# Patient Record
Sex: MALE | Race: WHITE | NOT HISPANIC OR LATINO | Employment: OTHER | URBAN - METROPOLITAN AREA
[De-identification: names, ages, dates, MRNs, and addresses within clinical notes are randomized per-mention and may not be internally consistent; named-entity substitution may affect disease eponyms.]

---

## 2017-01-13 ENCOUNTER — HOSPITAL ENCOUNTER (EMERGENCY)
Facility: HOSPITAL | Age: 77
Discharge: HOME | End: 2017-01-13
Attending: EMERGENCY MEDICINE
Payer: MEDICARE

## 2017-01-13 VITALS
DIASTOLIC BLOOD PRESSURE: 72 MMHG | TEMPERATURE: 98 F | RESPIRATION RATE: 16 BRPM | BODY MASS INDEX: 29.25 KG/M2 | OXYGEN SATURATION: 99 % | HEART RATE: 76 BPM | WEIGHT: 182 LBS | HEIGHT: 66 IN | SYSTOLIC BLOOD PRESSURE: 130 MMHG

## 2017-01-13 DIAGNOSIS — S49.92XA SHOULDER INJURY, LEFT, INITIAL ENCOUNTER: Primary | ICD-10-CM

## 2017-01-13 PROCEDURE — 63600175 PHARM REV CODE 636 W HCPCS: Performed by: EMERGENCY MEDICINE

## 2017-01-13 PROCEDURE — 99283 EMERGENCY DEPT VISIT LOW MDM: CPT | Mod: 25

## 2017-01-13 PROCEDURE — 96372 THER/PROPH/DIAG INJ SC/IM: CPT

## 2017-01-13 RX ORDER — MORPHINE SULFATE 10 MG/ML
5 INJECTION INTRAMUSCULAR; INTRAVENOUS; SUBCUTANEOUS
Status: COMPLETED | OUTPATIENT
Start: 2017-01-13 | End: 2017-01-13

## 2017-01-13 RX ORDER — OXYCODONE AND ACETAMINOPHEN 5; 325 MG/1; MG/1
1 TABLET ORAL EVERY 8 HOURS PRN
Qty: 15 TABLET | Refills: 0 | Status: SHIPPED | OUTPATIENT
Start: 2017-01-13 | End: 2018-12-07

## 2017-01-13 RX ADMIN — MORPHINE SULFATE 5 MG: 10 INJECTION INTRAVENOUS at 04:01

## 2017-01-13 NOTE — ED TRIAGE NOTES
Threw a plastic empty plastic bottle, heard something snap in left shoulder, now cant move shoulder.  complaints of pain in the left shoulder joint. Denies numbness or tingling distally.

## 2017-01-13 NOTE — DISCHARGE INSTRUCTIONS
As discussed it is very important that you take the sling off several times a day and do the exercises that were demonstrated.  You may apply ice to your shoulder a few times a day for the next few days.

## 2017-01-13 NOTE — ED PROVIDER NOTES
"Encounter Date: 1/13/2017    SCRIBE #1 NOTE: I, Fabricio Lieberman, am scribing for, and in the presence of, Gary Pearson III, MD. Other sections scribed: HPI, ROS.       History     Chief Complaint   Patient presents with    Shoulder Pain     "I think he dislocated his shoulder," per family member, pt was throwing something 30 mins pta, reports unable to move L upper arm, has pacemaker on L side of chest, denies hx of shoulder injuries, Hx CVA 14yrs ago, takes coumadin, CAD, DM     Review of patient's allergies indicates:   Allergen Reactions    Penicillins Swelling     HPI Comments: CC: Shoulder Pain  HPI: This 76 y.o. male with HTN, DM type II, hypothyroidism, CAD s/p stents x2, anticoagulation on Plavix, cardiac pacemaker in place and Hx of tobacco use presents to the ED c/o L shoulder pain that began this afternoon. His pain is severe, constant, acute, atraumatic and worse with ROM of shoulder. He denies Hx of previous shoulder injuries. He denies relief after taking a Percocet 5-325 mg PTA. He denies elbow pain, wrist pain, numbness to arm, neck pain, chest pain, and fever.      The history is provided by the patient.     Past Medical History   Diagnosis Date    Anticoagulant long-term use      Plavix (on hold for angiogram)    Arthritis     Coronary artery disease      pacemaker, stentsx2    Diabetes mellitus     Hypertension     Sleep apnea     Stroke      Rt side affected / mild    Thyroid disease      No past medical history pertinent negatives.  Past Surgical History   Procedure Laterality Date    Coronary angioplasty with stent placement  6/06     stents x 2 placed     Cholecystectomy      Appendectomy      Hernia repair       ventral hernia repair     No family history on file.  Social History   Substance Use Topics    Smoking status: Former Smoker     Quit date: 6/13/1998    Smokeless tobacco: Never Used    Alcohol use No     Review of Systems   Constitutional: Negative for chills and fever. "   HENT: Negative for congestion.    Eyes: Negative for pain and visual disturbance.   Respiratory: Negative for cough and shortness of breath.    Cardiovascular: Negative for chest pain.   Gastrointestinal: Negative for abdominal pain, diarrhea, nausea and vomiting.   Genitourinary: Negative for dysuria.   Musculoskeletal: Positive for arthralgias (L shoulder). Negative for joint swelling and neck pain.   Skin: Negative for rash and wound.   Neurological: Negative for numbness and headaches.       Physical Exam   Initial Vitals   BP Pulse Resp Temp SpO2   01/13/17 1612 01/13/17 1612 01/13/17 1612 01/13/17 1612 01/13/17 1612   134/70 72 18 98.6 °F (37 °C) 99 %     Physical Exam    Constitutional: He appears well-developed and well-nourished. He is not diaphoretic. No distress.   HENT:   Head: Normocephalic and atraumatic.   Right Ear: External ear normal.   Left Ear: External ear normal.   Eyes: Conjunctivae and EOM are normal. Pupils are equal, round, and reactive to light.   Neck: Normal range of motion.   Cardiovascular: Normal rate and regular rhythm.   Pulmonary/Chest: No respiratory distress.   Abdominal: He exhibits no distension.   Musculoskeletal: He exhibits no edema.        Arms:  Neurological: He is alert. GCS eye subscore is 4. GCS verbal subscore is 5. GCS motor subscore is 6.   Skin: Skin is warm and dry.   Psychiatric: He has a normal mood and affect. Thought content normal.         ED Course   Procedures  Labs Reviewed - No data to display          Medical Decision Making:   Initial Assessment:   76-year-old male presents complaining of severe, acute onset left shoulder pain that began while throwing something immediately prior to arrival.  He does have limited abduction of the left shoulder, but there is no obvious deformity on exam.  Areas of tenderness documented above.  No neurovascular compromise.  No associated signs or symptoms to suggest referred pain.  Differential Diagnosis:   Likely  represents muscular or tendinous strain.  Acromioclavicular joint separation also a possibility.  Much lower likelihood of fracture or dislocation, but will obtain an x-ray to screen for these.  Independently Interpreted Test(s):   I have ordered and independently interpreted X-rays - see summary below.       <> Summary of X-Ray Reading(s): Left shoulder x-ray: No acute bony abnormality, high riding humeral head  ED Management:  X-ray does not reveal acute bony abnormality.  Suspect soft tissue injury.  Will place the patient in a sling and swath, prescribed pain medication, and recommend range of motion exercises and follow-up with orthopedics.  Will hold NSAIDs this patient is taking warfarin. Patient counseled regarding test results, recommendations for supportive care, and need for follow-up.  Return precautions given.              Scribe Attestation:   Scribe #1: I performed the above scribed service and the documentation accurately describes the services I performed. I attest to the accuracy of the note.    Attending Attestation:           Physician Attestation for Scribe:  Physician Attestation Statement for Scribe #1: I, Gary Pearson III, MD, reviewed documentation, as scribed by Fabricio Lieberman in my presence, and it is both accurate and complete.                 ED Course     Clinical Impression:   The encounter diagnosis was Shoulder injury, left, initial encounter.          Gary Pearson III, MD  01/13/17 1942

## 2017-01-13 NOTE — ED AVS SNAPSHOT
[facility]  [address] [place] [address]               August Sukumarclair   2017  4:14 PM   ED    Description:  Male : 1940   Department:  [facility]           Your Care was Coordinated By:     Provider Role From To    Gary Pearson III, MD Attending Provider 17 [id number] --      Reason for Visit     Shoulder Pain           Diagnoses this Visit        Comments    Shoulder injury, left, initial encounter    -  Primary       ED Disposition     ED Disposition Condition Comment    Discharge             To Do List           Follow-up Information     Follow up with Vaughn Minor III, MD. Call in 3 days.    Specialty:  Orthopedic Surgery    Contact information:    [address]  [address]  [place] [address]  [phone]         These Medications        Disp Refills Start End    oxycodone-acetaminophen (PERCOCET) 5-325 mg per tablet 15 tablet 0 2017     Take 1 tablet by mouth every 8 (eight) hours as needed for Pain. - Oral    Pharmacy: [facility]  [place]  [address] [facility] Ph #: [phone]         [facility]     [facility] -  Assistance  Registered nurses in the [facility] provide clinical advisement, health education, appointment booking, and other advisory services.  Call for this free service at [phone].             Medications           These medications were administered today        Dose Freq    morphine injection 5 mg 5 mg ED 1 Time    Sig: Inject 0.5 mLs (5 mg total) into the muscle ED 1 Time.    Class: Normal    Route: Intramuscular      CHANGE how you are taking these medications     Start Taking Instead of    oxycodone-acetaminophen (PERCOCET) 5-325 mg per tablet oxycodone-acetaminophen 5-325 mg (PERCOCET) 5-325 mg per tablet    Dosage:  Take 1 tablet by mouth every 8 (eight) hours as needed for Pain. Dosage:  Take 1 tablet by mouth  "every 6 (six) hours as needed for Pain (breakthrough pain).           Verify that the below list of medications is an accurate representation of the medications you are currently taking.  If none reported, the list may be blank. If incorrect, please contact your healthcare provider. Carry this list with you in case of emergency.           Current Medications     amiodarone (PACERONE) 200 MG Tab Take 200 mg by mouth once daily.     digoxin (LANOXIN) 250 mcg tablet Take 250 mcg by mouth once daily.    glimepiride (AMARYL) 4 MG tablet Take 4 mg by mouth before breakfast.    levothyroxine (SYNTHROID) 200 MCG tablet Take 200 mcg by mouth once daily.    metformin (GLUCOPHAGE) 500 MG tablet Take 500 mg by mouth 2 (two) times daily with meals.    ranitidine (ZANTAC) 300 MG tablet Take 300 mg by mouth every evening.    sertraline (ZOLOFT) 50 MG tablet Take 50 mg by mouth once daily.    tamsulosin (FLOMAX) 0.4 mg Cp24 TAKE 1 CAPSULE AT BEDTIME.    vitamin D 185 MG Tab Take 185 mg by mouth once daily.    warfarin (COUMADIN) 5 MG tablet Take 7.5 mg by mouth once daily.    clopidogrel (PLAVIX) 75 mg tablet Take 75 mg by mouth once daily.    oxycodone-acetaminophen (PERCOCET) 5-325 mg per tablet Take 1 tablet by mouth every 8 (eight) hours as needed for Pain.    sildenafil (VIAGRA) 100 MG tablet Take 1 tablet (100 mg total) by mouth daily as needed for Erectile Dysfunction.           Clinical Reference Information           Your Vitals Were     BP Pulse Temp Resp Height Weight    130/72 (BP Location: Right arm, Patient Position: Sitting, BP Method: Automatic) 76 98.2 °F (36.8 °C) (Oral) 16 5' 6" (1.676 m) 82.6 kg (182 lb)    SpO2 BMI             99% 29.38 kg/m2         Allergies as of 1/13/2017        Reactions    Penicillins Swelling      Immunizations Administered on Date of Encounter - 1/13/2017     None      ED Micro, Lab, POCT     None      ED Imaging Orders     Start Ordered       Status Ordering Provider    01/13/17 8807 " 01/13/17 1627  X-Ray Shoulder Trauma 3 view Left  1 time imaging      Final result         Discharge Instructions       As discussed it is very important that you take the sling off several times a day and do the exercises that were demonstrated.  You may apply ice to your shoulder a few times a day for the next few days.     [facility] Sign-Up     Activating your [facility] account is as easy as 1-2-3!     1) Visit [email or web address], select Sign Up Now, enter this activation code and your date of birth, then select Next.  [email or web address]  Expires: 2/27/2017  5:57 PM      2) Create a username and password to use when you visit [facility] in the future and select a security question in case you lose your password and select Next.    3) Enter your e-mail address and click Sign Up!    Additional Information  If you have questions, please e-mail [email or web address] or call [phone] to talk to our [facility] staff. Remember, [facility] is NOT to be used for urgent needs. For medical emergencies, dial 911.         Smoking Cessation     If you would like to quit smoking:   You may be eligible for free services if you are a [place] resident and started smoking cigarettes before September 1, 1988.  Call the [employer] ([employer]) toll free at (933) 304-3788 or (316) 711-2749.   Call 1-800-QUIT-NOW if you do not meet the above criteria.             [facility] complies with applicable Federal civil rights laws and does not discriminate on the basis of race, color, national origin, age, disability, or sex.        Language Assistance Services     ATTENTION: Language assistance services are available, free of charge. Please call [phone].      ATENCIÓN: Si habla gracie, tiene a jurado disposición servicios gratuitos de asistencia lingüística. Llame al [phone].     CHÚ Ý: N?u b?n nói Ti?ng Vi?t, có các d?ch v? h? tr? ngôn ng? mi?n phí dành cho b?n. G?i s? [phone].

## 2017-01-20 ENCOUNTER — HOSPITAL ENCOUNTER (OUTPATIENT)
Dept: RADIOLOGY | Facility: HOSPITAL | Age: 77
Discharge: HOME | End: 2017-01-20
Attending: ORTHOPAEDIC SURGERY
Payer: MEDICARE

## 2017-01-20 ENCOUNTER — HOSPITAL ENCOUNTER (OUTPATIENT)
Dept: INTERVENTIONAL RADIOLOGY/VASCULAR | Facility: HOSPITAL | Age: 77
Discharge: HOME | End: 2017-01-20
Attending: ORTHOPAEDIC SURGERY
Payer: MEDICARE

## 2017-01-20 DIAGNOSIS — M75.112 NONTRAUMATIC INCOMPLETE TEAR OF LEFT ROTATOR CUFF: ICD-10-CM

## 2017-01-20 DIAGNOSIS — M25.512 SHOULDER PAIN, LEFT: ICD-10-CM

## 2017-01-20 PROCEDURE — 25500020 PHARM REV CODE 255: Performed by: ORTHOPAEDIC SURGERY

## 2017-01-20 PROCEDURE — 20610 DRAIN/INJ JOINT/BURSA W/O US: CPT | Mod: ,,, | Performed by: RADIOLOGY

## 2017-01-20 PROCEDURE — 73201 CT UPPER EXTREMITY W/DYE: CPT | Mod: TC,LT

## 2017-01-20 PROCEDURE — 73201 CT UPPER EXTREMITY W/DYE: CPT | Mod: 26,LT,, | Performed by: RADIOLOGY

## 2017-01-20 PROCEDURE — 77002 NEEDLE LOCALIZATION BY XRAY: CPT | Mod: TC

## 2017-01-20 PROCEDURE — 77002 NEEDLE LOCALIZATION BY XRAY: CPT | Mod: 26,,, | Performed by: RADIOLOGY

## 2017-01-20 RX ADMIN — IOHEXOL 10 ML: 300 INJECTION, SOLUTION INTRAVENOUS at 04:01

## 2017-02-16 ENCOUNTER — HOSPITAL ENCOUNTER (EMERGENCY)
Facility: HOSPITAL | Age: 77
Discharge: HOME | End: 2017-02-16
Payer: MEDICARE

## 2017-02-16 VITALS
RESPIRATION RATE: 18 BRPM | TEMPERATURE: 99 F | DIASTOLIC BLOOD PRESSURE: 77 MMHG | OXYGEN SATURATION: 98 % | HEART RATE: 90 BPM | SYSTOLIC BLOOD PRESSURE: 139 MMHG

## 2017-02-16 DIAGNOSIS — J10.1 INFLUENZA A: Primary | ICD-10-CM

## 2017-02-16 LAB
ALBUMIN SERPL BCP-MCNC: 3.7 G/DL
ALP SERPL-CCNC: 89 U/L
ALT SERPL W/O P-5'-P-CCNC: 21 U/L
ANION GAP SERPL CALC-SCNC: 11 MMOL/L
AST SERPL-CCNC: 22 U/L
BACTERIA #/AREA URNS HPF: ABNORMAL /HPF
BASOPHILS # BLD AUTO: 0.02 K/UL
BASOPHILS NFR BLD: 0.4 %
BILIRUB SERPL-MCNC: 0.9 MG/DL
BILIRUB UR QL STRIP: NEGATIVE
BNP SERPL-MCNC: 81 PG/ML
BUN SERPL-MCNC: 15 MG/DL
CALCIUM SERPL-MCNC: 9.2 MG/DL
CHLORIDE SERPL-SCNC: 105 MMOL/L
CLARITY UR: CLEAR
CO2 SERPL-SCNC: 22 MMOL/L
COLOR UR: YELLOW
CREAT SERPL-MCNC: 0.9 MG/DL
DIFFERENTIAL METHOD: ABNORMAL
DIGOXIN SERPL-MCNC: <0.1 NG/ML
EOSINOPHIL # BLD AUTO: 0 K/UL
EOSINOPHIL NFR BLD: 0.4 %
ERYTHROCYTE [DISTWIDTH] IN BLOOD BY AUTOMATED COUNT: 17.1 %
EST. GFR  (AFRICAN AMERICAN): >60 ML/MIN/1.73 M^2
EST. GFR  (NON AFRICAN AMERICAN): >60 ML/MIN/1.73 M^2
FLUAV AG SPEC QL IA: POSITIVE
FLUBV AG SPEC QL IA: NEGATIVE
GLUCOSE SERPL-MCNC: 130 MG/DL
GLUCOSE UR QL STRIP: ABNORMAL
HCT VFR BLD AUTO: 40.6 %
HGB BLD-MCNC: 13.5 G/DL
HGB UR QL STRIP: NEGATIVE
HYALINE CASTS #/AREA URNS LPF: 0 /LPF
INR PPP: 2.2
KETONES UR QL STRIP: NEGATIVE
LEUKOCYTE ESTERASE UR QL STRIP: NEGATIVE
LYMPHOCYTES # BLD AUTO: 0.3 K/UL
LYMPHOCYTES NFR BLD: 4.7 %
MCH RBC QN AUTO: 31.8 PG
MCHC RBC AUTO-ENTMCNC: 33.3 %
MCV RBC AUTO: 96 FL
MICROSCOPIC COMMENT: ABNORMAL
MONOCYTES # BLD AUTO: 0.5 K/UL
MONOCYTES NFR BLD: 9.8 %
NEUTROPHILS # BLD AUTO: 4.5 K/UL
NEUTROPHILS NFR BLD: 84.5 %
NITRITE UR QL STRIP: NEGATIVE
PH UR STRIP: 5 [PH] (ref 5–8)
PLATELET # BLD AUTO: 130 K/UL
PMV BLD AUTO: 10.3 FL
POTASSIUM SERPL-SCNC: 3.7 MMOL/L
PROT SERPL-MCNC: 7.2 G/DL
PROT UR QL STRIP: ABNORMAL
PROTHROMBIN TIME: 22.1 SEC
RBC # BLD AUTO: 4.24 M/UL
RBC #/AREA URNS HPF: 2 /HPF (ref 0–4)
SODIUM SERPL-SCNC: 138 MMOL/L
SP GR UR STRIP: 1.03 (ref 1–1.03)
SPECIMEN SOURCE: ABNORMAL
SQUAMOUS #/AREA URNS HPF: 2 /HPF
TROPONIN I SERPL DL<=0.01 NG/ML-MCNC: 0.02 NG/ML
URN SPEC COLLECT METH UR: ABNORMAL
UROBILINOGEN UR STRIP-ACNC: NEGATIVE EU/DL
WBC # BLD AUTO: 5.32 K/UL
WBC #/AREA URNS HPF: 1 /HPF (ref 0–5)
WBC CASTS #/AREA URNS LPF: 1 /LPF

## 2017-02-16 PROCEDURE — 80053 COMPREHEN METABOLIC PANEL: CPT

## 2017-02-16 PROCEDURE — 94761 N-INVAS EAR/PLS OXIMETRY MLT: CPT

## 2017-02-16 PROCEDURE — 94640 AIRWAY INHALATION TREATMENT: CPT

## 2017-02-16 PROCEDURE — 84484 ASSAY OF TROPONIN QUANT: CPT

## 2017-02-16 PROCEDURE — 81000 URINALYSIS NONAUTO W/SCOPE: CPT

## 2017-02-16 PROCEDURE — 83880 ASSAY OF NATRIURETIC PEPTIDE: CPT

## 2017-02-16 PROCEDURE — 27000221 HC OXYGEN, UP TO 24 HOURS

## 2017-02-16 PROCEDURE — 87400 INFLUENZA A/B EACH AG IA: CPT | Mod: 59

## 2017-02-16 PROCEDURE — 25000242 PHARM REV CODE 250 ALT 637 W/ HCPCS

## 2017-02-16 PROCEDURE — 93005 ELECTROCARDIOGRAM TRACING: CPT

## 2017-02-16 PROCEDURE — 85610 PROTHROMBIN TIME: CPT

## 2017-02-16 PROCEDURE — 99284 EMERGENCY DEPT VISIT MOD MDM: CPT

## 2017-02-16 PROCEDURE — 80162 ASSAY OF DIGOXIN TOTAL: CPT

## 2017-02-16 PROCEDURE — 85025 COMPLETE CBC W/AUTO DIFF WBC: CPT

## 2017-02-16 PROCEDURE — 25000003 PHARM REV CODE 250

## 2017-02-16 RX ORDER — OSELTAMIVIR PHOSPHATE 75 MG/1
75 CAPSULE ORAL 2 TIMES DAILY
Qty: 10 CAPSULE | Refills: 0 | Status: SHIPPED | OUTPATIENT
Start: 2017-02-16 | End: 2017-02-21

## 2017-02-16 RX ORDER — IPRATROPIUM BROMIDE AND ALBUTEROL SULFATE 2.5; .5 MG/3ML; MG/3ML
3 SOLUTION RESPIRATORY (INHALATION)
Status: COMPLETED | OUTPATIENT
Start: 2017-02-16 | End: 2017-02-16

## 2017-02-16 RX ORDER — OSELTAMIVIR PHOSPHATE 75 MG/1
75 CAPSULE ORAL
Status: COMPLETED | OUTPATIENT
Start: 2017-02-16 | End: 2017-02-16

## 2017-02-16 RX ADMIN — IPRATROPIUM BROMIDE AND ALBUTEROL SULFATE 3 ML: .5; 3 SOLUTION RESPIRATORY (INHALATION) at 01:02

## 2017-02-16 RX ADMIN — OSELTAMIVIR PHOSPHATE 75 MG: 75 CAPSULE ORAL at 02:02

## 2017-02-16 NOTE — ED AVS SNAPSHOT
[facility]  [address] [place] [address]               August Ewelina   2017 12:49 PM   ED    Description:  Male : 1940   Department:  [facility]           Your Care was Coordinated By:     Provider Role From To    Gary Valdez MD Attending Provider 17 1302 --      Reason for Visit     Fatigue           Diagnoses this Visit        Comments    Influenza A    -  Primary       ED Disposition     None           To Do List           Follow-up Information     Follow up with Nic Mauro MD. Schedule an appointment as soon as possible for a visit in 1 day.    Specialty:  Family Medicine    Why:  Consider again the flu vaccine every year, please discuss your absence of digoxin in her blood.  Return for worsening fevers symptoms cough weakness any concern    Contact information:    [address] [place] [address]  PRIMARY CARE [facility] [address] [phone]         These Medications        Disp Refills Start End    oseltamivir (TAMIFLU) 75 MG capsule 10 capsule 0 2017    Take 1 capsule (75 mg total) by mouth 2 (two) times daily. - Oral    Pharmacy: [facility]  [place] - [address] [facility] Ph #: [phone]         [facility]     [facility] -  Assistance  Registered nurses in the [facility] provide clinical advisement, health education, appointment booking, and other advisory services.  Call for this free service at [phone].             Medications           START taking these NEW medications        Refills    oseltamivir (TAMIFLU) 75 MG capsule 0    Sig: Take 1 capsule (75 mg total) by mouth 2 (two) times daily.    Class: Print    Route: Oral      These medications were administered today        Dose Freq    albuterol-ipratropium 2.5mg-0.5mg/3mL nebulizer solution 3 mL 3 mL ED 1 Time    Sig: Take 3 mLs by nebulization ED 1 Time.     Class: Normal    Route: Nebulization    oseltamivir capsule 75 mg 75 mg ED 1 Time    Sig: Take 1 capsule (75 mg total) by mouth ED 1 Time.    Class: Normal    Route: Oral           Verify that the below list of medications is an accurate representation of the medications you are currently taking.  If none reported, the list may be blank. If incorrect, please contact your healthcare provider. Carry this list with you in case of emergency.           Current Medications     amiodarone (PACERONE) 200 MG Tab Take 200 mg by mouth once daily.     clopidogrel (PLAVIX) 75 mg tablet Take 75 mg by mouth once daily.    digoxin (LANOXIN) 250 mcg tablet Take 250 mcg by mouth once daily.    glimepiride (AMARYL) 4 MG tablet Take 4 mg by mouth before breakfast.    levothyroxine (SYNTHROID) 200 MCG tablet Take 200 mcg by mouth once daily.    metformin (GLUCOPHAGE) 500 MG tablet Take 500 mg by mouth 2 (two) times daily with meals.    ranitidine (ZANTAC) 300 MG tablet Take 300 mg by mouth every evening.    sertraline (ZOLOFT) 50 MG tablet Take 50 mg by mouth once daily.    tamsulosin (FLOMAX) 0.4 mg Cp24 TAKE 1 CAPSULE AT BEDTIME.    vitamin D 185 MG Tab Take 185 mg by mouth once daily.    warfarin (COUMADIN) 5 MG tablet Take 7.5 mg by mouth once daily.    oseltamivir (TAMIFLU) 75 MG capsule Take 1 capsule (75 mg total) by mouth 2 (two) times daily.    oseltamivir capsule 75 mg Take 1 capsule (75 mg total) by mouth ED 1 Time.    oxycodone-acetaminophen (PERCOCET) 5-325 mg per tablet Take 1 tablet by mouth every 8 (eight) hours as needed for Pain.    sildenafil (VIAGRA) 100 MG tablet Take 1 tablet (100 mg total) by mouth daily as needed for Erectile Dysfunction.           Clinical Reference Information           Your Vitals Were     BP Pulse Temp Resp SpO2       139/77 90 99.2 °F (37.3 °C) (Oral) 18 98%       Allergies as of 2/16/2017        Reactions    Penicillins Swelling      Immunizations Administered on Date of Encounter -  2/16/2017     None      ED Micro, Lab, POCT     Start Ordered       Status Ordering Provider    02/16/17 1308 02/16/17 1307  Protime-INR  STAT      Final result     02/16/17 1308 02/16/17 1307  Troponin I  STAT      Final result     02/16/17 1308 02/16/17 1307  B-Type natriuretic peptide (BNP)  STAT      Final result     02/16/17 1308 02/16/17 1307  Digoxin level  Once      Final result     02/16/17 1307 02/16/17 1307  CBC auto differential  STAT      Final result     02/16/17 1307 02/16/17 1307  Comprehensive metabolic panel  STAT      Final result     02/16/17 1307 02/16/17 1307  Urinalysis - Clean Catch  STAT      Final result     02/16/17 1307 02/16/17 1307  Influenza antigen Nasopharyngeal Wash  STAT      Final result     02/16/17 1307 02/16/17 1307  Urinalysis Microscopic  Once      Final result       ED Imaging Orders     Start Ordered       Status Ordering Provider    02/16/17 1307 02/16/17 1307  X-Ray Chest AP Portable  1 time imaging      Final result         Discharge Instructions         Flu Vaccines for Adults   The flu (influenza) is caused by a virus that is easily spread. A flu vaccine protects you and others from the flu. Its best to get a flu shot each fall, as soon as the vaccine is available in your area. You can get it at your healthcare providers office or a health clinic. Drugstores, senior centers, and workplaces often offer flu shots, too. If you want to know if your provider has the flu vaccine available, or if you have other questions, ask your healthcare provider.    Flu facts  · The flu shot will not give you the flu.  · The flu can be dangerous--even life-threatening. Every year, about 36,000 people die from complications from the flu.  · The flu is caused by a virus. It cant be treated with antibiotics.  · Influenza is not the same as stomach flu, the 24-hour bug that causes vomiting and diarrhea. This is most likely because of a GI (gastrointestinal) infection--not the  "flu.  · You need to get a flu shot each year.   Flu symptoms  Flu symptoms tend to come on quickly. Fever, headache, fatigue, cough, sore throat, runny nose, and muscle aches are symptoms of the flu. Upset stomach and vomiting are not common for adults. Some symptoms such as fatigue and cough may last a few weeks.  How a flu shot protects you  There are many types (strains) of the flu virus. Medical experts predict which strains are most likely to make people sick each year. Flu shots are made from these strains. When you get a flu vaccine, "killed" (inactivated) or very mild flu viruses are injected into your body or sprayed into your nose. These cannot give you the flu. But they do cause your body to make antibodies to fight these flu strains. If you are exposed to the same strains later in the flu season, the antibodies will fight off the germs.  Recommendations for the flu vaccine  The CDC recommends that infants over the age of 6 months and all children and adults should get a flu shot every year.  Some people are at an increased risk of developing serious complications from the flu. It is extremely important that these people get the vaccine. They include those with:  · Long-term heart and lung conditions  · Other serious health conditions  ¨ Endocrine disorders such as diabetes  ¨ Kidney or liver disorders  ¨ Weakened immune system from disease or medical treatment. For example, people with HIV or AIDS or those taking long-term steroids or medicines to treat cancer.  ¨ Blood disorders such as sickle cell disease  It is also very important that others who have an increased risk of being exposed to the flu or are around people with increased risk for complications get the vaccine. They are:  · Healthcare providers and other staff who provide care in hospitals, nursing homes, home health, and other facilities  · Household members, including children of people in high-risk groups  Types of flu vaccines  The flu " vaccine is available as a shot and as a nasal spray. Your healthcare provider will determine which vaccine is right for you.  Flu shot  The shot is available in a few different forms. There is a high-dose vaccine for those over 65 and a vaccine for those with egg allergies. It is safe for most people. Talk with your provider if you have had:  · A severe allergic reaction to a previous flu vaccine  · Guillain-Barré syndrome. This is a severe paralyzing condition.  Nasal spray  The nasal spray is not recommended for the 4946-0474 flu season. The CDC says this is because the nasal spray did not seem to protect against the flu over the last several flu seasons. In the past, it was meant for people ages 2 to 49.  Date Last Reviewed: 8/27/2014 © 2000-2016 [employer]. [address] [place] [address]. All rights reserved. This information is not intended as a substitute for professional medical care. Always follow your healthcare professional's instructions.          Influenza     Viruses that cause influenza spread through the air in droplets when someone who has the flu coughs, sneezes, laughs, or talks.   Influenza (the flu) is an infection that affects your respiratory tract. This tract is made up of your mouth, nose, and lungs, and the passages between them. Unlike a cold, the flu can make you very ill. And it can lead to pneumonia, a serious lung infection. The flu can have serious complications and even be fatal for some people. These include older adults, young children, and people with certain chronic conditions.  Who is at risk for the flu?  Anyone can get the flu. But you are more likely to become infected if you:  · Have a weakened immune system  · Work in a healthcare setting where you may be exposed to flu germs  · Live or work with someone who has the flu  · Havent had an annual flu shot  How does the flu spread?  The flu is caused by viruses. The viruses spread through the air in  droplets when someone who has the flu coughs, sneezes, laughs, or talks. You can become infected when you inhale these viruses directly. You can also become infected when you touch a surface on which the droplets have landed and then transfer the germs to your eyes, nose, or mouth. Touching used tissues, or sharing utensils, drinking glasses, or a toothbrush with an infected person can expose you to flu viruses, too.  What are the symptoms of the flu?  Flu symptoms tend to come on quickly and may last a few days to a few weeks. They include:  · Fever usually higher than 100.4°F  (38°C) and chills  · Sore throat and headache  · Dry cough  · Runny nose  · Tiredness and weakness  · Muscle aches  Things that make the flu worse  For some people, the flu can be very serious. The risk for complications is greater for:  · Children younger than age 5  · Adults ages 65 and older  · People with a chronic illness such as diabetes or heart, kidney, or lung disease  · People who live in a nursing home or long-term care facility   How is the flu treated?  The flu usually gets better after 7 days or so. In some cases, your healthcare provider may prescribe an antiviral medicine. This may help you get well sooner. For the medicine to help, you need to take it as soon as possible (ideally within 48 hours) after your symptoms start. If you develop pneumonia or other serious illness, you may need to stay in the hospital.  Easing flu symptoms  · Drink lots of fluids such as water, juice, and warm soup. A good rule is to drink enough so that you urinate your normal amount.  · Get plenty of rest.  · Ask your healthcare provider what to take for fever and pain.  · Call your provider if your fever is 100.4°F (38°C) or higher, or you become dizzy, lightheaded, or short of breath.  Taking steps to protect others  · Wash your hands often, especially after coughing or sneezing. Or clean your hands with an alcohol-based hand  containing  at least 60% alcohol.  · Cough or sneeze into a tissue. Then throw the tissue away and wash your hands. If you dont have a tissue, cough and sneeze into the crook of your elbow.  · Stay home until at least 24 hours after you no longer have a fever or chills. Be sure the fever isnt being hidden by fever-reducing medicine.  · Dont share food, utensils, drinking glasses, or a toothbrush with others.  · Ask your healthcare provider if others in your household should get antiviral medicine to help them avoid infection.  How can the flu be prevented?  · One of the best ways to avoid the flu is to get a flu vaccine each year. Viruses that cause the flu change from year to year. For that reason, doctors recommend getting the flu vaccine each year, as soon as it's available in your area. The vaccine may be given as a shot or as a nasal spray. Your healthcare provider can tell you which vaccine is right for you. The nasal spray is not recommended for the 2099-0563 flu season. The CDC says this is because the nasal spray did not seem to protect against the flu over the last several flu seasons. In the past, it was meant for people ages 2 to 49.  · Wash your hands often. Frequent handwashing is a proven way to help prevent infection.  · Carry an alcohol-based hand gel containing at least 60% alcohol. Use it when you can't use soap and water. Then wash your hands as soon as you can.  · Avoid touching your eyes, nose, and mouth.  · At home and work, clean phones, computer keyboards, and toys often with disinfectant wipes.  · If possible, avoid close contact with others who have the flu or symptoms of the flu.  Handwashing tips  Handwashing is one of the best ways to prevent many common infections. If you are caring for or visiting someone with the flu, wash your hands each time you enter and leave the room. Follow these steps:  · Use warm water and plenty of soap. Rub your hands together well.  · Clean the whole hand, under  your nails, between your fingers, and up the wrists.  · Wash for at least 15 seconds.  · Rinse, letting the water run down your fingers, not up your wrists.  · Dry your hands well. Use a paper towel to turn off the faucet and open the door.  Using alcohol-based hand   Alcohol-based hand  are also a good choice. Use them when you can't use soap and water. Follow these steps:  · Squeeze about a tablespoon of gel into the palm of one hand.  · Rub your hands together briskly, cleaning the backs of your hands, the palms, between your fingers, and up the wrists.  · Rub until the gel is gone and your hands are completely dry.  Preventing influenza in healthcare settings  The flu is a special concern for people in hospitals and long-term care facilities. To help prevent the spread of flu, many hospitals and nursing homes take these steps:  · Healthcare providers wash their hands or use an alcohol-based hand  before and after treating each patient.  · People with the flu have private rooms and bathrooms or share a room with someone with the same infection.  · People at high-risk for the flu but don't have it are encouraged to get the flu and pneumonia vaccines.  · All healthcare workers are encouraged or required to get flu shots.   Date Last Reviewed: 8/27/2014 © 2000-2016 [employer]. [address] [place] [address]. All rights reserved. This information is not intended as a substitute for professional medical care. Always follow your healthcare professional's instructions.          Influenza (Adult)    Influenza is also called the flu. It is a viral illness that affects the air passages of your lungs. It is different from the common cold. The flu can easily be passed from one to person to another. It may be spread through the air by coughing and sneezing. Or it can be spread by touching the sick person and then touching your own eyes, nose, or mouth.  The flu starts 1 to 3  days after you are exposed to the flu virus. It may last for 1 to 2 weeks. You usually dont need to take antibiotics unless you have a complication. This might be an ear or sinus infection or pneumonia.  Symptoms of the flu may be mild or severe. They can include extreme tiredness (wanting to stay in bed all day), chills, fevers, muscle aches, soreness with eye movement, headache, and a dry, hacking cough.  Home care  Follow these guidelines when caring for yourself at home:  · Avoid being around cigarette smoke, whether yours or other peoples.  · Acetaminophen or ibuprofen will help ease your fever, muscle aches, and headache. Dont give aspirin to anyone younger than 18 who has the flu. Aspirin can harm the liver.  · Nausea and loss of appetite are common with the flu. Eat light meals. Drink 6 to 8 glasses of liquids every day. Good choices are water, sport drinks, soft drinks without caffeine, juices, tea, and soup. Extra fluids will also help loosen secretions in your nose and lungs.  · Over-the-counter cold medicines will not make the flu go away faster. But the medicines may help with coughing, sore throat, and congestion in your nose and sinuses. Dont use a decongestant if you have high blood pressure.  · Stay home until your fever has been gone for at least 24 hours without using medicine to reduce fever.  Follow-up care  Follow up with your healthcare provider, or as advised, if you are not getting better over the next week.  If you are 65 or older, talk with your provider about getting a pneumococcal vaccine every 5 years. You should also get this vaccine if you have chronic asthma or COPD. All adults should get a flu vaccine every fall. Ask your provider about this.  When to seek medical advice  Call your healthcare provider right away if any of these occur:  · Cough with lots of colored sputum (mucus) or blood in your sputum  · Chest pain, shortness of breath, wheezing, or difficulty  breathing  · Severe headache, or face, neck, or ear pain  · New rash with fever  · Fever of 100.4°F (38°C) or higher, or as directed by your healthcare provider  · Confusion, behavior change, or seizure  · Severe weakness or dizziness  · You get a fever or cough after getting better for a few days  Date Last Reviewed: 12/23/2014  © 3105-9410 [employer]. [address] [place] [address], [place] [address]. All rights reserved. This information is not intended as a substitute for professional medical care. Always follow your healthcare professional's instructions.          Smoking Cessation     If you would like to quit smoking:   You may be eligible for free services if you are a [place] resident and started smoking cigarettes before September 1, 1988.  Call the [employer]oking [employer]essation [employer] ([employer]) toll free at (834) 018-8584 or (400) 123-2977.   Call 8-800-QUIT-NOW if you do not meet the above criteria.             [facility] complies with applicable Federal civil rights laws and does not discriminate on the basis of race, color, national origin, age, disability, or sex.        Language Assistance Services     ATTENTION: Language assistance services are available, free of charge. Please call [phone].      ATENCIÓN: Si habla español, tiene a jurado disposición servicios gratuitos de asistencia lingüística. Llame al [phone].     CHÚ Ý: N?u b?n nói Ti?ng Vi?t, có các d?ch v? h? tr? ngôn ng? mi?n phí dành cho b?n. G?i s? [phone].

## 2017-02-16 NOTE — PROVIDER PROGRESS NOTES - EMERGENCY DEPT.
Encounter Date: 2/16/2017    ED Physician Progress Notes         lungs clear all fields patient is satting 98% on room air after DuoNeb

## 2017-02-16 NOTE — DISCHARGE INSTRUCTIONS
"  Flu Vaccines for Adults   The flu (influenza) is caused by a virus that is easily spread. A flu vaccine protects you and others from the flu. Its best to get a flu shot each fall, as soon as the vaccine is available in your area. You can get it at your healthcare providers office or a health clinic. Drugstores, senior centers, and workplaces often offer flu shots, too. If you want to know if your provider has the flu vaccine available, or if you have other questions, ask your healthcare provider.    Flu facts  · The flu shot will not give you the flu.  · The flu can be dangerous--even life-threatening. Every year, about 36,000 people die from complications from the flu.  · The flu is caused by a virus. It cant be treated with antibiotics.  · Influenza is not the same as stomach flu, the 24-hour bug that causes vomiting and diarrhea. This is most likely because of a GI (gastrointestinal) infection--not the flu.  · You need to get a flu shot each year.   Flu symptoms  Flu symptoms tend to come on quickly. Fever, headache, fatigue, cough, sore throat, runny nose, and muscle aches are symptoms of the flu. Upset stomach and vomiting are not common for adults. Some symptoms such as fatigue and cough may last a few weeks.  How a flu shot protects you  There are many types (strains) of the flu virus. Medical experts predict which strains are most likely to make people sick each year. Flu shots are made from these strains. When you get a flu vaccine, "killed" (inactivated) or very mild flu viruses are injected into your body or sprayed into your nose. These cannot give you the flu. But they do cause your body to make antibodies to fight these flu strains. If you are exposed to the same strains later in the flu season, the antibodies will fight off the germs.  Recommendations for the flu vaccine  The CDC recommends that infants over the age of 6 months and all children and adults should get a flu shot every year.  Some " people are at an increased risk of developing serious complications from the flu. It is extremely important that these people get the vaccine. They include those with:  · Long-term heart and lung conditions  · Other serious health conditions  ¨ Endocrine disorders such as diabetes  ¨ Kidney or liver disorders  ¨ Weakened immune system from disease or medical treatment. For example, people with HIV or AIDS or those taking long-term steroids or medicines to treat cancer.  ¨ Blood disorders such as sickle cell disease  It is also very important that others who have an increased risk of being exposed to the flu or are around people with increased risk for complications get the vaccine. They are:  · Healthcare providers and other staff who provide care in hospitals, nursing homes, home health, and other facilities  · Household members, including children of people in high-risk groups  Types of flu vaccines  The flu vaccine is available as a shot and as a nasal spray. Your healthcare provider will determine which vaccine is right for you.  Flu shot  The shot is available in a few different forms. There is a high-dose vaccine for those over 65 and a vaccine for those with egg allergies. It is safe for most people. Talk with your provider if you have had:  · A severe allergic reaction to a previous flu vaccine  · Guillain-Barré syndrome. This is a severe paralyzing condition.  Nasal spray  The nasal spray is not recommended for the 8354-3044 flu season. The CDC says this is because the nasal spray did not seem to protect against the flu over the last several flu seasons. In the past, it was meant for people ages 2 to 49.  Date Last Reviewed: 8/27/2014  © 4744-4529 [employer]. [address] [place] [address], [place] [address]. All rights reserved. This information is not intended as a substitute for professional medical care. Always follow your healthcare professional's instructions.          Influenza     Viruses  that cause influenza spread through the air in droplets when someone who has the flu coughs, sneezes, laughs, or talks.   Influenza (the flu) is an infection that affects your respiratory tract. This tract is made up of your mouth, nose, and lungs, and the passages between them. Unlike a cold, the flu can make you very ill. And it can lead to pneumonia, a serious lung infection. The flu can have serious complications and even be fatal for some people. These include older adults, young children, and people with certain chronic conditions.  Who is at risk for the flu?  Anyone can get the flu. But you are more likely to become infected if you:  · Have a weakened immune system  · Work in a healthcare setting where you may be exposed to flu germs  · Live or work with someone who has the flu  · Havent had an annual flu shot  How does the flu spread?  The flu is caused by viruses. The viruses spread through the air in droplets when someone who has the flu coughs, sneezes, laughs, or talks. You can become infected when you inhale these viruses directly. You can also become infected when you touch a surface on which the droplets have landed and then transfer the germs to your eyes, nose, or mouth. Touching used tissues, or sharing utensils, drinking glasses, or a toothbrush with an infected person can expose you to flu viruses, too.  What are the symptoms of the flu?  Flu symptoms tend to come on quickly and may last a few days to a few weeks. They include:  · Fever usually higher than 100.4°F  (38°C) and chills  · Sore throat and headache  · Dry cough  · Runny nose  · Tiredness and weakness  · Muscle aches  Things that make the flu worse  For some people, the flu can be very serious. The risk for complications is greater for:  · Children younger than age 5  · Adults ages 65 and older  · People with a chronic illness such as diabetes or heart, kidney, or lung disease  · People who live in a nursing home or long-term care  facility   How is the flu treated?  The flu usually gets better after 7 days or so. In some cases, your healthcare provider may prescribe an antiviral medicine. This may help you get well sooner. For the medicine to help, you need to take it as soon as possible (ideally within 48 hours) after your symptoms start. If you develop pneumonia or other serious illness, you may need to stay in the hospital.  Easing flu symptoms  · Drink lots of fluids such as water, juice, and warm soup. A good rule is to drink enough so that you urinate your normal amount.  · Get plenty of rest.  · Ask your healthcare provider what to take for fever and pain.  · Call your provider if your fever is 100.4°F (38°C) or higher, or you become dizzy, lightheaded, or short of breath.  Taking steps to protect others  · Wash your hands often, especially after coughing or sneezing. Or clean your hands with an alcohol-based hand  containing at least 60% alcohol.  · Cough or sneeze into a tissue. Then throw the tissue away and wash your hands. If you dont have a tissue, cough and sneeze into the crook of your elbow.  · Stay home until at least 24 hours after you no longer have a fever or chills. Be sure the fever isnt being hidden by fever-reducing medicine.  · Dont share food, utensils, drinking glasses, or a toothbrush with others.  · Ask your healthcare provider if others in your household should get antiviral medicine to help them avoid infection.  How can the flu be prevented?  · One of the best ways to avoid the flu is to get a flu vaccine each year. Viruses that cause the flu change from year to year. For that reason, doctors recommend getting the flu vaccine each year, as soon as it's available in your area. The vaccine may be given as a shot or as a nasal spray. Your healthcare provider can tell you which vaccine is right for you. The nasal spray is not recommended for the 4863-9953 flu season. The CDC says this is because the nasal  spray did not seem to protect against the flu over the last several flu seasons. In the past, it was meant for people ages 2 to 49.  · Wash your hands often. Frequent handwashing is a proven way to help prevent infection.  · Carry an alcohol-based hand gel containing at least 60% alcohol. Use it when you can't use soap and water. Then wash your hands as soon as you can.  · Avoid touching your eyes, nose, and mouth.  · At home and work, clean phones, computer keyboards, and toys often with disinfectant wipes.  · If possible, avoid close contact with others who have the flu or symptoms of the flu.  Handwashing tips  Handwashing is one of the best ways to prevent many common infections. If you are caring for or visiting someone with the flu, wash your hands each time you enter and leave the room. Follow these steps:  · Use warm water and plenty of soap. Rub your hands together well.  · Clean the whole hand, under your nails, between your fingers, and up the wrists.  · Wash for at least 15 seconds.  · Rinse, letting the water run down your fingers, not up your wrists.  · Dry your hands well. Use a paper towel to turn off the faucet and open the door.  Using alcohol-based hand   Alcohol-based hand  are also a good choice. Use them when you can't use soap and water. Follow these steps:  · Squeeze about a tablespoon of gel into the palm of one hand.  · Rub your hands together briskly, cleaning the backs of your hands, the palms, between your fingers, and up the wrists.  · Rub until the gel is gone and your hands are completely dry.  Preventing influenza in healthcare settings  The flu is a special concern for people in hospitals and long-term care facilities. To help prevent the spread of flu, many hospitals and nursing homes take these steps:  · Healthcare providers wash their hands or use an alcohol-based hand  before and after treating each patient.  · People with the flu have private rooms and  bathrooms or share a room with someone with the same infection.  · People at high-risk for the flu but don't have it are encouraged to get the flu and pneumonia vaccines.  · All healthcare workers are encouraged or required to get flu shots.   Date Last Reviewed: 8/27/2014  © 0707-4441 [employer]. [address] [place] [address], [place] [address]. All rights reserved. This information is not intended as a substitute for professional medical care. Always follow your healthcare professional's instructions.          Influenza (Adult)    Influenza is also called the flu. It is a viral illness that affects the air passages of your lungs. It is different from the common cold. The flu can easily be passed from one to person to another. It may be spread through the air by coughing and sneezing. Or it can be spread by touching the sick person and then touching your own eyes, nose, or mouth.  The flu starts 1 to 3 days after you are exposed to the flu virus. It may last for 1 to 2 weeks. You usually dont need to take antibiotics unless you have a complication. This might be an ear or sinus infection or pneumonia.  Symptoms of the flu may be mild or severe. They can include extreme tiredness (wanting to stay in bed all day), chills, fevers, muscle aches, soreness with eye movement, headache, and a dry, hacking cough.  Home care  Follow these guidelines when caring for yourself at home:  · Avoid being around cigarette smoke, whether yours or other peoples.  · Acetaminophen or ibuprofen will help ease your fever, muscle aches, and headache. Dont give aspirin to anyone younger than 18 who has the flu. Aspirin can harm the liver.  · Nausea and loss of appetite are common with the flu. Eat light meals. Drink 6 to 8 glasses of liquids every day. Good choices are water, sport drinks, soft drinks without caffeine, juices, tea, and soup. Extra fluids will also help loosen secretions in your nose and lungs.  · Over-the-counter  cold medicines will not make the flu go away faster. But the medicines may help with coughing, sore throat, and congestion in your nose and sinuses. Dont use a decongestant if you have high blood pressure.  · Stay home until your fever has been gone for at least 24 hours without using medicine to reduce fever.  Follow-up care  Follow up with your healthcare provider, or as advised, if you are not getting better over the next week.  If you are 65 or older, talk with your provider about getting a pneumococcal vaccine every 5 years. You should also get this vaccine if you have chronic asthma or COPD. All adults should get a flu vaccine every fall. Ask your provider about this.  When to seek medical advice  Call your healthcare provider right away if any of these occur:  · Cough with lots of colored sputum (mucus) or blood in your sputum  · Chest pain, shortness of breath, wheezing, or difficulty breathing  · Severe headache, or face, neck, or ear pain  · New rash with fever  · Fever of 100.4°F (38°C) or higher, or as directed by your healthcare provider  · Confusion, behavior change, or seizure  · Severe weakness or dizziness  · You get a fever or cough after getting better for a few days  Date Last Reviewed: 12/23/2014  © 0757-8376 [employer]. [address] [place] [address], [place] [address]. All rights reserved. This information is not intended as a substitute for professional medical care. Always follow your healthcare professional's instructions.

## 2017-02-16 NOTE — ED PROVIDER NOTES
"Encounter Date: 2/16/2017    SCRIBE #1 NOTE: I, Rubi Washburn, am scribing for, and in the presence of,  Gary Valdez MD. I have scribed the following portions of the note - Other sections scribed: HPI and ROS.       History     Chief Complaint   Patient presents with    Fatigue     Pt. with PMH of CVA presents with inability to walk or "put on pants this morning" accompanied by cough and nasal congestion. Pt. reportedly was also weak last night.      Review of patient's allergies indicates:   Allergen Reactions    Penicillins Swelling     HPI Comments: CC: Fatigue    HPI: This 76 y.o. Male who has DM, HTN, CVA, Arthritis, Anticoagulant long term use, Thyroid disease, CAD, Sleep Apnea, and Pacemaker placement presents to the ED c/o acute onset, constant fatigue with associated generalized weakness and dizziness that began this morning.  Pt reports of a 2 day h/o nonproductive cough, decreased appetite, and rhinorrhea.  Pt reports having a fever last night (temp max 102).  Pt reports he has been attempting treatment with Nyquil without improvement.  Pt reports his wife was diagnosed with Influenza A 2 days ago.  Pt denies abdominal pain, nausea, emesis, diarrhea, chest pain, back pain, rash, or any other associated symptoms.  Pt denies the use of home oxygen.  Pt reports his blood sugar as 90.  No alleviating factors.    The history is provided by the patient. No  was used.     Past Medical History   Diagnosis Date    Anticoagulant long-term use      Plavix (on hold for angiogram)    Arthritis     Coronary artery disease      pacemaker, stentsx2    Diabetes mellitus     Hypertension     Sleep apnea     Stroke      Rt side affected / mild    Thyroid disease      No past medical history pertinent negatives.  Past Surgical History   Procedure Laterality Date    Coronary angioplasty with stent placement  6/06     stents x 2 placed     Cholecystectomy      Appendectomy      Hernia repair "       ventral hernia repair     No family history on file.  Social History   Substance Use Topics    Smoking status: Former Smoker     Quit date: 6/13/1998    Smokeless tobacco: Never Used    Alcohol use No     Review of Systems   Constitutional: Positive for appetite change and fatigue. Negative for chills and fever.   HENT: Positive for rhinorrhea and sore throat. Negative for ear pain.    Eyes: Negative for pain.   Respiratory: Positive for cough. Negative for shortness of breath.    Cardiovascular: Negative for chest pain.   Gastrointestinal: Negative for abdominal pain, diarrhea, nausea and vomiting.   Genitourinary: Negative for dysuria.   Musculoskeletal: Negative for back pain and neck pain.   Skin: Negative for rash.   Neurological: Positive for dizziness and weakness. Negative for light-headedness, numbness and headaches.       Physical Exam   Initial Vitals   BP Pulse Resp Temp SpO2   02/16/17 1221 02/16/17 1221 02/16/17 1221 02/16/17 1221 02/16/17 1221   136/78 94 17 99.7 °F (37.6 °C) 91 %     Physical Exam well-developed well-nourished white male alert oriented ×3  HEENT exam pupils reactive injected sclera nonicteric nares benign injected posterior pharynx no exudate  Neck no meningismus shotty anterior cervical nodes  Lungs end expiratory wheezing right upper lobe and occasionally bibasal or accessory musculature and use no rhonchi  Cardiovascular S1-S2 regular no murmur rub or gallop  Abdomen very protuberant bowel sounds positive soft unable to appreciate masses or hepatosplenomegaly  Skin without rash  Muscular skeletal without deformity    ED Course   Procedures  Labs Reviewed   CBC W/ AUTO DIFFERENTIAL   COMPREHENSIVE METABOLIC PANEL   URINALYSIS   INFLUENZA A AND B ANTIGEN   PROTIME-INR   TROPONIN I   B-TYPE NATRIURETIC PEPTIDE   DIGOXIN LEVEL     EKG Readings: (Independently Interpreted)   Sinus rhythm occasional PAC and complete right bundle-branch block early criteria for LVH prolonged QT  nonspecific ST-T changes... No demand pacer spikes as noted on January 23 EKG 2014       X-Rays:   Independently Interpreted Readings:   Other Readings:  Chest x-ray without acute infiltrate   fever generalized weakness decreased appetite wife recently diagnosed with influenza A patient did not get immunized this year clinical evidence of influenza but concerning with the wheezes patient ran underlying secondary pneumonia or bronchitis             Scribe Attestation:   Scribe #1: I performed the above scribed service and the documentation accurately describes the services I performed. I attest to the accuracy of the note.    Attending Attestation:           Physician Attestation for Scribe:  Physician Attestation Statement for Scribe #1: I, Gary Valdez MD, reviewed documentation, as scribed by Rubi Washburn in my presence, and it is both accurate and complete.                 ED Course     Clinical Impression:   There were no encounter diagnoses.          Gary Valdez MD  02/16/17 [id number]

## 2017-02-16 NOTE — ED TRIAGE NOTES
"Pt states last night he started with a nonproductive cough. Today he feels "off balance", runny nose, cough not improving. He ran fever 102 but did not take any tylenol or ibuprofen. He took nyquil last night. His wife tested positive for flu A a few days ago. He does not wear home O2, sats low 90%s.  "

## 2017-02-16 NOTE — ED NOTES
SR up x2. Bed locked and low. Call bell in reach. Updated on plan of care. Will continue to monitor.

## 2017-06-03 ENCOUNTER — HOSPITAL ENCOUNTER (INPATIENT)
Facility: HOSPITAL | Age: 77
LOS: 4 days | Discharge: HOME | DRG: 151 | End: 2017-06-08
Attending: EMERGENCY MEDICINE | Admitting: HOSPITALIST
Payer: MEDICARE

## 2017-06-03 DIAGNOSIS — S02.2XXA CLOSED FRACTURE OF NASAL BONE, INITIAL ENCOUNTER: ICD-10-CM

## 2017-06-03 DIAGNOSIS — Z95.0 PRESENCE OF CARDIAC PACEMAKER: ICD-10-CM

## 2017-06-03 DIAGNOSIS — I50.9 CHRONIC CHF: ICD-10-CM

## 2017-06-03 DIAGNOSIS — R04.0 ACUTE POSTERIOR EPISTAXIS: Primary | ICD-10-CM

## 2017-06-03 DIAGNOSIS — I48.91 ATRIAL FIBRILLATION: ICD-10-CM

## 2017-06-04 PROBLEM — R04.0 ACUTE POSTERIOR EPISTAXIS: Status: ACTIVE | Noted: 2017-06-04

## 2017-06-04 PROBLEM — I25.10 CAD (CORONARY ARTERY DISEASE): Status: ACTIVE | Noted: 2017-06-04

## 2017-06-04 PROBLEM — I48.19 PERSISTENT ATRIAL FIBRILLATION: Status: ACTIVE | Noted: 2017-06-04

## 2017-06-04 LAB
ALBUMIN SERPL BCP-MCNC: 3.7 G/DL
ALP SERPL-CCNC: 73 U/L
ALT SERPL W/O P-5'-P-CCNC: 68 U/L
ANION GAP SERPL CALC-SCNC: 13 MMOL/L
APTT BLDCRRT: 29.4 SEC
AST SERPL-CCNC: 46 U/L
BASOPHILS # BLD AUTO: 0.01 K/UL
BASOPHILS NFR BLD: 0.2 %
BILIRUB SERPL-MCNC: 0.8 MG/DL
BUN SERPL-MCNC: 25 MG/DL
CALCIUM SERPL-MCNC: 9.5 MG/DL
CHLORIDE SERPL-SCNC: 105 MMOL/L
CO2 SERPL-SCNC: 21 MMOL/L
CREAT SERPL-MCNC: 1.3 MG/DL
DIFFERENTIAL METHOD: ABNORMAL
EOSINOPHIL # BLD AUTO: 0.1 K/UL
EOSINOPHIL NFR BLD: 1.7 %
ERYTHROCYTE [DISTWIDTH] IN BLOOD BY AUTOMATED COUNT: 15.3 %
EST. GFR  (AFRICAN AMERICAN): >60 ML/MIN/1.73 M^2
EST. GFR  (NON AFRICAN AMERICAN): 53 ML/MIN/1.73 M^2
GLUCOSE SERPL-MCNC: 149 MG/DL
HCT VFR BLD AUTO: 36.2 %
HGB BLD-MCNC: 12.2 G/DL
INR PPP: 1.6
LYMPHOCYTES # BLD AUTO: 0.7 K/UL
LYMPHOCYTES NFR BLD: 12.3 %
MCH RBC QN AUTO: 32.6 PG
MCHC RBC AUTO-ENTMCNC: 33.7 %
MCV RBC AUTO: 97 FL
MONOCYTES # BLD AUTO: 0.6 K/UL
MONOCYTES NFR BLD: 9.9 %
NEUTROPHILS # BLD AUTO: 4.5 K/UL
NEUTROPHILS NFR BLD: 75.9 %
PLATELET # BLD AUTO: 143 K/UL
PMV BLD AUTO: 10.4 FL
POTASSIUM SERPL-SCNC: 4 MMOL/L
PROT SERPL-MCNC: 7.1 G/DL
PROTHROMBIN TIME: 16.7 SEC
RBC # BLD AUTO: 3.74 M/UL
SODIUM SERPL-SCNC: 139 MMOL/L
WBC # BLD AUTO: 5.94 K/UL

## 2017-06-04 PROCEDURE — 93010 ELECTROCARDIOGRAM REPORT: CPT | Mod: ,,, | Performed by: INTERNAL MEDICINE

## 2017-06-04 PROCEDURE — 25000003 PHARM REV CODE 250: Performed by: EMERGENCY MEDICINE

## 2017-06-04 PROCEDURE — 85730 THROMBOPLASTIN TIME PARTIAL: CPT

## 2017-06-04 PROCEDURE — 96361 HYDRATE IV INFUSION ADD-ON: CPT

## 2017-06-04 PROCEDURE — 96375 TX/PRO/DX INJ NEW DRUG ADDON: CPT

## 2017-06-04 PROCEDURE — 96365 THER/PROPH/DIAG IV INF INIT: CPT

## 2017-06-04 PROCEDURE — 27000190 HC CPAP FULL FACE MASK W/VALVE

## 2017-06-04 PROCEDURE — 99285 EMERGENCY DEPT VISIT HI MDM: CPT | Mod: ,,, | Performed by: EMERGENCY MEDICINE

## 2017-06-04 PROCEDURE — 80053 COMPREHEN METABOLIC PANEL: CPT

## 2017-06-04 PROCEDURE — 85025 COMPLETE CBC W/AUTO DIFF WBC: CPT

## 2017-06-04 PROCEDURE — 25000003 PHARM REV CODE 250: Performed by: HOSPITALIST

## 2017-06-04 PROCEDURE — 99900035 HC TECH TIME PER 15 MIN (STAT)

## 2017-06-04 PROCEDURE — 2Y41X5Z PACKING OF NASAL REGION USING PACKING MATERIAL: ICD-10-PCS | Performed by: EMERGENCY MEDICINE

## 2017-06-04 PROCEDURE — 63600175 PHARM REV CODE 636 W HCPCS: Performed by: EMERGENCY MEDICINE

## 2017-06-04 PROCEDURE — 12000002 HC ACUTE/MED SURGE SEMI-PRIVATE ROOM

## 2017-06-04 PROCEDURE — 99222 1ST HOSP IP/OBS MODERATE 55: CPT | Mod: ,,, | Performed by: OTOLARYNGOLOGY

## 2017-06-04 PROCEDURE — 94660 CPAP INITIATION&MGMT: CPT

## 2017-06-04 PROCEDURE — 99285 EMERGENCY DEPT VISIT HI MDM: CPT | Mod: 25

## 2017-06-04 PROCEDURE — 99223 1ST HOSP IP/OBS HIGH 75: CPT | Mod: ,,, | Performed by: HOSPITALIST

## 2017-06-04 PROCEDURE — 85610 PROTHROMBIN TIME: CPT

## 2017-06-04 PROCEDURE — 96376 TX/PRO/DX INJ SAME DRUG ADON: CPT

## 2017-06-04 RX ORDER — ONDANSETRON 2 MG/ML
4 INJECTION INTRAMUSCULAR; INTRAVENOUS
Status: COMPLETED | OUTPATIENT
Start: 2017-06-04 | End: 2017-06-04

## 2017-06-04 RX ORDER — OXYCODONE AND ACETAMINOPHEN 5; 325 MG/1; MG/1
1 TABLET ORAL EVERY 8 HOURS PRN
Status: DISCONTINUED | OUTPATIENT
Start: 2017-06-04 | End: 2017-06-08 | Stop reason: HOSPADM

## 2017-06-04 RX ORDER — INSULIN ASPART 100 [IU]/ML
1-10 INJECTION, SOLUTION INTRAVENOUS; SUBCUTANEOUS
Status: DISCONTINUED | OUTPATIENT
Start: 2017-06-04 | End: 2017-06-08 | Stop reason: HOSPADM

## 2017-06-04 RX ORDER — NITROGLYCERIN 0.4 MG/1
0.4 TABLET SUBLINGUAL EVERY 5 MIN PRN
Status: DISCONTINUED | OUTPATIENT
Start: 2017-06-04 | End: 2017-06-08 | Stop reason: HOSPADM

## 2017-06-04 RX ORDER — OXYMETAZOLINE HCL 0.05 %
2 SPRAY, NON-AEROSOL (ML) NASAL 2 TIMES DAILY
Status: COMPLETED | OUTPATIENT
Start: 2017-06-04 | End: 2017-06-07

## 2017-06-04 RX ORDER — ACETAMINOPHEN 160 MG/5ML
650 SOLUTION ORAL
Status: COMPLETED | OUTPATIENT
Start: 2017-06-04 | End: 2017-06-04

## 2017-06-04 RX ORDER — ATORVASTATIN CALCIUM 80 MG/1
80 TABLET, FILM COATED ORAL NIGHTLY
COMMUNITY
End: 2018-12-07

## 2017-06-04 RX ORDER — FAMOTIDINE 20 MG/1
20 TABLET, FILM COATED ORAL 2 TIMES DAILY
Status: DISCONTINUED | OUTPATIENT
Start: 2017-06-04 | End: 2017-06-08 | Stop reason: HOSPADM

## 2017-06-04 RX ORDER — ISOSORBIDE MONONITRATE 20 MG/1
20 TABLET ORAL DAILY
Status: DISCONTINUED | OUTPATIENT
Start: 2017-06-05 | End: 2017-06-07

## 2017-06-04 RX ORDER — RANOLAZINE 500 MG/1
500 TABLET, EXTENDED RELEASE ORAL 2 TIMES DAILY
COMMUNITY
End: 2019-04-22

## 2017-06-04 RX ORDER — LIDOCAINE HYDROCHLORIDE 20 MG/ML
5 SOLUTION OROPHARYNGEAL
Status: COMPLETED | OUTPATIENT
Start: 2017-06-04 | End: 2017-06-04

## 2017-06-04 RX ORDER — NITROGLYCERIN 0.4 MG/1
0.4 TABLET SUBLINGUAL EVERY 5 MIN PRN
COMMUNITY

## 2017-06-04 RX ORDER — LANOLIN ALCOHOL/MO/W.PET/CERES
1000 CREAM (GRAM) TOPICAL DAILY
Status: DISCONTINUED | OUTPATIENT
Start: 2017-06-04 | End: 2017-06-08 | Stop reason: HOSPADM

## 2017-06-04 RX ORDER — DIGOXIN 125 MCG
250 TABLET ORAL DAILY
Status: DISCONTINUED | OUTPATIENT
Start: 2017-06-04 | End: 2017-06-08 | Stop reason: HOSPADM

## 2017-06-04 RX ORDER — AMIODARONE HYDROCHLORIDE 200 MG/1
200 TABLET ORAL DAILY
Status: DISCONTINUED | OUTPATIENT
Start: 2017-06-04 | End: 2017-06-08 | Stop reason: HOSPADM

## 2017-06-04 RX ORDER — CLOPIDOGREL BISULFATE 75 MG/1
75 TABLET ORAL DAILY
Status: DISCONTINUED | OUTPATIENT
Start: 2017-06-04 | End: 2017-06-08 | Stop reason: HOSPADM

## 2017-06-04 RX ORDER — RANOLAZINE 500 MG/1
500 TABLET, EXTENDED RELEASE ORAL 2 TIMES DAILY
Status: DISCONTINUED | OUTPATIENT
Start: 2017-06-04 | End: 2017-06-08 | Stop reason: HOSPADM

## 2017-06-04 RX ORDER — WARFARIN 7.5 MG/1
7.5 TABLET ORAL DAILY
Status: DISCONTINUED | OUTPATIENT
Start: 2017-06-04 | End: 2017-06-08 | Stop reason: HOSPADM

## 2017-06-04 RX ORDER — FLUTICASONE PROPIONATE 50 MCG
1 SPRAY, SUSPENSION (ML) NASAL DAILY
Status: DISCONTINUED | OUTPATIENT
Start: 2017-06-04 | End: 2017-06-08 | Stop reason: HOSPADM

## 2017-06-04 RX ORDER — ACETAMINOPHEN 650 MG/20.3ML
650 LIQUID ORAL ONCE
Status: COMPLETED | OUTPATIENT
Start: 2017-06-04 | End: 2017-06-04

## 2017-06-04 RX ORDER — TAMSULOSIN HYDROCHLORIDE 0.4 MG/1
1 CAPSULE ORAL NIGHTLY
Status: DISCONTINUED | OUTPATIENT
Start: 2017-06-04 | End: 2017-06-08 | Stop reason: HOSPADM

## 2017-06-04 RX ORDER — ISOSORBIDE MONONITRATE 20 MG/1
60 TABLET ORAL DAILY
Status: ON HOLD | COMMUNITY
End: 2017-06-08 | Stop reason: HOSPADM

## 2017-06-04 RX ORDER — IBUPROFEN 200 MG
16 TABLET ORAL
Status: DISCONTINUED | OUTPATIENT
Start: 2017-06-04 | End: 2017-06-08 | Stop reason: HOSPADM

## 2017-06-04 RX ORDER — LEVOTHYROXINE SODIUM 100 UG/1
200 TABLET ORAL DAILY
Status: DISCONTINUED | OUTPATIENT
Start: 2017-06-05 | End: 2017-06-08 | Stop reason: HOSPADM

## 2017-06-04 RX ORDER — GLUCAGON 1 MG
1 KIT INJECTION
Status: DISCONTINUED | OUTPATIENT
Start: 2017-06-04 | End: 2017-06-08 | Stop reason: HOSPADM

## 2017-06-04 RX ORDER — ATORVASTATIN CALCIUM 20 MG/1
80 TABLET, FILM COATED ORAL DAILY
Status: DISCONTINUED | OUTPATIENT
Start: 2017-06-04 | End: 2017-06-08 | Stop reason: HOSPADM

## 2017-06-04 RX ORDER — CEFPODOXIME PROXETIL 200 MG/1
200 TABLET, FILM COATED ORAL EVERY 12 HOURS
Status: DISCONTINUED | OUTPATIENT
Start: 2017-06-05 | End: 2017-06-06

## 2017-06-04 RX ORDER — ACETAMINOPHEN 500 MG
1000 TABLET ORAL
Status: DISCONTINUED | OUTPATIENT
Start: 2017-06-04 | End: 2017-06-04

## 2017-06-04 RX ORDER — FLUTICASONE PROPIONATE 50 MCG
1 SPRAY, SUSPENSION (ML) NASAL NIGHTLY PRN
COMMUNITY

## 2017-06-04 RX ORDER — IBUPROFEN 200 MG
24 TABLET ORAL
Status: DISCONTINUED | OUTPATIENT
Start: 2017-06-04 | End: 2017-06-08 | Stop reason: HOSPADM

## 2017-06-04 RX ORDER — WITCH HAZEL 50 %
2500 PADS, MEDICATED (EA) TOPICAL DAILY
COMMUNITY

## 2017-06-04 RX ORDER — LEVOCETIRIZINE DIHYDROCHLORIDE 5 MG/1
5 TABLET, FILM COATED ORAL NIGHTLY
COMMUNITY
End: 2018-12-07

## 2017-06-04 RX ADMIN — AMIODARONE HYDROCHLORIDE 200 MG: 200 TABLET ORAL at 02:06

## 2017-06-04 RX ADMIN — SODIUM CHLORIDE 500 ML: 0.9 INJECTION, SOLUTION INTRAVENOUS at 01:06

## 2017-06-04 RX ADMIN — OXYMETAZOLINE HYDROCHLORIDE 2 SPRAY: 5 SPRAY NASAL at 08:06

## 2017-06-04 RX ADMIN — CEFTRIAXONE 1 G: 1 INJECTION, SOLUTION INTRAVENOUS at 02:06

## 2017-06-04 RX ADMIN — ACETAMINOPHEN 649.6 MG: 160 SOLUTION ORAL at 02:06

## 2017-06-04 RX ADMIN — RANOLAZINE 500 MG: 500 TABLET, FILM COATED, EXTENDED RELEASE ORAL at 08:06

## 2017-06-04 RX ADMIN — ONDANSETRON 4 MG: 2 INJECTION INTRAMUSCULAR; INTRAVENOUS at 01:06

## 2017-06-04 RX ADMIN — OXYCODONE HYDROCHLORIDE AND ACETAMINOPHEN 1 TABLET: 5; 325 TABLET ORAL at 03:06

## 2017-06-04 RX ADMIN — CLOPIDOGREL 75 MG: 75 TABLET, FILM COATED ORAL at 02:06

## 2017-06-04 RX ADMIN — FLUTICASONE PROPIONATE 1 SPRAY: 50 SPRAY, METERED NASAL at 03:06

## 2017-06-04 RX ADMIN — ATORVASTATIN CALCIUM 80 MG: 20 TABLET, FILM COATED ORAL at 02:06

## 2017-06-04 RX ADMIN — TAMSULOSIN HYDROCHLORIDE 0.4 MG: 0.4 CAPSULE ORAL at 08:06

## 2017-06-04 RX ADMIN — FAMOTIDINE 20 MG: 20 TABLET, FILM COATED ORAL at 08:06

## 2017-06-04 RX ADMIN — ONDANSETRON 4 MG: 2 INJECTION INTRAMUSCULAR; INTRAVENOUS at 04:06

## 2017-06-04 RX ADMIN — WARFARIN SODIUM 7.5 MG: 2.5 TABLET ORAL at 06:06

## 2017-06-04 RX ADMIN — DIGOXIN 250 MCG: 125 TABLET ORAL at 03:06

## 2017-06-04 RX ADMIN — ACETAMINOPHEN 650 MG: 160 SOLUTION ORAL at 08:06

## 2017-06-04 RX ADMIN — CYANOCOBALAMIN TAB 1000 MCG 1000 MCG: 1000 TAB at 02:06

## 2017-06-04 RX ADMIN — LIDOCAINE HYDROCHLORIDE 5 ML: 20 SOLUTION ORAL; TOPICAL at 04:06

## 2017-06-04 NOTE — ED NOTES
Nose bleed since 1800, actively bleeding. Pt takes Coumadin and Plavix. Transfer from [facility]. Pt has rhino rocket in left nare.

## 2017-06-04 NOTE — NURSING TRANSFER
Nursing Transfer Note      6/4/2017     Transfer From: ED    Transfer via wheelchair    Transfer with     Transported by escort    Medicines sent:     Chart send with patient: Yes    Notified: family at bedside    Patient reassessed at: 1400 (date, time)    Upon arrival to floor: patient oriented to room, call bell in reach and bed in lowest position

## 2017-06-04 NOTE — ED PROVIDER NOTES
Encounter Date: 6/3/2017    SCRIBE #1 NOTE: I, Yumiko Perkins, am scribing for, and in the presence of,  Jennifer Christian MD. I have scribed the following portions of the note - Other sections scribed: HPI, ROS, PE.       History     Chief Complaint   Patient presents with    Epistaxis     Nose bleed since 1800, actively bleeding. Pt takes Coumadin and Plavix.      Review of patient's allergies indicates:   Allergen Reactions    Penicillins Swelling     CC: Epistaxis    HPI: 76 year old male on Coumadin and Plavix presents to the ED with daughter for an evaluation of a nosebleed since yesterday, Friday, 6/2/17. Symptoms are acute onset. Daughter-in-law states the bleeding began yesterday, subsided, and then began to bleed again at 1500 today, Saturday, 6/3/17. The bleeding has not stopped since. Daughter-in-law reports pt saturated a large towel with blood and filled a couple of emesis bags since arrival to the ED. Pt currently states the blood is draining down his throat.    Daughter-in-law states pt tripped and fell face forward 1 week ago. Pt did have a nosebleed from the fall. He refused to go to the ED at that time. Daughter-in-law stated he had two black eyes from that fall. He did not pass out.    Pt denies fever, chills, chest pain, SOB, and any other associated symptoms. No hx of nosebleeds in the past.    PCP: Dr. Rojas    Pt has PMHx of DM, HTN, CVA, arthritis, thyroid disease, CAD, sleep apnea, pacemaker placement, and anemia with 2 iron infusions.  Pt has PSHx of coronary angioplasty with stent, cholecystectomy, appendectomy, and hernia repair.      The history is provided by the patient and a relative. No  was used.     Past Medical History:   Diagnosis Date    Anticoagulant long-term use     Plavix (on hold for angiogram)    Arthritis     Coronary artery disease     pacemaker, stentsx2    Diabetes mellitus     Hypertension     Pacemaker     Sleep apnea     Stroke     Rt  side affected / mild    Thyroid disease      Past Surgical History:   Procedure Laterality Date    ANKLE SURGERY      APPENDECTOMY      CARDIAC PACEMAKER PLACEMENT      CHOLECYSTECTOMY      CORONARY ANGIOPLASTY WITH STENT PLACEMENT  6/06    stents x 2 placed     EYE SURGERY      HERNIA REPAIR      ventral hernia repair    sciatica      sciatica procedure     Family History   Problem Relation Age of Onset    Cancer Mother     Heart disease Father     Cancer Sister      Social History   Substance Use Topics    Smoking status: Former Smoker     Quit date: 6/13/1998    Smokeless tobacco: Never Used    Alcohol use No     Review of Systems   Constitutional: Negative for chills, diaphoresis and fever.   HENT: Positive for nosebleeds. Negative for ear pain and sore throat.    Eyes: Negative for photophobia and visual disturbance.   Respiratory: Negative for cough and shortness of breath.    Cardiovascular: Negative for chest pain.   Gastrointestinal: Negative for abdominal pain, diarrhea, nausea and vomiting.   Genitourinary: Negative for dysuria.   Musculoskeletal: Negative for back pain, neck pain and neck stiffness.   Skin: Positive for pallor. Negative for rash.   Neurological: Negative for headaches.       Physical Exam     Initial Vitals [06/03/17 2335]   BP Pulse Resp Temp SpO2   123/68 77 18 -- 97 %     Physical Exam    Nursing note and vitals reviewed.  Constitutional: Vital signs are normal. He appears well-developed and well-nourished. He is active.  Non-toxic appearance. No distress.   Uncomfortable, awake and alert, holding Yankauer suction in oropharynx, suctioning bright red blood.   HENT:   Head: Normocephalic and atraumatic.   Nose: Epistaxis is observed.   Bright red blood to bilateral nares, L>>R.  Bright red blood to posterior oropharynx.   Eyes: Conjunctivae and EOM are normal. Pupils are equal, round, and reactive to light.   Neck: Trachea normal and normal range of motion. Neck supple.    Cardiovascular: Normal rate and regular rhythm.   Pulmonary/Chest: Breath sounds normal. No respiratory distress.   Abdominal: Soft. Normal appearance and bowel sounds are normal. He exhibits no distension. There is no tenderness.   Musculoskeletal: Normal range of motion. He exhibits no edema.   Neurological: He is alert and oriented to person, place, and time. He has normal strength.   Skin: Skin is warm, dry and intact. There is pallor.   Psychiatric: He has a normal mood and affect.         ED Course   Procedures  Labs Reviewed   CBC W/ AUTO DIFFERENTIAL - Abnormal; Notable for the following:        Result Value    RBC 3.74 (*)     Hemoglobin 12.2 (*)     Hematocrit 36.2 (*)     MCH 32.6 (*)     RDW 15.3 (*)     Platelets 143 (*)     Lymph # 0.7 (*)     Gran% 75.9 (*)     Lymph% 12.3 (*)     All other components within normal limits   COMPREHENSIVE METABOLIC PANEL - Abnormal; Notable for the following:     CO2 21 (*)     Glucose 149 (*)     BUN, Bld 25 (*)     AST 46 (*)     ALT 68 (*)     eGFR if non  53 (*)     All other components within normal limits   PROTIME-INR - Abnormal; Notable for the following:     Prothrombin Time 16.7 (*)     INR 1.6 (*)     All other components within normal limits   APTT             Medical Decision Making:   History:   Old Medical Records: I decided to obtain old medical records.  Old Records Summarized: records from previous admission(s).  Initial Assessment:   This is an emergent evaluation of a 76-year-old male who presents with acute severe epistaxis.  Differential Diagnosis:   Differential includes supratherapeutic INR, thrombocytopenia, anemia, trauma secondary to fall 1 week ago, other.  Clinical Tests:   Lab Tests: Ordered and Reviewed  Radiological Study: Ordered and Reviewed  ED Management:  The patient was bolused normal saline 500 mL.  He received Zofran IV for his reported nausea.  I placed a Rapid Rhino 9cm nasal catheter to his left nare.  This  caused significant improvement and resolution of the patient's epistaxis.    Because of the presence of posterior packing, I treated the patient with rocephin IV to prevent TSS.    The patient had APAP PO (liquid as he stated the packing made it difficult to swallow pills) for headache.    Due to the history of fall one week ago with subsequent epistaxis, I obtained a CT head and maxillofacial.  This revealed fractures of the left and right nasal bones.  CT head read is pending.    On labwork, INR is 1.6.  Platelet count is 143.  Hemoglobin is 12.2.    Of note, the patient's suction canister at bedside has over 300mL of blood in it from his epistaxis tonight.    The patient requires monitoring due to the presence of posterior packing.  He should be evaluated by ENT.  Unfortunately we did not have ENT on the weekends at this institution.  Therefore he requires transfer to [facility].  I discussed this with Will at the transfer center.  Will put me in contact with Dr. KWESI Bnods of ENT, who recommended medicine admission and ENT consult.  Will then discussed the case with Dr. Sonal Mariano, hospitalist, who recommended that the patient be in ED to ED transfer for ENT evaluation, as he is concerned that with the posterior packing, the patient may become unstable.    At present, the patient's blood pressure, respiratory rate, and heart rate remain stable.  He is sat'ing around 94% on room air.  I have recently re-treated his throat pain and nausea with viscous lidocaine and zofran.    Plan is for ED to ED transfer to [facility] for ENT eval and further dispo per ENT/medicine/EM.  Other:   I have discussed this case with another health care provider.            Scribe Attestation:   Scribe #1: I performed the above scribed service and the documentation accurately describes the services I performed. I attest to the accuracy of the note.    Attending Attestation:           Physician Attestation for Scribe:  Physician  Attestation Statement for Scribe #1: I, Jennifer Christian MD, reviewed documentation, as scribed by Yumiko Perkins in my presence, and it is both accurate and complete.                 ED Course     Clinical Impression:   The primary encounter diagnosis was Acute posterior epistaxis. A diagnosis of Closed fracture of nasal bone, initial encounter was also pertinent to this visit.          Jennifer Christian MD  06/04/17 0447

## 2017-06-04 NOTE — ED TRIAGE NOTES
"Pt presents to ED from home with c/o bilateral nare nosebleed since yesterday. Pt is currently taking coumadin and plavix. Pt admits to having a fall 1 week ago with nosebleed and bilateral eye bruising. Daughter in law at bedside, states that pt had "sciata nerve burned on both sides" x 2 weeks apart, 2 weeks ago. Pt denies chest pain or SOB.  "

## 2017-06-04 NOTE — CONSULTS
Otolaryngology - Head and Neck Surgery  Consultation Report    Consultation From: ED    Chief Complaint: epistaxis    History of Present Illness: 76 y.o. year old male presents as transfer from [facility] for epistaxis.  He reports a fall onto his face one week ago with profuse bleeding bilaterally that stopped on its own, he did not seek medical attention at that time.  Last night he experienced bleeding from the left side of the nose that did not stop with pressure and he presented to the outside ED where an anterior-posterior pack was placed with cessation of bleeding, and he was transferred for further management.  Patient is on Coumadin and Plavix.    Review of Systems - Negative except as per HPI.    Past Medical History: Patient has a past medical history of Anticoagulant long-term use; Arthritis; Coronary artery disease; Diabetes mellitus; Hypertension; Pacemaker; Sleep apnea; Stroke; and Thyroid disease.    Past Surgical History: Patient has a past surgical history that includes Coronary angioplasty with stent (6/06); Cholecystectomy; Appendectomy; Hernia repair; Eye surgery; Cardiac pacemaker placement; sciatica; and Ankle surgery.    Social History: Patient reports that he quit smoking about 18 years ago. He has never used smokeless tobacco. He reports that he does not drink alcohol or use drugs.    Family History: family history includes Cancer in his mother and sister; Heart disease in his father.    Medications: No current facility-administered medications for this encounter.     Current Outpatient Prescriptions:     amiodarone (PACERONE) 200 MG Tab, Take 200 mg by mouth once daily. , Disp: , Rfl:     atorvastatin (LIPITOR) 80 MG tablet, Take 80 mg by mouth once daily., Disp: , Rfl:     clopidogrel (PLAVIX) 75 mg tablet, Take 75 mg by mouth once daily., Disp: , Rfl:     cyanocobalamin 2000 MCG tablet, Take 2,500 mcg by mouth once daily., Disp: , Rfl:     digoxin (LANOXIN) 250 mcg tablet,  Take 250 mcg by mouth once daily., Disp: , Rfl:     fluticasone (FLONASE) 50 mcg/actuation nasal spray, 1 spray by Each Nare route once daily., Disp: , Rfl:     glimepiride (AMARYL) 4 MG tablet, Take 4 mg by mouth before breakfast., Disp: , Rfl:     isosorbide mononitrate (ISMO,MONOKET) 20 MG Tab, Take 60 mg by mouth once daily at 6am., Disp: , Rfl:     levocetirizine (XYZAL) 5 MG tablet, Take 5 mg by mouth every evening., Disp: , Rfl:     levothyroxine (SYNTHROID) 200 MCG tablet, Take 200 mcg by mouth once daily., Disp: , Rfl:     metformin (GLUCOPHAGE) 500 MG tablet, Take 500 mg by mouth 2 (two) times daily with meals., Disp: , Rfl:     ranitidine (ZANTAC) 300 MG tablet, Take 300 mg by mouth every evening., Disp: , Rfl:     ranolazine (RANEXA) 500 MG Tb12, Take 500 mg by mouth 2 (two) times daily., Disp: , Rfl:     sertraline (ZOLOFT) 50 MG tablet, Take 100 mg by mouth 2 (two) times daily. , Disp: , Rfl:     tamsulosin (FLOMAX) 0.4 mg Cp24, TAKE 1 CAPSULE AT BEDTIME., Disp: 90 capsule, Rfl: 3    vitamin D 185 MG Tab, Take 2,000 mg by mouth once daily. , Disp: , Rfl:     warfarin (COUMADIN) 5 MG tablet, Take 7.5 mg by mouth once daily., Disp: , Rfl:     nitroGLYCERIN (NITROSTAT) 0.4 MG SL tablet, Place 0.4 mg under the tongue every 5 (five) minutes as needed for Chest pain., Disp: , Rfl:     oxycodone-acetaminophen (PERCOCET) 5-325 mg per tablet, Take 1 tablet by mouth every 8 (eight) hours as needed for Pain., Disp: 15 tablet, Rfl: 0    sildenafil (VIAGRA) 100 MG tablet, Take 1 tablet (100 mg total) by mouth daily as needed for Erectile Dysfunction., Disp: 10 tablet, Rfl: 11      Allergies: Patient is allergic to penicillins.    Physical Exam:  Temp:  [98.4 °F (36.9 °C)] 98.4 °F (36.9 °C)  Pulse:  [72-85] 76  Resp:  [14-32] 18  SpO2:  [93 %-97 %] 97 %  BP: (108-136)/(56-80) 134/66        Constitutional: Well appearing / communicating.  NAD.  Eyes: EOM I Bilaterally  Head/Face: Anterior nasal bridge  with swelling and mild erythema.  Negative paranasal sinus pressure/tenderness.  Salivary glands WNL.  House Brackmann I Bilaterally.  Nose: Left nare with 7.5 A/P pack in place and both balloons inflated.  No active bleeding from right or left nare.  Oral Cavity: Gingiva/lips WNL.  FOM Soft, no masses palpated. Oral Tongue mobile. Hard Palate WNL.   Oropharynx: BOT WNL. No masses/lesions noted. Tonsillar fossa/pharyngeal wall without lesions. Posterior oropharynx WNL.  Soft palate without masses. Midline uvula. No blood in posterior pharynx.  Neck/Lymphatic: No LAD I-VI bilaterally.  No thyromegaly.  No masses noted on exam.  Neuro/Psychiatric: AOx3.  Normal mood and affect.   Respiratory: Normal respiratory effort, no stridor, no retractions noted.    CBC    Recent Labs  Lab 06/04/17  0014   WBC 5.94   HGB 12.2*   HCT 36.2*   MCV 97   *     BMP    Recent Labs  Lab 06/04/17  0014   *      K 4.0      CO2 21*   BUN 25*   CREATININE 1.3   CALCIUM 9.5       Imaging Results          CT Maxillofacial Without Contrast (Final result)  Result time 06/04/17 01:58:41    Final result by Dari Mcnamara MD (06/04/17 01:58:41)                 Impression:        1. Left greater than right nasal fractures, as above.    2. Left-sided paranasal sinus disease.    3. Left frontotemporal lobe encephalomalacia suggesting sequela of remote infarct.    4. Additional findings as above.      Electronically signed by: DARI MCNAMARA MD, MD  Date:     06/04/17  Time:    01:58              Narrative:    HISTORY: Trauma    COMPARISON: None    FINDINGS: There is slightly comminuted and depressed fracture involving the posterior left nasal bone. Probable nondisplaced fracture involving the anterior right nasal bone as well.  A cannula identified within the left nasal passage extending to the nasopharynx. Air-fluid levels noted throughout the left nasal passage. There is leftward deviation of the bony nasal septum, which  otherwise appears intact. Mild prominence of the nasal soft tissues suggesting swelling/contusion. No subcutaneous emphysema or radiodense retained foreign body.    Patchy mucosal thickening of the left frontal ethmoidal recess, left ethmoidal air cells, left maxillary sinus and to a lesser extent left sphenoid sinus. Right-sided paranasal sinuses and mastoid air cells are clear.    Probable prior surgical change the bilateral ocular lenses. Otherwise, the orbits and orbital contents appear intact.  The globes are symmetric and the lenses are anteriorly located.    Bilateral orbital rims, zygomatic arches, pterygoid plates mandibular condyles and TMJs appear relatively symmetric and intact. Include airway is midline and patent.  Bilateral parotid and submandibular glands are within normal limits. Moderate to advanced degenerative change of the included mid to upper cervical spine. Patient is edentulous.     Included intracranial contents are significant for left frontotemporal lobe mild encephalomalacia suggesting sequela of remote infarct, generalized cerebral atrophy, and mild periventricular nonspecific low attenuation changes.                             CT Head Without Contrast (In process)                  Assessment: 76 year old male with nasal trauma 1 week ago presents with epistaxis.  Anticoagulated with Coumadin, also on Plavix. Anterior-posterior rhino rocket pack placed at outside hospital with cessation of bleeding. Ct reviewed, bilateral nasal bone fractures.      Plan:   - No acute surgical intervention, no active bleeding  - Okay for diet from ENT standpoint  - Recommend admission given posterior pack and anticoagulation, heart history  - Nasal saline Q2h spray to bilateral nostrils  - Afrin PRN nasal bleeding  - If O2 is required, no nasal cannula, please use humidified face tent  - No bending, lifting, or straining. Rec stool softener  - Will follow, if no further bleeding plan on pack removal in  72 hours.  - D/W staff Dr. Bonds

## 2017-06-04 NOTE — ED PROVIDER NOTES
Encounter Date: 6/3/2017    SCRIBE #1 NOTE: I, Ana Laura Purcell, am scribing for, and in the presence of,  Dr. Elias. I have scribed the following portions of the note - the Resident attestation.       History     Chief Complaint   Patient presents with    Epistaxis     Nose bleed since 1800, actively bleeding. Pt takes Coumadin and Plavix. Transfer from [facility]     Review of patient's allergies indicates:   Allergen Reactions    Penicillins Swelling     76-year-old male transferred from outside facility for evaluation of continued epistaxis. Patient is on both Coumadin and Plavix. At the outside facility patient was noted to have consistent posterior bleeding and a rhino rocket was placed. Patient was transferred here as he would require ENT evaluation and possibly medicine admission      The history is provided by the patient.     Past Medical History:   Diagnosis Date    Anticoagulant long-term use     Plavix (on hold for angiogram)    Arthritis     Coronary artery disease     pacemaker, stentsx2    Diabetes mellitus     Hypertension     Pacemaker     Sleep apnea     Stroke     Rt side affected / mild    Thyroid disease      Past Surgical History:   Procedure Laterality Date    ANKLE SURGERY      APPENDECTOMY      CARDIAC PACEMAKER PLACEMENT      CHOLECYSTECTOMY      CORONARY ANGIOPLASTY WITH STENT PLACEMENT  6/06    stents x 2 placed     EYE SURGERY      HERNIA REPAIR      ventral hernia repair    sciatica      sciatica procedure     Family History   Problem Relation Age of Onset    Cancer Mother     Heart disease Father     Cancer Sister      Social History   Substance Use Topics    Smoking status: Former Smoker     Quit date: 6/13/1998    Smokeless tobacco: Never Used    Alcohol use No     Review of Systems   Constitutional: Negative for chills and fever.   HENT: Positive for nosebleeds and sore throat. Negative for voice change.    Eyes: Negative for pain and visual disturbance.    Respiratory: Negative for cough and shortness of breath.    Cardiovascular: Negative for chest pain and leg swelling.   Gastrointestinal: Negative for abdominal pain, nausea and vomiting.   Genitourinary: Negative for dysuria and flank pain.   Musculoskeletal: Negative for back pain and neck pain.   Skin: Negative for rash.   Neurological: Negative for weakness and headaches.       Physical Exam     Initial Vitals   BP Pulse Resp Temp SpO2   06/03/17 2335 06/03/17 2335 06/03/17 2335 06/04/17 0603 06/03/17 2335   123/68 77 18 98.4 °F (36.9 °C) 97 %     Physical Exam    Nursing note and vitals reviewed.  Constitutional: He appears well-developed and well-nourished. He is not diaphoretic. No distress.   HENT:   Head: Normocephalic and atraumatic.   Mouth/Throat: Oropharynx is clear and moist.   Rhino rocket in place in the left nare with no active bleeding.     Eyes: Conjunctivae are normal. Pupils are equal, round, and reactive to light.   Neck: Normal range of motion. Neck supple.   Cardiovascular: Normal rate, regular rhythm and intact distal pulses.   Pulmonary/Chest: Breath sounds normal. No respiratory distress.   Abdominal: Soft. Bowel sounds are normal. There is no tenderness.   Musculoskeletal: He exhibits no edema or tenderness.   Neurological: He is alert and oriented to person, place, and time.   Skin: Skin is warm and dry.         ED Course   Procedures  Labs Reviewed   CBC W/ AUTO DIFFERENTIAL - Abnormal; Notable for the following:        Result Value    RBC 3.74 (*)     Hemoglobin 12.2 (*)     Hematocrit 36.2 (*)     MCH 32.6 (*)     RDW 15.3 (*)     Platelets 143 (*)     Lymph # 0.7 (*)     Gran% 75.9 (*)     Lymph% 12.3 (*)     All other components within normal limits   COMPREHENSIVE METABOLIC PANEL - Abnormal; Notable for the following:     CO2 21 (*)     Glucose 149 (*)     BUN, Bld 25 (*)     AST 46 (*)     ALT 68 (*)     eGFR if non  53 (*)     All other components within normal  limits   PROTIME-INR - Abnormal; Notable for the following:     Prothrombin Time 16.7 (*)     INR 1.6 (*)     All other components within normal limits   APTT             Medical Decision Making:   History:   Old Medical Records: I decided to obtain old medical records.  Clinical Tests:   Lab Tests: Ordered and Reviewed  Radiological Study: Ordered and Reviewed  Other:   I have discussed this case with another health care provider.       APC / Resident Notes:   VINCENTII MDM    A/P:  Pt is a 76-year-old male presenting for evaluation of continued epistaxis. There is a significant amount of blood loss at the outside hospital. As well as patient reports significant blood loss at home. He is denying any current symptoms for anemia but given the fact that he is on to blood thinners and has posterior packing placed with minimal time for Hemostasis he will require admission to medicine for observation and H&H monitoring. ENT has been consulted for evaluation recommendations as well.  Case Discussed with Dr. TONY MCKAY  06/04/2017 11:53 AM             Scribe Attestation:   Scribe #1: I performed the above scribed service and the documentation accurately describes the services I performed. I attest to the accuracy of the note.    Attending Attestation:   Physician Attestation Statement for Resident:  As the supervising MD   Physician Attestation Statement: I have personally seen and examined this patient.   I agree with the above history. -: 76 y.o. male with hx of CAD, DM, pacemaker, and previous CVA was seen at the [facility] and noted to have an epistaxis. This has been off and on, however now with L sided nosebleed. Pt was seen at the other ER and a rhino rocket was placed. Pt was referred here for ENT evaluation. Pt takes coumadin and plavix.   As the supervising MD I agree with the above PE.    As the supervising MD I agree with the above treatment, course, plan, and disposition.   -: His INR  is 1.6. The plan is to monitor the pt and await ENT evaluation.          Physician Attestation for Scribe:  Physician Attestation Statement for Scribe #1: I, Dr. Elias, reviewed documentation, as scribed by Ana Laura Purcell in my presence, and it is both accurate and complete.         Attending ED Notes:   Patient will be evaluated because of continued bleeding from his nose despite a rhino rocket. Patient is on blood thinners  Patient was evaluated by ENT and the patient will be admitted to the IM service to follow his HCT  And his persistant bleeding          ED Course     Clinical Impression:   The primary encounter diagnosis was Acute posterior epistaxis. A diagnosis of Closed fracture of nasal bone, initial encounter was also pertinent to this visit.    Disposition:   Disposition: Admitted  Condition: Serious       Elke Kuo MD  Resident  06/04/17 1159       Myke Huerta MD  06/20/17 4191

## 2017-06-04 NOTE — ED NOTES
[facility] ED-ED  [phone]  Accepting MD:Dr. Mariano.   [facility] called by transfer center

## 2017-06-04 NOTE — ED NOTES
The patient is awake, alert and cooperative with a calm affect, patient is aware of environment. Airway is open and patent, respirations are spontaneous, normal respiratory effort and rate noted, skin warm and dry, moves all extremities well, appearance: no apparent distress noted.  Aware that they are waiting to see ED doctor and ENT at this time

## 2017-06-04 NOTE — ED NOTES
The patient is resting quietly, eyes closed, arouses easily to stimuli. Airway is open and patent, respirations are spontaneous, normal respiratory effort and rate noted, skin warm and dry, appearance: in no acute distress and resting comfortably.  Patient/family member aware that they are being admitted to hospital. Awaiting bed assignment at this time. Informed patient/family that nurse will keep updating patient status. Will continue to monitor at this time

## 2017-06-04 NOTE — ED NOTES
LOC: The patient is awake, alert and aware of environment with an appropriate affect, the patient is oriented x 3 and speaking appropriately.  APPEARANCE: Patient resting comfortably and in no acute distress, patient is clean and well groomed, patient's clothing is properly fastened.  SKIN: The skin is warm and dry, patient has normal skin turgor and moist mucus membranes, skin intact, no breakdown or brusing noted.  MUSKULOSKELETAL: Patient moving all extremities well, no obvious swelling or deformities noted.  RESPIRATORY: Airway is open and patent, respirations are spontaneous, patient has a normal effort and rate.   NEURO: No neuro deficits, hand grasp equal, no drift noted, no facial droop noted. Speech is clear.  PAIN: Patient complains of nose pain, rhino rocket in place

## 2017-06-05 LAB
ALBUMIN SERPL BCP-MCNC: 3.4 G/DL
ALP SERPL-CCNC: 70 U/L
ALT SERPL W/O P-5'-P-CCNC: 65 U/L
ANION GAP SERPL CALC-SCNC: 10 MMOL/L
AST SERPL-CCNC: 40 U/L
BASOPHILS # BLD AUTO: 0.01 K/UL
BASOPHILS NFR BLD: 0.1 %
BILIRUB SERPL-MCNC: 0.9 MG/DL
BUN SERPL-MCNC: 17 MG/DL
CALCIUM SERPL-MCNC: 9.1 MG/DL
CHLORIDE SERPL-SCNC: 105 MMOL/L
CO2 SERPL-SCNC: 23 MMOL/L
CREAT SERPL-MCNC: 0.8 MG/DL
DIFFERENTIAL METHOD: ABNORMAL
DIGOXIN SERPL-MCNC: 0.3 NG/ML
EOSINOPHIL # BLD AUTO: 0 K/UL
EOSINOPHIL NFR BLD: 0.1 %
ERYTHROCYTE [DISTWIDTH] IN BLOOD BY AUTOMATED COUNT: 15.2 %
EST. GFR  (AFRICAN AMERICAN): >60 ML/MIN/1.73 M^2
EST. GFR  (NON AFRICAN AMERICAN): >60 ML/MIN/1.73 M^2
ESTIMATED PA SYSTOLIC PRESSURE: 21.34
GLUCOSE SERPL-MCNC: 166 MG/DL
HCT VFR BLD AUTO: 31.6 %
HGB BLD-MCNC: 10.5 G/DL
LYMPHOCYTES # BLD AUTO: 0.6 K/UL
LYMPHOCYTES NFR BLD: 7.8 %
MAGNESIUM SERPL-MCNC: 1.4 MG/DL
MCH RBC QN AUTO: 31.8 PG
MCHC RBC AUTO-ENTMCNC: 33.2 %
MCV RBC AUTO: 96 FL
MITRAL VALVE REGURGITATION: NORMAL
MONOCYTES # BLD AUTO: 0.7 K/UL
MONOCYTES NFR BLD: 8.2 %
NEUTROPHILS # BLD AUTO: 6.7 K/UL
NEUTROPHILS NFR BLD: 83.4 %
PHOSPHATE SERPL-MCNC: 1.9 MG/DL
PLATELET # BLD AUTO: 139 K/UL
PMV BLD AUTO: 10.3 FL
POTASSIUM SERPL-SCNC: 3.4 MMOL/L
PROT SERPL-MCNC: 6.7 G/DL
RBC # BLD AUTO: 3.3 M/UL
RETIRED EF AND QEF - SEE NOTES: 60 (ref 55–65)
SODIUM SERPL-SCNC: 138 MMOL/L
T4 FREE SERPL-MCNC: 1.15 NG/DL
TRICUSPID VALVE REGURGITATION: NORMAL
TSH SERPL DL<=0.005 MIU/L-ACNC: 5.06 UIU/ML
WBC # BLD AUTO: 8.05 K/UL

## 2017-06-05 PROCEDURE — 25000003 PHARM REV CODE 250: Performed by: HOSPITALIST

## 2017-06-05 PROCEDURE — 84100 ASSAY OF PHOSPHORUS: CPT

## 2017-06-05 PROCEDURE — 99233 SBSQ HOSP IP/OBS HIGH 50: CPT | Mod: ,,, | Performed by: HOSPITALIST

## 2017-06-05 PROCEDURE — 93306 TTE W/DOPPLER COMPLETE: CPT

## 2017-06-05 PROCEDURE — 83735 ASSAY OF MAGNESIUM: CPT

## 2017-06-05 PROCEDURE — 84439 ASSAY OF FREE THYROXINE: CPT

## 2017-06-05 PROCEDURE — 12000002 HC ACUTE/MED SURGE SEMI-PRIVATE ROOM

## 2017-06-05 PROCEDURE — 36415 COLL VENOUS BLD VENIPUNCTURE: CPT

## 2017-06-05 PROCEDURE — 93306 TTE W/DOPPLER COMPLETE: CPT | Mod: 26,,, | Performed by: INTERNAL MEDICINE

## 2017-06-05 PROCEDURE — 80162 ASSAY OF DIGOXIN TOTAL: CPT

## 2017-06-05 PROCEDURE — 84443 ASSAY THYROID STIM HORMONE: CPT

## 2017-06-05 PROCEDURE — 85025 COMPLETE CBC W/AUTO DIFF WBC: CPT

## 2017-06-05 PROCEDURE — 80053 COMPREHEN METABOLIC PANEL: CPT

## 2017-06-05 PROCEDURE — 99900035 HC TECH TIME PER 15 MIN (STAT)

## 2017-06-05 RX ADMIN — RANOLAZINE 500 MG: 500 TABLET, FILM COATED, EXTENDED RELEASE ORAL at 09:06

## 2017-06-05 RX ADMIN — ATORVASTATIN CALCIUM 80 MG: 20 TABLET, FILM COATED ORAL at 08:06

## 2017-06-05 RX ADMIN — LEVOTHYROXINE SODIUM 200 MCG: 100 TABLET ORAL at 06:06

## 2017-06-05 RX ADMIN — CYANOCOBALAMIN TAB 1000 MCG 1000 MCG: 1000 TAB at 08:06

## 2017-06-05 RX ADMIN — WARFARIN SODIUM 7.5 MG: 2.5 TABLET ORAL at 04:06

## 2017-06-05 RX ADMIN — TAMSULOSIN HYDROCHLORIDE 0.4 MG: 0.4 CAPSULE ORAL at 09:06

## 2017-06-05 RX ADMIN — FAMOTIDINE 20 MG: 20 TABLET, FILM COATED ORAL at 09:06

## 2017-06-05 RX ADMIN — OXYCODONE HYDROCHLORIDE AND ACETAMINOPHEN 1 TABLET: 5; 325 TABLET ORAL at 03:06

## 2017-06-05 RX ADMIN — OXYCODONE HYDROCHLORIDE AND ACETAMINOPHEN 1 TABLET: 5; 325 TABLET ORAL at 09:06

## 2017-06-05 RX ADMIN — CLOPIDOGREL 75 MG: 75 TABLET, FILM COATED ORAL at 08:06

## 2017-06-05 RX ADMIN — ISOSORBIDE MONONITRATE 20 MG: 20 TABLET ORAL at 06:06

## 2017-06-05 RX ADMIN — DIGOXIN 250 MCG: 125 TABLET ORAL at 08:06

## 2017-06-05 RX ADMIN — OXYMETAZOLINE HYDROCHLORIDE 2 SPRAY: 5 SPRAY NASAL at 09:06

## 2017-06-05 RX ADMIN — OXYMETAZOLINE HYDROCHLORIDE 2 SPRAY: 5 SPRAY NASAL at 08:06

## 2017-06-05 RX ADMIN — CEFPODOXIME PROXETIL 200 MG: 200 TABLET, FILM COATED ORAL at 08:06

## 2017-06-05 RX ADMIN — FAMOTIDINE 20 MG: 20 TABLET, FILM COATED ORAL at 08:06

## 2017-06-05 RX ADMIN — RANOLAZINE 500 MG: 500 TABLET, FILM COATED, EXTENDED RELEASE ORAL at 08:06

## 2017-06-05 RX ADMIN — CEFPODOXIME PROXETIL 200 MG: 200 TABLET, FILM COATED ORAL at 09:06

## 2017-06-05 RX ADMIN — FLUTICASONE PROPIONATE 1 SPRAY: 50 SPRAY, METERED NASAL at 08:06

## 2017-06-05 NOTE — HOSPITAL COURSE
Admitted as a transfer due to Epistaxis.     Acute posterior epistaxis  - [place] [facility] [place]  - No acute surgical intervention, no active bleeding  - Recommend admission given posterior pack and anticoagulation, heart history  - Nasal saline Q2h spray to bilateral nostrils  - Afrin PRN nasal bleeding  - If O2 is required, no nasal cannula, please use humidified face tent  - given one time dose of ceftriaxone IVPB  - cont Cefpodoxime PO BID x 5 days   - No bending, lifting, or straining. Rec stool softener  - - packing was removed; afrin and abx treatment completed   - start CPAP today     # Syncope and collapse .  # Orthostatic Hypotension  # Autonomic dysfunction due to DM2  - will get cardiology evaluation, PPM interrogation, negative for arrhythmia    - CT head- no acute process  - 2d echo with no significant valvular defects   - orthostatic pressures were positive; given fluids; all BP meds have been stopped; patient told and sent home on janel hoes to wear all the time; also sent home on low dose midodrine and to slowly get up out of bed;         Atrial fibrillation s/p PPM  - CHADS score 3  - EKG ordered  - cont amiodarone  - INR: 1.6  - restarting coumadin tonight; patient and family aware of risks and would like to continue for now     Hypothyroidism  - TSH  - cont synthroid     Diabetes type 2  - hold po metformin and po amaryl  - POCT q 4hrs  - Insulin SS      Coronary artery disease  - cont nitrates PRN for chest pain  - holding imdur due to low BPs  - cont ranolazine 12hr tablets BID  - cont Plavix 75 mg daily     AAA repair   - cont statin        Discharge Disposition - d/c today

## 2017-06-05 NOTE — NURSING
RN assisted pt to bathroom.  As pt stood up from toilet he became weak and almost fell.  RN caught him and lifted back to toilet.  Assisted to bed with second RN.  Pt has not c/o any pain or discomfort.

## 2017-06-05 NOTE — HPI
76 y.o. year old male presents as transfer from [facility] for epistaxis.  He reports a fall onto his face one week ago with profuse bleeding bilaterally that stopped on its own, he did not seek medical attention at that time.  Last night he experienced bleeding from the left side of the nose that did not stop with pressure and he presented to the outside ED where an anterior-posterior pack was placed with cessation of bleeding, and he was transferred for further management.  Patient is on Coumadin and Plavix.

## 2017-06-05 NOTE — NURSING
RN noted when she removed bipap to give meds that pt's nose had started to bleed a small amount again.  RN turned off bipap and notified Ara Segura.  Per her do not use as pt is obviously not tolerating.

## 2017-06-05 NOTE — PROGRESS NOTES
BiPAP was removed bc Pt started bleeding through his nose. RN Cherie was aware. Will continue to monitor.

## 2017-06-05 NOTE — H&P
[facility] Hospital Medicine  History & Physical    Patient Name: Doni Theodore  MRN: [id number]  Admission Date: 6/3/2017  Attending Physician: Filiberto Maldonado MD   Primary Care Provider: Nic Mauro MD    [facility] Medicine Team: [facility] MED A Filiberto Maldonado MD     Patient information was obtained from patient, spouse/SO and ER records.     Subjective:     Principal Problem:Acute posterior epistaxis    Chief Complaint:   Chief Complaint   Patient presents with    Epistaxis     Nose bleed since 1800, actively bleeding. Pt takes Coumadin and Plavix. [place] [facility] [place]        HPI: 76 y.o. year old male presents as transfer from [facility] for epistaxis.  He reports a fall onto his face one week ago with profuse bleeding bilaterally that stopped on its own, he did not seek medical attention at that time.  Last night he experienced bleeding from the left side of the nose that did not stop with pressure and he presented to the outside ED where an anterior-posterior pack was placed with cessation of bleeding, and he was transferred for further management.  Patient is on Coumadin and Plavix.    Past Medical History:   Diagnosis Date    Anticoagulant long-term use     Plavix (on hold for angiogram)    Arthritis     Coronary artery disease     pacemaker, stentsx2    Diabetes mellitus     Hypertension     Pacemaker     Sleep apnea     Stroke     Rt side affected / mild    Thyroid disease        Past Surgical History:   Procedure Laterality Date    ANKLE SURGERY      APPENDECTOMY      CARDIAC PACEMAKER PLACEMENT      CHOLECYSTECTOMY      CORONARY ANGIOPLASTY WITH STENT PLACEMENT  6/06    stents x 2 placed     EYE SURGERY      HERNIA REPAIR      ventral hernia repair    sciatica      sciatica procedure       Review of patient's allergies indicates:   Allergen Reactions    Penicillins Swelling       No current facility-administered medications on file prior to  encounter.      Current Outpatient Prescriptions on File Prior to Encounter   Medication Sig    amiodarone (PACERONE) 200 MG Tab Take 200 mg by mouth once daily.     clopidogrel (PLAVIX) 75 mg tablet Take 75 mg by mouth once daily.    digoxin (LANOXIN) 250 mcg tablet Take 250 mcg by mouth once daily.    glimepiride (AMARYL) 4 MG tablet Take 4 mg by mouth before breakfast.    levothyroxine (SYNTHROID) 200 MCG tablet Take 200 mcg by mouth once daily.    metformin (GLUCOPHAGE) 500 MG tablet Take 500 mg by mouth 2 (two) times daily with meals.    ranitidine (ZANTAC) 300 MG tablet Take 300 mg by mouth every evening.    sertraline (ZOLOFT) 50 MG tablet Take 100 mg by mouth 2 (two) times daily.     tamsulosin (FLOMAX) 0.4 mg Cp24 TAKE 1 CAPSULE AT BEDTIME.    vitamin D 185 MG Tab Take 2,000 mg by mouth once daily.     warfarin (COUMADIN) 5 MG tablet Take 7.5 mg by mouth once daily.    oxycodone-acetaminophen (PERCOCET) 5-325 mg per tablet Take 1 tablet by mouth every 8 (eight) hours as needed for Pain.    sildenafil (VIAGRA) 100 MG tablet Take 1 tablet (100 mg total) by mouth daily as needed for Erectile Dysfunction.     Family History     Problem Relation (Age of Onset)    Cancer Mother, Sister    Heart disease Father        Social History Main Topics    Smoking status: Former Smoker     Quit date: 6/13/1998    Smokeless tobacco: Never Used    Alcohol use No    Drug use: No    Sexual activity: No     Review of Systems   Constitutional: Negative.    HENT: Positive for nosebleeds. Negative for ear discharge and ear pain.    Eyes: Negative.    Respiratory: Negative.    Cardiovascular: Negative.    Gastrointestinal: Negative.      Objective:     Vital Signs (Most Recent):  Temp: 98 °F (36.7 °C) (06/04/17 1600)  Pulse: 74 (06/04/17 1600)  Resp: 18 (06/04/17 1600)  BP: (!) 153/74 (06/04/17 1600)  SpO2: 95 % (06/04/17 1600) Vital Signs (24h Range):  Temp:  [98 °F (36.7 °C)-98.4 °F (36.9 °C)] 98 °F (36.7  °C)  Pulse:  [68-85] 74  Resp:  [14-32] 18  SpO2:  [93 %-97 %] 95 %  BP: (108-153)/(56-80) 153/74     Weight: 104.3 kg (230 lb)  Body mass index is 33 kg/m².    Physical Exam   Constitutional: He appears well-developed and well-nourished.   HENT:   Head: Normocephalic and atraumatic.   Eyes: Conjunctivae are normal.   Neck: Normal range of motion. Neck supple.   Cardiovascular: Normal rate and regular rhythm.    Pulmonary/Chest: Effort normal and breath sounds normal.   Abdominal: Soft. Bowel sounds are normal.   Musculoskeletal: Normal range of motion.   Neurological: He is alert. He has normal reflexes.   Psychiatric: He has a normal mood and affect.        Significant Labs:   CBC:   Recent Labs  Lab 06/04/17  0014   WBC 5.94   HGB 12.2*   HCT 36.2*   *     CMP:   Recent Labs  Lab 06/04/17  0014      K 4.0      CO2 21*   *   BUN 25*   CREATININE 1.3   CALCIUM 9.5   PROT 7.1   ALBUMIN 3.7   BILITOT 0.8   ALKPHOS 73   AST 46*   ALT 68*   ANIONGAP 13   EGFRNONAA 53*       Significant Imaging: I have reviewed all pertinent imaging results/findings within the past 24 hours.    Assessment/Plan:   Acute posterior epistaxis  - transferred from [facility]  - No acute surgical intervention, no active bleeding  - Recommend admission given posterior pack and anticoagulation, heart history  - Nasal saline Q2h spray to bilateral nostrils  - Afrin PRN nasal bleeding  - If O2 is required, no nasal cannula, please use humidified face tent  - given one time dose of ceftriaxone IVPB  - cont Cefpodoxime PO BID x 5 days   - No bending, lifting, or straining. Rec stool softener  - Will follow, if no further bleeding plan on pack removal in 72 hours.    Atrial fibrillation s/p PPM  - CHADS score 3  - EKG ordered  - cont amiodarone  - cont Coumadin   - INR: 1.6    Hypothyroidism  - TSH  - cont synthroid    Diabetes type 2  - hold po metformin and po amaryl  - POCT q 4hrs  - Insulin SS     Coronary artery  disease  - cont nitrates PRN for chest pain  - cont isosorbide mononitrates  - cont ranolazine 12hr tablets BID  - cont Plavix 75 mg daily    AAA repair   - cont statin      VTE Risk Mitigation         Ordered     warfarin tablet 7.5 mg  Daily     Route:  Oral        06/04/17 1251        Filiberto Maldonado MD  Department of Hospital Medicine   [facility]

## 2017-06-05 NOTE — PROGRESS NOTES
Otolaryngology - Head and Neck Surgery  Progress Note    Subjective: No acute surgical issues overnight. No respiratory distress. BiPAP stopped overnight because of slight nasal bleeding.  Hemostatic this AM.     Objective:  Temp:  [98 °F (36.7 °C)-98.5 °F (36.9 °C)]   Pulse:  [68-85]   Resp:  [18-20]   BP: (131-156)/(66-77)   SpO2:  [94 %-97 %]      ]    Physical Exam   VITAL SIGNS:   Vitals:    06/05/17 0331 06/05/17 0333 06/05/17 0439 06/05/17 0700   BP:   131/69    BP Location:       Patient Position:       BP Method:       Pulse: 82 73 76 85   Resp: 20  20    Temp:   98 °F (36.7 °C)    TempSrc:   Oral    SpO2: (!) 94%  95%    Weight:       Height:            Constitutional: Well appearing / communicating.  NAD.  Eyes: EOM I Bilaterally  Head/Face: Anterior nasal bridge with swelling and mild erythema.  Negative paranasal sinus pressure/tenderness.  Salivary glands WNL.  House Brackmann I Bilaterally.  Nose: Left nare with 7.5 A/P pack in place and both balloons inflated.  No active bleeding from right or left nare.  Oral Cavity: Gingiva/lips WNL.  FOM Soft, no masses palpated. Oral Tongue mobile. Hard Palate WNL.   Oropharynx: BOT WNL. No masses/lesions noted. Tonsillar fossa/pharyngeal wall without lesions. Posterior oropharynx WNL.  Soft palate without masses. Midline uvula. No blood in posterior pharynx.  Neck/Lymphatic: No LAD I-VI bilaterally.  No thyromegaly.  No masses noted on exam.  Neuro/Psychiatric: AOx3.  Normal mood and affect.   Respiratory: Normal respiratory effort, no stridor, no retractions noted.      CBC    Recent Labs  Lab 06/04/17  0014 06/05/17  0619   WBC 5.94 8.05   HGB 12.2* 10.5*   HCT 36.2* 31.6*   MCV 97 96   * 139*       BMP    Recent Labs  Lab 06/04/17  0014 06/05/17 0619   * 166*    138   K 4.0 3.4*    105   CO2 21* 23   BUN 25* 17   CREATININE 1.3 0.8   CALCIUM 9.5 9.1   PHOS  --  1.9*   MG  --  1.4*       LISSET    Recent Labs  Lab 06/04/17  0014   INR  1.6*         Assessment: 76 year old male with nasal trauma 1 week ago presents with epistaxis.  Anticoagulated with Coumadin, also on Plavix. Anterior-posterior rhino rocket pack placed at outside hospital with cessation of bleeding. Ct reviewed, bilateral nasal bone fractures.      Plan:  Acute epistaxis    Nasal saline q4hrs to bilateral nostril   Afrin PRN nasal bleeding   Anti-staph abx while pack in place   Stool softener   Optimize coagulation profile/minimize HTN   Will keep balloon inflated for total of 72 hours   Humidified face tent if O2 required   Will follow    Wilton Anderson MD  Resident Physician  Otolaryngology-[facility]  Pager: [phone]

## 2017-06-05 NOTE — PLAN OF CARE
Nic Maruo MD  [address] [facility] [address]      [facility] [place] [facility] [address] [facility]  [address] [place] [address]  Phone: [phone] Fax: [phone]      Payor: [employer] MEDICARE / Plan: [employer] / Product Type: Medicare Advantage /        06/05/17 1609   Discharge Assessment   Assessment Type Discharge Planning Assessment   Confirmed/corrected address and phone number on facesheet? Yes   Assessment information obtained from? Patient;Caregiver   Expected Length of Stay (days) 4   Communicated expected length of stay with patient/caregiver yes   Prior to hospitilization cognitive status: Alert/Oriented   Prior to hospitalization functional status: Independent   Current cognitive status: Alert/Oriented   Current Functional Status: Independent   Arrived From home or self-care   Lives With spouse   Able to Return to Prior Arrangements yes   Is patient able to care for self after discharge? Yes   Who are your caregiver(s) and their phone number(s)? Maile Theodore  spouse [phone]    Patient's perception of discharge disposition home or selfcare   Patient currently receives home health services? No   Patient currently receives any other outside agency services? No   Equipment Currently Used at Home none   Does the patient have transportation to healthcare appointments? Yes   Transportation Available family or friend will provide   On Dialysis? No   Discharge Plan A Home with family   Patient/Family In Agreement With Plan yes

## 2017-06-05 NOTE — PHYSICIAN QUERY
"PT Name: Doni Theodore  MR #: [id number]    Physician Query Form - Heart  Condition Clarification     CDS/: Brandy E Capley               Contact information: Ext.  [phone]  This form is a permanent document in the medical record.     Query Date: June 5, 2017    By submitting this query, we are merely seeking further clarification of documentation. Please utilize your independent clinical judgment when addressing the question(s) below.    The medical record contains the following   Indicators     Supporting Clinical Findings Location in Medical Record    BNP      EF      Radiology findings     X Echo Results CONCLUSIONS     1 - Normal left ventricular systolic function (EF 60-65%).     2 - Indeterminate LV diastolic function.     3 - Normal right ventricular systolic function .     4 - The estimated PA systolic pressure is greater than 21 mmHg.     5 - Trivial mitral regurgitation.     6 - Trivial tricuspid regurgitation.     7 - Calicification of the aorta and ST junction..     8 - Mild left atrial enlargement. Echo 6/5    "Ascites" documented      "SOB" or "SLADE" documented      "Hypoxia" documented      Heart Failure documented      "Edema" documented     X Diuretics/Meds amiodarone tablet 200 mg  :  Dose 200 mg  :  Oral  :  Daily  atorvastatin tablet 80 mg  :  Dose 80 mg  :  Oral  :  Daily  clopidogrel tablet 75 mg  :  Dose 75 mg  :  Oral  :  Daily  digoxin tablet 250 mcg  :  Dose 250 mcg  :  Oral  :  Daily  isosorbide mononitrate tablet 20 mg  :  Dose 20 mg  :  Oral  :  Daily  ranolazine 12 hr tablet 500 mg  :  Dose 500 mg  :  Oral  :  2 times daily   warfarin tablet 7.5 mg  :  Dose 7.5 mg  :  Oral  :  Daily  :   MAR 6/4  MAR 6/4  MAR 6/4  MAR 6/4  MAR 6/5  MAR 6/4  MAR 6/4    Treatment:     X Other:  Past medical history:   Coronary artery disease  Pacemaker  Stents X 2  hypertension H&P 6/4       Provider, please specify diagnosis or diagnoses associated with above clinical findings.                          "      [ X ] Chronic Diastolic Heart Failure (EF > 40)*  [  ] Other (please specify): ___________________________________  [  ] Clinically Undetermined            *American Heart Association                                                                                                          Please document in your progress notes daily for the duration of treatment until resolved and include in your discharge summary.

## 2017-06-05 NOTE — SUBJECTIVE & OBJECTIVE
Past Medical History:   Diagnosis Date    Anticoagulant long-term use     Plavix (on hold for angiogram)    Arthritis     Coronary artery disease     pacemaker, stentsx2    Diabetes mellitus     Hypertension     Pacemaker     Sleep apnea     Stroke     Rt side affected / mild    Thyroid disease        Past Surgical History:   Procedure Laterality Date    ANKLE SURGERY      APPENDECTOMY      CARDIAC PACEMAKER PLACEMENT      CHOLECYSTECTOMY      CORONARY ANGIOPLASTY WITH STENT PLACEMENT  6/06    stents x 2 placed     EYE SURGERY      HERNIA REPAIR      ventral hernia repair    sciatica      sciatica procedure       Review of patient's allergies indicates:   Allergen Reactions    Penicillins Swelling       No current facility-administered medications on file prior to encounter.      Current Outpatient Prescriptions on File Prior to Encounter   Medication Sig    amiodarone (PACERONE) 200 MG Tab Take 200 mg by mouth once daily.     clopidogrel (PLAVIX) 75 mg tablet Take 75 mg by mouth once daily.    digoxin (LANOXIN) 250 mcg tablet Take 250 mcg by mouth once daily.    glimepiride (AMARYL) 4 MG tablet Take 4 mg by mouth before breakfast.    levothyroxine (SYNTHROID) 200 MCG tablet Take 200 mcg by mouth once daily.    metformin (GLUCOPHAGE) 500 MG tablet Take 500 mg by mouth 2 (two) times daily with meals.    ranitidine (ZANTAC) 300 MG tablet Take 300 mg by mouth every evening.    sertraline (ZOLOFT) 50 MG tablet Take 100 mg by mouth 2 (two) times daily.     tamsulosin (FLOMAX) 0.4 mg Cp24 TAKE 1 CAPSULE AT BEDTIME.    vitamin D 185 MG Tab Take 2,000 mg by mouth once daily.     warfarin (COUMADIN) 5 MG tablet Take 7.5 mg by mouth once daily.    oxycodone-acetaminophen (PERCOCET) 5-325 mg per tablet Take 1 tablet by mouth every 8 (eight) hours as needed for Pain.    sildenafil (VIAGRA) 100 MG tablet Take 1 tablet (100 mg total) by mouth daily as needed for Erectile Dysfunction.     Family  History     Problem Relation (Age of Onset)    Cancer Mother, Sister    Heart disease Father        Social History Main Topics    Smoking status: Former Smoker     Quit date: 6/13/1998    Smokeless tobacco: Never Used    Alcohol use No    Drug use: No    Sexual activity: No     Review of Systems   Constitutional: Negative.    HENT: Positive for nosebleeds. Negative for ear discharge and ear pain.    Eyes: Negative.    Respiratory: Negative.    Cardiovascular: Negative.    Gastrointestinal: Negative.      Objective:     Vital Signs (Most Recent):  Temp: 98 °F (36.7 °C) (06/04/17 1600)  Pulse: 74 (06/04/17 1600)  Resp: 18 (06/04/17 1600)  BP: (!) 153/74 (06/04/17 1600)  SpO2: 95 % (06/04/17 1600) Vital Signs (24h Range):  Temp:  [98 °F (36.7 °C)-98.4 °F (36.9 °C)] 98 °F (36.7 °C)  Pulse:  [68-85] 74  Resp:  [14-32] 18  SpO2:  [93 %-97 %] 95 %  BP: (108-153)/(56-80) 153/74     Weight: 104.3 kg (230 lb)  Body mass index is 33 kg/m².    Physical Exam   Constitutional: He appears well-developed and well-nourished.   HENT:   Head: Normocephalic and atraumatic.   Eyes: Conjunctivae are normal.   Neck: Normal range of motion. Neck supple.   Cardiovascular: Normal rate and regular rhythm.    Pulmonary/Chest: Effort normal and breath sounds normal.   Abdominal: Soft. Bowel sounds are normal.   Musculoskeletal: Normal range of motion.   Neurological: He is alert. He has normal reflexes.   Psychiatric: He has a normal mood and affect.        Significant Labs:   CBC:   Recent Labs  Lab 06/04/17  0014   WBC 5.94   HGB 12.2*   HCT 36.2*   *     CMP:   Recent Labs  Lab 06/04/17  0014      K 4.0      CO2 21*   *   BUN 25*   CREATININE 1.3   CALCIUM 9.5   PROT 7.1   ALBUMIN 3.7   BILITOT 0.8   ALKPHOS 73   AST 46*   ALT 68*   ANIONGAP 13   EGFRNONAA 53*       Significant Imaging: I have reviewed all pertinent imaging results/findings within the past 24 hours.

## 2017-06-06 PROBLEM — R55 SYNCOPE AND COLLAPSE: Status: ACTIVE | Noted: 2017-06-06

## 2017-06-06 LAB
ALBUMIN SERPL BCP-MCNC: 3.3 G/DL
ALP SERPL-CCNC: 73 U/L
ALT SERPL W/O P-5'-P-CCNC: 75 U/L
ANION GAP SERPL CALC-SCNC: 9 MMOL/L
AST SERPL-CCNC: 51 U/L
BASOPHILS # BLD AUTO: 0.01 K/UL
BASOPHILS NFR BLD: 0.2 %
BILIRUB SERPL-MCNC: 0.8 MG/DL
BUN SERPL-MCNC: 15 MG/DL
CALCIUM SERPL-MCNC: 9.5 MG/DL
CHLORIDE SERPL-SCNC: 101 MMOL/L
CO2 SERPL-SCNC: 28 MMOL/L
CREAT SERPL-MCNC: 1 MG/DL
DIFFERENTIAL METHOD: ABNORMAL
EOSINOPHIL # BLD AUTO: 0.1 K/UL
EOSINOPHIL NFR BLD: 1.3 %
ERYTHROCYTE [DISTWIDTH] IN BLOOD BY AUTOMATED COUNT: 15.2 %
EST. GFR  (AFRICAN AMERICAN): >60 ML/MIN/1.73 M^2
EST. GFR  (NON AFRICAN AMERICAN): >60 ML/MIN/1.73 M^2
GLUCOSE SERPL-MCNC: 170 MG/DL
HCT VFR BLD AUTO: 31 %
HGB BLD-MCNC: 10.2 G/DL
INR PPP: 1.6
LYMPHOCYTES # BLD AUTO: 0.7 K/UL
LYMPHOCYTES NFR BLD: 11.7 %
MAGNESIUM SERPL-MCNC: 1.5 MG/DL
MCH RBC QN AUTO: 31.7 PG
MCHC RBC AUTO-ENTMCNC: 32.9 %
MCV RBC AUTO: 96 FL
MONOCYTES # BLD AUTO: 0.6 K/UL
MONOCYTES NFR BLD: 10.1 %
NEUTROPHILS # BLD AUTO: 4.8 K/UL
NEUTROPHILS NFR BLD: 76.5 %
PHOSPHATE SERPL-MCNC: 2.4 MG/DL
PLATELET # BLD AUTO: 141 K/UL
PMV BLD AUTO: 10.3 FL
POCT GLUCOSE: 159 MG/DL (ref 70–110)
POCT GLUCOSE: 219 MG/DL (ref 70–110)
POTASSIUM SERPL-SCNC: 4.2 MMOL/L
PROT SERPL-MCNC: 6.8 G/DL
PROTHROMBIN TIME: 16.3 SEC
RBC # BLD AUTO: 3.22 M/UL
SODIUM SERPL-SCNC: 138 MMOL/L
WBC # BLD AUTO: 6.22 K/UL

## 2017-06-06 PROCEDURE — 12000002 HC ACUTE/MED SURGE SEMI-PRIVATE ROOM

## 2017-06-06 PROCEDURE — 99900035 HC TECH TIME PER 15 MIN (STAT)

## 2017-06-06 PROCEDURE — 63600175 PHARM REV CODE 636 W HCPCS: Performed by: HOSPITALIST

## 2017-06-06 PROCEDURE — 80053 COMPREHEN METABOLIC PANEL: CPT

## 2017-06-06 PROCEDURE — 84100 ASSAY OF PHOSPHORUS: CPT

## 2017-06-06 PROCEDURE — 85610 PROTHROMBIN TIME: CPT

## 2017-06-06 PROCEDURE — 36415 COLL VENOUS BLD VENIPUNCTURE: CPT

## 2017-06-06 PROCEDURE — 85025 COMPLETE CBC W/AUTO DIFF WBC: CPT

## 2017-06-06 PROCEDURE — 83735 ASSAY OF MAGNESIUM: CPT

## 2017-06-06 PROCEDURE — 25000003 PHARM REV CODE 250: Performed by: HOSPITALIST

## 2017-06-06 PROCEDURE — 63700000 PHARM REV CODE 250 ALT 637 W/O HCPCS: Performed by: HOSPITALIST

## 2017-06-06 PROCEDURE — 99233 SBSQ HOSP IP/OBS HIGH 50: CPT | Mod: ,,, | Performed by: HOSPITALIST

## 2017-06-06 RX ORDER — CEFPODOXIME PROXETIL 100 MG/1
200 TABLET, FILM COATED ORAL EVERY 12 HOURS
Status: DISCONTINUED | OUTPATIENT
Start: 2017-06-06 | End: 2017-06-07

## 2017-06-06 RX ADMIN — LEVOTHYROXINE SODIUM 200 MCG: 100 TABLET ORAL at 06:06

## 2017-06-06 RX ADMIN — AMIODARONE HYDROCHLORIDE 200 MG: 200 TABLET ORAL at 08:06

## 2017-06-06 RX ADMIN — CLOPIDOGREL 75 MG: 75 TABLET, FILM COATED ORAL at 08:06

## 2017-06-06 RX ADMIN — CYANOCOBALAMIN TAB 1000 MCG 1000 MCG: 1000 TAB at 08:06

## 2017-06-06 RX ADMIN — RANOLAZINE 500 MG: 500 TABLET, FILM COATED, EXTENDED RELEASE ORAL at 08:06

## 2017-06-06 RX ADMIN — OXYMETAZOLINE HYDROCHLORIDE 2 SPRAY: 5 SPRAY NASAL at 09:06

## 2017-06-06 RX ADMIN — ATORVASTATIN CALCIUM 80 MG: 20 TABLET, FILM COATED ORAL at 08:06

## 2017-06-06 RX ADMIN — FAMOTIDINE 20 MG: 20 TABLET, FILM COATED ORAL at 08:06

## 2017-06-06 RX ADMIN — INSULIN ASPART 2 UNITS: 100 INJECTION, SOLUTION INTRAVENOUS; SUBCUTANEOUS at 08:06

## 2017-06-06 RX ADMIN — CEFPODOXIME PROXETIL 200 MG: 200 TABLET, FILM COATED ORAL at 08:06

## 2017-06-06 RX ADMIN — INSULIN ASPART 2 UNITS: 100 INJECTION, SOLUTION INTRAVENOUS; SUBCUTANEOUS at 09:06

## 2017-06-06 RX ADMIN — TAMSULOSIN HYDROCHLORIDE 0.4 MG: 0.4 CAPSULE ORAL at 08:06

## 2017-06-06 RX ADMIN — WARFARIN SODIUM 7.5 MG: 2.5 TABLET ORAL at 05:06

## 2017-06-06 RX ADMIN — OXYCODONE HYDROCHLORIDE AND ACETAMINOPHEN 1 TABLET: 5; 325 TABLET ORAL at 06:06

## 2017-06-06 RX ADMIN — OXYCODONE HYDROCHLORIDE AND ACETAMINOPHEN 1 TABLET: 5; 325 TABLET ORAL at 02:06

## 2017-06-06 RX ADMIN — OXYMETAZOLINE HYDROCHLORIDE 2 SPRAY: 5 SPRAY NASAL at 08:06

## 2017-06-06 RX ADMIN — DIGOXIN 250 MCG: 125 TABLET ORAL at 08:06

## 2017-06-06 RX ADMIN — ISOSORBIDE MONONITRATE 20 MG: 20 TABLET ORAL at 06:06

## 2017-06-06 RX ADMIN — FLUTICASONE PROPIONATE 1 SPRAY: 50 SPRAY, METERED NASAL at 08:06

## 2017-06-06 NOTE — SUBJECTIVE & OBJECTIVE
Interval History: Patient seen and examined at bedside. No further epistaxis. Most likely discharge tomorrow.     Review of Systems   Constitutional: Negative.    HENT: Positive for nosebleeds. Negative for ear discharge and ear pain.    Eyes: Negative.    Respiratory: Negative.    Cardiovascular: Negative.    Gastrointestinal: Negative.      Objective:     Vital Signs (Most Recent):  Temp: 98.6 °F (37 °C) (06/05/17 1700)  Pulse: 76 (06/05/17 1900)  Resp: 12 (06/05/17 1700)  BP: 134/78 (06/05/17 1700)  SpO2: 97 % (06/05/17 1700) Vital Signs (24h Range):  Temp:  [97.5 °F (36.4 °C)-98.6 °F (37 °C)] 98.6 °F (37 °C)  Pulse:  [] 76  Resp:  [12-20] 12  SpO2:  [94 %-97 %] 97 %  BP: (106-141)/(59-78) 134/78     Weight: 104.3 kg (230 lb)  Body mass index is 33 kg/m².  No intake or output data in the 24 hours ending 06/05/17 2132   Physical Exam   Constitutional: He appears well-developed and well-nourished.   HENT:   Head: Normocephalic and atraumatic.   Eyes: Conjunctivae are normal.   Neck: Normal range of motion. Neck supple.   Cardiovascular: Normal rate and regular rhythm.    Pulmonary/Chest: Effort normal and breath sounds normal.   Abdominal: Soft. Bowel sounds are normal.   Musculoskeletal: Normal range of motion.   Neurological: He is alert. He has normal reflexes.   Psychiatric: He has a normal mood and affect.       Significant Labs:   CBC:   Recent Labs  Lab 06/04/17 0014 06/05/17 0619   WBC 5.94 8.05   HGB 12.2* 10.5*   HCT 36.2* 31.6*   * 139*     CMP:   Recent Labs  Lab 06/04/17 0014 06/05/17 0619    138   K 4.0 3.4*    105   CO2 21* 23   * 166*   BUN 25* 17   CREATININE 1.3 0.8   CALCIUM 9.5 9.1   PROT 7.1 6.7   ALBUMIN 3.7 3.4*   BILITOT 0.8 0.9   ALKPHOS 73 70   AST 46* 40   ALT 68* 65*   ANIONGAP 13 10   EGFRNONAA 53* >60.0       Significant Imaging: I have reviewed all pertinent imaging results/findings within the past 24 hours.

## 2017-06-06 NOTE — PROGRESS NOTES
[facility] Hospital Medicine  Progress Note    Patient Name: Doni Theodore  MRN: [id number]  Patient Class: IP- Inpatient   Admission Date: 6/3/2017  Length of Stay: 1 days  Attending Physician: Filiberto Maldonado MD  Primary Care Provider: Nic Mauro MD    [facility] Medicine Team: [facility]OSP MED A Filiberto Maldonado MD    Subjective:     Principal Problem:Acute posterior epistaxis    HPI:  76 y.o. year old male presents as transfer from [facility] for epistaxis.  He reports a fall onto his face one week ago with profuse bleeding bilaterally that stopped on its own, he did not seek medical attention at that time.  Last night he experienced bleeding from the left side of the nose that did not stop with pressure and he presented to the outside ED where an anterior-posterior pack was placed with cessation of bleeding, and he was transferred for further management.  Patient is on Coumadin and Plavix.    Hospital Course:  Admitted as a transfer due to Epistaxis.     Interval History: Patient seen and examined at bedside. No further epistaxis. Most likely discharge tomorrow.     Review of Systems   Constitutional: Negative.    HENT: Positive for nosebleeds. Negative for ear discharge and ear pain.    Eyes: Negative.    Respiratory: Negative.    Cardiovascular: Negative.    Gastrointestinal: Negative.      Objective:     Vital Signs (Most Recent):  Temp: 98.6 °F (37 °C) (06/05/17 1700)  Pulse: 76 (06/05/17 1900)  Resp: 12 (06/05/17 1700)  BP: 134/78 (06/05/17 1700)  SpO2: 97 % (06/05/17 1700) Vital Signs (24h Range):  Temp:  [97.5 °F (36.4 °C)-98.6 °F (37 °C)] 98.6 °F (37 °C)  Pulse:  [] 76  Resp:  [12-20] 12  SpO2:  [94 %-97 %] 97 %  BP: (106-141)/(59-78) 134/78     Weight: 104.3 kg (230 lb)  Body mass index is 33 kg/m².  No intake or output data in the 24 hours ending 06/05/17 2132   Physical Exam   Constitutional: He appears well-developed and well-nourished.   HENT:   Head:  Normocephalic and atraumatic.   Eyes: Conjunctivae are normal.   Neck: Normal range of motion. Neck supple.   Cardiovascular: Normal rate and regular rhythm.    Pulmonary/Chest: Effort normal and breath sounds normal.   Abdominal: Soft. Bowel sounds are normal.   Musculoskeletal: Normal range of motion.   Neurological: He is alert. He has normal reflexes.   Psychiatric: He has a normal mood and affect.       Significant Labs:   CBC:   Recent Labs  Lab 06/04/17  0014 06/05/17  0619   WBC 5.94 8.05   HGB 12.2* 10.5*   HCT 36.2* 31.6*   * 139*     CMP:   Recent Labs  Lab 06/04/17  0014 06/05/17  0619    138   K 4.0 3.4*    105   CO2 21* 23   * 166*   BUN 25* 17   CREATININE 1.3 0.8   CALCIUM 9.5 9.1   PROT 7.1 6.7   ALBUMIN 3.7 3.4*   BILITOT 0.8 0.9   ALKPHOS 73 70   AST 46* 40   ALT 68* 65*   ANIONGAP 13 10   EGFRNONAA 53* >60.0       Significant Imaging: I have reviewed all pertinent imaging results/findings within the past 24 hours.    Assessment/Plan:   Acute posterior epistaxis  - transferred from [facility]  - No acute surgical intervention, no active bleeding  - Recommend admission given posterior pack and anticoagulation, heart history  - Nasal saline Q2h spray to bilateral nostrils  - Afrin PRN nasal bleeding  - If O2 is required, no nasal cannula, please use humidified face tent  - given one time dose of ceftriaxone IVPB  - cont Cefpodoxime PO BID x 5 days   - No bending, lifting, or straining. Rec stool softener  - Will follow, if no further bleeding plan on pack removal in 72 hours.     Atrial fibrillation s/p PPM  - CHADS score 3  - EKG ordered  - cont amiodarone  - cont Coumadin   - INR: 1.6     Hypothyroidism  - TSH  - cont synthroid     Diabetes type 2  - hold po metformin and po amaryl  - POCT q 4hrs  - Insulin SS      Coronary artery disease  - cont nitrates PRN for chest pain  - cont isosorbide mononitrates  - cont ranolazine 12hr tablets BID  - cont Plavix 75 mg  daily     AAA repair   - cont statin     VTE Risk Mitigation         Ordered     warfarin tablet 7.5 mg  Daily     Route:  Oral        06/04/17 4527          Filiberto Maldonado MD  Department of Hospital Medicine   [facility]

## 2017-06-06 NOTE — PROGRESS NOTES
Pt's family refused transfer to the 11th Floor. Pt to stay in Rm# [id number]. Brianda on IMTA notified. Will continue to monitor.

## 2017-06-06 NOTE — PROGRESS NOTES
[facility]  Otorhinolaryngology-Head & Neck Surgery  Progress Note    Subjective:     Post-Op Info:  * No surgery found *      Hospital Day: 4     Interval History: NAEON. Pack deflated. No respiratory issues. Hemostatic    Medications:  Continuous Infusions:   Scheduled Meds:   amiodarone  200 mg Oral Daily    atorvastatin  80 mg Oral Daily    cefpodoxime  200 mg Oral Q12H    clopidogrel  75 mg Oral Daily    cyanocobalamin  1,000 mcg Oral Daily    digoxin  250 mcg Oral Daily    famotidine  20 mg Oral BID    fluticasone  1 spray Each Nare Daily    isosorbide mononitrate  20 mg Oral Daily    levothyroxine  200 mcg Oral Daily    oxymetazoline  2 spray Each Nare BID    ranolazine  500 mg Oral BID    tamsulosin  1 capsule Oral Nightly    warfarin  7.5 mg Oral Daily     PRN Meds:dextrose 50%, dextrose 50%, glucagon (human recombinant), glucose, glucose, insulin aspart, nitroGLYCERIN, oxycodone-acetaminophen     Review of patient's allergies indicates:   Allergen Reactions    Penicillins Swelling     Objective:     Vital Signs (24h Range):  Temp:  [97.5 °F (36.4 °C)-98.6 °F (37 °C)] 98.4 °F (36.9 °C)  Pulse:  [] 62  Resp:  [12-18] 16  SpO2:  [93 %-97 %] 96 %  BP: (106-179)/(59-85) 147/69        Lines/Drains/Airways     Peripheral Intravenous Line                 Peripheral IV - Single Lumen 06/04/17 0014 Left Antecubital 2 days                Physical Exam   Constitutional: He is oriented to person, place, and time. He appears well-developed and well-nourished. He is cooperative.  Non-toxic appearance. He does not have a sickly appearance. No distress.   HENT:   Head: Normocephalic. Not macrocephalic and not microcephalic. Head is without raccoon's eyes.   Right Ear: Hearing and external ear normal. No lacerations. No mastoid tenderness.   Left Ear: Hearing and external ear normal. No lacerations. No mastoid tenderness.   Nose: No rhinorrhea. No epistaxis. Foreign body (nasal pack)  is present.       Mouth/Throat: Oropharynx is clear and moist. No oral lesions. No trismus in the jaw. No uvula swelling. No oropharyngeal exudate or posterior oropharyngeal edema.       Eyes: Conjunctivae and EOM are normal. Pupils are equal, round, and reactive to light.   Neck: Normal range of motion. Neck supple. Carotid bruit is not present. No tracheal deviation present. No thyroid mass and no thyromegaly present.   Pulmonary/Chest: No stridor. No apnea and no tachypnea. No respiratory distress.   Abdominal: Normal appearance. There is no tenderness.   Neurological: He is alert and oriented to person, place, and time.   Skin: He is not diaphoretic.       Significant Labs:  ABGs: No results for input(s): PH, PCO2, HCO3, POCSATURATED, BE in the last 168 hours.  CBC:   Recent Labs  Lab 06/06/17  0349   WBC 6.22   RBC 3.22*   HGB 10.2*   HCT 31.0*   *   MCV 96   MCH 31.7*   MCHC 32.9     Coagulation:   Recent Labs  Lab 06/04/17  0014 06/06/17  0349   INR 1.6* 1.6*   APTT 29.4  --      LFTs:   Recent Labs  Lab 06/06/17  0349   ALT 75*   AST 51*   ALKPHOS 73   BILITOT 0.8   PROT 6.8   ALBUMIN 3.3*       Significant Diagnostics:  None    Assessment/Plan:     * Acute posterior epistaxis    Pack balloon deflated   Hemostatic   If noted to bleed, page resident, will inflate cuff  If hemostatic for 24 hours, will harvest pack  Nasal saline q4hrs to bilateral nostril  Afrin PRN nasal bleeding  Anti-staph abx while pack in place   Stool softener  Optimize coagulation profile/minimize HTN  Humidified face tent if O2 required  If repeat bleed noted, consider IR embolization              Wilton Anderson MD  Otorhinolaryngology-Head & Neck Surgery  [facility]

## 2017-06-06 NOTE — HPI
76 year old male anticoagulated with coumadin and plavix. Presenting with epistaxis 1 week after a nasal trauma. Anterior-posterior rhino rocket pack placed at outside hospital with cessation of bleeding. Ct reviewed, bilateral nasal bone fractures.

## 2017-06-06 NOTE — ASSESSMENT & PLAN NOTE
Pack balloon deflated   Hemostatic   If noted to bleed, page resident, will inflate cuff  If hemostatic for 24 hours, will harvest pack  Nasal saline q4hrs to bilateral nostril  Afrin PRN nasal bleeding  Anti-staph abx while pack in place   Stool softener  Optimize coagulation profile/minimize HTN  Humidified face tent if O2 required  If repeat bleed noted, consider IR embolization

## 2017-06-06 NOTE — SUBJECTIVE & OBJECTIVE
Interval History: NAEON. Pack deflated. No respiratory issues. Hemostatic    Medications:  Continuous Infusions:   Scheduled Meds:   amiodarone  200 mg Oral Daily    atorvastatin  80 mg Oral Daily    cefpodoxime  200 mg Oral Q12H    clopidogrel  75 mg Oral Daily    cyanocobalamin  1,000 mcg Oral Daily    digoxin  250 mcg Oral Daily    famotidine  20 mg Oral BID    fluticasone  1 spray Each Nare Daily    isosorbide mononitrate  20 mg Oral Daily    levothyroxine  200 mcg Oral Daily    oxymetazoline  2 spray Each Nare BID    ranolazine  500 mg Oral BID    tamsulosin  1 capsule Oral Nightly    warfarin  7.5 mg Oral Daily     PRN Meds:dextrose 50%, dextrose 50%, glucagon (human recombinant), glucose, glucose, insulin aspart, nitroGLYCERIN, oxycodone-acetaminophen     Review of patient's allergies indicates:   Allergen Reactions    Penicillins Swelling     Objective:     Vital Signs (24h Range):  Temp:  [97.5 °F (36.4 °C)-98.6 °F (37 °C)] 98.4 °F (36.9 °C)  Pulse:  [] 62  Resp:  [12-18] 16  SpO2:  [93 %-97 %] 96 %  BP: (106-179)/(59-85) 147/69        Lines/Drains/Airways     Peripheral Intravenous Line                 Peripheral IV - Single Lumen 06/04/17 0014 Left Antecubital 2 days                Physical Exam   Constitutional: He is oriented to person, place, and time. He appears well-developed and well-nourished. He is cooperative.  Non-toxic appearance. He does not have a sickly appearance. No distress.   HENT:   Head: Normocephalic. Not macrocephalic and not microcephalic. Head is without raccoon's eyes.   Right Ear: Hearing and external ear normal. No lacerations. No mastoid tenderness.   Left Ear: Hearing and external ear normal. No lacerations. No mastoid tenderness.   Nose: No rhinorrhea. No epistaxis. Foreign body (nasal pack) is present.       Mouth/Throat: Oropharynx is clear and moist. No oral lesions. No trismus in the jaw. No uvula swelling. No oropharyngeal exudate or posterior  oropharyngeal edema.       Eyes: Conjunctivae and EOM are normal. Pupils are equal, round, and reactive to light.   Neck: Normal range of motion. Neck supple. Carotid bruit is not present. No tracheal deviation present. No thyroid mass and no thyromegaly present.   Pulmonary/Chest: No stridor. No apnea and no tachypnea. No respiratory distress.   Abdominal: Normal appearance. There is no tenderness.   Neurological: He is alert and oriented to person, place, and time.   Skin: He is not diaphoretic.       Significant Labs:  ABGs: No results for input(s): PH, PCO2, HCO3, POCSATURATED, BE in the last 168 hours.  CBC:   Recent Labs  Lab 06/06/17  0349   WBC 6.22   RBC 3.22*   HGB 10.2*   HCT 31.0*   *   MCV 96   MCH 31.7*   MCHC 32.9     Coagulation:   Recent Labs  Lab 06/04/17  0014 06/06/17  0349   INR 1.6* 1.6*   APTT 29.4  --      LFTs:   Recent Labs  Lab 06/06/17  0349   ALT 75*   AST 51*   ALKPHOS 73   BILITOT 0.8   PROT 6.8   ALBUMIN 3.3*       Significant Diagnostics:  None

## 2017-06-07 LAB
ALBUMIN SERPL BCP-MCNC: 3.2 G/DL
ALP SERPL-CCNC: 72 U/L
ALT SERPL W/O P-5'-P-CCNC: 101 U/L
ANION GAP SERPL CALC-SCNC: 10 MMOL/L
AST SERPL-CCNC: 77 U/L
BASOPHILS # BLD AUTO: 0.01 K/UL
BASOPHILS NFR BLD: 0.2 %
BILIRUB SERPL-MCNC: 0.6 MG/DL
BUN SERPL-MCNC: 16 MG/DL
CALCIUM SERPL-MCNC: 9.4 MG/DL
CHLORIDE SERPL-SCNC: 103 MMOL/L
CO2 SERPL-SCNC: 27 MMOL/L
CREAT SERPL-MCNC: 0.9 MG/DL
DIFFERENTIAL METHOD: ABNORMAL
EOSINOPHIL # BLD AUTO: 0.1 K/UL
EOSINOPHIL NFR BLD: 1.7 %
ERYTHROCYTE [DISTWIDTH] IN BLOOD BY AUTOMATED COUNT: 15.3 %
EST. GFR  (AFRICAN AMERICAN): >60 ML/MIN/1.73 M^2
EST. GFR  (NON AFRICAN AMERICAN): >60 ML/MIN/1.73 M^2
GLUCOSE SERPL-MCNC: 155 MG/DL
HCT VFR BLD AUTO: 29.5 %
HGB BLD-MCNC: 9.9 G/DL
INR PPP: 1.8
LYMPHOCYTES # BLD AUTO: 0.6 K/UL
LYMPHOCYTES NFR BLD: 9.1 %
MAGNESIUM SERPL-MCNC: 1.6 MG/DL
MCH RBC QN AUTO: 32.1 PG
MCHC RBC AUTO-ENTMCNC: 33.6 %
MCV RBC AUTO: 96 FL
MONOCYTES # BLD AUTO: 0.5 K/UL
MONOCYTES NFR BLD: 8.7 %
NEUTROPHILS # BLD AUTO: 4.9 K/UL
NEUTROPHILS NFR BLD: 80.1 %
PHOSPHATE SERPL-MCNC: 2.4 MG/DL
PLATELET # BLD AUTO: 139 K/UL
PMV BLD AUTO: 9.9 FL
POCT GLUCOSE: 170 MG/DL (ref 70–110)
POCT GLUCOSE: 208 MG/DL (ref 70–110)
POCT GLUCOSE: 250 MG/DL (ref 70–110)
POCT GLUCOSE: 270 MG/DL (ref 70–110)
POTASSIUM SERPL-SCNC: 4.1 MMOL/L
PROT SERPL-MCNC: 6.6 G/DL
PROTHROMBIN TIME: 17.9 SEC
RBC # BLD AUTO: 3.08 M/UL
SODIUM SERPL-SCNC: 140 MMOL/L
WBC # BLD AUTO: 6.06 K/UL

## 2017-06-07 PROCEDURE — 25000003 PHARM REV CODE 250: Performed by: HOSPITALIST

## 2017-06-07 PROCEDURE — 85610 PROTHROMBIN TIME: CPT

## 2017-06-07 PROCEDURE — 93279 PRGRMG DEV EVAL PM/LDLS PM: CPT

## 2017-06-07 PROCEDURE — 80053 COMPREHEN METABOLIC PANEL: CPT

## 2017-06-07 PROCEDURE — 99222 1ST HOSP IP/OBS MODERATE 55: CPT | Mod: GC,,, | Performed by: INTERNAL MEDICINE

## 2017-06-07 PROCEDURE — 84100 ASSAY OF PHOSPHORUS: CPT

## 2017-06-07 PROCEDURE — 20600001 HC STEP DOWN PRIVATE ROOM

## 2017-06-07 PROCEDURE — 99233 SBSQ HOSP IP/OBS HIGH 50: CPT | Mod: ,,, | Performed by: HOSPITALIST

## 2017-06-07 PROCEDURE — 93279 PRGRMG DEV EVAL PM/LDLS PM: CPT | Mod: 26,,, | Performed by: INTERNAL MEDICINE

## 2017-06-07 PROCEDURE — 85025 COMPLETE CBC W/AUTO DIFF WBC: CPT

## 2017-06-07 PROCEDURE — 36415 COLL VENOUS BLD VENIPUNCTURE: CPT

## 2017-06-07 PROCEDURE — 83735 ASSAY OF MAGNESIUM: CPT

## 2017-06-07 RX ORDER — RAMELTEON 8 MG/1
8 TABLET ORAL NIGHTLY
Status: DISCONTINUED | OUTPATIENT
Start: 2017-06-07 | End: 2017-06-08 | Stop reason: HOSPADM

## 2017-06-07 RX ADMIN — RANOLAZINE 500 MG: 500 TABLET, FILM COATED, EXTENDED RELEASE ORAL at 08:06

## 2017-06-07 RX ADMIN — OXYCODONE HYDROCHLORIDE AND ACETAMINOPHEN 1 TABLET: 5; 325 TABLET ORAL at 08:06

## 2017-06-07 RX ADMIN — OXYMETAZOLINE HYDROCHLORIDE 2 SPRAY: 5 SPRAY NASAL at 08:06

## 2017-06-07 RX ADMIN — DIGOXIN 250 MCG: 125 TABLET ORAL at 08:06

## 2017-06-07 RX ADMIN — INSULIN ASPART 6 UNITS: 100 INJECTION, SOLUTION INTRAVENOUS; SUBCUTANEOUS at 12:06

## 2017-06-07 RX ADMIN — AMIODARONE HYDROCHLORIDE 200 MG: 200 TABLET ORAL at 08:06

## 2017-06-07 RX ADMIN — ATORVASTATIN CALCIUM 80 MG: 20 TABLET, FILM COATED ORAL at 08:06

## 2017-06-07 RX ADMIN — TAMSULOSIN HYDROCHLORIDE 0.4 MG: 0.4 CAPSULE ORAL at 09:06

## 2017-06-07 RX ADMIN — CEFPODOXIME PROXETIL 200 MG: 200 TABLET, FILM COATED ORAL at 08:06

## 2017-06-07 RX ADMIN — CLOPIDOGREL 75 MG: 75 TABLET, FILM COATED ORAL at 08:06

## 2017-06-07 RX ADMIN — INSULIN ASPART 2 UNITS: 100 INJECTION, SOLUTION INTRAVENOUS; SUBCUTANEOUS at 06:06

## 2017-06-07 RX ADMIN — INSULIN ASPART 4 UNITS: 100 INJECTION, SOLUTION INTRAVENOUS; SUBCUTANEOUS at 08:06

## 2017-06-07 RX ADMIN — ISOSORBIDE MONONITRATE 20 MG: 20 TABLET ORAL at 05:06

## 2017-06-07 RX ADMIN — FAMOTIDINE 20 MG: 20 TABLET, FILM COATED ORAL at 09:06

## 2017-06-07 RX ADMIN — RANOLAZINE 500 MG: 500 TABLET, FILM COATED, EXTENDED RELEASE ORAL at 09:06

## 2017-06-07 RX ADMIN — FAMOTIDINE 20 MG: 20 TABLET, FILM COATED ORAL at 08:06

## 2017-06-07 RX ADMIN — CYANOCOBALAMIN TAB 1000 MCG 1000 MCG: 1000 TAB at 08:06

## 2017-06-07 RX ADMIN — LEVOTHYROXINE SODIUM 200 MCG: 100 TABLET ORAL at 05:06

## 2017-06-07 RX ADMIN — WARFARIN SODIUM 7.5 MG: 2.5 TABLET ORAL at 05:06

## 2017-06-07 RX ADMIN — FLUTICASONE PROPIONATE 1 SPRAY: 50 SPRAY, METERED NASAL at 08:06

## 2017-06-07 RX ADMIN — INSULIN ASPART 2 UNITS: 100 INJECTION, SOLUTION INTRAVENOUS; SUBCUTANEOUS at 09:06

## 2017-06-07 NOTE — NURSING
Patient arrived to floor via wheelchair. VSS and in NAD. Patient is AAOx4, calm, cooperative, and independent. Patient oriented to room, instructed to use call bell for assistance. No signs of skin breakdown noted.

## 2017-06-07 NOTE — PLAN OF CARE
Problem: Patient Care Overview  Goal: Plan of Care Review  Outcome: Ongoing (interventions implemented as appropriate)  POC reviewed with patient who verbalized understanding. Pt tolerating diet well, no nausea/vomiting. Left nare drain remaining intact with no drainage during the night shift. Urine output adequate Pain being controlled with PRN pain medication. Pt remained free from falls throughout the shift VSS. No distress noted, will continue to monitor.

## 2017-06-07 NOTE — PROGRESS NOTES
[facility] Hospital Medicine  Progress Note    Patient Name: Doni Theodore  MRN: [id number]  Patient Class: IP- Inpatient   Admission Date: 6/3/2017  Length of Stay: 2 days  Attending Physician: Bryon Urias MD  Primary Care Provider: Nic Mauro MD    [facility] Medicine Team: [facility] MED A Bryon Urias MD    Subjective:     Principal Problem:Acute posterior epistaxis    HPI:  76 y.o. year old male presents as transfer from [facility] for epistaxis.  He reports a fall onto his face one week ago with profuse bleeding bilaterally that stopped on its own, he did not seek medical attention at that time.  Last night he experienced bleeding from the left side of the nose that did not stop with pressure and he presented to the outside ED where an anterior-posterior pack was placed with cessation of bleeding, and he was transferred for further management.  Patient is on Coumadin and Plavix.    Hospital Course:  Admitted as a transfer due to Epistaxis.     Interval History: patient is doing well today; patient's packing has been deflated today; no signs of epistaxis; will monitor over night;   - patient states that he had a syncopal episode and states when he woke up, he had a major nose bleed and came to the ED; will need further work up with MRI brain and cardiology consult    Review of Systems   Constitutional: Negative for activity change and appetite change.   HENT: Negative for drooling and facial swelling.    Eyes: Negative for discharge and itching.   Respiratory: Negative for cough, shortness of breath and wheezing.    Cardiovascular: Negative for chest pain and palpitations.   Gastrointestinal: Negative for abdominal pain and nausea.   Genitourinary: Negative for difficulty urinating and dysuria.   Skin: Negative for color change and pallor.   Neurological: Negative for dizziness and numbness.   Psychiatric/Behavioral: Negative for behavioral problems and confusion.      Objective:     Vital Signs (Most Recent):  Temp: 98.7 °F (37.1 °C) (06/06/17 1935)  Pulse: 67 (06/06/17 1935)  Resp: 18 (06/06/17 1935)  BP: 126/77 (06/06/17 1935)  SpO2: 97 % (06/06/17 1935) Vital Signs (24h Range):  Temp:  [97.4 °F (36.3 °C)-98.7 °F (37.1 °C)] 98.7 °F (37.1 °C)  Pulse:  [62-91] 67  Resp:  [16-18] 18  SpO2:  [94 %-97 %] 97 %  BP: (107-170)/(61-77) 126/77     Weight: 104.3 kg (230 lb)  Body mass index is 33 kg/m².    Intake/Output Summary (Last 24 hours) at 06/06/17 2301  Last data filed at 06/06/17 1733   Gross per 24 hour   Intake              840 ml   Output                0 ml   Net              840 ml      Physical Exam   Constitutional: He is oriented to person, place, and time. He appears well-developed and well-nourished.   HENT:   Head: Normocephalic and atraumatic.   Eyes: Conjunctivae are normal. Pupils are equal, round, and reactive to light.   Neck: Normal range of motion. No thyromegaly present.   Cardiovascular: Normal rate, regular rhythm and normal heart sounds.    Pulmonary/Chest: Effort normal and breath sounds normal.   Abdominal: Soft. He exhibits no distension. There is no tenderness.   Neurological: He is alert and oriented to person, place, and time. Coordination normal.   Skin: No rash noted. No erythema.       Significant Labs:   BMP:   Recent Labs  Lab 06/06/17  0349   *      K 4.2      CO2 28   BUN 15   CREATININE 1.0   CALCIUM 9.5   MG 1.5*     CBC:   Recent Labs  Lab 06/05/17  0619 06/06/17  0349   WBC 8.05 6.22   HGB 10.5* 10.2*   HCT 31.6* 31.0*   * 141*       Significant Imaging: I have reviewed all pertinent imaging results/findings within the past 24 hours.    Assessment/Plan:      Acute posterior epistaxis  - transferred from [facility]  - No acute surgical intervention, no active bleeding  - Recommend admission given posterior pack and anticoagulation, heart history  - Nasal saline Q2h spray to bilateral nostrils  - Afrin PRN nasal  bleeding  - If O2 is required, no nasal cannula, please use humidified face tent  - given one time dose of ceftriaxone IVPB  - cont Cefpodoxime PO BID x 5 days   - No bending, lifting, or straining. Rec stool softener  - - packing was deflated today by ENT, will monitor over night, might be removed in the AM    # Syncope and collapse .  -   Unclear etiology, concern for arrhythmia cause  - will get cardiology evaluation, may need PPM interrogation    - CT head- no acute process  - 2d echo with no significant valvular defects        Atrial fibrillation s/p PPM  - CHADS score 3  - EKG ordered  - cont amiodarone  - INR: 1.6  - coumadin on hold due to recurrent epistaxis      Hypothyroidism  - TSH  - cont synthroid     Diabetes type 2  - hold po metformin and po amaryl  - POCT q 4hrs  - Insulin SS      Coronary artery disease  - cont nitrates PRN for chest pain  - cont isosorbide mononitrates  - cont ranolazine 12hr tablets BID  - cont Plavix 75 mg daily     AAA repair   - cont statin      Discharge Disposition - likely tomorrow     VTE Risk Mitigation         Ordered     warfarin tablet 7.5 mg  Daily     Route:  Oral        06/04/17 1251          Bryon Urias MD  Department of Hospital Medicine   [facility]

## 2017-06-07 NOTE — SUBJECTIVE & OBJECTIVE
Interval History: patient is doing well today; patient's packing has been deflated today; no signs of epistaxis; will monitor over night;   - patient states that he had a syncopal episode and states when he woke up, he had a major nose bleed and came to the ED; will need further work up with MRI brain and cardiology consult    Review of Systems   Constitutional: Negative for activity change and appetite change.   HENT: Negative for drooling and facial swelling.    Eyes: Negative for discharge and itching.   Respiratory: Negative for cough, shortness of breath and wheezing.    Cardiovascular: Negative for chest pain and palpitations.   Gastrointestinal: Negative for abdominal pain and nausea.   Genitourinary: Negative for difficulty urinating and dysuria.   Skin: Negative for color change and pallor.   Neurological: Negative for dizziness and numbness.   Psychiatric/Behavioral: Negative for behavioral problems and confusion.     Objective:     Vital Signs (Most Recent):  Temp: 98.7 °F (37.1 °C) (06/06/17 1935)  Pulse: 67 (06/06/17 1935)  Resp: 18 (06/06/17 1935)  BP: 126/77 (06/06/17 1935)  SpO2: 97 % (06/06/17 1935) Vital Signs (24h Range):  Temp:  [97.4 °F (36.3 °C)-98.7 °F (37.1 °C)] 98.7 °F (37.1 °C)  Pulse:  [62-91] 67  Resp:  [16-18] 18  SpO2:  [94 %-97 %] 97 %  BP: (107-170)/(61-77) 126/77     Weight: 104.3 kg (230 lb)  Body mass index is 33 kg/m².    Intake/Output Summary (Last 24 hours) at 06/06/17 2301  Last data filed at 06/06/17 1733   Gross per 24 hour   Intake              840 ml   Output                0 ml   Net              840 ml      Physical Exam   Constitutional: He is oriented to person, place, and time. He appears well-developed and well-nourished.   HENT:   Head: Normocephalic and atraumatic.   Eyes: Conjunctivae are normal. Pupils are equal, round, and reactive to light.   Neck: Normal range of motion. No thyromegaly present.   Cardiovascular: Normal rate, regular rhythm and normal heart  sounds.    Pulmonary/Chest: Effort normal and breath sounds normal.   Abdominal: Soft. He exhibits no distension. There is no tenderness.   Neurological: He is alert and oriented to person, place, and time. Coordination normal.   Skin: No rash noted. No erythema.       Significant Labs:   BMP:   Recent Labs  Lab 06/06/17  0349   *      K 4.2      CO2 28   BUN 15   CREATININE 1.0   CALCIUM 9.5   MG 1.5*     CBC:   Recent Labs  Lab 06/05/17  0619 06/06/17  0349   WBC 8.05 6.22   HGB 10.5* 10.2*   HCT 31.6* 31.0*   * 141*       Significant Imaging: I have reviewed all pertinent imaging results/findings within the past 24 hours.

## 2017-06-07 NOTE — CONSULTS
Cardiology Consult Note  Attending Physician: Bryon Urias MD  Reason for Consult: syncope, pacemaker interrogation     HPI:   76 y.o. gentleman with history of CAD s/p multiple stents, sick sinus syndrome s/p PPM, A-Fib (on anti-coagulation), HTN, HLD, DM2, hypothyroidism, and FEMI who presented to [facility] on 6/3 for recurrent epistaxis after falling on his face. Patient reports that he was walking in his kitchen about 2 weeks ago and then remembers waking up off the floor with a bleeding from both nares. He does not remember passing out and denies prodromal symptoms prior to syncope. The bleeding stopped on its own and he did not seek medical attention but reoccurred from the left nares the day prior to admission.  Cardiology was consulted for pacemaker interrogation. Patient denies previous episodes of syncope. Of note, he mentions that he has been getting dizzy over the past several months when standing up quickly.    ROS:    Constitution: negative for - fever, chills, weight loss or weight gain  HENT: negative for - sore throat, rhinorrhea, or headache  Eyes: negative for - blurred or double vision  Cardiovascular: no chest pain or dyspnea on exertion  Pulmonary: no cough, shortness of breath, or wheezing  Gastrointestinal: negative for - abdominal pain, nausea, vomiting, or diarrhea  : negative for - dysuria  Neurological: negative for - focal weakness or sensory changes  PMH:     Past Medical History:   Diagnosis Date    Anticoagulant long-term use     Plavix (on hold for angiogram)    Arthritis     Coronary artery disease     pacemaker, stentsx2    Diabetes mellitus     Hypertension     Pacemaker     Sleep apnea     Stroke     Rt side affected / mild    Thyroid disease      Past Surgical History:   Procedure Laterality Date    ANKLE SURGERY      APPENDECTOMY      CARDIAC PACEMAKER PLACEMENT      CHOLECYSTECTOMY      CORONARY ANGIOPLASTY WITH STENT PLACEMENT  6/06    stents x 2 placed      EYE SURGERY      HERNIA REPAIR      ventral hernia repair    sciatica      sciatica procedure     Allergies:     Review of patient's allergies indicates:   Allergen Reactions    Penicillins Swelling     Medications:     No current facility-administered medications on file prior to encounter.      Current Outpatient Prescriptions on File Prior to Encounter   Medication Sig Dispense Refill    amiodarone (PACERONE) 200 MG Tab Take 200 mg by mouth once daily.       clopidogrel (PLAVIX) 75 mg tablet Take 75 mg by mouth once daily.      digoxin (LANOXIN) 250 mcg tablet Take 250 mcg by mouth once daily.      glimepiride (AMARYL) 4 MG tablet Take 4 mg by mouth before breakfast.      levothyroxine (SYNTHROID) 200 MCG tablet Take 200 mcg by mouth once daily.      metformin (GLUCOPHAGE) 500 MG tablet Take 500 mg by mouth 2 (two) times daily with meals.      ranitidine (ZANTAC) 300 MG tablet Take 300 mg by mouth every evening.      sertraline (ZOLOFT) 50 MG tablet Take 100 mg by mouth 2 (two) times daily.       tamsulosin (FLOMAX) 0.4 mg Cp24 TAKE 1 CAPSULE AT BEDTIME. 90 capsule 3    vitamin D 185 MG Tab Take 2,000 mg by mouth once daily.       warfarin (COUMADIN) 5 MG tablet Take 7.5 mg by mouth once daily.      oxycodone-acetaminophen (PERCOCET) 5-325 mg per tablet Take 1 tablet by mouth every 8 (eight) hours as needed for Pain. 15 tablet 0    sildenafil (VIAGRA) 100 MG tablet Take 1 tablet (100 mg total) by mouth daily as needed for Erectile Dysfunction. 10 tablet 11       Inpatient Medications   Continuous Infusions:   Scheduled Meds:   amiodarone  200 mg Oral Daily    atorvastatin  80 mg Oral Daily    cefpodoxime  200 mg Oral Q12H    clopidogrel  75 mg Oral Daily    cyanocobalamin  1,000 mcg Oral Daily    digoxin  250 mcg Oral Daily    famotidine  20 mg Oral BID    fluticasone  1 spray Each Nare Daily    isosorbide mononitrate  20 mg Oral Daily    levothyroxine  200 mcg Oral Daily    ranolazine   500 mg Oral BID    tamsulosin  1 capsule Oral Nightly    warfarin  7.5 mg Oral Daily     PRN Meds:dextrose 50%, dextrose 50%, glucagon (human recombinant), glucose, glucose, insulin aspart, nitroGLYCERIN, oxycodone-acetaminophen     Social History:     Social History   Substance Use Topics    Smoking status: Former Smoker     Quit date: 6/13/1998    Smokeless tobacco: Never Used    Alcohol use No     Family History:     Family History   Problem Relation Age of Onset    Cancer Mother     Heart disease Father     Cancer Sister      Physical Exam:     Vitals:  Temp:  [97.4 °F (36.3 °C)-98.9 °F (37.2 °C)]   Pulse:  [59-84]   Resp:  [17-18]   BP: (110-139)/(65-77)   SpO2:  [94 %-97 %]  on room air I/O's:    Intake/Output Summary (Last 24 hours) at 06/07/17 0929  Last data filed at 06/07/17 0443   Gross per 24 hour   Intake             1220 ml   Output                0 ml   Net             1220 ml        Constitutional: NAD, conversant  HEENT: Sclera anicteric, PERRLA, EOMI  Neck: No JVD, no carotid bruits  CV: RRR, no murmur, normal S1/S2  Pulm: CTAB, no wheezes, rales, or ronchi  GI: Abdomen soft, NTND, +BS  Extremities: No LE edema, warm and well perfused  Skin: No ecchymosis, erythema, or ulcers  Psych: AOx3, appropriate affect  Neuro: CNII-XII intact, no focal deficits    Labs:       Recent Labs  Lab 06/05/17 0619 06/06/17 0349 06/07/17  0341    138 140   K 3.4* 4.2 4.1    101 103   CO2 23 28 27   BUN 17 15 16   CREATININE 0.8 1.0 0.9   * 170* 155*   ANIONGAP 10 9 10     No results for input(s): TROPONINI, BNP in the last 168 hours.   Recent Labs  Lab 06/05/17 0619 06/06/17 0349 06/07/17  0341   WBC 8.05 6.22 6.06   HGB 10.5* 10.2* 9.9*   HCT 31.6* 31.0* 29.5*   * 141* 139*       Recent Labs  Lab 06/05/17 0619 06/06/17  0349 06/07/17  0341   AST 40 51* 77*   ALT 65* 75* 101*   ALKPHOS 70 73 72   BILITOT 0.9 0.8 0.6   ALBUMIN 3.4* 3.3* 3.2*        Imaging:     EF   Date Value Ref  Range Status   06/05/2017 60 55 - 65      2D Echo 6/5/17   1 - Normal left ventricular systolic function (EF 60-65%).     2 - Indeterminate LV diastolic function.     3 - Normal right ventricular systolic function .     4 - The estimated PA systolic pressure is greater than 21 mmHg.     5 - Trivial mitral regurgitation.     6 - Trivial tricuspid regurgitation.     7 - Calicification of the aorta and ST junction..     8 - Mild left atrial enlargement.     EKG: NSR, incomplete RBBB    Telemetry: NSR    Assessment:   77 yo male with pmh of  history of CAD s/p multiple stents, sick sinus syndrome s/p PPM, A-Fib (on anti-coagulation), HTN, HLD, DM2, hypothyroidism, and FEMI who presented with epistaxis after falling on his face 2 weeks ago. It is unclear whether patient had synopsized prior to fall but it likely as patient does not remember how he fell.     Plan:     Syncope  - syncopal episode appears to have been unprovoked, so arrhythmia is a possibility  - CT head negative for intracranial hemorrhage or masses  - will order pacemaker interrogation to check for recent episodes of arrhythmia  - recommend checking orthostatic vital signs as patient reports lightheadedness/dizziness when standing up    Atrial Fibrillation   - continue amiodarone and warfarin as bleed appears to have subsided  - consider stopping digoxin but will leave that decision to patient's primary cardiologist    CAD  - continue asa, plavix, statin    Plan discussed with Dr. Gutierrez. Staff attestation to follow    Finesse Srinivasan DO  PGY1 Internal Medicine  Pager: [phone]      I have personally taken the history and examined this patient. Pertinent labs/studies have been reviewed, and I agree with the resident's assessment and plan as above.    Traumatic syncope without prodrome. Agree with pacemaker interrogation to rule out arrhythmogenic etiology.

## 2017-06-08 VITALS
SYSTOLIC BLOOD PRESSURE: 110 MMHG | DIASTOLIC BLOOD PRESSURE: 59 MMHG | HEART RATE: 64 BPM | WEIGHT: 230 LBS | BODY MASS INDEX: 32.93 KG/M2 | RESPIRATION RATE: 16 BRPM | OXYGEN SATURATION: 93 % | TEMPERATURE: 98 F | HEIGHT: 70 IN

## 2017-06-08 PROBLEM — I95.1 ORTHOSTATIC HYPOTENSION: Status: ACTIVE | Noted: 2017-06-08

## 2017-06-08 LAB
ALBUMIN SERPL BCP-MCNC: 3.2 G/DL
ALP SERPL-CCNC: 75 U/L
ALT SERPL W/O P-5'-P-CCNC: 104 U/L
ANION GAP SERPL CALC-SCNC: 11 MMOL/L
AST SERPL-CCNC: 65 U/L
BASOPHILS # BLD AUTO: 0.01 K/UL
BASOPHILS NFR BLD: 0.2 %
BILIRUB SERPL-MCNC: 0.7 MG/DL
BUN SERPL-MCNC: 16 MG/DL
CALCIUM SERPL-MCNC: 9.6 MG/DL
CHLORIDE SERPL-SCNC: 101 MMOL/L
CO2 SERPL-SCNC: 26 MMOL/L
CREAT SERPL-MCNC: 1 MG/DL
DIFFERENTIAL METHOD: ABNORMAL
EOSINOPHIL # BLD AUTO: 0.1 K/UL
EOSINOPHIL NFR BLD: 1.1 %
ERYTHROCYTE [DISTWIDTH] IN BLOOD BY AUTOMATED COUNT: 15.3 %
EST. GFR  (AFRICAN AMERICAN): >60 ML/MIN/1.73 M^2
EST. GFR  (NON AFRICAN AMERICAN): >60 ML/MIN/1.73 M^2
GLUCOSE SERPL-MCNC: 186 MG/DL
HCT VFR BLD AUTO: 29.3 %
HGB BLD-MCNC: 9.8 G/DL
INR PPP: 1.9
LYMPHOCYTES # BLD AUTO: 0.5 K/UL
LYMPHOCYTES NFR BLD: 9.9 %
MAGNESIUM SERPL-MCNC: 1.6 MG/DL
MCH RBC QN AUTO: 31.9 PG
MCHC RBC AUTO-ENTMCNC: 33.4 %
MCV RBC AUTO: 95 FL
MONOCYTES # BLD AUTO: 0.6 K/UL
MONOCYTES NFR BLD: 10.3 %
NEUTROPHILS # BLD AUTO: 4.2 K/UL
NEUTROPHILS NFR BLD: 78.3 %
PHOSPHATE SERPL-MCNC: 2.9 MG/DL
PLATELET # BLD AUTO: 164 K/UL
PMV BLD AUTO: 10 FL
POCT GLUCOSE: 177 MG/DL (ref 70–110)
POCT GLUCOSE: 204 MG/DL (ref 70–110)
POTASSIUM SERPL-SCNC: 3.8 MMOL/L
PROT SERPL-MCNC: 6.8 G/DL
PROTHROMBIN TIME: 19.3 SEC
RBC # BLD AUTO: 3.07 M/UL
SODIUM SERPL-SCNC: 138 MMOL/L
WBC # BLD AUTO: 5.35 K/UL

## 2017-06-08 PROCEDURE — 25000003 PHARM REV CODE 250: Performed by: HOSPITALIST

## 2017-06-08 PROCEDURE — 99233 SBSQ HOSP IP/OBS HIGH 50: CPT | Mod: ,,, | Performed by: HOSPITALIST

## 2017-06-08 PROCEDURE — 85025 COMPLETE CBC W/AUTO DIFF WBC: CPT

## 2017-06-08 PROCEDURE — 84100 ASSAY OF PHOSPHORUS: CPT

## 2017-06-08 PROCEDURE — 83735 ASSAY OF MAGNESIUM: CPT

## 2017-06-08 PROCEDURE — 36415 COLL VENOUS BLD VENIPUNCTURE: CPT

## 2017-06-08 PROCEDURE — 80053 COMPREHEN METABOLIC PANEL: CPT

## 2017-06-08 PROCEDURE — 85610 PROTHROMBIN TIME: CPT

## 2017-06-08 RX ORDER — MIDODRINE HYDROCHLORIDE 2.5 MG/1
2.5 TABLET ORAL 3 TIMES DAILY
Status: DISCONTINUED | OUTPATIENT
Start: 2017-06-08 | End: 2017-06-08 | Stop reason: HOSPADM

## 2017-06-08 RX ORDER — MIDODRINE HYDROCHLORIDE 2.5 MG/1
2.5 TABLET ORAL 3 TIMES DAILY
Qty: 90 TABLET | Refills: 3 | Status: SHIPPED | OUTPATIENT
Start: 2017-06-08 | End: 2018-12-07

## 2017-06-08 RX ADMIN — ATORVASTATIN CALCIUM 80 MG: 20 TABLET, FILM COATED ORAL at 08:06

## 2017-06-08 RX ADMIN — INSULIN ASPART 2 UNITS: 100 INJECTION, SOLUTION INTRAVENOUS; SUBCUTANEOUS at 08:06

## 2017-06-08 RX ADMIN — FAMOTIDINE 20 MG: 20 TABLET, FILM COATED ORAL at 08:06

## 2017-06-08 RX ADMIN — MIDODRINE HYDROCHLORIDE 2.5 MG: 2.5 TABLET ORAL at 10:06

## 2017-06-08 RX ADMIN — SODIUM CHLORIDE 500 ML: 0.9 INJECTION, SOLUTION INTRAVENOUS at 10:06

## 2017-06-08 RX ADMIN — DIGOXIN 250 MCG: 125 TABLET ORAL at 08:06

## 2017-06-08 RX ADMIN — MIDODRINE HYDROCHLORIDE 2.5 MG: 2.5 TABLET ORAL at 02:06

## 2017-06-08 RX ADMIN — LEVOTHYROXINE SODIUM 200 MCG: 100 TABLET ORAL at 06:06

## 2017-06-08 RX ADMIN — RAMELTEON 8 MG: 8 TABLET, FILM COATED ORAL at 01:06

## 2017-06-08 RX ADMIN — FLUTICASONE PROPIONATE 1 SPRAY: 50 SPRAY, METERED NASAL at 08:06

## 2017-06-08 RX ADMIN — AMIODARONE HYDROCHLORIDE 200 MG: 200 TABLET ORAL at 08:06

## 2017-06-08 RX ADMIN — RANOLAZINE 500 MG: 500 TABLET, FILM COATED, EXTENDED RELEASE ORAL at 08:06

## 2017-06-08 RX ADMIN — INSULIN ASPART 4 UNITS: 100 INJECTION, SOLUTION INTRAVENOUS; SUBCUTANEOUS at 01:06

## 2017-06-08 RX ADMIN — CYANOCOBALAMIN TAB 1000 MCG 1000 MCG: 1000 TAB at 08:06

## 2017-06-08 RX ADMIN — CLOPIDOGREL 75 MG: 75 TABLET, FILM COATED ORAL at 08:06

## 2017-06-08 NOTE — PROGRESS NOTES
Pacemaker Clinic-  Inpatient device evaluation performed 2ndary to syncope.  SC PPM programmed VVI 50 bpm, routinely followed elsewhere.  Device fxn WNL, RV pacing 1.7%, no ventricular high rates noted (set at 150 bpm x 5 beats).  Approx 7.5 years to [facility]VALDO.  No changes.

## 2017-06-08 NOTE — SUBJECTIVE & OBJECTIVE
Interval History: patient is doing well today; awaiting pacemaker interrogation; needs orthostatic pressures; packing has been removed; will re-start coumadin; spoke with family about high risks, they would like to continue for now    Review of Systems   Constitutional: Negative for activity change and appetite change.   HENT: Negative for drooling and facial swelling.    Eyes: Negative for discharge and itching.   Respiratory: Negative for cough, shortness of breath and wheezing.    Cardiovascular: Negative for chest pain and palpitations.   Gastrointestinal: Negative for abdominal pain and nausea.   Genitourinary: Negative for difficulty urinating and dysuria.   Skin: Negative for color change and pallor.   Neurological: Negative for dizziness and numbness.   Psychiatric/Behavioral: Negative for behavioral problems and confusion.     Objective:     Vital Signs (Most Recent):  Temp: 98.7 °F (37.1 °C) (06/07/17 1943)  Pulse: 68 (06/07/17 1943)  Resp: 15 (06/07/17 1943)  BP: (!) 142/70 (06/07/17 1943)  SpO2: (!) 94 % (06/07/17 1943) Vital Signs (24h Range):  Temp:  [96.6 °F (35.9 °C)-98.9 °F (37.2 °C)] 98.7 °F (37.1 °C)  Pulse:  [59-84] 68  Resp:  [15-18] 15  SpO2:  [92 %-96 %] 94 %  BP: (102-142)/(61-70) 142/70     Weight: 104.3 kg (230 lb)  Body mass index is 33 kg/m².    Intake/Output Summary (Last 24 hours) at 06/07/17 2110  Last data filed at 06/07/17 0443   Gross per 24 hour   Intake              500 ml   Output                0 ml   Net              500 ml      Physical Exam   Constitutional: He is oriented to person, place, and time. He appears well-developed and well-nourished.   HENT:   Head: Normocephalic and atraumatic.   Eyes: Conjunctivae are normal. Pupils are equal, round, and reactive to light.   Neck: Normal range of motion. No thyromegaly present.   Cardiovascular: Normal rate, regular rhythm and normal heart sounds.    Pulmonary/Chest: Effort normal and breath sounds normal.   Abdominal: Soft. He  exhibits no distension. There is no tenderness.   Neurological: He is alert and oriented to person, place, and time. Coordination normal.   Skin: No rash noted. No erythema.       Significant Labs:   BMP:     Recent Labs  Lab 06/07/17  0341   *      K 4.1      CO2 27   BUN 16   CREATININE 0.9   CALCIUM 9.4   MG 1.6     CBC:     Recent Labs  Lab 06/06/17  0349 06/07/17  0341   WBC 6.22 6.06   HGB 10.2* 9.9*   HCT 31.0* 29.5*   * 139*       Significant Imaging: I have reviewed all pertinent imaging results/findings within the past 24 hours.

## 2017-06-08 NOTE — NURSING
D/C instructions provided to pt, along with medications and times for next administration. IV removed, education provided on orthostatics and home use of janel alicia.

## 2017-06-08 NOTE — NURSING
Patient being discharged home. Patient given AVS. IV and telemetry removed. Patient is waiting on ride to pick him up.

## 2017-06-08 NOTE — SUBJECTIVE & OBJECTIVE
Interval History: patient had his interogation which was negative for arrhythmia; plan to go home    Review of Systems   Constitutional: Negative for activity change and appetite change.   HENT: Negative for drooling and facial swelling.    Eyes: Negative for discharge and itching.   Respiratory: Negative for cough, shortness of breath and wheezing.    Cardiovascular: Negative for chest pain and palpitations.   Gastrointestinal: Negative for abdominal pain and nausea.   Genitourinary: Negative for difficulty urinating and dysuria.   Skin: Negative for color change and pallor.   Neurological: Negative for dizziness and numbness.   Psychiatric/Behavioral: Negative for behavioral problems and confusion.     Objective:     Vital Signs (Most Recent):  Temp: 98.2 °F (36.8 °C) (06/08/17 1100)  Pulse: 64 (06/08/17 1151)  Resp: 16 (06/08/17 0725)  BP: (!) 110/59 (06/08/17 1151)  SpO2: (!) 93 % (06/08/17 1100) Vital Signs (24h Range):  Temp:  [97.5 °F (36.4 °C)-98.7 °F (37.1 °C)] 98.2 °F (36.8 °C)  Pulse:  [59-82] 64  Resp:  [15-16] 16  SpO2:  [92 %-96 %] 93 %  BP: ()/(52-75) 110/59     Weight: 104.3 kg (230 lb)  Body mass index is 33 kg/m².  No intake or output data in the 24 hours ending 06/08/17 1632   Physical Exam   Constitutional: He is oriented to person, place, and time. He appears well-developed and well-nourished.   HENT:   Head: Normocephalic and atraumatic.   Eyes: Conjunctivae are normal. Pupils are equal, round, and reactive to light.   Neck: Normal range of motion. No thyromegaly present.   Cardiovascular: Normal rate, regular rhythm and normal heart sounds.    Pulmonary/Chest: Effort normal and breath sounds normal.   Abdominal: Soft. He exhibits no distension. There is no tenderness.   Neurological: He is alert and oriented to person, place, and time. Coordination normal.   Skin: No rash noted. No erythema.       Significant Labs:   BMP:     Recent Labs  Lab 06/08/17  0427   *      K 3.8       CO2 26   BUN 16   CREATININE 1.0   CALCIUM 9.6   MG 1.6     CBC:     Recent Labs  Lab 06/07/17  0341 06/08/17  0427   WBC 6.06 5.35   HGB 9.9* 9.8*   HCT 29.5* 29.3*   * 164       Significant Imaging: I have reviewed all pertinent imaging results/findings within the past 24 hours.

## 2017-06-08 NOTE — PROGRESS NOTES
[facility] Hospital Medicine  Progress Note    Patient Name: Doni Theodore  MRN: [id number]  Patient Class: IP- Inpatient   Admission Date: 6/3/2017  Length of Stay: 3 days  Attending Physician: Bryon Urias MD  Primary Care Provider: Nic Mauro MD    [facility] Medicine Team: [facility] HOSP MED A Bryon Urias MD    Subjective:     Principal Problem:Acute posterior epistaxis    HPI:  76 y.o. year old male presents as transfer from [facility] for epistaxis.  He reports a fall onto his face one week ago with profuse bleeding bilaterally that stopped on its own, he did not seek medical attention at that time.  Last night he experienced bleeding from the left side of the nose that did not stop with pressure and he presented to the outside ED where an anterior-posterior pack was placed with cessation of bleeding, and he was transferred for further management.  Patient is on Coumadin and Plavix.    Hospital Course:  Admitted as a transfer due to Epistaxis.     Interval History: patient is doing well today; awaiting pacemaker interrogation; needs orthostatic pressures; packing has been removed; will re-start coumadin; spoke with family about high risks, they would like to continue for now    Review of Systems   Constitutional: Negative for activity change and appetite change.   HENT: Negative for drooling and facial swelling.    Eyes: Negative for discharge and itching.   Respiratory: Negative for cough, shortness of breath and wheezing.    Cardiovascular: Negative for chest pain and palpitations.   Gastrointestinal: Negative for abdominal pain and nausea.   Genitourinary: Negative for difficulty urinating and dysuria.   Skin: Negative for color change and pallor.   Neurological: Negative for dizziness and numbness.   Psychiatric/Behavioral: Negative for behavioral problems and confusion.     Objective:     Vital Signs (Most Recent):  Temp: 98.7 °F (37.1 °C) (06/07/17 1943)  Pulse: 68  (06/07/17 1943)  Resp: 15 (06/07/17 1943)  BP: (!) 142/70 (06/07/17 1943)  SpO2: (!) 94 % (06/07/17 1943) Vital Signs (24h Range):  Temp:  [96.6 °F (35.9 °C)-98.9 °F (37.2 °C)] 98.7 °F (37.1 °C)  Pulse:  [59-84] 68  Resp:  [15-18] 15  SpO2:  [92 %-96 %] 94 %  BP: (102-142)/(61-70) 142/70     Weight: 104.3 kg (230 lb)  Body mass index is 33 kg/m².    Intake/Output Summary (Last 24 hours) at 06/07/17 2119  Last data filed at 06/07/17 0443   Gross per 24 hour   Intake              500 ml   Output                0 ml   Net              500 ml      Physical Exam   Constitutional: He is oriented to person, place, and time. He appears well-developed and well-nourished.   HENT:   Head: Normocephalic and atraumatic.   Eyes: Conjunctivae are normal. Pupils are equal, round, and reactive to light.   Neck: Normal range of motion. No thyromegaly present.   Cardiovascular: Normal rate, regular rhythm and normal heart sounds.    Pulmonary/Chest: Effort normal and breath sounds normal.   Abdominal: Soft. He exhibits no distension. There is no tenderness.   Neurological: He is alert and oriented to person, place, and time. Coordination normal.   Skin: No rash noted. No erythema.       Significant Labs:   BMP:     Recent Labs  Lab 06/07/17  0341   *      K 4.1      CO2 27   BUN 16   CREATININE 0.9   CALCIUM 9.4   MG 1.6     CBC:     Recent Labs  Lab 06/06/17  0349 06/07/17  0341   WBC 6.22 6.06   HGB 10.2* 9.9*   HCT 31.0* 29.5*   * 139*       Significant Imaging: I have reviewed all pertinent imaging results/findings within the past 24 hours.    Assessment/Plan:      Acute posterior epistaxis  - transferred from [facility]  - No acute surgical intervention, no active bleeding  - Recommend admission given posterior pack and anticoagulation, heart history  - Nasal saline Q2h spray to bilateral nostrils  - Afrin PRN nasal bleeding  - If O2 is required, no nasal cannula, please use humidified face tent  - given  one time dose of ceftriaxone IVPB  - cont Cefpodoxime PO BID x 5 days   - No bending, lifting, or straining. Rec stool softener  - - packing was removed today; afrin for 3 more day  - start CPAP tomorrow      # Syncope and collapse .  -   Unclear etiology, concern for arrhythmia cause  - will get cardiology evaluation, awaiting PPM interrogation    - CT head- no acute process  - 2d echo with no significant valvular defects   - check orthostatic pressures in AM        Atrial fibrillation s/p PPM  - CHADS score 3  - EKG ordered  - cont amiodarone  - INR: 1.6  - restarting coumadin tonight      Hypothyroidism  - TSH  - cont synthroid     Diabetes type 2  - hold po metformin and po amaryl  - POCT q 4hrs  - Insulin SS      Coronary artery disease  - cont nitrates PRN for chest pain  - holding imdur due to low BPs  - cont ranolazine 12hr tablets BID  - cont Plavix 75 mg daily     AAA repair   - cont statin        Discharge Disposition - likely tomorrow            VTE Risk Mitigation         Ordered     warfarin tablet 7.5 mg  Daily     Route:  Oral        06/04/17 0611          Bryon Urias MD  Department of Hospital Medicine   [facility]

## 2017-06-08 NOTE — PLAN OF CARE
Problem: Patient Care Overview  Goal: Plan of Care Review  Outcome: Ongoing (interventions implemented as appropriate)  Plan of care reviewed with pt.  Understanding verbalized.  Pt alert and oriented x 4.  Afebrile.  VSS.  No complaints of pain throughout shift.  Teley monitor in place.  Corrective insulin given.  Up with stand by assist.   Instructed pt not to use CPAP tonight per MD order.  Understanding verbalized.  Fall precautions in place.  Bed in lowest position.  Call light and bedside table within reach.  Safety precautions maintained throughout shift.

## 2017-06-08 NOTE — PROGRESS NOTES
[facility] Hospital Medicine  Progress Note    Patient Name: Doni Theodore  MRN: [id number]  Patient Class: IP- Inpatient   Admission Date: 6/3/2017  Length of Stay: 4 days  Attending Physician: No att. providers found  Primary Care Provider: Nic Mauro MD    [facility] Medicine Team: [facility] HOSP MED A Bryon Urias MD    Subjective:     Principal Problem:Acute posterior epistaxis    HPI:  76 y.o. year old male presents as transfer from [facility] [place] for epistaxis.  He reports a fall onto his face one week ago with profuse bleeding bilaterally that stopped on its own, he did not seek medical attention at that time.  Last night he experienced bleeding from the left side of the nose that did not stop with pressure and he presented to the outside ED where an anterior-posterior pack was placed with cessation of bleeding, and he was transferred for further management.  Patient is on Coumadin and Plavix.    Hospital Course:  Admitted as a transfer due to Epistaxis.     Interval History: patient had his interogation which was negative for arrhythmia; plan to go home    Review of Systems   Constitutional: Negative for activity change and appetite change.   HENT: Negative for drooling and facial swelling.    Eyes: Negative for discharge and itching.   Respiratory: Negative for cough, shortness of breath and wheezing.    Cardiovascular: Negative for chest pain and palpitations.   Gastrointestinal: Negative for abdominal pain and nausea.   Genitourinary: Negative for difficulty urinating and dysuria.   Skin: Negative for color change and pallor.   Neurological: Negative for dizziness and numbness.   Psychiatric/Behavioral: Negative for behavioral problems and confusion.     Objective:     Vital Signs (Most Recent):  Temp: 98.2 °F (36.8 °C) (06/08/17 1100)  Pulse: 64 (06/08/17 1151)  Resp: 16 (06/08/17 0725)  BP: (!) 110/59 (06/08/17 1151)  SpO2: (!) 93 % (06/08/17 1100) Vital Signs (24h  Range):  Temp:  [97.5 °F (36.4 °C)-98.7 °F (37.1 °C)] 98.2 °F (36.8 °C)  Pulse:  [59-82] 64  Resp:  [15-16] 16  SpO2:  [92 %-96 %] 93 %  BP: ()/(52-75) 110/59     Weight: 104.3 kg (230 lb)  Body mass index is 33 kg/m².  No intake or output data in the 24 hours ending 06/08/17 1632   Physical Exam   Constitutional: He is oriented to person, place, and time. He appears well-developed and well-nourished.   HENT:   Head: Normocephalic and atraumatic.   Eyes: Conjunctivae are normal. Pupils are equal, round, and reactive to light.   Neck: Normal range of motion. No thyromegaly present.   Cardiovascular: Normal rate, regular rhythm and normal heart sounds.    Pulmonary/Chest: Effort normal and breath sounds normal.   Abdominal: Soft. He exhibits no distension. There is no tenderness.   Neurological: He is alert and oriented to person, place, and time. Coordination normal.   Skin: No rash noted. No erythema.       Significant Labs:   BMP:     Recent Labs  Lab 06/08/17  0427   *      K 3.8      CO2 26   BUN 16   CREATININE 1.0   CALCIUM 9.6   MG 1.6     CBC:     Recent Labs  Lab 06/07/17  0341 06/08/17  0427   WBC 6.06 5.35   HGB 9.9* 9.8*   HCT 29.5* 29.3*   * 164       Significant Imaging: I have reviewed all pertinent imaging results/findings within the past 24 hours.    Assessment/Plan:      Acute posterior epistaxis  - transferred from [facility]  - No acute surgical intervention, no active bleeding  - Recommend admission given posterior pack and anticoagulation, heart history  - Nasal saline Q2h spray to bilateral nostrils  - Afrin PRN nasal bleeding  - If O2 is required, no nasal cannula, please use humidified face tent  - given one time dose of ceftriaxone IVPB  - cont Cefpodoxime PO BID x 5 days   - No bending, lifting, or straining. Rec stool softener  - - packing was removed; afrin and abx treatment completed   - start CPAP today     # Syncope and collapse .  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  - will get cardiology evaluation, PPM interrogation, negative for arrhythmia    - CT head- no acute process  - 2d echo with no significant valvular defects   - orthostatic pressures were positive; given fluids; all BP meds have been stopped; patient told and sent home on janel hoes to wear all the time; also sent home on low dose midodrine and to slowly get up out of bed;         Atrial fibrillation s/p PPM  - CHADS score 3  - EKG ordered  - cont amiodarone  - INR: 1.6  - restarting coumadin tonight; patient and family aware of risks and would like to continue for now     Hypothyroidism  - TSH  - cont synthroid     Diabetes type 2  - hold po metformin and po amaryl  - POCT q 4hrs  - Insulin SS      Coronary artery disease  - cont nitrates PRN for chest pain  - holding imdur due to low BPs  - cont ranolazine 12hr tablets BID  - cont Plavix 75 mg daily     AAA repair   - cont statin        Discharge Disposition - d/c today       VTE Risk Mitigation     None          Bryon Urias MD  Department of Hospital Medicine   [facility]

## 2017-06-08 NOTE — DISCHARGE SUMMARY
[facility] Hospital Medicine  Discharge Summary      Patient Name: Doni Theodore  MRN: [id number]  Admission Date: 6/3/2017  Hospital Length of Stay: 4 days  Discharge Date and Time: 6/8/17  Attending Physician: No att. providers found   Discharging Provider: Bryon Urias MD  Primary Care Provider: Nic Mauro MD  [facility] Medicine Team: [facility] HOSP MED A Bryon Urias MD    HPI:   76 y.o. year old male presents as transfer from [facility] for epistaxis.  He reports a fall onto his face one week ago with profuse bleeding bilaterally that stopped on its own, he did not seek medical attention at that time.  Last night he experienced bleeding from the left side of the nose that did not stop with pressure and he presented to the outside ED where an anterior-posterior pack was placed with cessation of bleeding, and he was transferred for further management.  Patient is on Coumadin and Plavix.    * No surgery found *      Indwelling Lines/Drains at time of discharge:   Lines/Drains/Airways          No matching active lines, drains, or airways        Hospital Course:   Admitted as a transfer due to Epistaxis.     Acute posterior epistaxis  - transferred from [facility]  - No acute surgical intervention, no active bleeding  - Recommend admission given posterior pack and anticoagulation, heart history  - Nasal saline Q2h spray to bilateral nostrils  - Afrin PRN nasal bleeding  - If O2 is required, no nasal cannula, please use humidified face tent  - given one time dose of ceftriaxone IVPB  - cont Cefpodoxime PO BID x 5 days   - No bending, lifting, or straining. Rec stool softener  - - packing was removed; afrin and abx treatment completed   - start CPAP today     # Syncope and collapse .  # Orthostatic Hypotension  # Autonomic dysfunction due to DM2  - will get cardiology evaluation, PPM interrogation, negative for arrhythmia    - CT head- no acute process  - 2d echo with no significant  valvular defects   - orthostatic pressures were positive; given fluids; all BP meds have been stopped; patient told and sent home on janel hoes to wear all the time; also sent home on low dose midodrine and to slowly get up out of bed;         Atrial fibrillation s/p PPM  - CHADS score 3  - EKG ordered  - cont amiodarone  - INR: 1.6  - restarting coumadin tonight; patient and family aware of risks and would like to continue for now     Hypothyroidism  - TSH  - cont synthroid     Diabetes type 2  - hold po metformin and po amaryl  - POCT q 4hrs  - Insulin SS      Coronary artery disease  - cont nitrates PRN for chest pain  - holding imdur due to low BPs  - cont ranolazine 12hr tablets BID  - cont Plavix 75 mg daily     AAA repair   - cont statin        Discharge Disposition - d/c today      Consults:   Consults         Status Ordering Provider     Inpatient consult to Cardiology  Once     Provider:  (Not yet assigned)    Completed ANTOINETTE PUGH     Inpatient consult to ENT  Once     Provider:  (Not yet assigned)    Completed AARON SILVA              Pending Diagnostic Studies:     Procedure Component Value Units Date/Time    CT Head Without Contrast [[id number]] Resulted:  06/04/17 0142    Order Status:  Sent Lab Status:  In process Updated:  06/04/17 0142        Final Active Diagnoses:    Diagnosis Date Noted POA    PRINCIPAL PROBLEM:  Acute posterior epistaxis [R04.0] 06/04/2017 Yes    Orthostatic hypotension [I95.1] 06/08/2017 Yes    Syncope and collapse [R55] 06/06/2017 Yes    Persistent atrial fibrillation [I48.1] 06/04/2017 Yes    CAD (coronary artery disease) [I25.10] 06/04/2017 Yes    AAA (abdominal aortic aneurysm) [I71.4] 06/18/2013 Yes      Problems Resolved During this Admission:    Diagnosis Date Noted Date Resolved POA      No new Assessment & Plan notes have been filed under this hospital service since the last note was generated.  Service: Hospital Medicine      Discharged Condition:  good    Disposition: Home or Self Care    Follow Up: 2 weeks PCP    Patient Instructions:     Diet general       Medications:  Reconciled Home Medications:   Discharge Medication List as of 6/8/2017  1:46 PM      START taking these medications    Details   midodrine (PROAMATINE) 2.5 MG Tab Take 1 tablet (2.5 mg total) by mouth 3 (three) times daily., Starting Thu 6/8/2017, Until Fri 6/8/2018, Normal         CONTINUE these medications which have NOT CHANGED    Details   amiodarone (PACERONE) 200 MG Tab Take 200 mg by mouth once daily. , Until Discontinued, Historical Med      atorvastatin (LIPITOR) 80 MG tablet Take 80 mg by mouth once daily., Historical Med      clopidogrel (PLAVIX) 75 mg tablet Take 75 mg by mouth once daily., Until Discontinued, Historical Med      cyanocobalamin 2000 MCG tablet Take 2,500 mcg by mouth once daily., Historical Med      digoxin (LANOXIN) 250 mcg tablet Take 250 mcg by mouth once daily., Until Discontinued, Historical Med      fluticasone (FLONASE) 50 mcg/actuation nasal spray 1 spray by Each Nare route once daily., Historical Med      glimepiride (AMARYL) 4 MG tablet Take 4 mg by mouth before breakfast., Until Discontinued, Historical Med      levocetirizine (XYZAL) 5 MG tablet Take 5 mg by mouth every evening., Historical Med      levothyroxine (SYNTHROID) 200 MCG tablet Take 200 mcg by mouth once daily., Until Discontinued, Historical Med      metformin (GLUCOPHAGE) 500 MG tablet Take 500 mg by mouth 2 (two) times daily with meals., Until Discontinued, Historical Med      ranitidine (ZANTAC) 300 MG tablet Take 300 mg by mouth every evening., Until Discontinued, Historical Med      ranolazine (RANEXA) 500 MG Tb12 Take 500 mg by mouth 2 (two) times daily., Historical Med      sertraline (ZOLOFT) 50 MG tablet Take 100 mg by mouth 2 (two) times daily. , Historical Med      tamsulosin (FLOMAX) 0.4 mg Cp24 TAKE 1 CAPSULE AT BEDTIME., Normal      vitamin D 185 MG Tab Take 2,000 mg by  mouth once daily. , Historical Med      warfarin (COUMADIN) 5 MG tablet Take 7.5 mg by mouth once daily., Until Discontinued, Historical Med      nitroGLYCERIN (NITROSTAT) 0.4 MG SL tablet Place 0.4 mg under the tongue every 5 (five) minutes as needed for Chest pain., Historical Med      oxycodone-acetaminophen (PERCOCET) 5-325 mg per tablet Take 1 tablet by mouth every 8 (eight) hours as needed for Pain., Starting 1/13/2017, Until Discontinued, Print      sildenafil (VIAGRA) 100 MG tablet Take 1 tablet (100 mg total) by mouth daily as needed for Erectile Dysfunction., Starting 6/30/2015, Until Wed 6/29/16, Normal         STOP taking these medications       isosorbide mononitrate (ISMO,MONOKET) 20 MG Tab Comments:   Reason for Stopping:             Time spent on the discharge of patient: 33 minutes    Bryon Urias MD  Department of Hospital Medicine  [facility]

## 2017-06-09 ENCOUNTER — PATIENT OUTREACH (OUTPATIENT)
Dept: ADMINISTRATIVE | Facility: CLINIC | Age: 77
End: 2017-06-09
Payer: MEDICARE

## 2017-06-09 NOTE — PLAN OF CARE
06/09/17 1051   Final Note   Assessment Type Final Discharge Note   Discharge Disposition Home   Discharge planning education complete? Yes   What phone number can be called within the next 1-3 days to see how you are doing after discharge? [phone]   Hospital Follow Up  Appt(s) scheduled? Yes   Discharge plans and expectations educations in teach back method with documentation complete? Yes   Discharge/Hospital Encounter Summary to (non-[facility]) PCP Yes  (faxed to [phone])   Referral to Outpatient Case Management complete? No   Referral to / orders for Home Health Complete? No   Any social issues identified prior to discharge? No   Did you assess the readiness or willingness of the family or caregiver to support self management of care? Yes   Right Care Referral Info   Post Acute Recommendation No Care     Nic Mauro MD  [address] [facility] CARE [facility] / JEAN BOB*    Follow up scheduled for 06/12/17 at 3:00pm and discharge summary faxed

## 2017-06-10 NOTE — PATIENT INSTRUCTIONS
Fall Prevention   Falls often occur due to slipping, tripping or losing your balance. Here are ways to reduce your risk of falling again.   Was there anything that caused your fall that can be fixed, removed or replaced?   Make your home safe by keeping walkways clear of objects you may trip over.   Use non-slip pads under rugs.   Do not walk in poorly lit areas.   Do not stand on chairs or wobbly ladders.   Use caution when reaching overhead or looking upward. This position can cause a loss of balance.   Be sure your shoes fit properly, have non-slip bottoms and are in good condition.   Be cautious when going up and down stairs, curbs, and when walking on uneven sidewalks.   If your balance is poor, consider using a cane or walker.   If your fall was related to alcohol use, stop or limit alcohol intake.   If your fall was related to use of sleeping medicines, talk to your doctor about this. You may need to reduce your dosage at bedtime if you awaken during the night to go to the bathroom.   To reduce the need for nighttime bathroom trips:   Avoid drinking fluids for several hours before going to bed   Empty your bladder before going to bed   Men can keep a urinal at the bedside  Stay as active as you can. Balance, flexibility, strength, and endurance all come from exercise. They all play a role in preventing falls. Ask your heathcare provider which types of activity are right for you.  © 8006-2930 [employer]. [address] [place] [address], [place] [address]. All rights reserved. This information is not intended as a substitute for professional medical care. Always follow your healthcare professional's instructions.

## 2017-09-19 DIAGNOSIS — I25.10 CORONARY ARTERY DISEASE, ANGINA PRESENCE UNSPECIFIED, UNSPECIFIED VESSEL OR LESION TYPE, UNSPECIFIED WHETHER NATIVE OR TRANSPLANTED HEART: Primary | ICD-10-CM

## 2017-09-21 ENCOUNTER — INITIAL CONSULT (OUTPATIENT)
Dept: CARDIOLOGY | Facility: CLINIC | Age: 77
End: 2017-09-21
Payer: MEDICARE

## 2017-09-21 ENCOUNTER — DOCUMENTATION ONLY (OUTPATIENT)
Dept: CARDIOLOGY | Facility: CLINIC | Age: 77
End: 2017-09-21

## 2017-09-21 VITALS
HEIGHT: 68 IN | BODY MASS INDEX: 27.3 KG/M2 | OXYGEN SATURATION: 94 % | DIASTOLIC BLOOD PRESSURE: 58 MMHG | SYSTOLIC BLOOD PRESSURE: 92 MMHG | WEIGHT: 180.13 LBS | HEART RATE: 74 BPM

## 2017-09-21 DIAGNOSIS — I49.5 SICK SINUS SYNDROME: ICD-10-CM

## 2017-09-21 DIAGNOSIS — I71.40 ABDOMINAL AORTIC ANEURYSM (AAA) WITHOUT RUPTURE: ICD-10-CM

## 2017-09-21 DIAGNOSIS — I25.10 CORONARY ARTERY DISEASE, ANGINA PRESENCE UNSPECIFIED, UNSPECIFIED VESSEL OR LESION TYPE, UNSPECIFIED WHETHER NATIVE OR TRANSPLANTED HEART: Primary | ICD-10-CM

## 2017-09-21 DIAGNOSIS — R55 SYNCOPE AND COLLAPSE: ICD-10-CM

## 2017-09-21 DIAGNOSIS — I95.1 ORTHOSTATIC HYPOTENSION: ICD-10-CM

## 2017-09-21 DIAGNOSIS — D64.9 ANEMIA, UNSPECIFIED TYPE: ICD-10-CM

## 2017-09-21 DIAGNOSIS — Z95.0 PACEMAKER: ICD-10-CM

## 2017-09-21 DIAGNOSIS — I63.9 CEREBROVASCULAR ACCIDENT (CVA), UNSPECIFIED MECHANISM: ICD-10-CM

## 2017-09-21 DIAGNOSIS — I48.19 PERSISTENT ATRIAL FIBRILLATION: ICD-10-CM

## 2017-09-21 PROCEDURE — 3008F BODY MASS INDEX DOCD: CPT | Mod: S$GLB,,, | Performed by: INTERNAL MEDICINE

## 2017-09-21 PROCEDURE — 99214 OFFICE O/P EST MOD 30 MIN: CPT | Mod: S$GLB,,, | Performed by: INTERNAL MEDICINE

## 2017-09-21 PROCEDURE — 99999 PR PBB SHADOW E&M-EST. PATIENT-LVL V: CPT | Mod: PBBFAC,,, | Performed by: INTERNAL MEDICINE

## 2017-09-21 PROCEDURE — 1159F MED LIST DOCD IN RCRD: CPT | Mod: S$GLB,,, | Performed by: INTERNAL MEDICINE

## 2017-09-21 RX ORDER — SODIUM CHLORIDE 9 MG/ML
3 INJECTION, SOLUTION INTRAVENOUS CONTINUOUS
Status: CANCELLED | OUTPATIENT
Start: 2017-09-21 | End: 2017-09-21

## 2017-09-21 RX ORDER — DIPHENHYDRAMINE HCL 25 MG
50 CAPSULE ORAL ONCE
Status: CANCELLED | OUTPATIENT
Start: 2017-09-21 | End: 2017-09-21

## 2017-09-21 RX ORDER — NITROGLYCERIN 0.4 MG/1
0.4 TABLET SUBLINGUAL EVERY 5 MIN PRN
Status: SHIPPED | OUTPATIENT
Start: 2017-09-21 | End: 2018-09-21

## 2017-09-21 NOTE — LETTER
September 24, 2017      Darrick Quinteros MD  [address] [place] [address] [address]ld  [address]  Cardiology Center  [place] [address]           [facility]  [address]  [facility] [address]  Phone: [phone]          Patient: Doni Theodore   MR Number: [id number]   YOB: 1940   Date of Visit: 9/21/2017       Dear Dr. Darrick Quinteros:    Thank you for referring Doni Theodore to me for evaluation. Attached you will find relevant portions of my assessment and plan of care.    If you have questions, please do not hesitate to call me. I look forward to following Doni Theodore along with you.    Sincerely,        Enclosure  CC:  No Recipients    If you would like to receive this communication electronically, please contact [email or web address] or [phone] to request more information on [employer] access.    For providers and/or their staff who would like to refer a patient to [facility], please contact us through our one-stop-shop provider referral line, Jassi Salas, at [phone].    If you feel you have received this communication in error or would no longer like to receive these types of communications, please e-mail [email or web address]

## 2017-09-21 NOTE — PROGRESS NOTES
Subjective:    Patient ID:  Doni Theodore is a 77 y.o. male who presents for evaluation of CAD.    Referring Physician: Dr. aDrrick Guo    HPI  76 yo M with PMhx of HTN, HLD,DM II on metformin, Carotid artery disease (< 50%), orthostatic hypotension on midodrine in past, SSS s/p PPM, PAF on coumadin, PAD s/p AAA repair 2013 , CVA 10-14 yrs ago, CAD s/p mLAD 2.25x15 MARY (on tripple therapy ASA/plavix/coumadin) underwent cardiac stress test due to symptoms of progressive SLADE and found to have perfusion defects so referred for coronary agniogram +/- PCI by Dr. Hema Walker.    He reports cuts grass in his yard approximately 150 feet long and has been getting short of breath . He denies any angina/claudication or orthopnea/PND currently. Denies any bleeding diasthesis but reports frequent falls which was associated with autonomic dysfunction for which he is s/p PPM.      Past Surgical History:   Procedure Laterality Date    ANKLE SURGERY      APPENDECTOMY      CARDIAC PACEMAKER PLACEMENT      CHOLECYSTECTOMY      CORONARY ANGIOPLASTY WITH STENT PLACEMENT  6/06    stents x 2 placed     EYE SURGERY      HERNIA REPAIR      ventral hernia repair    sciatica      sciatica procedure     Current Outpatient Prescriptions on File Prior to Visit   Medication Sig Dispense Refill    amiodarone (PACERONE) 200 MG Tab Take 200 mg by mouth once daily.       atorvastatin (LIPITOR) 80 MG tablet Take 80 mg by mouth once daily.      clopidogrel (PLAVIX) 75 mg tablet Take 75 mg by mouth once daily.      cyanocobalamin 2000 MCG tablet Take 2,500 mcg by mouth once daily.      digoxin (LANOXIN) 250 mcg tablet Take 250 mcg by mouth once daily.      glimepiride (AMARYL) 4 MG tablet Take 4 mg by mouth before breakfast.      levocetirizine (XYZAL) 5 MG tablet Take 5 mg by mouth every evening.      levothyroxine (SYNTHROID) 200 MCG tablet Take 200 mcg by mouth once daily.      metformin (GLUCOPHAGE) 500 MG tablet Take  500 mg by mouth 2 (two) times daily with meals.      midodrine (PROAMATINE) 2.5 MG Tab Take 1 tablet (2.5 mg total) by mouth 3 (three) times daily. 90 tablet 3    oxycodone-acetaminophen (PERCOCET) 5-325 mg per tablet Take 1 tablet by mouth every 8 (eight) hours as needed for Pain. 15 tablet 0    ranitidine (ZANTAC) 300 MG tablet Take 300 mg by mouth every evening.      ranolazine (RANEXA) 500 MG Tb12 Take 500 mg by mouth 2 (two) times daily.      sertraline (ZOLOFT) 50 MG tablet Take 100 mg by mouth 2 (two) times daily.       tamsulosin (FLOMAX) 0.4 mg Cp24 TAKE 1 CAPSULE AT BEDTIME. 90 capsule 3    vitamin D 185 MG Tab Take 2,000 mg by mouth once daily.       warfarin (COUMADIN) 5 MG tablet Take 7.5 mg by mouth once daily.      fluticasone (FLONASE) 50 mcg/actuation nasal spray 1 spray by Each Nare route once daily.      nitroGLYCERIN (NITROSTAT) 0.4 MG SL tablet Place 0.4 mg under the tongue every 5 (five) minutes as needed for Chest pain.       No current facility-administered medications on file prior to visit.      Review of patient's allergies indicates:   Allergen Reactions    Penicillins Swelling     Social History   Substance Use Topics    Smoking status: Former Smoker     Quit date: 6/13/1998    Smokeless tobacco: Never Used    Alcohol use No     Family History   Problem Relation Age of Onset    Cancer Mother     Heart disease Father     Cancer Sister        Cardiac Data    SPECT 8/31/17:  Small to moderate ischemia in lateral wall, inferior and anterior wall.    Echo 3/2017:  CONCLUSIONS     1 - Normal left ventricular systolic function (EF 60-65%).     2 - Indeterminate LV diastolic function.     3 - Normal right ventricular systolic function .     4 - The estimated PA systolic pressure is greater than 21 mmHg.     5 - Trivial mitral regurgitation.     6 - Trivial tricuspid regurgitation.     7 - Calicification of the aorta and ST junction..     8 - Mild left atrial enlargement.  "    Review of Systems   Constitution: Negative for chills, decreased appetite, fever, weakness, night sweats, weight gain and weight loss.   HENT: Negative for congestion, hoarse voice, stridor and tinnitus.    Eyes: Negative for blurred vision, pain and visual disturbance.   Cardiovascular: Positive for dyspnea on exertion. Negative for chest pain, claudication, irregular heartbeat, leg swelling, near-syncope, orthopnea, palpitations, paroxysmal nocturnal dyspnea and syncope.   Respiratory: Negative for cough, hemoptysis, shortness of breath, snoring and wheezing.    Endocrine: Negative for cold intolerance, heat intolerance and polyuria.   Hematologic/Lymphatic: Negative for bleeding problem. Does not bruise/bleed easily.   Skin: Negative for color change and rash.   Musculoskeletal: Negative for arthritis, back pain, joint pain, muscle cramps, myalgias and stiffness.   Gastrointestinal: Negative for abdominal pain, anorexia, constipation, diarrhea, dysphagia, heartburn, hemorrhoids, melena, nausea and vomiting.   Genitourinary: Negative for frequency, hematuria, hesitancy, nocturia and urgency.   Neurological: Negative for difficulty with concentration, dizziness, focal weakness, headaches, light-headedness, numbness, seizures, tremors and vertigo.   Psychiatric/Behavioral: Negative for altered mental status and depression. The patient does not have insomnia.    Allergic/Immunologic: Negative for hives.        Objective:  Vitals:    09/21/17 1333 09/21/17 1338   BP: 95/60 (!) 92/58   BP Location: Right arm Left arm   Patient Position: Sitting Sitting   BP Method: Large (Automatic) Large (Automatic)   Pulse: 75 74   SpO2: (!) 94% (!) 94%   Weight: 81.7 kg (180 lb 1.9 oz) 81.7 kg (180 lb 1.9 oz)   Height: 5' 8" (1.727 m) 5' 8" (1.727 m)         Physical Exam   Constitutional: He is oriented to person, place, and time. He appears well-developed and well-nourished.   HENT:   Head: Normocephalic and atraumatic. "   Eyes: Conjunctivae are normal. Pupils are equal, round, and reactive to light.   Neck: Normal range of motion. No JVD present. No tracheal deviation present. No thyromegaly present.   Cardiovascular: Regular rhythm, S1 normal, S2 normal, normal heart sounds, intact distal pulses and normal pulses.   No extrasystoles are present. Exam reveals no S3.    No murmur heard.  Pulses:       Carotid pulses are 2+ on the right side, and 2+ on the left side.       Radial pulses are 2+ on the right side, and 2+ on the left side.        Femoral pulses are 2+ on the right side, and 2+ on the left side.       Dorsalis pedis pulses are 2+ on the right side, and 2+ on the left side.        Posterior tibial pulses are 2+ on the right side, and 2+ on the left side.   Pulmonary/Chest: Effort normal and breath sounds normal. No stridor. No respiratory distress. He has no wheezes. He has no rales.   Abdominal: Soft. Bowel sounds are normal. He exhibits no distension. There is no tenderness.   Musculoskeletal: Normal range of motion. He exhibits no edema or tenderness.   Neurological: He is alert and oriented to person, place, and time.   Skin: Skin is warm and dry. He is not diaphoretic. No erythema.   Psychiatric: He has a normal mood and affect.         Assessment:       1. Coronary artery disease, angina presence unspecified, unspecified vessel or lesion type, unspecified whether native or transplanted heart    2. Abdominal aortic aneurysm (AAA) without rupture    3. Persistent atrial fibrillation    4. Syncope and collapse    5. Sick sinus syndrome    6. Orthostatic hypotension    7.      Anemia of unspecified type  8.     CVA without residual weakness  Plan:      1- Abnormal Stress Test.   - Plan for LHC (MARY candidate)  - R radial 5 Fr approach. Beltran 4  - DAPT (already on it)  - Hold metformin 24 hrs pre and 48 hrs post procedure.   - Keep NPO   - Pre-cath IVF ordered  -The risks, benefits and alternatives of the procedure were  explained to the patient.   -The risks of coronary angiography include but are not limited to: bleeding, infection, heart rhythm abnormalities, allergic reactions, kidney injury, stroke and death.   -The risks of moderate sedation include hypotension, respiratory depression, arrhythmias, bronchospasm, and death.   - Informed consent was obtained and the patient is agreeable to proceed with the procedure.    2- DM II on oral medication  - Hold metformin 24 hrs pre and 48 hrs post procedure.     3- PAF on coumadin  Continue with amiodarone. Hold coumadin 3 days before procedure    4- Dyslipidemia   Continue lipitor 80 mg po daily     5. H/o normocytic anemia. Will reassess CBC prior to cardiac cath; if Hct significantly lower than baseline, then will need to defer cath and refer patient for work-up    6. H/o carotid stenosis. The patient has a h/o left brain CVA; continue EC ASA 81mg po qday and statin    7. H/o AAA. The patient is s/p EVAR with stable aneurysm  on most recent CTA    Monico Thomas MD  PGY-[id number]  Interventional Cardiology Fellow    I have personally taken the history and examined this patient and agree with the resident's note as stated above.  All of the patient's questions were answered.

## 2017-09-21 NOTE — PROGRESS NOTES
OUTPATIENT CATHETERIZATION INSTRUCTIONS    You have been scheduled for a procedure in the catheterization lab on Monday, October 23, 2017.     Please report to the Cardiology Waiting Area on the Third floor of the hospital and check in at 6 AM.   You will then be taken to the SSCU (Short Stay Cardiac Unit) and prepared for your procedure. Please be aware that this is not the time of your procedure but the time you are to arrive. The procedures are scheduled on an hourly basis; however, emergency cases take precedence over all other cases.       You may not have anything to eat or drink after midnight the night before your test. You may take your regular morning medications with water. If there are any medications that you should not take you will be instructed to hold them that morning. If you are diabetic and on Metformin (Glucophage) do not take it the day before, the day of, and for 2 days after your procedure.      The procedure will take 1-2 hours to perform. After the procedure, you will return to SSCU on the third floor of the hospital. You will need to lie still (or keep your arm still) for the next 4 to 6 hours to minimize bleeding from the puncture site. Your family may remain in the room with you during this time.       You may be able to be discharged home that same afternoon if there is someone to drive you home and there were no complications. If you have one of the balloon, stent, or device procedures you may spend the night in the hospital. Your doctor will determine, based on your progress, the date and time of your discharge. The results of your procedure will be discussed with you before you are discharged. Any further testing or procedures will be scheduled for you either before you leave or you will be called with these appointments.       If you should have any questions, concerns, or need to change the date of your procedure, please call  LINDA Arana @ [phone]    Special  Instructions:  Hold your  warfarin (Coumadin) for 3 days before your procedure. Your last dose will be on: Thursday, October 19 2017.     Stop taking your Metformin (Glucophage) on Sunday, October 22 2017.                   THE ABOVE INSTRUCTIONS WERE GIVEN TO THE PATIENT VERBALLY AND THEY VERBALIZED UNDERSTANDING.  THEY DO NOT REQUIRE ANY SPECIAL NEEDS AND DO NOT HAVE ANY LEARNING BARRIERS.          Directions for Reporting to Cardiology Waiting Area in the Hospital  If you park in the Parking Garage:  Take elevators to the1st floor of the parking garage.  Continue past the gift shop, coffee shop, and piano.  Take a right and go to the gold elevators. (Elevator B)  Take the elevator to the 3rd floor.  Follow the arrow on the sign on the wall that says Cath Lab Registration/EP Lab Registration.  Follow the long hallway all the way around until you come to a big open area.  This is the registration area.  Check in at Reception Desk.    OR    If family is dropping you off:  Have them drop you off at the front of the Hospital under the green overhang.  Enter through the doors and take a right.  Take the E elevators to the 3rd floor Cardiology Waiting Area.  Check in at the Reception Desk in the waiting room.

## 2017-09-25 DIAGNOSIS — N40.0 BPH WITHOUT URINARY OBSTRUCTION: ICD-10-CM

## 2017-09-25 RX ORDER — TAMSULOSIN HYDROCHLORIDE 0.4 MG/1
CAPSULE ORAL
Qty: 90 CAPSULE | Refills: 0 | OUTPATIENT
Start: 2017-09-25

## 2017-09-28 RX ORDER — SODIUM CHLORIDE 9 MG/ML
3 INJECTION, SOLUTION INTRAVENOUS CONTINUOUS
Status: CANCELLED | OUTPATIENT
Start: 2017-09-28 | End: 2017-09-28

## 2017-09-28 RX ORDER — DIPHENHYDRAMINE HCL 25 MG
50 CAPSULE ORAL ONCE
Status: CANCELLED | OUTPATIENT
Start: 2017-09-28 | End: 2017-09-28

## 2017-10-23 ENCOUNTER — HOSPITAL ENCOUNTER (OUTPATIENT)
Facility: HOSPITAL | Age: 77
Discharge: HOME | End: 2017-10-24
Attending: INTERNAL MEDICINE | Admitting: INTERNAL MEDICINE
Payer: MEDICARE

## 2017-10-23 DIAGNOSIS — I25.10 CORONARY ARTERY DISEASE, ANGINA PRESENCE UNSPECIFIED, UNSPECIFIED VESSEL OR LESION TYPE, UNSPECIFIED WHETHER NATIVE OR TRANSPLANTED HEART: ICD-10-CM

## 2017-10-23 DIAGNOSIS — I25.118 CORONARY ARTERY DISEASE OF NATIVE ARTERY OF NATIVE HEART WITH STABLE ANGINA PECTORIS: Primary | ICD-10-CM

## 2017-10-23 DIAGNOSIS — I25.10 CORONARY ARTERY DISEASE: ICD-10-CM

## 2017-10-23 DIAGNOSIS — Z98.61 S/P PTCA (PERCUTANEOUS TRANSLUMINAL CORONARY ANGIOPLASTY): Primary | ICD-10-CM

## 2017-10-23 DIAGNOSIS — I71.40 ABDOMINAL AORTIC ANEURYSM WITHOUT RUPTURE: ICD-10-CM

## 2017-10-23 LAB
ABO + RH BLD: NORMAL
ANION GAP SERPL CALC-SCNC: 8 MMOL/L
BASOPHILS # BLD AUTO: 0.02 K/UL
BASOPHILS NFR BLD: 0.4 %
BLD GP AB SCN CELLS X3 SERPL QL: NORMAL
BUN SERPL-MCNC: 12 MG/DL
CALCIUM SERPL-MCNC: 9.3 MG/DL
CHLORIDE SERPL-SCNC: 103 MMOL/L
CO2 SERPL-SCNC: 26 MMOL/L
CREAT SERPL-MCNC: 1 MG/DL
DIFFERENTIAL METHOD: ABNORMAL
EOSINOPHIL # BLD AUTO: 0.1 K/UL
EOSINOPHIL NFR BLD: 2.5 %
ERYTHROCYTE [DISTWIDTH] IN BLOOD BY AUTOMATED COUNT: 16.2 %
EST. GFR  (AFRICAN AMERICAN): >60 ML/MIN/1.73 M^2
EST. GFR  (NON AFRICAN AMERICAN): >60 ML/MIN/1.73 M^2
GLUCOSE SERPL-MCNC: 183 MG/DL
HCT VFR BLD AUTO: 33.2 %
HGB BLD-MCNC: 11 G/DL
IMM GRANULOCYTES # BLD AUTO: 0.04 K/UL
IMM GRANULOCYTES NFR BLD AUTO: 0.8 %
INR PPP: 1.2
LYMPHOCYTES # BLD AUTO: 0.6 K/UL
LYMPHOCYTES NFR BLD: 10.7 %
MCH RBC QN AUTO: 30.7 PG
MCHC RBC AUTO-ENTMCNC: 33.1 G/DL
MCV RBC AUTO: 93 FL
MONOCYTES # BLD AUTO: 0.5 K/UL
MONOCYTES NFR BLD: 9.1 %
NEUTROPHILS # BLD AUTO: 4 K/UL
NEUTROPHILS NFR BLD: 76.5 %
NRBC BLD-RTO: 0 /100 WBC
PLATELET # BLD AUTO: 157 K/UL
PMV BLD AUTO: 10.5 FL
POCT GLUCOSE: 212 MG/DL (ref 70–110)
POCT GLUCOSE: 212 MG/DL (ref 70–110)
POTASSIUM SERPL-SCNC: 4.3 MMOL/L
PROTHROMBIN TIME: 12.6 SEC
RBC # BLD AUTO: 3.58 M/UL
SODIUM SERPL-SCNC: 137 MMOL/L
WBC # BLD AUTO: 5.25 K/UL

## 2017-10-23 PROCEDURE — 99152 MOD SED SAME PHYS/QHP 5/>YRS: CPT | Mod: ,,, | Performed by: INTERNAL MEDICINE

## 2017-10-23 PROCEDURE — 85610 PROTHROMBIN TIME: CPT

## 2017-10-23 PROCEDURE — 85025 COMPLETE CBC W/AUTO DIFF WBC: CPT

## 2017-10-23 PROCEDURE — 82962 GLUCOSE BLOOD TEST: CPT

## 2017-10-23 PROCEDURE — 25000003 PHARM REV CODE 250

## 2017-10-23 PROCEDURE — 25000003 PHARM REV CODE 250: Performed by: INTERNAL MEDICINE

## 2017-10-23 PROCEDURE — 93571 IV DOP VEL&/PRESS C FLO 1ST: CPT | Mod: 26,LD,, | Performed by: INTERNAL MEDICINE

## 2017-10-23 PROCEDURE — 92929 CATH LAB PROCEDURE: CPT | Mod: LD,,, | Performed by: INTERNAL MEDICINE

## 2017-10-23 PROCEDURE — C1887 CATHETER, GUIDING: HCPCS

## 2017-10-23 PROCEDURE — 80048 BASIC METABOLIC PNL TOTAL CA: CPT

## 2017-10-23 PROCEDURE — 63600175 PHARM REV CODE 636 W HCPCS

## 2017-10-23 PROCEDURE — 93010 ELECTROCARDIOGRAM REPORT: CPT | Mod: 76,,, | Performed by: INTERNAL MEDICINE

## 2017-10-23 PROCEDURE — 93005 ELECTROCARDIOGRAM TRACING: CPT | Mod: 59

## 2017-10-23 PROCEDURE — C1760 CLOSURE DEV, VASC: HCPCS

## 2017-10-23 PROCEDURE — 93458 L HRT ARTERY/VENTRICLE ANGIO: CPT | Mod: 26,59,, | Performed by: INTERNAL MEDICINE

## 2017-10-23 PROCEDURE — 93571 IV DOP VEL&/PRESS C FLO 1ST: CPT

## 2017-10-23 PROCEDURE — 92943 PRQ TRLUML REVSC CH OCC ANT: CPT | Mod: LD,,, | Performed by: INTERNAL MEDICINE

## 2017-10-23 PROCEDURE — 86900 BLOOD TYPING SEROLOGIC ABO: CPT

## 2017-10-23 PROCEDURE — 86850 RBC ANTIBODY SCREEN: CPT

## 2017-10-23 PROCEDURE — 92978 ENDOLUMINL IVUS OCT C 1ST: CPT | Mod: 26,LD,, | Performed by: INTERNAL MEDICINE

## 2017-10-23 PROCEDURE — 93010 ELECTROCARDIOGRAM REPORT: CPT | Mod: ,,, | Performed by: INTERNAL MEDICINE

## 2017-10-23 RX ORDER — FLUTICASONE PROPIONATE 50 MCG
1 SPRAY, SUSPENSION (ML) NASAL DAILY
Status: DISCONTINUED | OUTPATIENT
Start: 2017-10-24 | End: 2017-10-24 | Stop reason: HOSPADM

## 2017-10-23 RX ORDER — NITROGLYCERIN 0.4 MG/1
0.4 TABLET SUBLINGUAL EVERY 5 MIN PRN
Status: DISCONTINUED | OUTPATIENT
Start: 2017-10-23 | End: 2017-10-24 | Stop reason: HOSPADM

## 2017-10-23 RX ORDER — FAMOTIDINE 20 MG/1
20 TABLET, FILM COATED ORAL 2 TIMES DAILY
Status: DISCONTINUED | OUTPATIENT
Start: 2017-10-23 | End: 2017-10-24 | Stop reason: HOSPADM

## 2017-10-23 RX ORDER — RANOLAZINE 500 MG/1
500 TABLET, EXTENDED RELEASE ORAL 2 TIMES DAILY
Status: DISCONTINUED | OUTPATIENT
Start: 2017-10-23 | End: 2017-10-24 | Stop reason: HOSPADM

## 2017-10-23 RX ORDER — SODIUM CHLORIDE 9 MG/ML
1.5 INJECTION, SOLUTION INTRAVENOUS CONTINUOUS
Status: ACTIVE | OUTPATIENT
Start: 2017-10-23 | End: 2017-10-23

## 2017-10-23 RX ORDER — HYDRALAZINE HYDROCHLORIDE 20 MG/ML
10 INJECTION INTRAMUSCULAR; INTRAVENOUS ONCE AS NEEDED
Status: ACTIVE | OUTPATIENT
Start: 2017-10-23 | End: 2017-10-23

## 2017-10-23 RX ORDER — ATORVASTATIN CALCIUM 20 MG/1
80 TABLET, FILM COATED ORAL NIGHTLY
Status: DISCONTINUED | OUTPATIENT
Start: 2017-10-23 | End: 2017-10-24 | Stop reason: HOSPADM

## 2017-10-23 RX ORDER — GLIMEPIRIDE 1 MG/1
4 TABLET ORAL
Status: DISCONTINUED | OUTPATIENT
Start: 2017-10-24 | End: 2017-10-24 | Stop reason: HOSPADM

## 2017-10-23 RX ORDER — AMIODARONE HYDROCHLORIDE 200 MG/1
200 TABLET ORAL NIGHTLY
Status: DISCONTINUED | OUTPATIENT
Start: 2017-10-23 | End: 2017-10-24 | Stop reason: HOSPADM

## 2017-10-23 RX ORDER — LEVOTHYROXINE SODIUM 100 UG/1
200 TABLET ORAL DAILY
Status: DISCONTINUED | OUTPATIENT
Start: 2017-10-24 | End: 2017-10-24 | Stop reason: HOSPADM

## 2017-10-23 RX ORDER — TAMSULOSIN HYDROCHLORIDE 0.4 MG/1
1 CAPSULE ORAL NIGHTLY
Status: DISCONTINUED | OUTPATIENT
Start: 2017-10-23 | End: 2017-10-24 | Stop reason: HOSPADM

## 2017-10-23 RX ORDER — CLOPIDOGREL BISULFATE 75 MG/1
75 TABLET ORAL DAILY
Status: DISCONTINUED | OUTPATIENT
Start: 2017-10-23 | End: 2017-10-24 | Stop reason: HOSPADM

## 2017-10-23 RX ORDER — DIPHENHYDRAMINE HCL 50 MG
50 CAPSULE ORAL ONCE
Status: COMPLETED | OUTPATIENT
Start: 2017-10-23 | End: 2017-10-23

## 2017-10-23 RX ORDER — ASPIRIN 81 MG/1
81 TABLET ORAL DAILY
Status: DISCONTINUED | OUTPATIENT
Start: 2017-10-23 | End: 2017-10-24 | Stop reason: HOSPADM

## 2017-10-23 RX ORDER — MIDODRINE HYDROCHLORIDE 2.5 MG/1
2.5 TABLET ORAL 3 TIMES DAILY
Status: DISCONTINUED | OUTPATIENT
Start: 2017-10-23 | End: 2017-10-24 | Stop reason: HOSPADM

## 2017-10-23 RX ORDER — WARFARIN 7.5 MG/1
7.5 TABLET ORAL DAILY
Status: DISCONTINUED | OUTPATIENT
Start: 2017-10-23 | End: 2017-10-24 | Stop reason: HOSPADM

## 2017-10-23 RX ORDER — DIGOXIN 250 MCG
250 TABLET ORAL DAILY
Status: DISCONTINUED | OUTPATIENT
Start: 2017-10-23 | End: 2017-10-24 | Stop reason: HOSPADM

## 2017-10-23 RX ORDER — SODIUM CHLORIDE 9 MG/ML
3 INJECTION, SOLUTION INTRAVENOUS CONTINUOUS
Status: ACTIVE | OUTPATIENT
Start: 2017-10-23 | End: 2017-10-23

## 2017-10-23 RX ORDER — ASPIRIN 81 MG/1
81 TABLET ORAL DAILY
COMMUNITY

## 2017-10-23 RX ORDER — OXYCODONE AND ACETAMINOPHEN 5; 325 MG/1; MG/1
1 TABLET ORAL EVERY 8 HOURS PRN
Status: DISCONTINUED | OUTPATIENT
Start: 2017-10-23 | End: 2017-10-24 | Stop reason: HOSPADM

## 2017-10-23 RX ORDER — SERTRALINE HYDROCHLORIDE 50 MG/1
100 TABLET, FILM COATED ORAL 2 TIMES DAILY
Status: DISCONTINUED | OUTPATIENT
Start: 2017-10-23 | End: 2017-10-24 | Stop reason: HOSPADM

## 2017-10-23 RX ADMIN — SODIUM CHLORIDE 3 ML/KG/HR: 0.9 INJECTION, SOLUTION INTRAVENOUS at 06:10

## 2017-10-23 RX ADMIN — DIPHENHYDRAMINE HYDROCHLORIDE 50 MG: 50 CAPSULE ORAL at 06:10

## 2017-10-23 RX ADMIN — TAMSULOSIN HYDROCHLORIDE 0.4 MG: 0.4 CAPSULE ORAL at 09:10

## 2017-10-23 RX ADMIN — RANOLAZINE 500 MG: 500 TABLET, FILM COATED, EXTENDED RELEASE ORAL at 09:10

## 2017-10-23 RX ADMIN — ATORVASTATIN CALCIUM 80 MG: 20 TABLET, FILM COATED ORAL at 09:10

## 2017-10-23 RX ADMIN — SERTRALINE HYDROCHLORIDE 100 MG: 50 TABLET ORAL at 09:10

## 2017-10-23 RX ADMIN — MIDODRINE HYDROCHLORIDE 2.5 MG: 2.5 TABLET ORAL at 09:10

## 2017-10-23 RX ADMIN — FAMOTIDINE 20 MG: 20 TABLET, FILM COATED ORAL at 09:10

## 2017-10-23 RX ADMIN — WARFARIN SODIUM 7.5 MG: 7.5 TABLET ORAL at 06:10

## 2017-10-23 RX ADMIN — AMIODARONE HYDROCHLORIDE 200 MG: 200 TABLET ORAL at 09:10

## 2017-10-23 NOTE — PROGRESS NOTES
"Post Cath Note  Referring Physician: Dr Guo  Procedure: PCI  Indication: CCS III angina, abnormal stress test    HPI:   78 yo with known CAD with abnormal stress test    Cath Results:   Access:  RRA    mLAD lesion and mD1     See full cath report for further details    Intervention:   PCI to the mLAD and D1  with MARY x 3  Closure device:  Patient tolerated the procedure well, no complications    Post Cath Exam:   BP (!) 158/80 (BP Location: Right arm, Patient Position: Lying)   Pulse (!) 57   Temp 97.6 °F (36.4 °C) (Oral)   Resp 18   Ht 5' 8" (1.727 m)   Wt 81.6 kg (180 lb)   SpO2 96%   BMI 27.37 kg/m²   No unusual pain, hematoma, thrill or bruit at vascular access site.  Distal pulse present without signs of ischemia.    Recommendations:   DAPT for 1 year  Coumadin starting tonight  Cardiac rehab consult  High intensity statin    Signed:  Britney hampton MD  Interventional Cardiology Fellow  Pager: [phone]  10/23/2017 12:26 PM    "

## 2017-10-23 NOTE — NURSING TRANSFER
Nursing Transfer Note      10/23/2017     Transfer To: [id number]    Transfer via wheelchair    Transfer with cardiac monitoring    Transported by RN    Medicines sent: n/a    Chart send with patient: Yes    Notified: ,patient stated he would notify family    Upon arrival to floor: cardiac monitor applied, patient oriented to room, call bell in reach and bed in lowest position    Report given to LINDA Diallo

## 2017-10-23 NOTE — PROGRESS NOTES
Vasc band removed per protocol over 1 hour period. Patient tolerated well. Gauze/tegaderm dressing placed. Will monitor.

## 2017-10-23 NOTE — H&P
78 yo M with PMhx of HTN, HLD,DM II on metformin, Carotid artery disease (< 50%), orthostatic hypotension on midodrine in past, SSS s/p PPM, PAF on coumadin, PAD s/p AAA repair 2013 , CVA 10-14 yrs ago, CAD s/p mLAD 2.25x15 MARY (on tripple therapy ASA/plavix/coumadin) underwent cardiac stress test due to symptoms of progressive SLADE and found to have perfusion defects so referred for coronary agniogram +/- PCI by Dr. Hema Walker.     He reports cuts grass in his yard approximately 150 feet long and has been getting short of breath . He denies any angina/claudication or orthopnea/PND currently. Denies any bleeding diasthesis but reports frequent falls which was associated with autonomic dysfunction for which he is s/p PPM.              Past Surgical History:   Procedure Laterality Date    ANKLE SURGERY        APPENDECTOMY        CARDIAC PACEMAKER PLACEMENT        CHOLECYSTECTOMY        CORONARY ANGIOPLASTY WITH STENT PLACEMENT   6/06     stents x 2 placed     EYE SURGERY        HERNIA REPAIR         ventral hernia repair    sciatica         sciatica procedure             Current Outpatient Prescriptions on File Prior to Visit   Medication Sig Dispense Refill    amiodarone (PACERONE) 200 MG Tab Take 200 mg by mouth once daily.         atorvastatin (LIPITOR) 80 MG tablet Take 80 mg by mouth once daily.        clopidogrel (PLAVIX) 75 mg tablet Take 75 mg by mouth once daily.        cyanocobalamin 2000 MCG tablet Take 2,500 mcg by mouth once daily.        digoxin (LANOXIN) 250 mcg tablet Take 250 mcg by mouth once daily.        glimepiride (AMARYL) 4 MG tablet Take 4 mg by mouth before breakfast.        levocetirizine (XYZAL) 5 MG tablet Take 5 mg by mouth every evening.        levothyroxine (SYNTHROID) 200 MCG tablet Take 200 mcg by mouth once daily.        metformin (GLUCOPHAGE) 500 MG tablet Take 500 mg by mouth 2 (two) times daily with meals.        midodrine (PROAMATINE) 2.5 MG Tab Take 1  tablet (2.5 mg total) by mouth 3 (three) times daily. 90 tablet 3    oxycodone-acetaminophen (PERCOCET) 5-325 mg per tablet Take 1 tablet by mouth every 8 (eight) hours as needed for Pain. 15 tablet 0    ranitidine (ZANTAC) 300 MG tablet Take 300 mg by mouth every evening.        ranolazine (RANEXA) 500 MG Tb12 Take 500 mg by mouth 2 (two) times daily.        sertraline (ZOLOFT) 50 MG tablet Take 100 mg by mouth 2 (two) times daily.         tamsulosin (FLOMAX) 0.4 mg Cp24 TAKE 1 CAPSULE AT BEDTIME. 90 capsule 3    vitamin D 185 MG Tab Take 2,000 mg by mouth once daily.         warfarin (COUMADIN) 5 MG tablet Take 7.5 mg by mouth once daily.        fluticasone (FLONASE) 50 mcg/actuation nasal spray 1 spray by Each Nare route once daily.        nitroGLYCERIN (NITROSTAT) 0.4 MG SL tablet Place 0.4 mg under the tongue every 5 (five) minutes as needed for Chest pain.          No current facility-administered medications on file prior to visit.            Review of patient's allergies indicates:   Allergen Reactions    Penicillins Swelling            Social History   Substance Use Topics    Smoking status: Former Smoker       Quit date: 6/13/1998    Smokeless tobacco: Never Used    Alcohol use No            Family History   Problem Relation Age of Onset    Cancer Mother      Heart disease Father      Cancer Sister           Cardiac Data     SPECT 8/31/17:  Small to moderate ischemia in lateral wall, inferior and anterior wall.     Echo 3/2017:  CONCLUSIONS     1 - Normal left ventricular systolic function (EF 60-65%).     2 - Indeterminate LV diastolic function.     3 - Normal right ventricular systolic function .     4 - The estimated PA systolic pressure is greater than 21 mmHg.     5 - Trivial mitral regurgitation.     6 - Trivial tricuspid regurgitation.     7 - Calicification of the aorta and ST junction..     8 - Mild left atrial enlargement.      Review of Systems   Constitution: Negative for  chills, decreased appetite, fever, weakness, night sweats, weight gain and weight loss.   HENT: Negative for congestion, hoarse voice, stridor and tinnitus.    Eyes: Negative for blurred vision, pain and visual disturbance.   Cardiovascular: Positive for dyspnea on exertion. Negative for chest pain, claudication, irregular heartbeat, leg swelling, near-syncope, orthopnea, palpitations, paroxysmal nocturnal dyspnea and syncope.   Respiratory: Negative for cough, hemoptysis, shortness of breath, snoring and wheezing.    Endocrine: Negative for cold intolerance, heat intolerance and polyuria.   Hematologic/Lymphatic: Negative for bleeding problem. Does not bruise/bleed easily.   Skin: Negative for color change and rash.   Musculoskeletal: Negative for arthritis, back pain, joint pain, muscle cramps, myalgias and stiffness.   Gastrointestinal: Negative for abdominal pain, anorexia, constipation, diarrhea, dysphagia, heartburn, hemorrhoids, melena, nausea and vomiting.   Genitourinary: Negative for frequency, hematuria, hesitancy, nocturia and urgency.   Neurological: Negative for difficulty with concentration, dizziness, focal weakness, headaches, light-headedness, numbness, seizures, tremors and vertigo.   Psychiatric/Behavioral: Negative for altered mental status and depression. The patient does not have insomnia.    Allergic/Immunologic: Negative for hives.       Physical Exam   Constitutional: He is oriented to person, place, and time. He appears well-developed and well-nourished.   HENT:   Head: Normocephalic and atraumatic.   Eyes: Conjunctivae are normal. Pupils are equal, round, and reactive to light.   Neck: Normal range of motion. No JVD present. No tracheal deviation present. No thyromegaly present.   Cardiovascular: Regular rhythm, S1 normal, S2 normal, normal heart sounds, intact distal pulses and normal pulses.   No extrasystoles are present. Exam reveals no S3.    No murmur heard.  Pulses:       Carotid  pulses are 2+ on the right side, and 2+ on the left side.       Radial pulses are 2+ on the right side, and 2+ on the left side.        Femoral pulses are 2+ on the right side, and 2+ on the left side.       Dorsalis pedis pulses are 1+ on the right side, and 1+ on the left side.        Posterior tibial pulses are 1+ on the right side, and 1+ on the left side.   Pulmonary/Chest: Effort normal and breath sounds normal. No stridor. No respiratory distress. He has no wheezes. He has no rales.   Abdominal: Soft. Bowel sounds are normal. He exhibits no distension. There is no tenderness.   Musculoskeletal: Normal range of motion. He exhibits no edema or tenderness.   Neurological: He is alert and oriented to person, place, and time.   Skin: Skin is warm and dry. He is not diaphoretic. No erythema.   Psychiatric: He has a normal mood and affect.          Assessment:       1. Coronary artery disease, angina presence unspecified, unspecified vessel or lesion type, unspecified whether native or transplanted heart    2. Abdominal aortic aneurysm (AAA) without rupture    3. Persistent atrial fibrillation    4. Syncope and collapse    5. Sick sinus syndrome    6. Orthostatic hypotension    7.      Anemia of unspecified type  8.     CVA without residual weakness  Plan:      1- Abnormal Stress Test.   - Plan for LHC (MARY candidate)  - R radial 5 Fr approach. Beltran 4  - DAPT (already on it)  - Hold metformin 24 hrs pre and 48 hrs post procedure.   - Keep NPO   - Pre-cath IVF ordered  -The risks, benefits and alternatives of the procedure were explained to the patient.   -The risks of coronary angiography include but are not limited to: bleeding, infection, heart rhythm abnormalities, allergic reactions, kidney injury, stroke and death.   -The risks of moderate sedation include hypotension, respiratory depression, arrhythmias, bronchospasm, and death.   - Informed consent was obtained and the patient is agreeable to proceed with  the procedure.     2- DM II on oral medication  - Hold metformin 24 hrs pre and 48 hrs post procedure.      3- PAF on coumadin  Continue with amiodarone. Hold coumadin 3 days before procedure     4- Dyslipidemia   Continue lipitor 80 mg po daily      5. H/o normocytic anemia. Will reassess CBC prior to cardiac cath; if Hct significantly lower than baseline, then will need to defer cath and refer patient for work-up     6. H/o carotid stenosis. The patient has a h/o left brain CVA; continue EC ASA 81mg po qday and statin     7. H/o AAA. The patient is s/p EVAR with stable aneurysm  on most recent CTA    Britney Courtney MD  Interventional Cardiology Fellow   Pager:[phone]

## 2017-10-23 NOTE — CONSULTS
"Cardiac Rehab     Doni Theodore   [id number]   10/23/2017       Activity taught: Yes    Cardiac Rehab Phase Taught: Phase I & II    Risk Factors-Modifiable: diabetes, nutrition, hyperlipidemia, hypertension, sedentary lifestyle, stress, exercise    Risk Factors-Non modifiable: age, gender    Teaching Method: Verbal, Written and Living with Heart Disease book.    Understanding/Response: Pt verbalized understanding, all questions answered. Pt understands their cardiac rehab order is good for 12 months.   Pt given external order for [facility] cardiac rehab program.     Comments: S/P PTCA. Discussed cardiac rehab and risk factor modification. Team to refer patient to [facility] Cardiac Rehab. Educational materials were used in the process and given to the patient. They included "Your Guide to Living with Heart Disease", Phase Two Cardiac Rehabilitation information along with a sample Mediterranean diet.The patient expressed understanding of the teaching and expressed desire to take a role in modifying the risk factors when they return home.    FLORIDALMA Esquivel RN  Cardiac Rehab Nurse      "

## 2017-10-23 NOTE — PLAN OF CARE
Problem: Patient Care Overview  Goal: Plan of Care Review  Outcome: Ongoing (interventions implemented as appropriate)  Received report from Jorge. Patient s/p [facility], AAOx3. VSS, no c/o pain or discomfort at this time, resp even and unlabored. Vascband dressing to R wrist is CDI. No active bleeding. No hematoma noted. Post procedure protocol reviewed with patient and patient's family. Understanding verbalized. Family members at bedside. Nurse call bell within reach. Will continue to monitor per post procedure protocol.

## 2017-10-24 VITALS
DIASTOLIC BLOOD PRESSURE: 75 MMHG | BODY MASS INDEX: 27.46 KG/M2 | WEIGHT: 181.19 LBS | RESPIRATION RATE: 23 BRPM | HEART RATE: 87 BPM | OXYGEN SATURATION: 97 % | TEMPERATURE: 100 F | SYSTOLIC BLOOD PRESSURE: 130 MMHG | HEIGHT: 68 IN

## 2017-10-24 LAB
ANION GAP SERPL CALC-SCNC: 6 MMOL/L
BASOPHILS # BLD AUTO: 0.02 K/UL
BASOPHILS NFR BLD: 0.3 %
BUN SERPL-MCNC: 11 MG/DL
CALCIUM SERPL-MCNC: 10 MG/DL
CHLORIDE SERPL-SCNC: 103 MMOL/L
CO2 SERPL-SCNC: 28 MMOL/L
CREAT SERPL-MCNC: 1 MG/DL
DIFFERENTIAL METHOD: ABNORMAL
EOSINOPHIL # BLD AUTO: 0.1 K/UL
EOSINOPHIL NFR BLD: 2 %
ERYTHROCYTE [DISTWIDTH] IN BLOOD BY AUTOMATED COUNT: 16.4 %
EST. GFR  (AFRICAN AMERICAN): >60 ML/MIN/1.73 M^2
EST. GFR  (NON AFRICAN AMERICAN): >60 ML/MIN/1.73 M^2
GLUCOSE SERPL-MCNC: 186 MG/DL
HCT VFR BLD AUTO: 35.9 %
HGB BLD-MCNC: 11.9 G/DL
IMM GRANULOCYTES # BLD AUTO: 0.02 K/UL
IMM GRANULOCYTES NFR BLD AUTO: 0.3 %
INR PPP: 1.2
LYMPHOCYTES # BLD AUTO: 0.5 K/UL
LYMPHOCYTES NFR BLD: 8.7 %
MCH RBC QN AUTO: 30.5 PG
MCHC RBC AUTO-ENTMCNC: 33.1 G/DL
MCV RBC AUTO: 92 FL
MONOCYTES # BLD AUTO: 0.6 K/UL
MONOCYTES NFR BLD: 9.7 %
NEUTROPHILS # BLD AUTO: 4.8 K/UL
NEUTROPHILS NFR BLD: 79 %
NRBC BLD-RTO: 0 /100 WBC
PLATELET # BLD AUTO: 176 K/UL
PMV BLD AUTO: 10.6 FL
POTASSIUM SERPL-SCNC: 4.4 MMOL/L
PROTHROMBIN TIME: 12.2 SEC
RBC # BLD AUTO: 3.9 M/UL
SODIUM SERPL-SCNC: 137 MMOL/L
WBC # BLD AUTO: 6.11 K/UL

## 2017-10-24 PROCEDURE — 36415 COLL VENOUS BLD VENIPUNCTURE: CPT

## 2017-10-24 PROCEDURE — 82962 GLUCOSE BLOOD TEST: CPT

## 2017-10-24 PROCEDURE — 25000003 PHARM REV CODE 250: Performed by: INTERNAL MEDICINE

## 2017-10-24 PROCEDURE — 85025 COMPLETE CBC W/AUTO DIFF WBC: CPT

## 2017-10-24 PROCEDURE — 80048 BASIC METABOLIC PNL TOTAL CA: CPT

## 2017-10-24 PROCEDURE — 85610 PROTHROMBIN TIME: CPT

## 2017-10-24 PROCEDURE — 25000242 PHARM REV CODE 250 ALT 637 W/ HCPCS: Performed by: INTERNAL MEDICINE

## 2017-10-24 RX ORDER — CLOPIDOGREL BISULFATE 75 MG/1
75 TABLET ORAL DAILY
Qty: 30 TABLET | Refills: 11 | Status: SHIPPED | OUTPATIENT
Start: 2017-10-24 | End: 2018-10-24

## 2017-10-24 RX ADMIN — ASPIRIN 81 MG: 81 TABLET, COATED ORAL at 08:10

## 2017-10-24 RX ADMIN — DIGOXIN 250 MCG: 250 TABLET ORAL at 08:10

## 2017-10-24 RX ADMIN — GLIMEPIRIDE 4 MG: 1 TABLET ORAL at 05:10

## 2017-10-24 RX ADMIN — RANOLAZINE 500 MG: 500 TABLET, FILM COATED, EXTENDED RELEASE ORAL at 08:10

## 2017-10-24 RX ADMIN — CLOPIDOGREL 75 MG: 75 TABLET, FILM COATED ORAL at 08:10

## 2017-10-24 RX ADMIN — FAMOTIDINE 20 MG: 20 TABLET, FILM COATED ORAL at 08:10

## 2017-10-24 RX ADMIN — LEVOTHYROXINE SODIUM 200 MCG: 100 TABLET ORAL at 05:10

## 2017-10-24 RX ADMIN — MIDODRINE HYDROCHLORIDE 2.5 MG: 2.5 TABLET ORAL at 05:10

## 2017-10-24 RX ADMIN — SERTRALINE HYDROCHLORIDE 100 MG: 50 TABLET ORAL at 08:10

## 2017-10-24 NOTE — PLAN OF CARE
Problem: Patient Care Overview  Goal: Plan of Care Review  Outcome: Ongoing (interventions implemented as appropriate)  POC reviewed with patient and he verbalized understanding. VSS and patient denies presence of pain. Patient had LHC preformed today accessing right radial artery. Vas band removed, gauze and transparent film on site, not signs of bleeding or hematoma noted, radial pulses +2 and equal bilaterally. Fall bundle implemented and patient has remained free of falls and injuries. Patient in no apparent sign of distress, will continue to monitor.

## 2017-10-24 NOTE — DISCHARGE SUMMARY
[facility]  Cardiology  Discharge Summary      Patient Name: Doni Theodore  MRN: [id number]  Admission Date: 10/23/2017  Hospital Length of Stay: 0 days  Discharge Date and Time: 10/24/17 0830  Attending Physician: Jesus Sanderson MD  Discharging Provider: Kwame Hemphill MD  Primary Care Physician: Nic Mauro MD    HPI: 76 yo M with PMhx of HTN, HLD,DM II on metformin, Carotid artery disease (< 50%), orthostatic hypotension on midodrine in past, SSS s/p PPM, PAF on coumadin, PAD s/p AAA repair 2013 , CVA 10-14 yrs ago, CAD s/p mLAD 2.25x15 MARY (on tripple therapy ASA/plavix/coumadin) underwent cardiac stress test due to symptoms of progressive SLADE and found to have perfusion defects so referred for coronary agniogram +/- PCI by Dr. Hema Walker.    Procedure(s) (LRB):  HEART CATH-LEFT (Left)         Hospital Course (synopsis of major diagnoses, care, treatment, and services provided during the course of the hospital stay): Patient with successful PCI to the mLAD and D1     Access site c/d/i, no hematoma and without any complications    Consults:   Consults         Status Ordering Provider     Inpatient consult to Cardiac Rehab  Once     Provider:  (Not yet assigned)    Completed KWAME HEMPHILL              Final Active Diagnoses:    Diagnosis Date Noted POA    PRINCIPAL PROBLEM:  Coronary artery disease [I25.10] 10/23/2017 Yes      Problems Resolved During this Admission:    Diagnosis Date Noted Date Resolved POA       Discharged Condition: good    Follow Up:  Follow-up Information     Darrick Quinteros MD In 2 weeks.    Specialty:  Cardiology  Contact information:  [address] [place] [address] [address]  [id number]  CARDIOLOGY CENTER  [facility] [id number]  [phone]                 Patient Instructions:     Diet Cardiac       Medications:  Reconciled Home Medications:   Discharge Medication List as of 10/24/2017 10:22 AM      START taking these medications    Details   !!  clopidogrel (PLAVIX) 75 mg tablet Take 1 tablet (75 mg total) by mouth once daily., Starting Tue 10/24/2017, Until Wed 10/24/2018, Normal       !! - Potential duplicate medications found. Please discuss with provider.      CONTINUE these medications which have NOT CHANGED    Details   amiodarone (PACERONE) 200 MG Tab Take 200 mg by mouth every evening. , Historical Med      aspirin (ECOTRIN) 81 MG EC tablet Take 81 mg by mouth once daily., Historical Med      atorvastatin (LIPITOR) 80 MG tablet Take 80 mg by mouth every evening. , Historical Med      !! clopidogrel (PLAVIX) 75 mg tablet Take 75 mg by mouth once daily., Until Discontinued, Historical Med      cyanocobalamin 2000 MCG tablet Take 2,500 mcg by mouth once daily., Historical Med      digoxin (LANOXIN) 250 mcg tablet Take 250 mcg by mouth once daily., Until Discontinued, Historical Med      fluticasone (FLONASE) 50 mcg/actuation nasal spray 1 spray by Each Nare route nightly as needed. , Historical Med      glimepiride (AMARYL) 4 MG tablet Take 4 mg by mouth before breakfast., Until Discontinued, Historical Med      levocetirizine (XYZAL) 5 MG tablet Take 5 mg by mouth every evening., Historical Med      levothyroxine (SYNTHROID) 200 MCG tablet Take 200 mcg by mouth once daily., Until Discontinued, Historical Med      metformin (GLUCOPHAGE) 500 MG tablet Take 500 mg by mouth 2 (two) times daily with meals., Until Discontinued, Historical Med      midodrine (PROAMATINE) 2.5 MG Tab Take 1 tablet (2.5 mg total) by mouth 3 (three) times daily., Starting Thu 6/8/2017, Until Fri 6/8/2018, Normal      nitroGLYCERIN (NITROSTAT) 0.4 MG SL tablet Place 0.4 mg under the tongue every 5 (five) minutes as needed for Chest pain., Historical Med      oxycodone-acetaminophen (PERCOCET) 5-325 mg per tablet Take 1 tablet by mouth every 8 (eight) hours as needed for Pain., Starting 1/13/2017, Until Discontinued, Print      ranitidine (ZANTAC) 300 MG tablet Take 300 mg by  mouth every evening., Until Discontinued, Historical Med      ranolazine (RANEXA) 500 MG Tb12 Take 500 mg by mouth 2 (two) times daily., Historical Med      sertraline (ZOLOFT) 50 MG tablet Take 100 mg by mouth 2 (two) times daily. , Historical Med      tamsulosin (FLOMAX) 0.4 mg Cp24 TAKE 1 CAPSULE AT BEDTIME., Normal      vitamin D 185 MG Tab Take 2,000 mg by mouth once daily. , Historical Med      warfarin (COUMADIN) 5 MG tablet Take 7.5 mg by mouth once daily., Until Discontinued, Historical Med       !! - Potential duplicate medications found. Please discuss with provider.          Time spent on the discharge of patient: 30 minutes    Britney Courtney MD  Cardiology  [facility]

## 2017-10-24 NOTE — NURSING TRANSFER
Nursing Transfer Note      10/23/2017     Transfer SSCU 005 to 382    Transfer via wheelchair    Transfer with telemetry    Transported by Diana MCKEON    Medicines sent: N/A    Chart send with patient: yes    Notified: RN aware    Patient reassessed at: 1822 10/23/17    Upon arrival to floor: VS assess. Patient oriented to room. Call bell in reach. Telemetry on. VISI applied. Patient refused IPAD. Will continue to monitor patient.

## 2017-10-25 LAB
POC ACTIVATED CLOTTING TIME K: 197 SEC (ref 74–137)
POC ACTIVATED CLOTTING TIME K: 208 SEC (ref 74–137)
SAMPLE: ABNORMAL
SAMPLE: ABNORMAL

## 2017-11-03 LAB
CORONARY STENOSIS: ABNORMAL
CORONARY STENT: YES

## 2018-12-07 ENCOUNTER — OFFICE VISIT (OUTPATIENT)
Dept: URGENT CARE | Facility: CLINIC | Age: 78
End: 2018-12-07
Payer: MEDICARE

## 2018-12-07 VITALS
DIASTOLIC BLOOD PRESSURE: 55 MMHG | HEART RATE: 95 BPM | SYSTOLIC BLOOD PRESSURE: 87 MMHG | OXYGEN SATURATION: 96 % | BODY MASS INDEX: 27.37 KG/M2 | WEIGHT: 180 LBS | TEMPERATURE: 98 F

## 2018-12-07 DIAGNOSIS — M54.42 ACUTE MIDLINE LOW BACK PAIN WITH LEFT-SIDED SCIATICA: Primary | ICD-10-CM

## 2018-12-07 PROCEDURE — 99203 OFFICE O/P NEW LOW 30 MIN: CPT | Mod: 25,S$GLB,, | Performed by: NURSE PRACTITIONER

## 2018-12-07 PROCEDURE — 96372 THER/PROPH/DIAG INJ SC/IM: CPT | Mod: S$GLB,,, | Performed by: NURSE PRACTITIONER

## 2018-12-07 RX ORDER — METFORMIN HYDROCHLORIDE 1000 MG/1
1000 TABLET ORAL 2 TIMES DAILY WITH MEALS
Refills: 3 | COMMUNITY
Start: 2018-11-07 | End: 2019-08-09 | Stop reason: ALTCHOICE

## 2018-12-07 RX ORDER — SERTRALINE HYDROCHLORIDE 100 MG/1
100 TABLET, FILM COATED ORAL 2 TIMES DAILY
Refills: 2 | COMMUNITY
Start: 2018-10-19

## 2018-12-07 RX ORDER — TIZANIDINE 4 MG/1
TABLET ORAL
Refills: 1 | Status: ON HOLD | COMMUNITY
Start: 2018-11-28 | End: 2019-07-20 | Stop reason: HOSPADM

## 2018-12-07 RX ORDER — PREDNISONE 20 MG/1
20 TABLET ORAL DAILY
Qty: 3 TABLET | Refills: 0 | Status: SHIPPED | OUTPATIENT
Start: 2018-12-08 | End: 2018-12-11

## 2018-12-07 RX ORDER — ISOSORBIDE MONONITRATE 60 MG/1
60 TABLET, EXTENDED RELEASE ORAL DAILY
Refills: 2 | Status: ON HOLD | COMMUNITY
Start: 2018-09-26 | End: 2019-08-02 | Stop reason: HOSPADM

## 2018-12-07 RX ORDER — METOPROLOL SUCCINATE 25 MG/1
TABLET, EXTENDED RELEASE ORAL
Status: ON HOLD | COMMUNITY
Start: 2018-12-03 | End: 2019-08-02 | Stop reason: SDUPTHER

## 2018-12-07 RX ORDER — BETAMETHASONE SODIUM PHOSPHATE AND BETAMETHASONE ACETATE 3; 3 MG/ML; MG/ML
6 INJECTION, SUSPENSION INTRA-ARTICULAR; INTRALESIONAL; INTRAMUSCULAR; SOFT TISSUE ONCE
Status: COMPLETED | OUTPATIENT
Start: 2018-12-07 | End: 2018-12-07

## 2018-12-07 RX ORDER — CLOPIDOGREL BISULFATE 75 MG/1
75 TABLET ORAL DAILY
Refills: 2 | Status: ON HOLD | COMMUNITY
Start: 2018-10-11 | End: 2019-08-13 | Stop reason: HOSPADM

## 2018-12-07 RX ORDER — LEVOTHYROXINE SODIUM 175 UG/1
175 TABLET ORAL DAILY
COMMUNITY
Start: 2018-12-02

## 2018-12-07 RX ORDER — GABAPENTIN 300 MG/1
300 CAPSULE ORAL 3 TIMES DAILY
Refills: 3 | COMMUNITY
Start: 2018-11-07

## 2018-12-07 RX ORDER — MIDODRINE HYDROCHLORIDE 5 MG/1
TABLET ORAL
Status: ON HOLD | COMMUNITY
Start: 2018-12-04 | End: 2020-10-12 | Stop reason: HOSPADM

## 2018-12-07 RX ORDER — RIVAROXABAN 20 MG/1
TABLET, FILM COATED ORAL
Refills: 6 | COMMUNITY
Start: 2018-11-16

## 2018-12-07 RX ORDER — ATORVASTATIN CALCIUM 40 MG/1
TABLET, FILM COATED ORAL
Status: ON HOLD | COMMUNITY
Start: 2018-12-03 | End: 2019-07-20

## 2018-12-07 RX ADMIN — BETAMETHASONE SODIUM PHOSPHATE AND BETAMETHASONE ACETATE 6 MG: 3; 3 INJECTION, SUSPENSION INTRA-ARTICULAR; INTRALESIONAL; INTRAMUSCULAR; SOFT TISSUE at 01:12

## 2018-12-07 NOTE — PROGRESS NOTES
"Subjective:       Patient ID: Doni Theodore is a 78 y.o. male.    Vitals:  weight is 81.6 kg (180 lb). His temperature is 98.3 °F (36.8 °C). His blood pressure is 87/55 (abnormal) and his pulse is 95. His oxygen saturation is 96%.     Chief Complaint: Back Pain    Pt presents with low back pain that he has had for "years". Pt reports pain has been severe 10/10 for 3 weeks. Pt reports midline, low back pain that radiates down left leg. Pt reports he cannot take pain medication because his BP is low. Pt takes medication to raise his BP daily. Pt cannot take NSAIDs d/t blood thinners. Pt reports he recently had a UTI, but yesterday he went to his PCP and urine was clear.       Back Pain   This is a recurrent problem. The current episode started 1 to 4 weeks ago (couple weeks). The problem occurs constantly. The pain is present in the lumbar spine. The pain radiates to the right foot and left foot. The pain is at a severity of 5/10. The symptoms are aggravated by standing (walking). Pertinent negatives include no abdominal pain, bladder incontinence, bowel incontinence, dysuria or numbness. Treatments tried: shot. The treatment provided mild relief.       Constitution: Negative for fatigue.   Gastrointestinal: Negative for abdominal pain and bowel incontinence.   Genitourinary: Negative for dysuria, urgency, bladder incontinence and hematuria.   Musculoskeletal: Positive for back pain. Negative for muscle cramps and history of spine disorder.   Skin: Negative for rash.   Neurological: Negative for coordination disturbances, numbness and tingling.       Objective:      Physical Exam   Constitutional: He is oriented to person, place, and time. Vital signs are normal. He appears well-developed and well-nourished. He is active and cooperative. No distress.   HENT:   Head: Normocephalic and atraumatic.   Nose: Nose normal.   Mouth/Throat: Oropharynx is clear and moist and mucous membranes are normal.   Eyes: Conjunctivae " and lids are normal.   Neck: Trachea normal, normal range of motion, full passive range of motion without pain and phonation normal. Neck supple.   Cardiovascular: Normal rate, regular rhythm, normal heart sounds, intact distal pulses and normal pulses.   Pulmonary/Chest: Effort normal and breath sounds normal.   Abdominal: Soft. Normal appearance and bowel sounds are normal. He exhibits no abdominal bruit, no pulsatile midline mass and no mass.   Musculoskeletal: He exhibits no edema or deformity.        Lumbar back: He exhibits tenderness, pain and spasm.   Neurological: He is alert and oriented to person, place, and time. He has normal strength and normal reflexes. No sensory deficit.   Skin: Skin is warm, dry and intact. He is not diaphoretic.   Psychiatric: He has a normal mood and affect. His speech is normal and behavior is normal. Judgment and thought content normal. Cognition and memory are normal.   Nursing note and vitals reviewed.      Assessment:       1. Acute midline low back pain with left-sided sciatica        Plan:         Acute midline low back pain with left-sided sciatica  -     betamethasone acetate-betamethasone sodium phosphate injection 6 mg  -     predniSONE (DELTASONE) 20 MG tablet; Take 1 tablet (20 mg total) by mouth once daily. for 3 days  Dispense: 3 tablet; Refill: 0      Patient Instructions     DO NOT START THE STEROID BY MOUTH UNTIL TOMORROW    Back Care Tips    Caring for your back  These are things you can do to prevent a recurrence of acute back pain and to reduce symptoms from chronic back pain:  · Maintain a healthy weight. If you are overweight, losing weight will help most types of back pain.  · Exercise is an important part of recovery from most types of back pain. The muscles behind and in front of the spine support the back. This means strengthening both the back muscles and the abdominal muscles will provide better support for your spine.   · Swimming and brisk walking  are good overall exercises to improve your fitness level.  · Practice safe lifting methods (below).  · Practice good posture when sitting, standing and walking. Avoid prolonged sitting. This puts more stress on the lower back than standing or walking.  · Wear quality shoes with sufficient arch support. Foot and ankle alignment can affect back symptoms. Women should avoid wearing high heels.  · Therapeutic massage can help relax the back muscles without stretching them.  · During the first 24 to 72 hours after an acute injury or flare-up of chronic back pain, apply an ice pack to the painful area for 20 minutes and then remove it for 20 minutes, over a period of 60 to 90 minutes, or several times a day. As a safety precaution, do not use a heating pad at bedtime. Sleeping on a heating pad can lead to skin burns or tissue damage.  · You can alternate ice and heat therapies.  Medications  Talk to your healthcare provider before using medicines, especially if you have other medical problems or are taking other medicines.  · You may use acetaminophen or ibuprofen to control pain, unless your healthcare provider prescribed other pain medicine. If you have chronic conditions like diabetes, liver or kidney disease, stomach ulcers, or gastrointestinal bleeding, or are taking blood thinners, talk with your healthcare provider before taking any medicines.  · Be careful if you are given prescription pain medicines, narcotics, or medicine for muscle spasm. They can cause drowsiness, affect your coordination, reflexes, and judgment. Do not drive or operate heavy machinery while taking these types of medicines. Take prescription pain medicine only as prescribed by your healthcare provider.  Lumbar stretch  Here is a simple stretching exercise that will help relax muscle spasm and keep your back more limber. If exercise makes your back pain worse, dont do it.  · Lie on your back with your knees bent and both feet on the  ground.  · Slowly raise your left knee to your chest as you flatten your lower back against the floor. Hold for 5 seconds.  · Relax and repeat the exercise with your right knee.  · Do 10 of these exercises for each leg.  Safe lifting method  · Dont bend over at the waist to lift an object off the floor.  Instead, bend your knees and hips in a squat.   · Keep your back and head upright  · Hold the object close to your body, directly in front of you.  · Straighten your legs to lift the object.   · Lower the object to the floor in the reverse fashion.  · If you must slide something across the floor, push it.  Posture tips  Sitting  Sit in chairs with straight backs or low-back support. Keep your knees lower than your hips, with your feet flat on the floor.  When driving, sit up straight. Adjust the seat forward so you are not leaning toward the steering wheel.  A small pillow or rolled towel behind your lower back may help if you are driving long distances.   Standing  When standing for long periods, shift most of your weight to one leg at a time. Alternate legs every few minutes.   Sleeping  The best way to sleep is on your side with your knees bent. Put a low pillow under your head to support your neck in a neutral spine position. Avoid thick pillows that bend your neck to one side. Put a pillow between your legs to further relax your lower back. If you sleep on your back, put pillows under your knees to support your legs in a slightly flexed position. Use a firm mattress. If your mattress sags, replace it, or use a 1/2-inch plywood board under the mattress to add support.  Follow-up care  Follow up with your healthcare provider, or as advised.  If X-rays, a CT scan or an MRI scan were taken, they will be reviewed by a radiologist. You will be notified of any new findings that may affect your care.  Call 911  Seek emergency medical care if any of the following occur:  · Trouble breathing  · Confusion  · Very  drowsy  · Fainting or loss of consciousness  · Rapid or very slow heart rate  · Loss of  bowel or bladder control  When to seek medical care  Call your healthcare provider if any of the following occur:  · Pain becomes worse or spreads to your arms or legs  · Weakness or numbness in one or both arms or legs  · Numbness in the groin area  Date Last Reviewed: 6/1/2016  © 0953-2937 [employer]. [address] [place] [address], [place] [address]. All rights reserved. This information is not intended as a substitute for professional medical care. Always follow your healthcare professional's instructions.

## 2018-12-07 NOTE — PATIENT INSTRUCTIONS
DO NOT START THE STEROID BY MOUTH UNTIL TOMORROW    Back Care Tips    Caring for your back  These are things you can do to prevent a recurrence of acute back pain and to reduce symptoms from chronic back pain:  · Maintain a healthy weight. If you are overweight, losing weight will help most types of back pain.  · Exercise is an important part of recovery from most types of back pain. The muscles behind and in front of the spine support the back. This means strengthening both the back muscles and the abdominal muscles will provide better support for your spine.   · Swimming and brisk walking are good overall exercises to improve your fitness level.  · Practice safe lifting methods (below).  · Practice good posture when sitting, standing and walking. Avoid prolonged sitting. This puts more stress on the lower back than standing or walking.  · Wear quality shoes with sufficient arch support. Foot and ankle alignment can affect back symptoms. Women should avoid wearing high heels.  · Therapeutic massage can help relax the back muscles without stretching them.  · During the first 24 to 72 hours after an acute injury or flare-up of chronic back pain, apply an ice pack to the painful area for 20 minutes and then remove it for 20 minutes, over a period of 60 to 90 minutes, or several times a day. As a safety precaution, do not use a heating pad at bedtime. Sleeping on a heating pad can lead to skin burns or tissue damage.  · You can alternate ice and heat therapies.  Medications  Talk to your healthcare provider before using medicines, especially if you have other medical problems or are taking other medicines.  · You may use acetaminophen or ibuprofen to control pain, unless your healthcare provider prescribed other pain medicine. If you have chronic conditions like diabetes, liver or kidney disease, stomach ulcers, or gastrointestinal bleeding, or are taking blood thinners, talk with your healthcare provider before  taking any medicines.  · Be careful if you are given prescription pain medicines, narcotics, or medicine for muscle spasm. They can cause drowsiness, affect your coordination, reflexes, and judgment. Do not drive or operate heavy machinery while taking these types of medicines. Take prescription pain medicine only as prescribed by your healthcare provider.  Lumbar stretch  Here is a simple stretching exercise that will help relax muscle spasm and keep your back more limber. If exercise makes your back pain worse, dont do it.  · Lie on your back with your knees bent and both feet on the ground.  · Slowly raise your left knee to your chest as you flatten your lower back against the floor. Hold for 5 seconds.  · Relax and repeat the exercise with your right knee.  · Do 10 of these exercises for each leg.  Safe lifting method  · Dont bend over at the waist to lift an object off the floor.  Instead, bend your knees and hips in a squat.   · Keep your back and head upright  · Hold the object close to your body, directly in front of you.  · Straighten your legs to lift the object.   · Lower the object to the floor in the reverse fashion.  · If you must slide something across the floor, push it.  Posture tips  Sitting  Sit in chairs with straight backs or low-back support. Keep your knees lower than your hips, with your feet flat on the floor.  When driving, sit up straight. Adjust the seat forward so you are not leaning toward the steering wheel.  A small pillow or rolled towel behind your lower back may help if you are driving long distances.   Standing  When standing for long periods, shift most of your weight to one leg at a time. Alternate legs every few minutes.   Sleeping  The best way to sleep is on your side with your knees bent. Put a low pillow under your head to support your neck in a neutral spine position. Avoid thick pillows that bend your neck to one side. Put a pillow between your legs to further relax  your lower back. If you sleep on your back, put pillows under your knees to support your legs in a slightly flexed position. Use a firm mattress. If your mattress sags, replace it, or use a 1/2-inch plywood board under the mattress to add support.  Follow-up care  Follow up with your healthcare provider, or as advised.  If X-rays, a CT scan or an MRI scan were taken, they will be reviewed by a radiologist. You will be notified of any new findings that may affect your care.  Call 911  Seek emergency medical care if any of the following occur:  · Trouble breathing  · Confusion  · Very drowsy  · Fainting or loss of consciousness  · Rapid or very slow heart rate  · Loss of  bowel or bladder control  When to seek medical care  Call your healthcare provider if any of the following occur:  · Pain becomes worse or spreads to your arms or legs  · Weakness or numbness in one or both arms or legs  · Numbness in the groin area  Date Last Reviewed: 6/1/2016 © 2000-2017 [employer]. [address] [place] [address], [place] [address]. All rights reserved. This information is not intended as a substitute for professional medical care. Always follow your healthcare professional's instructions.

## 2019-04-22 ENCOUNTER — HOSPITAL ENCOUNTER (EMERGENCY)
Facility: HOSPITAL | Age: 79
Discharge: HOME | End: 2019-04-23
Attending: EMERGENCY MEDICINE
Payer: MEDICARE
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
## 2019-04-22 DIAGNOSIS — M54.17 LUMBOSACRAL RADICULOPATHY: ICD-10-CM

## 2019-04-22 DIAGNOSIS — M51.36 LUMBAR DEGENERATIVE DISC DISEASE: Primary | ICD-10-CM

## 2019-04-22 LAB
ALBUMIN SERPL BCP-MCNC: 3.4 G/DL (ref 3.5–5.2)
ALP SERPL-CCNC: 72 U/L (ref 55–135)
ALT SERPL W/O P-5'-P-CCNC: 38 U/L (ref 10–44)
ANION GAP SERPL CALC-SCNC: 10 MMOL/L (ref 8–16)
AST SERPL-CCNC: 46 U/L (ref 10–40)
BASOPHILS # BLD AUTO: 0.01 K/UL (ref 0–0.2)
BASOPHILS NFR BLD: 0.1 % (ref 0–1.9)
BILIRUB SERPL-MCNC: 0.7 MG/DL (ref 0.1–1)
BNP SERPL-MCNC: 271 PG/ML (ref 0–99)
BUN SERPL-MCNC: 22 MG/DL (ref 8–23)
CALCIUM SERPL-MCNC: 9.2 MG/DL (ref 8.7–10.5)
CHLORIDE SERPL-SCNC: 105 MMOL/L (ref 95–110)
CO2 SERPL-SCNC: 24 MMOL/L (ref 23–29)
CREAT SERPL-MCNC: 1 MG/DL (ref 0.5–1.4)
DIFFERENTIAL METHOD: ABNORMAL
EOSINOPHIL # BLD AUTO: 0.1 K/UL (ref 0–0.5)
EOSINOPHIL NFR BLD: 1.1 % (ref 0–8)
ERYTHROCYTE [DISTWIDTH] IN BLOOD BY AUTOMATED COUNT: 16.7 % (ref 11.5–14.5)
EST. GFR  (AFRICAN AMERICAN): >60 ML/MIN/1.73 M^2
EST. GFR  (NON AFRICAN AMERICAN): >60 ML/MIN/1.73 M^2
GLUCOSE SERPL-MCNC: 193 MG/DL (ref 70–110)
HCT VFR BLD AUTO: 36.7 % (ref 40–54)
HGB BLD-MCNC: 11.6 G/DL (ref 14–18)
LYMPHOCYTES # BLD AUTO: 0.6 K/UL (ref 1–4.8)
LYMPHOCYTES NFR BLD: 6.5 % (ref 18–48)
MAGNESIUM SERPL-MCNC: 1.3 MG/DL (ref 1.6–2.6)
MCH RBC QN AUTO: 31.2 PG (ref 27–31)
MCHC RBC AUTO-ENTMCNC: 31.6 G/DL (ref 32–36)
MCV RBC AUTO: 99 FL (ref 82–98)
MONOCYTES # BLD AUTO: 0.7 K/UL (ref 0.3–1)
MONOCYTES NFR BLD: 7.3 % (ref 4–15)
NEUTROPHILS # BLD AUTO: 7.5 K/UL (ref 1.8–7.7)
NEUTROPHILS NFR BLD: 85 % (ref 38–73)
PLATELET # BLD AUTO: 214 K/UL (ref 150–350)
PMV BLD AUTO: 10.4 FL (ref 9.2–12.9)
POTASSIUM SERPL-SCNC: 4.5 MMOL/L (ref 3.5–5.1)
PROT SERPL-MCNC: 6.9 G/DL (ref 6–8.4)
RBC # BLD AUTO: 3.72 M/UL (ref 4.6–6.2)
SODIUM SERPL-SCNC: 139 MMOL/L (ref 136–145)
TROPONIN I SERPL DL<=0.01 NG/ML-MCNC: 0.01 NG/ML (ref 0–0.03)
WBC # BLD AUTO: 8.86 K/UL (ref 3.9–12.7)

## 2019-04-22 PROCEDURE — 84484 ASSAY OF TROPONIN QUANT: CPT

## 2019-04-22 PROCEDURE — 93010 ELECTROCARDIOGRAM REPORT: CPT | Mod: ,,, | Performed by: INTERNAL MEDICINE

## 2019-04-22 PROCEDURE — 99285 EMERGENCY DEPT VISIT HI MDM: CPT | Mod: 25

## 2019-04-22 PROCEDURE — 85025 COMPLETE CBC W/AUTO DIFF WBC: CPT

## 2019-04-22 PROCEDURE — 93005 ELECTROCARDIOGRAM TRACING: CPT

## 2019-04-22 PROCEDURE — 83735 ASSAY OF MAGNESIUM: CPT

## 2019-04-22 PROCEDURE — 80053 COMPREHEN METABOLIC PANEL: CPT

## 2019-04-22 PROCEDURE — 25000003 PHARM REV CODE 250: Performed by: EMERGENCY MEDICINE

## 2019-04-22 PROCEDURE — 83880 ASSAY OF NATRIURETIC PEPTIDE: CPT

## 2019-04-22 PROCEDURE — 93010 EKG 12-LEAD: ICD-10-PCS | Mod: ,,, | Performed by: INTERNAL MEDICINE

## 2019-04-22 RX ORDER — TIZANIDINE 4 MG/1
4 TABLET ORAL
Status: COMPLETED | OUTPATIENT
Start: 2019-04-23 | End: 2019-04-23

## 2019-04-22 RX ORDER — GLIMEPIRIDE 4 MG/1
8 TABLET ORAL
COMMUNITY

## 2019-04-22 RX ORDER — MAGNESIUM SULFATE 1 G/100ML
1 INJECTION INTRAVENOUS
Status: COMPLETED | OUTPATIENT
Start: 2019-04-23 | End: 2019-04-23

## 2019-04-22 RX ORDER — KETOROLAC TROMETHAMINE 30 MG/ML
10 INJECTION, SOLUTION INTRAMUSCULAR; INTRAVENOUS
Status: COMPLETED | OUTPATIENT
Start: 2019-04-23 | End: 2019-04-23

## 2019-04-22 RX ORDER — ACETAMINOPHEN 500 MG
1000 TABLET ORAL
Status: COMPLETED | OUTPATIENT
Start: 2019-04-22 | End: 2019-04-22

## 2019-04-22 RX ADMIN — ACETAMINOPHEN 1000 MG: 500 TABLET, FILM COATED ORAL at 10:04

## 2019-04-23 VITALS
DIASTOLIC BLOOD PRESSURE: 54 MMHG | HEIGHT: 64 IN | WEIGHT: 180 LBS | HEART RATE: 100 BPM | TEMPERATURE: 99 F | OXYGEN SATURATION: 95 % | BODY MASS INDEX: 30.73 KG/M2 | RESPIRATION RATE: 19 BRPM | SYSTOLIC BLOOD PRESSURE: 99 MMHG

## 2019-04-23 PROCEDURE — 96375 TX/PRO/DX INJ NEW DRUG ADDON: CPT

## 2019-04-23 PROCEDURE — 63600175 PHARM REV CODE 636 W HCPCS: Performed by: EMERGENCY MEDICINE

## 2019-04-23 PROCEDURE — 96365 THER/PROPH/DIAG IV INF INIT: CPT

## 2019-04-23 PROCEDURE — 25000003 PHARM REV CODE 250: Performed by: EMERGENCY MEDICINE

## 2019-04-23 RX ORDER — TRAMADOL HYDROCHLORIDE 50 MG/1
25 TABLET ORAL EVERY 8 HOURS PRN
Qty: 10 TABLET | Refills: 0 | Status: SHIPPED | OUTPATIENT
Start: 2019-04-23

## 2019-04-23 RX ADMIN — KETOROLAC TROMETHAMINE 10 MG: 30 INJECTION, SOLUTION INTRAMUSCULAR at 12:04

## 2019-04-23 RX ADMIN — TIZANIDINE 4 MG: 4 TABLET ORAL at 12:04

## 2019-04-23 RX ADMIN — MAGNESIUM SULFATE 1 G: 1 INJECTION INTRAVENOUS at 12:04

## 2019-04-23 NOTE — ED PROVIDER NOTES
Encounter Date: 4/22/2019    SCRIBE #1 NOTE: I, Rubi Washburn, am scribing for, and in the presence of,  Trung Varma MD. I have scribed the following portions of the note - Other sections scribed: HPI, ROS, and PE.      This is an initial triage evaluation of Doni Theodore, a 78 y.o., male  that presents to the Emergency Department with c/o left ankle pain since fall.  Patient states he has chronic sciatica and back spasms and that will is what wife attributes to patient falling over backwards and hitting his head on Friday. Pt got lightheaded when bending over.  He remembers falling and getting up from the floor.     Pertinent exam findings:  Multiple areas of bruising to his forearms    Orders Pending :  Left ankle x-ray   Destination: [facility]    I have evaluated and provided a medical screening exam with initial orders placed, if indicated, to expedite care. The patient is stable to return to the waiting area and will be placed in a treatment area when one is available. Care will be transferred to an alternate provider when patient is roomed from the lobby for full assessment including: history, physical exam, additional orders, and final disposition.      YONATHAN Jensen     History     Chief Complaint   Patient presents with    Fall     Pt slipped and fell Friday while picking something from the floor. Pt remembers falling. Pt c/o L ankle pain and he hit the back of his head/neck with the fall.      CC: Fall    HPI: This 78 y.o male who has DM, HTN, Vertyebral disc disease, Coronary Artery disease, Sleep apnea, and Pacemaker presents to the ED for an evaluation for acute onset, constant, 10/10 left ankle pain s/p a fall that occurred 4 days ago.  Patient's family reports the patient attempted to bend forward to retrieve pills from the floor when he fell.  Patient reports hitting his head onto a nearby window seal.  Patient's family reports the patient suffers from chronic hypotension for which he is on  midodrine.  Patient also reports of chronic back pain.  Family reports the patient currently receives injections to his back by his orthopedist and reports he is scheduled to have his next injection 19.  Patient denies loss of consciousness, nausea, emesis, diarrhea, abdominal pain, chest pain, extremity numbness, extremity weakness, visual changes, bowel/bladder problems, saddle anesthesia or any other associated symptoms.  No relief with attempting Tylenol arthritis.  No alleviating factors.  Patient is currently on anticoagulants and reports compliance.    The history is provided by the patient and a relative. No  was used.     Review of patient's allergies indicates:   Allergen Reactions    Penicillins Swelling     Past Medical History:   Diagnosis Date    Anticoagulant long-term use     Plavix (on hold for angiogram)    Arthritis     Coronary artery disease     pacemaker, stentsx2    Diabetes mellitus     Hypertension     Pacemaker     Sleep apnea     Stroke     Rt side affected / mild    Thyroid disease      Past Surgical History:   Procedure Laterality Date    ANGIOGRAM N/A 2013    [facility]    ANKLE SURGERY      APPENDECTOMY      CARDIAC PACEMAKER PLACEMENT      CHOLECYSTECTOMY      CORONARY ANGIOPLASTY WITH STENT PLACEMENT      stents x 2 placed     EYE SURGERY      HEART CATH-LEFT Left 10/23/2017    Performed by [facility] CATH LAB    HERNIA REPAIR      ventral hernia repair    REPAIR, AAA, ENDOVASCULAR N/A 2013    Performed by Radu Melgar [facility] at [facility] OR    sciatica      sciatica procedure     Family History   Problem Relation Age of Onset    Cancer Mother     Heart disease Father     Cancer Sister      Social History     Tobacco Use    Smoking status: Former Smoker     Last attempt to quit: 1998     Years since quittin.8    Smokeless tobacco: Never Used   Substance  Use Topics    Alcohol use: No     Alcohol/week: 0.0 oz    Drug use: No     Review of Systems   Constitutional: Negative for chills and fever.   HENT: Negative for ear pain and sore throat.    Eyes: Negative for pain and visual disturbance.   Respiratory: Negative for cough and shortness of breath.    Cardiovascular: Negative for chest pain.   Gastrointestinal: Negative for abdominal pain, diarrhea, nausea and vomiting.   Genitourinary: Negative for dysuria.   Musculoskeletal: Positive for arthralgias and back pain.   Skin: Negative for rash.   Neurological: Negative for headaches.       Physical Exam     Initial Vitals [04/22/19 1952]   BP Pulse Resp Temp SpO2   105/69 104 20 99 °F (37.2 °C) 95 %      MAP       --         Physical Exam    Nursing note and vitals reviewed.  Constitutional: He is not diaphoretic. No distress.   HENT:   Head: Normocephalic and atraumatic.   Mouth/Throat: Oropharynx is clear and moist.   Eyes: Conjunctivae and EOM are normal. Pupils are equal, round, and reactive to light. No scleral icterus.   Neck: Normal range of motion. Neck supple. No JVD present.   Cardiovascular: Normal rate and intact distal pulses. An irregularly irregular rhythm present.    Pulmonary/Chest: No stridor. No respiratory distress. He has wheezes (scant; bilaterally). He exhibits no tenderness.   Abdominal: Soft. Bowel sounds are normal. He exhibits no distension. There is no tenderness.   Musculoskeletal: Normal range of motion.   Patient has left lumbar tenderness.  Patient has positive left sided straight leg test.  + left ankle edema, no deformity, mild pain with left ankle range of motion   Neurological: He is alert. He has normal strength. No cranial nerve deficit or sensory deficit. GCS score is 15. GCS eye subscore is 4. GCS verbal subscore is 5. GCS motor subscore is 6.   Patient is oriented to place and disoriented to time.   Skin: Skin is warm and dry. No rash noted.   Psychiatric: He has a normal mood  and affect.         ED Course   Procedures  Labs Reviewed   CBC W/ AUTO DIFFERENTIAL - Abnormal; Notable for the following components:       Result Value    RBC 3.72 (*)     Hemoglobin 11.6 (*)     Hematocrit 36.7 (*)     MCV 99 (*)     MCH 31.2 (*)     MCHC 31.6 (*)     RDW 16.7 (*)     Lymph # 0.6 (*)     Gran% 85.0 (*)     Lymph% 6.5 (*)     All other components within normal limits   COMPREHENSIVE METABOLIC PANEL - Abnormal; Notable for the following components:    Glucose 193 (*)     Albumin 3.4 (*)     AST 46 (*)     All other components within normal limits   B-TYPE NATRIURETIC PEPTIDE - Abnormal; Notable for the following components:     (*)     All other components within normal limits   MAGNESIUM - Abnormal; Notable for the following components:    Magnesium 1.3 (*)     All other components within normal limits   TROPONIN I     EKG Readings: (Independently Interpreted)   Initial Reading: No STEMI. Rhythm: Atrial Fibrillation. Heart Rate: 106. Conduction: Normal. ST Segments: Non-Specific ST Segment Depression. T Waves Flipped: AVL. Axis: Normal.       Imaging Results          CT Head Without Contrast (Final result)  Result time 04/22/19 23:23:26    Final result by Libra Montana MD (04/22/19 23:23:26)                 Impression:      1. No CT evidence of acute intracranial abnormality. Clinical correlation and further evaluation as warranted.  2. Findings suggestive of chronic microvascular ischemic change remote left frontoparietal infarct.  3. Paranasal sinus disease as detailed above.      Electronically signed by: Libra Montana MD  Date:    04/22/2019  Time:    23:23             Narrative:    EXAMINATION:  CT HEAD WITHOUT CONTRAST    CLINICAL HISTORY:  fall on anticoagulation;    TECHNIQUE:  Low dose axial images were obtained through the head.  Coronal and sagittal reformations were also performed. Contrast was not administered.    COMPARISON:  Head CT 06/04/2017.    FINDINGS:  There is no  evidence of acute intracranial hemorrhage, hydrocephalus, midline shift or mass effect.  There is generalized cerebral volume loss compensatory prominence of cerebral sulci and the ventricular system.  There is a stable region of encephalomalacia involving the left frontoparietal region in keeping with sequela of prior infarction.  There is hypoattenuation in the supratentorial and periventricular white matter which is nonspecific but likely reflect sequela of chronic microvascular ischemic change.  The mastoid air cells are essentially clear.  There is mucosal thickening of the ethmoid sinuses and essentially complete opacification of the right maxillary sinus.  The osseous calvarium is intact.                               CT Cervical Spine Without Contrast (Final result)  Result time 04/22/19 23:19:05    Final result by Libra Montana MD (04/22/19 23:19:05)                 Impression:      1. No CT evidence of acute cervical spine fracture or traumatic subluxation.  Focal correlation and further evaluation as warranted.  2. Multilevel degenerative change of the cervical most pronounced at the C4-C5 and C5-C6 levels.      Electronically signed by: Libra Montana MD  Date:    04/22/2019  Time:    23:19             Narrative:    EXAMINATION:  CT CERVICAL SPINE WITHOUT CONTRAST    CLINICAL HISTORY:  C-spine trauma, NEXUS/CCR positive, +risk factor(s);    TECHNIQUE:  Low dose axial images, sagittal and coronal reformations were performed though the cervical spine.  Contrast was not administered.    COMPARISON:  Correlation is made to maxillofacial CT 06/04/2017    FINDINGS:  There is straightening of normal cervical lordosis and minimal anterolisthesis of C3 on C4, presumably degenerative in etiology and similar to prior CT examination.  Vertebral body heights are maintained.  There is a intervertebral disc height loss at the C4-C5 through C6-C7 levels.  There is no prevertebral soft tissue swelling.  The facet joints  articulate appropriately.  No evidence of acute fracture or traumatic subluxation.  The visualized skull base is intact.    There is multilevel degenerative change of the cervical spine primarily consisting of significant multilevel facet arthropathy, posterior disc osteophyte complexes and uncovertebral joint DJD.  There is degenerative change about the dens.  For example at the C4-C5 level there is a posterior disc osteophyte complex, uncovertebral joint DJD and bilateral facet arthropathy resulting in mild spinal canal stenosis and severe bilateral neural foraminal narrowing.  There is also severe left neural foraminal narrowing at the C5-C6 level.    Incidentally visualized soft tissue structures demonstrate atherosclerosis of the carotid artery bifurcations.  Visualized lung apices are free of pleural fluid or focal consolidation.                                CT Lumbar Spine Without Contrast (Final result)  Result time 04/22/19 23:50:08    Final result by Marta Denton MD (04/22/19 23:50:08)                 Impression:      No acute fracture.  Grade 1 retrolisthesis of L4 on L5.  Severe multilevel degenerative change of the spine.    Multiple levels of spinal stenosis and foraminal narrowing, which appears chronic.  Recommend MRI of the spine for further information when clinically appropriate    Spondylolysis and spondylolisthesis at L5/S1.    This report was flagged in Epic as abnormal.      Electronically signed by: Marta Denton  Date:    04/22/2019  Time:    23:50             Narrative:    EXAMINATION:  CT OF THE LUMBAR SPINE WITHOUT    CLINICAL HISTORY:  Low back pain.    TECHNIQUE:  1.25 mm axial images were obtained through the lumbar spine. Coronal and sagittal reformatted images were provided.    COMPARISON:  None.    FINDINGS:  There is mild levoscoliosis.  There is grade 1 retrolisthesis of L4 on L5 and grade 2 anterolisthesis of L5 on S1. pars defects are seen at L5.  There is no  vertebral body fracture.  There are multiple levels of spinal stenosis and foraminal narrowing.  There is severe multilevel degenerative changes spine and at the SI joints.  An aorto bi-iliac stent graft is present.                               X-Ray Ankle Complete Left (Final result)  Result time 04/22/19 22:20:48    Final result by London Esquivel MD (04/22/19 22:20:48)                 Impression:      No evidence of a fracture or dislocation of the left ankle.    Advanced osteoarthrosis of the left ankle.    Nonspecific soft tissue swelling throughout the left ankle.      Electronically signed by: London Esquivel MD  Date:    04/22/2019  Time:    22:20             Narrative:    EXAMINATION:  XR ANKLE COMPLETE 3 VIEW LEFT    CLINICAL HISTORY:  Unspecified fall, initial encounter    TECHNIQUE:  AP, lateral and oblique views of the left ankle were performed.    COMPARISON:  None    FINDINGS:  There is diffuse demineralization of the osseous structures.  The talar dome is intact.  The ankle mortise is within normal limits.  There is significant osteophytosis throughout the left ankle.  There is soft tissue swelling throughout the left ankle.  No radiopaque foreign body is identified.  There are advanced vascular calcifications.  There is no evidence of a fracture or dislocation of the left ankle.                                 Medical Decision Making:   History:   Old Medical Records: I decided to obtain old medical records.  Old Records Summarized: records from another hospital.       <> Summary of Records: Last seen in cardiology clinic approximately 3 weeks ago.  BP adequately controlled with my did urine, patient was in atrial fibrillation at that time the heart rate between 99 and 112  Differential Diagnosis:   Mechanical fall  Orthostasis  Intracranial injury  CVA  Electrolyte abnormality  Ankle fracture  Lumbar radiculopathy  Vertebral injury  Independently Interpreted Test(s):   I have ordered and independently  interpreted EKG Reading(s) - see prior notes  Clinical Tests:   Lab Tests: Ordered and Reviewed  Radiological Study: Ordered and Reviewed  Medical Tests: Reviewed and Ordered  ED Management:  Patient is afebrile and in no acute distress at time of history and physical.  EKG is atrial fibrillation at a rate of 106.  Patient has no obvious focal neurological deficits.  Labs without leukocytosis.  Hemoglobin is consistent with patient's baseline.  Renal function is within normal limits. Troponin is negative at 0.012.  BNP is mildly elevated at 271.  Patient is not hypoxic or tachypneic.  Magnesium is decreased at 1.3.  Patient given magnesium supplementation in the emergency department.  CT scan of the brain negative for acute CVA.  CT scan of the cervical spine and lumbar described in demonstrate no acute fracture but do note severe multilevel degenerative changes.  Patient does not have bowel or bladder symptoms, or lower extremity weakness. Patient given analgesia, muscle relaxer with some improvement in his symptoms. Symptoms consistent with his vertebral disc disease.  Family reports that patient has not uses prescribed tizanidine in several days.  Patient is stable for discharge on trial of muscle relaxer and his pain medications.  Patient and family counseled on supportive care, appropriate medication usage, concerning symptoms for which to return to ER and the importance of follow up. Understanding and agreement with treatment plan was expressed.   This chart was completed using dictation software, as a result there may be some transcription errors.             Scribe Attestation:   Scribe #1: I performed the above scribed service and the documentation accurately describes the services I performed. I attest to the accuracy of the note.    Attending Attestation:           Physician Attestation for Scribe:  Physician Attestation Statement for Scribe #1: I, Trung Varma MD, reviewed documentation, as scribed by  Rubi Washburn in my presence, and it is both accurate and complete.                    Clinical Impression:       ICD-10-CM ICD-9-CM   1. Lumbar degenerative disc disease M51.36 722.52   2. Fall W19.XXXA E888.9   3. Lumbosacral radiculopathy M54.17 724.4         Disposition:   Disposition: Discharged  Condition: Stable                        Trung Varma MD  04/23/19 0220

## 2019-04-23 NOTE — DISCHARGE INSTRUCTIONS
CT scan does not demonstrate any injuries your brain.  You have arthritis in your neck and lower back as well as an ankle that is likely related to your pain.  Use Tylenol as needed for pain.  Use tizanidine as needed for muscle spasms.  Use the prescribed tramadol as needed for severe pain not improved by Tylenol or tizanidine.  Continue the remainder of your medications as you have been prescribed.  Make an appointment to see your primary physician to monitor your symptoms and address if you need to be on any other medications to manage her symptoms.  Follow up with your spine specialist as you have scheduled.  Return to the emergency department for fever, changes in mental status, severe pain not improved by medications, new numbness, new weakness or bowel or bladder problems.

## 2019-07-18 ENCOUNTER — HOSPITAL ENCOUNTER (INPATIENT)
Facility: HOSPITAL | Age: 79
LOS: 2 days | Discharge: HOME HEALTH | DRG: 308 | End: 2019-07-20
Attending: EMERGENCY MEDICINE | Admitting: HOSPITALIST
Payer: MEDICARE

## 2019-07-18 DIAGNOSIS — I48.91 ATRIAL FIBRILLATION, RAPID: ICD-10-CM

## 2019-07-18 DIAGNOSIS — I48.91 A-FIB: ICD-10-CM

## 2019-07-18 DIAGNOSIS — I48.0 PAF (PAROXYSMAL ATRIAL FIBRILLATION): ICD-10-CM

## 2019-07-18 DIAGNOSIS — R26.81 UNSTEADY GAIT: Primary | ICD-10-CM

## 2019-07-18 DIAGNOSIS — I48.91 ATRIAL FIBRILLATION WITH RAPID VENTRICULAR RESPONSE: ICD-10-CM

## 2019-07-18 DIAGNOSIS — R53.1 WEAKNESS: ICD-10-CM

## 2019-07-18 LAB
ALBUMIN SERPL BCP-MCNC: 3.4 G/DL (ref 3.5–5.2)
ALP SERPL-CCNC: 66 U/L (ref 55–135)
ALT SERPL W/O P-5'-P-CCNC: 11 U/L (ref 10–44)
ANION GAP SERPL CALC-SCNC: 10 MMOL/L (ref 8–16)
APTT BLDCRRT: 29.8 SEC (ref 21–32)
AST SERPL-CCNC: 16 U/L (ref 10–40)
BASOPHILS # BLD AUTO: 0.01 K/UL (ref 0–0.2)
BASOPHILS NFR BLD: 0.2 % (ref 0–1.9)
BILIRUB SERPL-MCNC: 0.4 MG/DL (ref 0.1–1)
BNP SERPL-MCNC: 366 PG/ML (ref 0–99)
BUN SERPL-MCNC: 12 MG/DL (ref 8–23)
CALCIUM SERPL-MCNC: 9.2 MG/DL (ref 8.7–10.5)
CHLORIDE SERPL-SCNC: 111 MMOL/L (ref 95–110)
CO2 SERPL-SCNC: 21 MMOL/L (ref 23–29)
CREAT SERPL-MCNC: 1 MG/DL (ref 0.5–1.4)
DIFFERENTIAL METHOD: ABNORMAL
EOSINOPHIL # BLD AUTO: 0.1 K/UL (ref 0–0.5)
EOSINOPHIL NFR BLD: 1.4 % (ref 0–8)
ERYTHROCYTE [DISTWIDTH] IN BLOOD BY AUTOMATED COUNT: 16.1 % (ref 11.5–14.5)
EST. GFR  (AFRICAN AMERICAN): >60 ML/MIN/1.73 M^2
EST. GFR  (NON AFRICAN AMERICAN): >60 ML/MIN/1.73 M^2
GLUCOSE SERPL-MCNC: 101 MG/DL (ref 70–110)
HCT VFR BLD AUTO: 36.4 % (ref 40–54)
HGB BLD-MCNC: 11.4 G/DL (ref 14–18)
INR PPP: 1.1 (ref 0.8–1.2)
LYMPHOCYTES # BLD AUTO: 0.6 K/UL (ref 1–4.8)
LYMPHOCYTES NFR BLD: 11.6 % (ref 18–48)
MCH RBC QN AUTO: 32 PG (ref 27–31)
MCHC RBC AUTO-ENTMCNC: 31.3 G/DL (ref 32–36)
MCV RBC AUTO: 102 FL (ref 82–98)
MONOCYTES # BLD AUTO: 0.4 K/UL (ref 0.3–1)
MONOCYTES NFR BLD: 7.8 % (ref 4–15)
NEUTROPHILS # BLD AUTO: 4.4 K/UL (ref 1.8–7.7)
NEUTROPHILS NFR BLD: 79.2 % (ref 38–73)
PLATELET # BLD AUTO: 173 K/UL (ref 150–350)
PMV BLD AUTO: 10 FL (ref 9.2–12.9)
POTASSIUM SERPL-SCNC: 4 MMOL/L (ref 3.5–5.1)
PROT SERPL-MCNC: 6.6 G/DL (ref 6–8.4)
PROTHROMBIN TIME: 11.9 SEC (ref 9–12.5)
RBC # BLD AUTO: 3.56 M/UL (ref 4.6–6.2)
SODIUM SERPL-SCNC: 142 MMOL/L (ref 136–145)
T4 FREE SERPL-MCNC: 1.05 NG/DL (ref 0.71–1.51)
TROPONIN I SERPL DL<=0.01 NG/ML-MCNC: 0.01 NG/ML (ref 0–0.03)
TSH SERPL DL<=0.005 MIU/L-ACNC: 13.84 UIU/ML (ref 0.4–4)
WBC # BLD AUTO: 5.54 K/UL (ref 3.9–12.7)

## 2019-07-18 PROCEDURE — 12000002 HC ACUTE/MED SURGE SEMI-PRIVATE ROOM

## 2019-07-18 PROCEDURE — 85730 THROMBOPLASTIN TIME PARTIAL: CPT

## 2019-07-18 PROCEDURE — 80053 COMPREHEN METABOLIC PANEL: CPT

## 2019-07-18 PROCEDURE — 84439 ASSAY OF FREE THYROXINE: CPT

## 2019-07-18 PROCEDURE — 93010 ELECTROCARDIOGRAM REPORT: CPT | Mod: ,,, | Performed by: INTERNAL MEDICINE

## 2019-07-18 PROCEDURE — 20000000 HC ICU ROOM

## 2019-07-18 PROCEDURE — 25000003 PHARM REV CODE 250: Performed by: EMERGENCY MEDICINE

## 2019-07-18 PROCEDURE — 84443 ASSAY THYROID STIM HORMONE: CPT

## 2019-07-18 PROCEDURE — 99285 EMERGENCY DEPT VISIT HI MDM: CPT | Mod: 25

## 2019-07-18 PROCEDURE — 84484 ASSAY OF TROPONIN QUANT: CPT

## 2019-07-18 PROCEDURE — 63600175 PHARM REV CODE 636 W HCPCS: Performed by: EMERGENCY MEDICINE

## 2019-07-18 PROCEDURE — 83880 ASSAY OF NATRIURETIC PEPTIDE: CPT

## 2019-07-18 PROCEDURE — 93005 ELECTROCARDIOGRAM TRACING: CPT

## 2019-07-18 PROCEDURE — 85610 PROTHROMBIN TIME: CPT

## 2019-07-18 PROCEDURE — 93010 EKG 12-LEAD: ICD-10-PCS | Mod: ,,, | Performed by: INTERNAL MEDICINE

## 2019-07-18 PROCEDURE — 85025 COMPLETE CBC W/AUTO DIFF WBC: CPT

## 2019-07-18 RX ORDER — SODIUM CHLORIDE 0.9 % (FLUSH) 0.9 %
10 SYRINGE (ML) INJECTION
Status: DISCONTINUED | OUTPATIENT
Start: 2019-07-18 | End: 2019-07-20 | Stop reason: HOSPADM

## 2019-07-18 RX ORDER — DILTIAZEM HYDROCHLORIDE 5 MG/ML
10 INJECTION INTRAVENOUS
Status: DISCONTINUED | OUTPATIENT
Start: 2019-07-18 | End: 2019-07-18

## 2019-07-18 RX ADMIN — AMIODARONE HYDROCHLORIDE 1 MG/MIN: 1.8 INJECTION, SOLUTION INTRAVENOUS at 09:07

## 2019-07-18 RX ADMIN — DILTIAZEM HYDROCHLORIDE 10 MG: 5 INJECTION INTRAVENOUS at 08:07

## 2019-07-19 ENCOUNTER — ANESTHESIA (OUTPATIENT)
Dept: CARDIOLOGY | Facility: HOSPITAL | Age: 79
DRG: 308 | End: 2019-07-19
Payer: MEDICARE

## 2019-07-19 ENCOUNTER — ANESTHESIA EVENT (OUTPATIENT)
Dept: CARDIOLOGY | Facility: HOSPITAL | Age: 79
DRG: 308 | End: 2019-07-19
Payer: MEDICARE

## 2019-07-19 PROBLEM — I50.43 ACUTE ON CHRONIC COMBINED SYSTOLIC AND DIASTOLIC CONGESTIVE HEART FAILURE: Status: ACTIVE | Noted: 2019-07-19

## 2019-07-19 PROBLEM — I48.91 A-FIB: Status: RESOLVED | Noted: 2019-07-18 | Resolved: 2019-07-19

## 2019-07-19 PROBLEM — I48.91 ATRIAL FIBRILLATION WITH RAPID VENTRICULAR RESPONSE: Status: ACTIVE | Noted: 2019-07-19

## 2019-07-19 PROBLEM — I10 ESSENTIAL HYPERTENSION: Status: ACTIVE | Noted: 2019-07-19

## 2019-07-19 PROBLEM — Z79.01 ANTICOAGULANT LONG-TERM USE: Status: ACTIVE | Noted: 2019-07-19

## 2019-07-19 PROBLEM — R29.898 LEG WEAKNESS, BILATERAL: Status: ACTIVE | Noted: 2019-07-19

## 2019-07-19 PROBLEM — E11.9 DIABETES MELLITUS, TYPE 2: Status: ACTIVE | Noted: 2019-07-19

## 2019-07-19 LAB
BSA FOR ECHO PROCEDURE: 1.94 M2
CHOLEST SERPL-MCNC: 85 MG/DL (ref 120–199)
CHOLEST/HDLC SERPL: 1.8 {RATIO} (ref 2–5)
ESTIMATED AVG GLUCOSE: 117 MG/DL (ref 68–131)
ESTIMATED AVG GLUCOSE: 117 MG/DL (ref 68–131)
HBA1C MFR BLD HPLC: 5.7 % (ref 4–5.6)
HBA1C MFR BLD HPLC: 5.7 % (ref 4–5.6)
HDLC SERPL-MCNC: 46 MG/DL (ref 40–75)
HDLC SERPL: 54.1 % (ref 20–50)
LDLC SERPL CALC-MCNC: 26.8 MG/DL (ref 63–159)
NONHDLC SERPL-MCNC: 39 MG/DL
POCT GLUCOSE: 122 MG/DL (ref 70–110)
POCT GLUCOSE: 125 MG/DL (ref 70–110)
POCT GLUCOSE: 135 MG/DL (ref 70–110)
POCT GLUCOSE: 76 MG/DL (ref 70–110)
SINUS: 3.6 CM
TRIGL SERPL-MCNC: 61 MG/DL (ref 30–150)
TROPONIN I SERPL DL<=0.01 NG/ML-MCNC: 0.01 NG/ML (ref 0–0.03)
TROPONIN I SERPL DL<=0.01 NG/ML-MCNC: 0.02 NG/ML (ref 0–0.03)
TROPONIN I SERPL DL<=0.01 NG/ML-MCNC: 0.03 NG/ML (ref 0–0.03)
TROPONIN I SERPL DL<=0.01 NG/ML-MCNC: 0.03 NG/ML (ref 0–0.03)

## 2019-07-19 PROCEDURE — D9220A PRA ANESTHESIA: Mod: ANES,,, | Performed by: ANESTHESIOLOGY

## 2019-07-19 PROCEDURE — 84484 ASSAY OF TROPONIN QUANT: CPT

## 2019-07-19 PROCEDURE — 84484 ASSAY OF TROPONIN QUANT: CPT | Mod: 91

## 2019-07-19 PROCEDURE — 20000000 HC ICU ROOM

## 2019-07-19 PROCEDURE — 94761 N-INVAS EAR/PLS OXIMETRY MLT: CPT

## 2019-07-19 PROCEDURE — 99291 CRITICAL CARE FIRST HOUR: CPT | Mod: ,,, | Performed by: INTERNAL MEDICINE

## 2019-07-19 PROCEDURE — 93010 ELECTROCARDIOGRAM REPORT: CPT | Mod: 59,,, | Performed by: INTERNAL MEDICINE

## 2019-07-19 PROCEDURE — 63600175 PHARM REV CODE 636 W HCPCS: Performed by: EMERGENCY MEDICINE

## 2019-07-19 PROCEDURE — 37000009 HC ANESTHESIA EA ADD 15 MINS: Performed by: INTERNAL MEDICINE

## 2019-07-19 PROCEDURE — 25000003 PHARM REV CODE 250: Performed by: INTERNAL MEDICINE

## 2019-07-19 PROCEDURE — 93010 EKG 12-LEAD: ICD-10-PCS | Mod: 59,,, | Performed by: INTERNAL MEDICINE

## 2019-07-19 PROCEDURE — 99291 PR CRITICAL CARE, E/M 30-74 MINUTES: ICD-10-PCS | Mod: ,,, | Performed by: INTERNAL MEDICINE

## 2019-07-19 PROCEDURE — 83036 HEMOGLOBIN GLYCOSYLATED A1C: CPT

## 2019-07-19 PROCEDURE — 93005 ELECTROCARDIOGRAM TRACING: CPT

## 2019-07-19 PROCEDURE — 27000221 HC OXYGEN, UP TO 24 HOURS

## 2019-07-19 PROCEDURE — 63600175 PHARM REV CODE 636 W HCPCS: Performed by: HOSPITALIST

## 2019-07-19 PROCEDURE — D9220A PRA ANESTHESIA: ICD-10-PCS | Mod: CRNA,,, | Performed by: REGISTERED NURSE

## 2019-07-19 PROCEDURE — 25000003 PHARM REV CODE 250: Performed by: HOSPITALIST

## 2019-07-19 PROCEDURE — 36415 COLL VENOUS BLD VENIPUNCTURE: CPT

## 2019-07-19 PROCEDURE — 63600175 PHARM REV CODE 636 W HCPCS: Performed by: REGISTERED NURSE

## 2019-07-19 PROCEDURE — 80061 LIPID PANEL: CPT

## 2019-07-19 PROCEDURE — 37000008 HC ANESTHESIA 1ST 15 MINUTES: Performed by: INTERNAL MEDICINE

## 2019-07-19 PROCEDURE — D9220A PRA ANESTHESIA: ICD-10-PCS | Mod: ANES,,, | Performed by: ANESTHESIOLOGY

## 2019-07-19 PROCEDURE — D9220A PRA ANESTHESIA: Mod: CRNA,,, | Performed by: REGISTERED NURSE

## 2019-07-19 PROCEDURE — 63600175 PHARM REV CODE 636 W HCPCS: Performed by: INTERNAL MEDICINE

## 2019-07-19 RX ORDER — GABAPENTIN 300 MG/1
300 CAPSULE ORAL 3 TIMES DAILY
Status: DISCONTINUED | OUTPATIENT
Start: 2019-07-19 | End: 2019-07-20 | Stop reason: HOSPADM

## 2019-07-19 RX ORDER — CLOPIDOGREL BISULFATE 75 MG/1
75 TABLET ORAL DAILY
Status: DISCONTINUED | OUTPATIENT
Start: 2019-07-19 | End: 2019-07-20 | Stop reason: HOSPADM

## 2019-07-19 RX ORDER — TAMSULOSIN HYDROCHLORIDE 0.4 MG/1
1 CAPSULE ORAL NIGHTLY
Status: DISCONTINUED | OUTPATIENT
Start: 2019-07-19 | End: 2019-07-20 | Stop reason: HOSPADM

## 2019-07-19 RX ORDER — LISINOPRIL 5 MG/1
5 TABLET ORAL DAILY
Status: DISCONTINUED | OUTPATIENT
Start: 2019-07-19 | End: 2019-07-20

## 2019-07-19 RX ORDER — AMIODARONE HYDROCHLORIDE 200 MG/1
200 TABLET ORAL 2 TIMES DAILY
Status: DISCONTINUED | OUTPATIENT
Start: 2019-07-26 | End: 2019-07-20 | Stop reason: HOSPADM

## 2019-07-19 RX ORDER — INSULIN ASPART 100 [IU]/ML
1-10 INJECTION, SOLUTION INTRAVENOUS; SUBCUTANEOUS EVERY 6 HOURS PRN
Status: DISCONTINUED | OUTPATIENT
Start: 2019-07-19 | End: 2019-07-20 | Stop reason: HOSPADM

## 2019-07-19 RX ORDER — SERTRALINE HYDROCHLORIDE 50 MG/1
100 TABLET, FILM COATED ORAL DAILY
Status: DISCONTINUED | OUTPATIENT
Start: 2019-07-19 | End: 2019-07-20 | Stop reason: HOSPADM

## 2019-07-19 RX ORDER — ASPIRIN 81 MG/1
81 TABLET ORAL DAILY
Status: DISCONTINUED | OUTPATIENT
Start: 2019-07-19 | End: 2019-07-19

## 2019-07-19 RX ORDER — FUROSEMIDE 10 MG/ML
20 INJECTION INTRAMUSCULAR; INTRAVENOUS ONCE
Status: COMPLETED | OUTPATIENT
Start: 2019-07-19 | End: 2019-07-19

## 2019-07-19 RX ORDER — ATORVASTATIN CALCIUM 40 MG/1
40 TABLET, FILM COATED ORAL DAILY
Status: DISCONTINUED | OUTPATIENT
Start: 2019-07-19 | End: 2019-07-20 | Stop reason: HOSPADM

## 2019-07-19 RX ORDER — PROPOFOL 10 MG/ML
VIAL (ML) INTRAVENOUS
Status: DISCONTINUED | OUTPATIENT
Start: 2019-07-19 | End: 2019-07-19

## 2019-07-19 RX ORDER — ISOSORBIDE MONONITRATE 30 MG/1
60 TABLET, EXTENDED RELEASE ORAL DAILY
Status: DISCONTINUED | OUTPATIENT
Start: 2019-07-19 | End: 2019-07-20

## 2019-07-19 RX ORDER — LIDOCAINE HCL/PF 100 MG/5ML
SYRINGE (ML) INTRAVENOUS
Status: DISCONTINUED | OUTPATIENT
Start: 2019-07-19 | End: 2019-07-19

## 2019-07-19 RX ORDER — GLUCAGON 1 MG
1 KIT INJECTION
Status: DISCONTINUED | OUTPATIENT
Start: 2019-07-19 | End: 2019-07-20 | Stop reason: HOSPADM

## 2019-07-19 RX ORDER — METOPROLOL TARTRATE 25 MG/1
25 TABLET, FILM COATED ORAL 2 TIMES DAILY
Status: DISCONTINUED | OUTPATIENT
Start: 2019-07-19 | End: 2019-07-19

## 2019-07-19 RX ORDER — NITROGLYCERIN 0.4 MG/1
0.4 TABLET SUBLINGUAL EVERY 5 MIN PRN
Status: DISCONTINUED | OUTPATIENT
Start: 2019-07-19 | End: 2019-07-20 | Stop reason: HOSPADM

## 2019-07-19 RX ORDER — AMIODARONE HYDROCHLORIDE 200 MG/1
200 TABLET ORAL DAILY
Status: DISCONTINUED | OUTPATIENT
Start: 2019-08-03 | End: 2019-07-20 | Stop reason: HOSPADM

## 2019-07-19 RX ORDER — SODIUM CHLORIDE 9 MG/ML
INJECTION, SOLUTION INTRAVENOUS CONTINUOUS PRN
Status: DISCONTINUED | OUTPATIENT
Start: 2019-07-19 | End: 2019-07-19

## 2019-07-19 RX ORDER — AMIODARONE HYDROCHLORIDE 200 MG/1
400 TABLET ORAL 2 TIMES DAILY
Status: DISCONTINUED | OUTPATIENT
Start: 2019-07-19 | End: 2019-07-20 | Stop reason: HOSPADM

## 2019-07-19 RX ORDER — METOPROLOL SUCCINATE 50 MG/1
50 TABLET, EXTENDED RELEASE ORAL DAILY
Status: DISCONTINUED | OUTPATIENT
Start: 2019-07-19 | End: 2019-07-20 | Stop reason: HOSPADM

## 2019-07-19 RX ADMIN — TAMSULOSIN HYDROCHLORIDE 0.4 MG: 0.4 CAPSULE ORAL at 08:07

## 2019-07-19 RX ADMIN — GABAPENTIN 300 MG: 300 CAPSULE ORAL at 08:07

## 2019-07-19 RX ADMIN — PROPOFOL 20 MG: 10 INJECTION, EMULSION INTRAVENOUS at 02:07

## 2019-07-19 RX ADMIN — LISINOPRIL 5 MG: 5 TABLET ORAL at 08:07

## 2019-07-19 RX ADMIN — LEVOTHYROXINE SODIUM 175 MCG: 125 TABLET ORAL at 06:07

## 2019-07-19 RX ADMIN — SERTRALINE HYDROCHLORIDE 100 MG: 50 TABLET ORAL at 08:07

## 2019-07-19 RX ADMIN — PROPOFOL 25 MG: 10 INJECTION, EMULSION INTRAVENOUS at 02:07

## 2019-07-19 RX ADMIN — ISOSORBIDE MONONITRATE 60 MG: 30 TABLET, EXTENDED RELEASE ORAL at 08:07

## 2019-07-19 RX ADMIN — PROPOFOL 10 MG: 10 INJECTION, EMULSION INTRAVENOUS at 02:07

## 2019-07-19 RX ADMIN — FUROSEMIDE 20 MG: 10 INJECTION, SOLUTION INTRAMUSCULAR; INTRAVENOUS at 08:07

## 2019-07-19 RX ADMIN — CLOPIDOGREL BISULFATE 75 MG: 75 TABLET ORAL at 08:07

## 2019-07-19 RX ADMIN — GABAPENTIN 300 MG: 300 CAPSULE ORAL at 03:07

## 2019-07-19 RX ADMIN — LIDOCAINE HYDROCHLORIDE 50 MG: 20 INJECTION, SOLUTION INTRAVENOUS at 02:07

## 2019-07-19 RX ADMIN — AMIODARONE HYDROCHLORIDE 400 MG: 200 TABLET ORAL at 08:07

## 2019-07-19 RX ADMIN — METOPROLOL SUCCINATE 50 MG: 50 TABLET, EXTENDED RELEASE ORAL at 07:07

## 2019-07-19 RX ADMIN — SODIUM CHLORIDE: 0.9 INJECTION, SOLUTION INTRAVENOUS at 02:07

## 2019-07-19 RX ADMIN — AMIODARONE HYDROCHLORIDE 0.5 MG/MIN: 1.8 INJECTION, SOLUTION INTRAVENOUS at 03:07

## 2019-07-19 RX ADMIN — ATORVASTATIN CALCIUM 40 MG: 40 TABLET, FILM COATED ORAL at 08:07

## 2019-07-19 RX ADMIN — RIVAROXABAN 20 MG: 20 TABLET, FILM COATED ORAL at 04:07

## 2019-07-19 RX ADMIN — AMIODARONE HYDROCHLORIDE 0.5 MG/MIN: 1.8 INJECTION, SOLUTION INTRAVENOUS at 01:07

## 2019-07-19 NOTE — PLAN OF CARE
Problem: Adult Inpatient Plan of Care  Goal: Plan of Care Review  Outcome: Ongoing (interventions implemented as appropriate)     07/19/19 0557   Plan of Care Review   Plan of Care Reviewed With patient     Patient was admitted last night with the CC of shortness of breath and weakness. Upon arrival in ER EKG shows Afib RVR  At a rate of 140's-150's. He has a history of A-fib Heart stents in the past and a pacemaker placement. He is slightly short of breath on exertion but maintaining saturation of above 92%. Blood pressure borderline high on 150's to 170's systolic. He is still on Amiodarone drip as per protocol. With consult to Cardiology this morning. His troponin is 0.029 which is slightly higher than the previous draw. Dr Benjamin of EICU was made aware of the lab values as well as uncontrolled heart rate of 130;s to 140's  which is still A-fib. No additional order.

## 2019-07-19 NOTE — CARE UPDATE
Patient has been seen and examinated,agree with A/P of ,he is still in Afib with RVR,increased home medication metoprolol to 50,cardiology is on case.

## 2019-07-19 NOTE — PROGRESS NOTES
Family here with picture on phone of pacemaker card. [employer] implant placed July 29 2014. Serial # [id number], Model # [id number]

## 2019-07-19 NOTE — ED PROVIDER NOTES
Encounter Date: 7/18/2019       History     Chief Complaint   Patient presents with    Extremity Weakness     Pt reports via EMS with c/o bilateral leg pain and weakness when moving to a standing position. Pt in Afib -140's, pt has hx of Afib.     78 y.o. male Past Medical History:  No date: Anticoagulant long-term use      Comment:  Plavix (on hold for angiogram)  No date: Arthritis  No date: Coronary artery disease      Comment:  pacemaker, stentsx2  No date: Diabetes mellitus  No date: Hypertension  No date: Pacemaker  No date: Sleep apnea  No date: Stroke      Comment:  Rt side affected / mild  No date: Thyroid disease     Pacemaker inserted in 2006, pt does not know what kind and does not have his card with him. Presents c/o weakness, SLADE, decreased exercise tolerance over the last 2 weeks, notes that he has been back in afib for the last 4-5 days. HE is on plavix and xarelto.      Pt has [employer] Pacemaker [id number]        3/13/19    CONCLUSIONS     * Mildly increased left ventricular cavity size. Increased left ventricular wall thickness. Wall motion TDS. Mildly decreased left      ventricular systolic function. Left ventricular ejection fraction is estimated at 45 %. Rhythm precludes evaluation of diastolic      function.      * Normal right ventricular size. Right ventricular systolic pressure 26 mmHg. Catheter/pacemaker wire visualized in the right      ventricle. Mild to Moderately increased right atrial size. Mild to Moderately increased left atrial size.      * Mild mitral annular calcification. Mild mitral valve regurgitation. Mild aortic valve calcification. Mild tricuspid valve      regurgitation. Trace pulmonary valve regurgitation.      Other findings as noted above.          Review of patient's allergies indicates:   Allergen Reactions    Penicillins Swelling     Past Medical History:   Diagnosis Date    Anticoagulant long-term use     Plavix (on hold for angiogram)    Arthritis      Coronary artery disease     pacemaker, stentsx2    Diabetes mellitus     Hypertension     Pacemaker     Sleep apnea     Stroke     Rt side affected / mild    Thyroid disease      Past Surgical History:   Procedure Laterality Date    ANGIOGRAM N/A 2013    Performed by [facility] OR    ANKLE SURGERY      APPENDECTOMY      CARDIAC PACEMAKER PLACEMENT      CHOLECYSTECTOMY      CORONARY ANGIOPLASTY WITH STENT PLACEMENT      stents x 2 placed     EYE SURGERY      HEART CATH-LEFT Left 10/23/2017    [facility] by [facility]ATH [facility]    HERNIA REPAIR      ventral hernia repair    REPAIR, AAA, ENDOVASCULAR N/A 2013    Performed by Radu Melgar [facility] at [facility] OR    sciatica      sciatica procedure     Family History   Problem Relation Age of Onset    Cancer Mother     Heart disease Father     Cancer Sister      Social History     Tobacco Use    Smoking status: Former Smoker     Last attempt to quit: 1998     Years since quittin.1    Smokeless tobacco: Never Used   Substance Use Topics    Alcohol use: No     Alcohol/week: 0.0 oz    Drug use: No     Review of Systems   Constitutional: Negative for fever.   HENT: Negative for sore throat.    Respiratory: Negative for shortness of breath.    Cardiovascular: Negative for chest pain.   Gastrointestinal: Negative for nausea.   Genitourinary: Negative for dysuria.   Musculoskeletal: Negative for back pain.   Skin: Negative for rash.   Neurological: Positive for weakness.   Hematological: Does not bruise/bleed easily.   All other systems reviewed and are negative.      Physical Exam     Initial Vitals [19]   BP Pulse Resp Temp SpO2   (!) 150/100 (!) 138 18 98.3 °F (36.8 °C) 96 %      MAP       --         Physical Exam    Nursing note and vitals reviewed.  Constitutional: He appears well-developed and well-nourished.   HENT:   Head: Normocephalic and atraumatic.   Eyes: EOM are normal.  Pupils are equal, round, and reactive to light.   Pulmonary/Chest: Effort normal and breath sounds normal. No respiratory distress. He has no wheezes.   Abdominal: Soft. He exhibits no distension. There is no tenderness.   Musculoskeletal: Normal range of motion. He exhibits no edema or tenderness.   Neurological: He is alert and oriented to person, place, and time. No cranial nerve deficit.   Skin: Skin is warm and dry.   Psychiatric: He has a normal mood and affect.     tachy irr irr  No jvd, no pitting  ED Course   Procedures  Labs Reviewed   CBC W/ AUTO DIFFERENTIAL   COMPREHENSIVE METABOLIC PANEL   TSH   PROTIME-INR   APTT     EKG Readings: (Independently Interpreted)   Hr 126, afib rvr, incomplete RBBB, no stemi.       Imaging Results          X-Ray Chest AP Portable (In process)                  Medical Decision Making:   Initial Assessment:   No response to iv dilt. Have started iv amio given pt takes it at home.               Attending Attestation:         Attending Critical Care:   Critical Care Times:   Direct Patient Care (initial evaluation, reassessments, and time considering the case)................................................................30 minutes.   Additional History from reviewing old medical records or taking additional history from the family, EMS, PCP, etc.......................10 minutes.   Ordering, Reviewing, and Interpreting Diagnostic Studies...............................................................................................................10 minutes.   Documentation..................................................................................................................................................................................10 minutes.   Consultation with other Physicians. .................................................................................................................................................10 minutes.    ==============================================================  · Total Critical Care Time - exclusive of procedural time: 70 minutes.  ==============================================================            pacemaker interrogated. Working correctly. Pt does have runs of afib.   I have started an amio drip. I have not given loading dose as pt is on po amio at baseline.     Clinical Impression:       ICD-10-CM ICD-9-CM   1. Atrial fibrillation with rapid ventricular response I48.91 427.31   2. A-fib I48.91 427.31   3. Weakness R53.1 780.79                                Chey Lamb MD  07/18/19 [id number]

## 2019-07-19 NOTE — ASSESSMENT & PLAN NOTE
Known MV CAD s/p LAD/Diag PCI 10/2017  Currently on DAPT, will stop ASA and cont Plavix (with ongoing Xarelto rx)  Cont BBl/Statin  Check lipids

## 2019-07-19 NOTE — TRANSFER OF CARE
"Anesthesia Transfer of Care Note    Patient: Doni Theodore    Procedure(s) Performed: Procedure(s) (LRB):  Transesophageal echo (VERITO) intra-procedure log documentation (N/A)  Cardioversion or Defibrillation    Patient location: Other: report to Hair RN    Anesthesia Type: general    Transport from OR: Transported from OR on 2-3 L/min O2 by NC with adequate spontaneous ventilation    Post pain: adequate analgesia    Post assessment: no apparent anesthetic complications and tolerated procedure well    Post vital signs: stable    Level of consciousness: responds to stimulation    Nausea/Vomiting: no nausea/vomiting    Complications: none    Transfer of care protocol was followed      Last vitals:   Visit Vitals  BP (!) 95/57   Pulse 62   Temp 36 °C (96.8 °F) (Skin)   Resp 11   Ht 5' 3" (1.6 m)   Wt 84.7 kg (186 lb 11.7 oz)   SpO2 95%   BMI 33.08 kg/m²     "

## 2019-07-19 NOTE — ASSESSMENT & PLAN NOTE
Mild vol overload on CXR  EF 45% by echo 3/2019  Cont BBl  Add lisinopril 5mg qd  Lasix 20mg IV x1

## 2019-07-19 NOTE — BRIEF OP NOTE
[facility]  Brief Operative Note     SUMMARY     Surgery Date: 7/19/2019     Surgeon(s) and Role:     * Nic Miguel MD - Primary    Assisting Surgeon: None    Pre-op Diagnosis:  Atrial fibrillation with rapid ventricular response [I48.91]    Post-op Diagnosis:  Post-Op Diagnosis Codes:     * Atrial fibrillation with rapid ventricular response [I48.91]    Procedure(s) (LRB):  Transesophageal echo (VERITO) intra-procedure log documentation (N/A)  Cardioversion or Defibrillation    Anesthesia: Monitor Anesthesia Care    Description of the findings of the procedure: uneventful VERITO/DCCV with anesthesia in OR.    Findings/Key Components:   LVEF 40-45%, mild global HK  Normal valves  Mild-mod MR  Mild TR  Mild AI  No LA/MAURICIO thrombus    Successful DCCV AF->NSR 360J x1    Pt tolerated procedure well without complications    Plan:  Observe in ICU  Cont Xarelto 20mg qd  Start amio load  Dispo planning appropriate  Pt to follow up with me in 1-2 weeks.    Estimated Blood Loss: none         Specimens: None    Diet: ADA/cardiac    Activity: ad meaghan.

## 2019-07-19 NOTE — SUBJECTIVE & OBJECTIVE
Past Medical History:   Diagnosis Date    A-fib 7/18/2019    Anticoagulant long-term use     Plavix (on hold for angiogram)    Arthritis     Coronary artery disease     pacemaker, stentsx2    Diabetes mellitus     Hypertension     Pacemaker     Sleep apnea     Stroke     Rt side affected / mild    Thyroid disease        Past Surgical History:   Procedure Laterality Date    ANGIOGRAM N/A 5/31/2013    Performed by [facility] Provider at [facility] OR    ANKLE SURGERY      APPENDECTOMY      CARDIAC PACEMAKER PLACEMENT      CHOLECYSTECTOMY      CORONARY ANGIOPLASTY WITH STENT PLACEMENT  6/06    stents x 2 placed     EYE SURGERY      HEART CATH-LEFT Left 10/23/2017    Performed by [facility] at [facility] CATH LAB    HERNIA REPAIR      ventral hernia repair    REPAIR, AAA, ENDOVASCULAR N/A 6/18/2013    Performed by Radu Melgar MD at [facility] OR    sciatica      sciatica procedure       Review of patient's allergies indicates:   Allergen Reactions    Penicillins Swelling       No current facility-administered medications on file prior to encounter.      Current Outpatient Medications on File Prior to Encounter   Medication Sig    amiodarone (PACERONE) 200 MG Tab Take 200 mg by mouth every evening.     aspirin (ECOTRIN) 81 MG EC tablet Take 81 mg by mouth once daily.    atorvastatin (LIPITOR) 40 MG tablet     clopidogrel (PLAVIX) 75 mg tablet Take 75 mg by mouth once daily.    cyanocobalamin 2000 MCG tablet Take 2,500 mcg by mouth once daily.    fluticasone (FLONASE) 50 mcg/actuation nasal spray 1 spray by Each Nare route nightly as needed.     gabapentin (NEURONTIN) 300 MG capsule Take 300 mg by mouth 3 (three) times daily.    glimepiride (AMARYL) 4 MG tablet Take 4 mg by mouth before breakfast.    isosorbide mononitrate (IMDUR) 60 MG 24 hr tablet Take 60 mg by mouth once daily.    metFORMIN (GLUCOPHAGE) 1000 MG tablet Take 1,000 mg by mouth 2 (two) times daily with meals.     metoprolol succinate (TOPROL-XL) 25 MG 24 hr tablet     midodrine (PROAMATINE) 5 MG Tab     nitroGLYCERIN (NITROSTAT) 0.4 MG SL tablet Place 0.4 mg under the tongue every 5 (five) minutes as needed for Chest pain.    ranitidine (ZANTAC) 300 MG tablet Take 300 mg by mouth every evening.    sertraline (ZOLOFT) 100 MG tablet Take 100 mg by mouth 2 (two) times daily.    SYNTHROID 175 mcg tablet     tamsulosin (FLOMAX) 0.4 mg Cp24 TAKE 1 CAPSULE AT BEDTIME.    tiZANidine (ZANAFLEX) 4 MG tablet TAKE 1 TABLET AS NEEDED EVERY 8 HOURS AS NEEDED FOR MUSCLE RELAXER ORALLY 30 DAYS    traMADol (ULTRAM) 50 mg tablet Take 0.5 tablets (25 mg total) by mouth every 8 (eight) hours as needed (Sever pain not controlled by tylenol or tizanidine).    vitamin D 185 MG Tab Take 2,000 mg by mouth once daily.     XARELTO 20 mg Tab TAKE 1 TABLET BY MOUTH ONCE A DAY WITH EVENING MEAL     Family History     Problem Relation (Age of Onset)    Cancer Mother, Sister    Heart disease Father        Tobacco Use    Smoking status: Former Smoker     Last attempt to quit: 1998     Years since quittin.1    Smokeless tobacco: Never Used   Substance and Sexual Activity    Alcohol use: No     Alcohol/week: 0.0 oz    Drug use: No    Sexual activity: Never     Review of Systems   Constitution: Positive for malaise/fatigue. Negative for chills, diaphoresis and fever.   HENT: Negative for nosebleeds.    Eyes: Negative for blurred vision and double vision.   Cardiovascular: Positive for leg swelling and palpitations. Negative for chest pain, claudication, cyanosis, dyspnea on exertion, orthopnea, paroxysmal nocturnal dyspnea and syncope.   Respiratory: Positive for shortness of breath. Negative for cough and wheezing.    Skin: Negative for dry skin and poor wound healing.   Musculoskeletal: Negative for back pain, joint swelling and myalgias.   Gastrointestinal: Negative for abdominal pain, nausea and vomiting.   Genitourinary: Negative  for hematuria.   Neurological: Negative for dizziness, headaches, numbness, seizures and weakness.   Psychiatric/Behavioral: Negative for altered mental status and depression.     Objective:     Vital Signs (Most Recent):  Temp: 98.7 °F (37.1 °C) (07/19/19 0303)  Pulse: (!) 126 (07/19/19 0726)  Resp: (!) 22 (07/19/19 0600)  BP: (!) 154/115 (07/19/19 0726)  SpO2: (!) 93 % (07/19/19 0600) Vital Signs (24h Range):  Temp:  [98.3 °F (36.8 °C)-99.3 °F (37.4 °C)] 98.7 °F (37.1 °C)  Pulse:  [115-146] 126  Resp:  [16-43] 22  SpO2:  [92 %-100 %] 93 %  BP: (126-173)/() 154/115     Weight: 84.7 kg (186 lb 11.7 oz)  Body mass index is 33.08 kg/m².    SpO2: (!) 93 %  O2 Device (Oxygen Therapy): room air(Simultaneous filing. User may not have seen previous data.)      Intake/Output Summary (Last 24 hours) at 7/19/2019 0737  Last data filed at 7/19/2019 0600  Gross per 24 hour   Intake 235.74 ml   Output --   Net 235.74 ml       Lines/Drains/Airways     Peripheral Intravenous Line                 Peripheral IV - Single Lumen 07/18/19 2020 20 G Left Wrist less than 1 day         Peripheral IV - Single Lumen 07/18/19 2130 20 G Right Antecubital less than 1 day                Physical Exam   Constitutional: He is oriented to person, place, and time. He appears well-developed and well-nourished.   HENT:   Head: Normocephalic and atraumatic.   Eyes: Pupils are equal, round, and reactive to light. Conjunctivae and EOM are normal. No scleral icterus.   Neck: Normal range of motion. Neck supple. No JVD present. Carotid bruit is not present. No tracheal deviation present. No thyromegaly present.   Cardiovascular: S1 normal and S2 normal. An irregularly irregular rhythm present. Tachycardia present. Exam reveals distant heart sounds. Exam reveals no gallop and no friction rub.   No murmur heard.  Pulmonary/Chest: Effort normal and breath sounds normal. No respiratory distress. He has no wheezes. He has no rales. He exhibits no  tenderness.   Abdominal: Soft. He exhibits no distension.   obese   Musculoskeletal: Normal range of motion. He exhibits edema.   Neurological: He is alert and oriented to person, place, and time. He has normal strength. No cranial nerve deficit.   Skin: Skin is warm and dry. No rash noted.   Psychiatric: He has a normal mood and affect. His behavior is normal.       Current Medications:   aspirin  81 mg Oral Daily    atorvastatin  40 mg Oral Daily    clopidogrel  75 mg Oral Daily    gabapentin  300 mg Oral TID    isosorbide mononitrate  60 mg Oral Daily    levothyroxine  175 mcg Oral Daily    metoprolol succinate  50 mg Oral Daily    rivaroxaban  20 mg Oral with dinner    sertraline  100 mg Oral Daily    tamsulosin  1 capsule Oral Nightly      amiodarone in dextrose 5% 0.5 mg/min (07/19/19 0600)     dextrose 50%, glucagon (human recombinant), insulin aspart U-100, nitroGLYCERIN, sodium chloride 0.9%    Laboratory (all labs reviewed):  CBC:  Recent Labs   Lab 06/08/17  0427 10/23/17  0642 10/24/17  0641 04/22/19  2206 07/18/19  2047   WBC 5.35 5.25 6.11 8.86 5.54   Hemoglobin 9.8 L 11.0 L 11.9 L 11.6 L 11.4 L   Hematocrit 29.3 L 33.2 L 35.9 L 36.7 L 36.4 L   Platelets 164 157 176 214 173       CHEMISTRIES:  Recent Labs   Lab 06/05/17  0619 06/06/17  0349 06/07/17  0341 06/08/17  0427 10/23/17  0642 10/24/17  0641 04/22/19  2206 07/18/19  2047   Glucose 166 H 170 H 155 H 186 H 183 H 186 H 193 H 101   Sodium 138 138 140 138 137 137 139 142   Potassium 3.4 L 4.2 4.1 3.8 4.3 4.4 4.5 4.0   BUN, Bld 17 15 16 16 12 11 22 12   Creatinine 0.8 1.0 0.9 1.0 1.0 1.0 1.0 1.0   eGFR if African American >60.0 >60.0 >60.0 >60.0 >60.0 >60.0 >60 >60   eGFR if non African American >60.0 >60.0 >60.0 >60.0 >60.0 >60.0 >60 >60   Calcium 9.1 9.5 9.4 9.6 9.3 10.0 9.2 9.2   Magnesium 1.4 L 1.5 L 1.6 1.6  --   --  1.3 L  --        CARDIAC BIOMARKERS:  Recent Labs   Lab 02/16/17  1311 04/22/19  2206 07/18/19  2047 07/19/19  0232    Troponin I 0.022 0.012 0.007 0.029 H       COAGS:  Recent Labs   Lab 06/07/17  0341 06/08/17  0427 10/23/17  0705 10/24/17  0853 07/18/19  2047   INR 1.8 H 1.9 H 1.2 1.2 1.1       LIPIDS/LFTS:  Recent Labs   Lab 06/06/17  0349 06/07/17  0341 06/08/17  0427 04/22/19  2206 07/18/19  2047   AST 51 H 77 H 65 H 46 H 16   ALT 75 H 101 H 104 H 38 11       BNP:  Recent Labs   Lab 02/16/17  1311 04/22/19  2206 07/18/19  2047   BNP 81 271 H 366 H       TSH:  Recent Labs   Lab 06/05/17  0619 07/18/19  2047   TSH 5.056 H 13.841 H       Free T4:  Recent Labs   Lab 06/05/17  0619 07/18/19 2047   Free T4 1.15 1.05       Diagnostic Results:  ECG (personally reviewed and interpreted tracing(s)):  7/18/19: , IRBBB (AF old).  Was last documented in SR 10/23/17.    Chest X-Ray (personally reviewed and interpreted image(s)): 7/18/19 mild CHF, L PPM    Echo: 3/13/19 (care everywhere; VERITO ordered)    * Mildly increased left ventricular cavity size. Increased left ventricular wall thickness. Wall motion TDS. Mildly decreased left ventricular systolic function. Left ventricular ejection fraction is estimated at 45 %. Rhythm precludes evaluation of diastolic function.      * Normal right ventricular size. Right ventricular systolic pressure 26 mmHg. Catheter/pacemaker wire visualized in the right ventricle. Mild to Moderately increased right atrial size. Mild to Moderately increased left atrial size.      * Mild mitral annular calcification. Mild mitral valve regurgitation. Mild aortic valve calcification. Mild tricuspid valve regurgitation. Trace pulmonary valve regurgitation.      Other findings as noted above.      Cath: 10/23/17 (images personally reviewed and interpreted)  B. Summary/Post-Operative Diagnosis    [id number].    70% mid LAD ISR; FFR=0.79.     of D1.    Successful PCI of mid LAD with 4X15 MARY.    Successful PCI of D1 with 2.25X30 and 2.5X18 MARY.    IVUS utilized for stent sizing.  D. Hemodynamic Results  LVEDP  (Pre): 22 mmHg  E. Angiographic Results       Patient has a right dominant coronary artery.      - Left Main Coronary Artery:             The LM has luminal irregularities. There is DONNA 3 flow.     - Left Anterior Descending Artery:             The proximal LAD has a 10% stenosis. There is DONNA 3 flow. The remaining portion of the vessel has luminal irregularities (10).             The mid LAD has a 70% stenosis. There is DONNA 3 flow. FFR was 0.79. The remaining portion of the vessel has luminal irregularities (10).                     Lesion Details:   The length is 10-20 mm, eccentric shape, moderate proximal tortuosity, segment angulation of <45 degrees, irregular contour, mild to moderate calcification. No bifurcation is present. The minimum luminal diameter is   0.5 millimeters.             The distal LAD has a 50% stenosis. There is DONNA 3 flow. The remaining portion of the vessel is of small caliber.     - D1:             The D1 has chronic total occlusion. There is DONNA 0 flow. There is collateral flow from the PLB (faint). The remaining portion of the vessel has luminal irregularities (10).                     Lesion Details:   This is a Type C lesion.  The length is 40mm, eccentric shape, moderate proximal tortuosity, segment angulation of 45-90 degrees, irregular contour, mild to moderate calcification. Bifurcation is present. The minimum   luminal diameter is 0 millimeters.     - Left Circumflex Artery:             The proximal LCX has a 20% stenosis. There is DONNA 3 flow. The remaining portion of the vessel has luminal irregularities (10).             The distal LCX has luminal irregularities. There is DONNA 3 flow.     - OM1:             The OM1 has luminal irregularities has a 30% stenosis. There is DONNA 3 flow. The remaining portion of the vessel has luminal irregularities (10).     - Right Coronary Artery:             The proximal RCA has luminal irregularities. There is DONNA 3 flow.             The  mid RCA has a 20% stenosis. There is DONNA 3 flow. The remaining portion of the vessel has luminal irregularities (10).             The distal RCA has a 50% stenosis. There is DONNA 3 flow. The remaining portion of the vessel has luminal irregularities (10).     - Posterior Lateral Branch:             The PLB has a 40% stenosis. There is DONNA 3 flow. The remaining portion of the vessel has luminal irregularities (10).  Intervention       Mid LAD:              The lesion was successfully intervened. Post-stenosis of 0%, post-DONNA 3 flow and TMP grade 3. The vessel was accessed natively.  The following items were used: Blln Trek Rx 3.0 X 12, Stent Resolute Rx 4.00x15 (MARY) and Cath [facility].       D1:              The lesion was successfully intervened. Post-stenosis of 0%, post-DONNA 3 flow and TMP grade 3. The vessel was accessed natively.  The following items were used: Cath Mini Trek 1.5 X 20 Rx, Blln Sc Euphora 2.0 X 10, Stent Resolute Rx 2.25x30   (MARY), Stent Resolute Rx 2.50x18 (MARY) and Cath [facility].

## 2019-07-19 NOTE — H&P
[facility] Hospital Medicine  History & Physical    Patient Name: Doni Theodore  MRN: [id number]  Admission Date: 07/19/2019  Attending Physician: Nic Cadet MD, MPH      PCP:     Nic Mauro MD    CC:     Chief Complaint   Patient presents with    Extremity Weakness     Pt reports via EMS with c/o bilateral leg pain and weakness when moving to a standing position. Pt in Afib -140's, pt has hx of Afib.       HISTORY OF PRESENT ILLNESS:     Doni Theodore is a 78 y.o. male that (in part)  has a past medical history of A-fib, Anticoagulant long-term use, Arthritis, Coronary artery disease, Diabetes mellitus, Hypertension, Pacemaker, Sleep apnea, Stroke, and Thyroid disease.  has a past surgical history that includes Coronary angioplasty with stent (6/06); Cholecystectomy; Appendectomy; Hernia repair; Eye surgery; Cardiac pacemaker placement; sciatica; and Ankle surgery. Presents to [facility] Emergency Department complaining of bilateral leg pain and weakness when ambulating.  Also complaining of palpitations and vague chest discomfort.  Mild dyspnea with exertion.  Denies syncope, edema, cyanosis fever, chills, or cough.  No joint swelling or unilateral weakness.  No slurred speech, paresthesias, ataxia, vertigo, or tremors.  Acute onset today.  Frequency 1 episode.  Reports compliance with his home medication regimen.  History of persistent atrial fibrillation.  On chronic anticoagulation.  Takes amiodarone.    In the emergency department his heart rate was found to be in the 130s to 140s.  EKG revealed atrial fibrillation with rapid ventricular response.  He was started on amiodarone infusion.  He was rate controlled with diltiazem prior to this.  Cardiology was consulted.    Hospital medicine has been asked to admit for further evaluation and treatment.       REVIEW OF SYSTEMS:     -- Constitutional: No fever or chills.  -- Eyes: No visual changes,  diplopia, pain, tearing, blind spots, or discharge.   -- Ears, nose, mouth, throat, and face: No congestion, sore throat, epistaxis, d/c, bleeding gums, neck stiffness masses, or dental issues.  -- Respiratory:  As above in HPI.  -- Cardiovascular:  As above in HPI.   -- Gastrointestinal: No vomiting, abdominal pain, hematemesis, melena, dyspepsia, or change in bowel habits.  -- Genitourinary: No hematuria, dysuria, frequency, urgency, nocturia, polyuria, stones, or incontinence.  -- Integument/breast: No rash, pruritis, pigmentation changes, dryness, or changes in hair  -- Hematologic/lymphatic:  Positive for easy bruising.  Denies  lymphadenopathy.   -- Musculoskeletal:  As above in HPI.  -- Neurological: No seizures, headaches, incoordination, paraesthesias, ataxia, vertigo, or tremors.  -- Behavioral/Psych: No auditory or visual hallucinations, depression, or suicidal/homicidal ideations.  -- Endocrine: No heat or cold intolerance, polydipsia, or unintentional weight gain / loss.  -- Allergy/Immunologic: No recurrent infections or adverse reaction to food, insects, or difficulty breathing.        PAST MEDICAL / SURGICAL HISTORY:     Past Medical History:   Diagnosis Date    A-fib 7/18/2019    Anticoagulant long-term use     Plavix (on hold for angiogram)    Arthritis     Coronary artery disease     pacemaker, stentsx2    Diabetes mellitus     Hypertension     Pacemaker     Sleep apnea     Stroke     Rt side affected / mild    Thyroid disease      Past Surgical History:   Procedure Laterality Date    ANGIOGRAM N/A 5/31/2013    Performed by [facility]    ANKLE SURGERY      APPENDECTOMY      CARDIAC PACEMAKER PLACEMENT      CHOLECYSTECTOMY      CORONARY ANGIOPLASTY WITH STENT PLACEMENT  6/06    stents x 2 placed     EYE SURGERY      HEART CATH-LEFT Left 10/23/2017    Performed by [facility] at [facility] CATH LAB    HERNIA REPAIR      ventral hernia repair     REPAIR, AAA, ENDOVASCULAR N/A 2013    Performed by [facility] OR    sciatica      sciatica procedure         FAMILY HISTORY:     Family History   Problem Relation Age of Onset    Cancer Mother     Heart disease Father     Cancer Sister          SOCIAL HISTORY:     Social History     Socioeconomic History    Marital status:      Spouse name: Not on file    Number of children: Not on file    Years of education: Not on file    Highest education level: Not on file   Occupational History    Not on file   Social Needs    Financial resource strain: Not on file    Food insecurity:     Worry: Not on file     Inability: Not on file    Transportation needs:     Medical: Not on file     Non-medical: Not on file   Tobacco Use    Smoking status: Former Smoker     Last attempt to quit: 1998     Years since quittin.1    Smokeless tobacco: Never Used   Substance and Sexual Activity    Alcohol use: No     Alcohol/week: 0.0 oz    Drug use: No    Sexual activity: Never   Lifestyle    Physical activity:     Days per week: Not on file     Minutes per session: Not on file    Stress: Not on file   Relationships    Social connections:     Talks on phone: Not on file     Gets together: Not on file     Attends [employer] service: Not on file     Active member of club or organization: Not on file     Attends meetings of clubs or organizations: Not on file     Relationship status: Not on file   Other Topics Concern    Not on file   Social History Narrative    Not on file         ALLERGIES:       Review of patient's allergies indicates:   Allergen Reactions    Penicillins Swelling           HOME MEDICATIONS:     Prior to Admission medications    Medication Sig Start Date End Date Taking? Authorizing Provider   amiodarone (PACERONE) 200 MG Tab Take 200 mg by mouth every evening.     Historical Provider, MD   aspirin (ECOTRIN) 81 MG EC tablet Take 81 mg by mouth once daily.     Historical Provider, MD   atorvastatin (LIPITOR) 40 MG tablet  12/3/18   Historical Provider, MD   clopidogrel (PLAVIX) 75 mg tablet Take 75 mg by mouth once daily. 10/11/18   Historical Provider, MD   cyanocobalamin 2000 MCG tablet Take 2,500 mcg by mouth once daily.    Historical Provider, MD   fluticasone (FLONASE) 50 mcg/actuation nasal spray 1 spray by Each Nare route nightly as needed.     Historical Provider, MD   gabapentin (NEURONTIN) 300 MG capsule Take 300 mg by mouth 3 (three) times daily. 11/7/18   Historical Provider, MD   glimepiride (AMARYL) 4 MG tablet Take 4 mg by mouth before breakfast.    Historical Provider, MD   isosorbide mononitrate (IMDUR) 60 MG 24 hr tablet Take 60 mg by mouth once daily. 9/26/18   Historical Provider, MD   metFORMIN (GLUCOPHAGE) 1000 MG tablet Take 1,000 mg by mouth 2 (two) times daily with meals. 11/7/18   Historical Provider, MD   metoprolol succinate (TOPROL-XL) 25 MG 24 hr tablet  12/3/18   Historical Provider, MD   midodrine (PROAMATINE) 5 MG Tab  12/4/18   Historical Provider, MD   nitroGLYCERIN (NITROSTAT) 0.4 MG SL tablet Place 0.4 mg under the tongue every 5 (five) minutes as needed for Chest pain.    Historical Provider, MD   ranitidine (ZANTAC) 300 MG tablet Take 300 mg by mouth every evening.    Historical Provider, MD   sertraline (ZOLOFT) 100 MG tablet Take 100 mg by mouth 2 (two) times daily. 10/19/18   Historical Provider, MD   SYNTHROID 175 mcg tablet  12/2/18   Historical Provider, MD   tamsulosin (FLOMAX) 0.4 mg Cp24 TAKE 1 CAPSULE AT BEDTIME. 8/18/16   JUSTIN Beauchamp MD   tiZANidine (ZANAFLEX) 4 MG tablet TAKE 1 TABLET AS NEEDED EVERY 8 HOURS AS NEEDED FOR MUSCLE RELAXER ORALLY 30 DAYS 11/28/18   Historical Provider, MD   traMADol (ULTRAM) 50 mg tablet Take 0.5 tablets (25 mg total) by mouth every 8 (eight) hours as needed (Sever pain not controlled by tylenol or tizanidine). 4/23/19   Trung Varma MD   vitamin D 185 MG Tab Take 2,000 mg by mouth  once daily.     Historical Provider, MD   XARELTO 20 mg Tab TAKE 1 TABLET BY MOUTH ONCE A DAY WITH EVENING MEAL 11/16/18   Historical Provider, MD          HOSPITAL MEDICATIONS:     Scheduled Meds:    aspirin  81 mg Oral Daily    atorvastatin  40 mg Oral Daily    clopidogrel  75 mg Oral Daily    gabapentin  300 mg Oral TID    isosorbide mononitrate  60 mg Oral Daily    levothyroxine  175 mcg Oral Daily    metoprolol tartrate  25 mg Oral BID    rivaroxaban  20 mg Oral with dinner    sertraline  100 mg Oral Daily    tamsulosin  1 capsule Oral Nightly     Continuous Infusions:    amiodarone in dextrose 5% 0.5 mg/min (07/19/19 0400)     PRN Meds: dextrose 50%, glucagon (human recombinant), insulin aspart U-100, nitroGLYCERIN, sodium chloride 0.9%      PHYSICAL EXAM:     Wt Readings from Last 1 Encounters:   07/18/19 2345 84.7 kg (186 lb 11.7 oz)   07/18/19 2016 81.6 kg (180 lb)     Body mass index is 33.08 kg/m².  Vitals:    07/19/19 0100 07/19/19 0200 07/19/19 0303 07/19/19 0400   BP: (!) 170/93 (!) 160/105 (!) 173/98 (!) 156/111   Pulse: (!) 115 (!) 120 (!) 123 (!) 129   Resp: 18 19 20 (!) 43   Temp:   98.7 °F (37.1 °C)    TempSrc:   Oral    SpO2: (!) 94% (!) 94% (!) 93% (!) 92%   Weight:       Height:              -- General appearance: well developed. appears stated age   -- Head: normocephalic, atraumatic   -- Eyes: conjunctivae clear. Extraocular muscles intact  -- Nose: Nares normal. Septum midline.   -- Mouth/Throat: lips, mucosa, and tongue normal. no throat erythema.   -- Neck: supple, symmetrical, trachea midline, no JVD and thyroid not grossly enlarged, appears symmetric  -- Lungs: clear to auscultation bilaterally. normal respiratory effort. No use of accessory muscles.   -- Chest wall: no tenderness. equal bilateral chest rise   -- Heart:  Rapid irregularly irregular heart rate and rhythm. S1, S2 normal.  no click, rub or gallop   -- Abdomen: soft, non-tender, non-distended, non-tympanic; bowel  sounds normal; no masses  -- Extremities: no cyanosis, clubbing or edema.   -- Pulses: 2+ and symmetric   -- Skin: color normal, texture normal, turgor normal. No rashes or lesions.   -- Neurologic: Normal strength and tone. No focal numbness or weakness. CNII-XII intact. [place] coma scale: eyes open spontaneously-4, oriented & converses-5, obeys commands-6.      LABORATORY STUDIES:     Recent Results (from the past 36 hour(s))   CBC auto differential    Collection Time: 07/18/19  8:47 PM   Result Value Ref Range    WBC 5.54 3.90 - 12.70 K/uL    RBC 3.56 (L) 4.60 - 6.20 M/uL    Hemoglobin 11.4 (L) 14.0 - 18.0 g/dL    Hematocrit 36.4 (L) 40.0 - 54.0 %    Mean Corpuscular Volume 102 (H) 82 - 98 fL    Mean Corpuscular Hemoglobin 32.0 (H) 27.0 - 31.0 pg    Mean Corpuscular Hemoglobin Conc 31.3 (L) 32.0 - 36.0 g/dL    RDW 16.1 (H) 11.5 - 14.5 %    Platelets 173 150 - 350 K/uL    MPV 10.0 9.2 - 12.9 fL    Gran # (ANC) 4.4 1.8 - 7.7 K/uL    Lymph # 0.6 (L) 1.0 - 4.8 K/uL    Mono # 0.4 0.3 - 1.0 K/uL    Eos # 0.1 0.0 - 0.5 K/uL    Baso # 0.01 0.00 - 0.20 K/uL    Gran% 79.2 (H) 38.0 - 73.0 %    Lymph% 11.6 (L) 18.0 - 48.0 %    Mono% 7.8 4.0 - 15.0 %    Eosinophil% 1.4 0.0 - 8.0 %    Basophil% 0.2 0.0 - 1.9 %    Differential Method Automated    Comprehensive metabolic panel    Collection Time: 07/18/19  8:47 PM   Result Value Ref Range    Sodium 142 136 - 145 mmol/L    Potassium 4.0 3.5 - 5.1 mmol/L    Chloride 111 (H) 95 - 110 mmol/L    CO2 21 (L) 23 - 29 mmol/L    Glucose 101 70 - 110 mg/dL    BUN, Bld 12 8 - 23 mg/dL    Creatinine 1.0 0.5 - 1.4 mg/dL    Calcium 9.2 8.7 - 10.5 mg/dL    Total Protein 6.6 6.0 - 8.4 g/dL    Albumin 3.4 (L) 3.5 - 5.2 g/dL    Total Bilirubin 0.4 0.1 - 1.0 mg/dL    Alkaline Phosphatase 66 55 - 135 U/L    AST 16 10 - 40 U/L    ALT 11 10 - 44 U/L    Anion Gap 10 8 - 16 mmol/L    eGFR if African American >60 >60 mL/min/1.73 m^2    eGFR if non African American >60 >60 mL/min/1.73 m^2   TSH     Collection Time: 07/18/19  8:47 PM   Result Value Ref Range    TSH 13.841 (H) 0.400 - 4.000 uIU/mL   Protime-INR    Collection Time: 07/18/19  8:47 PM   Result Value Ref Range    Prothrombin Time 11.9 9.0 - 12.5 sec    INR 1.1 0.8 - 1.2   APTT    Collection Time: 07/18/19  8:47 PM   Result Value Ref Range    aPTT 29.8 21.0 - 32.0 sec   Troponin I    Collection Time: 07/18/19  8:47 PM   Result Value Ref Range    Troponin I 0.007 0.000 - 0.026 ng/mL   Brain natriuretic peptide    Collection Time: 07/18/19  8:47 PM   Result Value Ref Range     (H) 0 - 99 pg/mL   T4, free    Collection Time: 07/18/19  8:47 PM   Result Value Ref Range    Free T4 1.05 0.71 - 1.51 ng/dL   Troponin I    Collection Time: 07/19/19  2:38 AM   Result Value Ref Range    Troponin I 0.029 (H) 0.000 - 0.026 ng/mL       Lab Results   Component Value Date    INR 1.1 07/18/2019    INR 1.2 10/24/2017    INR 1.2 10/23/2017     Lab Results   Component Value Date    HGBA1C 7.5 (H) 06/13/2013     No results for input(s): POCTGLUCOSE in the last 72 hours.        IMAGING:     Imaging Results          X-Ray Chest AP Portable (Final result)  Result time 07/18/19 21:34:06    Final result by Cortes Bey MD (07/18/19 21:34:06)                 Impression:      Suspected mild to moderate pulmonary edema.      Electronically signed by: Cortes Bey MD  Date:    07/18/2019  Time:    21:34             Narrative:    EXAMINATION:  XR CHEST AP PORTABLE    CLINICAL HISTORY:  Weakness    TECHNIQUE:  Single frontal view of the chest was performed.    COMPARISON:  None    FINDINGS:  Heart is enlarged but stable in size.  Left-sided pacer device is seen.  There is prominence of central pulmonary vasculature with bilateral perihilar opacity and increased interstitial attenuation.  Overall pattern is most suggestive for mild to moderate pulmonary edema, although atypical pneumonia could have similar appearance.  No evidence of pneumothorax or large pleural  effusion.  No acute osseous abnormality identified.                                  CONSULTS:     IP CONSULT TO CARDIOLOGY       ASSESSMENT & PLAN:     Primary Diagnosis:  Atrial fibrillation with rapid ventricular response    Active Hospital Problems    Diagnosis  POA    *Atrial fibrillation with rapid ventricular response [I48.91]  Yes     Priority: 1 - High    Persistent atrial fibrillation [I48.1]  Yes     Priority: 2     Diabetes mellitus, type 2 [E11.9]  Yes    Essential hypertension [I10]  Yes    Anticoagulant long-term use [Z79.01]  Not Applicable     Plavix (on hold for angiogram)      Coronary artery disease [I25.10]  Yes    Pacemaker [Z95.0]  Yes    CVA (cerebral vascular accident) [I63.9]  Yes    CAD (coronary artery disease) [I25.10]  Yes      Resolved Hospital Problems   No resolved problems to display.       Atrial fibrillation with rapid ventricular response  · Currently rate controlled after being given diltiazem and starting an amiodarone infusion.  · Maintain with oral amiodarone and beta-blocker with hold parameters  · Monitor on telemetry  · Maintain magnesium around 2.0  · Maintain potassium around 4.0    Chronic anticoagulation  · For treatment of atrial fibrillation and cerebrovascular disease  · No evidence of acute blood loss   · Continue Xarelto.    Diabetes Mellitus Type 2  · Blood glucose is  in acceptable range at this time  · Maintain w/ subcutaneous insulin management order set  · Hold oral diabetic meds  · ADA 1800 kcal diet  · BG goal while in patient is <180mg/dL  · HgA1c = Pending    Hypertension  · Goal while inpatient is a systolic blood pressure less than 160mmHg  · BP in acceptable range at this time  · Continue current home regimen with hold parameters  · PRN antihypertensives available    Cerebral vascular disease  · No evidence of acute stroke at this time  · Maintain adequate blood pressure control  · Heart healthy diet  · Aspirin /  plavix  · Statin    Coronary  artery disease  · No evidence of acute coronary syndrome at this time  · Maintain adequate blood pressure control  · Heart healthy diet  · Aspirin / Plavix  · Statin        VTE Risk Mitigation (From admission, onward)        Ordered     rivaroxaban tablet 20 mg  with dinner      07/19/19 0534     IP VTE HIGH RISK PATIENT  Once      07/18/19 2340     Place sequential compression device  Until discontinued      07/18/19 2340            Adult PRN medications available   DVT prophylaxis given       DISPOSITION:     Will admit to the Hospital Medicine service for further evaluation and treatment.    Chart reviewed and updated where applicable.    High Risk Conditions:  Patient has a condition that poses threat to life and bodily function:  Atrial fibrillation rapid ventricular response      ===============================================================    Nic Cadet MD, MPH  Department of Hospital Medicine   [facility]  [phone] pg  (7pm - 6am)          This note is dictated using [employer] voice recognition software.  There are word recognition mistakes that are occasionally missed on review.

## 2019-07-19 NOTE — HPI
78 y.o. male that (in part)  has a past medical history of A-fib, Anticoagulant long-term use, Arthritis, Coronary artery disease, Diabetes mellitus, Hypertension, Pacemaker, Sleep apnea, Stroke, and Thyroid disease.  has a past surgical history that includes Coronary angioplasty with stent (6/06); Cholecystectomy; Appendectomy; Hernia repair; Eye surgery; Cardiac pacemaker placement; sciatica; and Ankle surgery. Presents to [facility] Emergency Department complaining of bilateral leg pain and weakness when ambulating.  Also complaining of palpitations and vague chest discomfort.  Mild dyspnea with exertion.  Denies syncope, edema, cyanosis fever, chills, or cough.  No joint swelling or unilateral weakness.  No slurred speech, paresthesias, ataxia, vertigo, or tremors.  Acute onset today.  Frequency 1 episode.  Reports compliance with his home medication regimen.  History of persistent atrial fibrillation.  On chronic anticoagulation.  Takes amiodarone.  In the emergency department his heart rate was found to be in the 130s to 140s.  EKG revealed atrial fibrillation with rapid ventricular response.  He was started on amiodarone infusion.  He was rate controlled with diltiazem prior to this.  Cardiology was consulted.    Follows with Dr. Guo, last seen in 3/2019 (care everywhere note below)    The pt presents with sxs predom of leg weakness and fatigue with ambulation.  No overt anginal sxs.  Also with palps and noted to be in AF/RVR.  Has hx of persistent AF and is on ASA/Plavix/Xarelto.  No recent GIB (but has hx of this).  Also has PPM (unknown type).  Describes some SOB and has minimal LE edema.  Hx FEMI on CPAP.  We discussed VERITO guided DCCV and he agrees to proceed.  Also wishes to follow up with me.       Per Brant note 3/28/19 (care everywhere):  ASSESSMENT/PLAN:   78 y.o. male with: Doing okay s/p labs and non invasive studies. The patient had to be taken off all his statin  because of significant myalgia. His LDL is a little higher than what I like to see and therefore I will go ahead and start him on Praluent     1) Ho GI Bleed with coumadin tox due to a pain med; Doing better s/p EGD but nothing was found.    2) CAD; [place] [facility] [place] 4.0 X 15 resolute stent. using an 8f radial sheath and 8f EBU 4 guide Asymptomatic.   4/2016 NSTEMI S/P PCI of the RCA / CAD with a proximal and distal RCA stents. 10/17 a PCI of the LAD as noted above still with on and off weakness.  Repeat echo. With an ejection fraction 45% left atrial enlargement and PA pressure 26.     3) PAF with RVR s/p VERITO and Cardioversion. On amiodarone. Xarelto for embolic protection. [id number] VASC, DM, CVA, AGE, HTN. Pt states he has had high rate episodes but not symptomatic. HR today 99 in afib. Will get device check and possibly refer back to EP. Repeat echo. As noted above    4) Ho orthostatic hypotension; on midodrine, stable on current tx.  Stable blood pressure is 115/74 heart rate is 112    5) HLD; he was taken off lipitor d/t LFTs elevating and will have abdominal U/S. was consistent with hepatic steatosis/fatty liver. His LFTs remain slightly elevated ALT 62 AST is 39. His the LDL 78 HDL is 44 will proceed with P CS K9 inhibitor    6) HTN; stable today 115/74    7) S/p AAA repair: Per PCP    8) Carotid Disease: 35% bilat    9) h/o CVA    10) DM: as per PCP    11) thyroid dx; per endo

## 2019-07-19 NOTE — HPI
Doni Theodore is a 78 y.o. male that (in part)  has a past medical history of A-fib, Anticoagulant long-term use, Arthritis, Coronary artery disease, Diabetes mellitus, Hypertension, Pacemaker, Sleep apnea, Stroke, and Thyroid disease.  has a past surgical history that includes Coronary angioplasty with stent (6/06); Cholecystectomy; Appendectomy; Hernia repair; Eye surgery; Cardiac pacemaker placement; sciatica; and Ankle surgery. Presents to [facility] Emergency Department complaining of bilateral leg pain and weakness when ambulating.  Also complaining of palpitations and vague chest discomfort.  Mild dyspnea with exertion.  Denies syncope, edema, cyanosis fever, chills, or cough.  No joint swelling or unilateral weakness.  No slurred speech, paresthesias, ataxia, vertigo, or tremors.  Acute onset today.  Frequency 1 episode.  Reports compliance with his home medication regimen.  History of persistent atrial fibrillation.  On chronic anticoagulation.  Takes amiodarone.    In the emergency department his heart rate was found to be in the 130s to 140s.  EKG revealed atrial fibrillation with rapid ventricular response.  He was started on amiodarone infusion.  He was rate controlled with diltiazem prior to this.  Cardiology was consulted.    Hospital medicine has been asked to admit for further evaluation and treatment.

## 2019-07-19 NOTE — ED TRIAGE NOTES
Pt brought in by EMS. States the pt was c/o bilateral leg pain along with some SOB and weakness when standing. Pt in afib currently with HR in the 120s-130s. Pt does have a PMHx of afib. Pt does not report any SOB currently. In NAD currently.

## 2019-07-19 NOTE — EICU
New admit    79 y/o M CAD s/p stent mid LAD and D1 (2017) s/p pacemaker insertion (2006), afib on amiodarone and xarelto, DM, hypothyroidism and hypertension, presented with generalized weakness and dyspnea on exertion for 2 weeks.  On presentation he was noted to be in afib RVR 140s given diltiazem 10 mg IV without positive result and eventually started amiodarone drip, no bolus given.    Camera assessment:  Patient seen asleep    Telemetry:  /109    O2 93%    Data:  WBC 5.54, H/H 11.4/36.4, platelets 173  Na 142, K 4, creatinine 1  Trop I 0.029,   TSH 13.841, FT4 1.05    · afib RVR not rate controlled, will start metoprolol tartrate 25 mg PO BID (on metoprolol succinate at home), continue anticoagulation.  Cardiology to be consulted in am  · Trend cardiac enzymes  · Start Accucheck and sliding scale insulin

## 2019-07-19 NOTE — ANESTHESIA PREPROCEDURE EVALUATION
07/19/2019    Pre-operative evaluation for Procedure(s) (LRB):  Transesophageal echo (VERITO) intra-procedure log documentation (N/A)    Doni Theodore is a 78 y.o. male     Patient Active Problem List   Diagnosis    AAA (abdominal aortic aneurysm)    Influenza A    Acute posterior epistaxis    Persistent atrial fibrillation    Coronary artery disease involving native coronary artery of native heart without angina pectoris    Syncope and collapse    Orthostatic hypotension    Sick sinus syndrome    Anemia    Pacemaker    CVA (cerebral vascular accident)    Coronary artery disease    Atrial fibrillation with rapid ventricular response    Diabetes mellitus, type 2    Essential hypertension    Anticoagulant long-term use    Acute on chronic combined systolic and diastolic congestive heart failure    Leg weakness, bilateral       Review of patient's allergies indicates:   Allergen Reactions    Penicillins Swelling       No current facility-administered medications on file prior to encounter.      Current Outpatient Medications on File Prior to Encounter   Medication Sig Dispense Refill    amiodarone (PACERONE) 200 MG Tab Take 200 mg by mouth every evening.       aspirin (ECOTRIN) 81 MG EC tablet Take 81 mg by mouth once daily.      atorvastatin (LIPITOR) 40 MG tablet       clopidogrel (PLAVIX) 75 mg tablet Take 75 mg by mouth once daily.  2    cyanocobalamin 2000 MCG tablet Take 2,500 mcg by mouth once daily.      fluticasone (FLONASE) 50 mcg/actuation nasal spray 1 spray by Each Nare route nightly as needed.       gabapentin (NEURONTIN) 300 MG capsule Take 300 mg by mouth 3 (three) times daily.  3    glimepiride (AMARYL) 4 MG tablet Take 4 mg by mouth before breakfast.      isosorbide mononitrate (IMDUR) 60 MG 24 hr tablet Take 60 mg by mouth once daily.  2    metFORMIN (GLUCOPHAGE) 1000 MG tablet Take 1,000 mg by mouth 2 (two) times daily with meals.  3    metoprolol succinate (TOPROL-XL)  25 MG 24 hr tablet       midodrine (PROAMATINE) 5 MG Tab       nitroGLYCERIN (NITROSTAT) 0.4 MG SL tablet Place 0.4 mg under the tongue every 5 (five) minutes as needed for Chest pain.      ranitidine (ZANTAC) 300 MG tablet Take 300 mg by mouth every evening.      sertraline (ZOLOFT) 100 MG tablet Take 100 mg by mouth 2 (two) times daily.  2    SYNTHROID 175 mcg tablet       tamsulosin (FLOMAX) 0.4 mg Cp24 TAKE 1 CAPSULE AT BEDTIME. 90 capsule 3    tiZANidine (ZANAFLEX) 4 MG tablet TAKE 1 TABLET AS NEEDED EVERY 8 HOURS AS NEEDED FOR MUSCLE RELAXER ORALLY 30 DAYS  1    traMADol (ULTRAM) 50 mg tablet Take 0.5 tablets (25 mg total) by mouth every 8 (eight) hours as needed (Sever pain not controlled by tylenol or tizanidine). 10 tablet 0    vitamin D 185 MG Tab Take 2,000 mg by mouth once daily.       XARELTO 20 mg Tab TAKE 1 TABLET BY MOUTH ONCE A DAY WITH EVENING MEAL  6       Past Surgical History:   Procedure Laterality Date    ANGIOGRAM N/A 5/31/2013    Performed by [facility] at [facility] OR    ANKLE SURGERY      APPENDECTOMY      CARDIAC PACEMAKER PLACEMENT      CHOLECYSTECTOMY      CORONARY ANGIOPLASTY WITH STENT PLACEMENT  6/06    stents x 2 placed     EYE SURGERY      HEART CATH-LEFT Left 10/23/2017    Performed by [facility] at [facility] CATH LAB    HERNIA REPAIR      ventral hernia repair    REPAIR, AAA, ENDOVASCULAR N/A 6/18/2013    Performed by Radu Melgar [facility] at [facility] OR    sciatica      sciatica procedure         CBC:   Recent Labs     07/18/19 2047   WBC 5.54   RBC 3.56*   HGB 11.4*   HCT 36.4*      *   MCH 32.0*   MCHC 31.3*       CMP:   Recent Labs     07/18/19 2047      K 4.0   *   CO2 21*   BUN 12   CREATININE 1.0      CALCIUM 9.2   ALBUMIN 3.4*   PROT 6.6   ALKPHOS 66   ALT 11   AST 16   BILITOT 0.4       INR  Recent Labs     07/18/19 2047   INR 1.1   APTT 29.8         2D Echo:  Results for orders placed or performed  during the hospital encounter of 06/03/17   2D echo with color flow doppler   Result Value Ref Range    QEF 60 55 - 65    Mitral Valve Regurgitation TRIVIAL     Est. PA Systolic Pressure 21.34     Tricuspid Valve Regurgitation TRIVIAL          Anesthesia Evaluation    I have reviewed the Patient Summary Reports.    I have reviewed the Nursing Notes.      Review of Systems  Anesthesia Hx:  No problems with previous Anesthesia  History of prior surgery of interest to airway management or planning: Denies Family Hx of Anesthesia complications.   Denies Personal Hx of Anesthesia complications.   Social:  No Alcohol Use, Non-Smoker, Former Smoker    Cardiovascular:   Pacemaker Hypertension CAD  Dysrhythmias atrial fibrillation hyperlipidemia AAA   Pulmonary:   Sleep Apnea    Neurological:   CVA    Endocrine:   Diabetes, type 2 Hypothyroidism    Dermatological:  Skin Normal    Psych:  Psychiatric Normal           Physical Exam  General:  Well nourished    Airway/Jaw/Neck:  Airway Findings: Mouth Opening: Normal Tongue: Normal  General Airway Assessment: Adult  Mallampati: III  Improves to II with phonation.  TM Distance: Normal, at least 6 cm  Jaw/Neck Findings:  Neck ROM: Normal ROM      Dental:  Dental Findings: Edentulous   Chest/Lungs:  Chest/Lungs Findings: Normal Respiratory Rate     Heart/Vascular:  Heart Findings: Rate: Tachycardia  Rhythm: Irregularly Irregular        Mental Status:  Mental Status Findings:  Cooperative, Alert and Oriented         Anesthesia Plan  Type of Anesthesia, risks & benefits discussed:  Anesthesia Type:  general, MAC  Patient's Preference:   Intra-op Monitoring Plan: standard ASA monitors  Intra-op Monitoring Plan Comments:   Post Op Pain Control Plan: per primary service following discharge from PACU  Post Op Pain Control Plan Comments:   Induction:   IV  Beta Blocker:  Patient is on a Beta-Blocker and has received one dose within the past 24 hours (No further documentation required).        Informed Consent: Patient understands risks and agrees with Anesthesia plan.  Questions answered. Anesthesia consent signed with patient.  ASA Score: 3     Day of Surgery Review of History & Physical: I have interviewed and examined the patient. I have reviewed the patient's H&P dated:  There are no significant changes.  H&P update referred to the surgeon.         Ready For Surgery From Anesthesia Perspective.

## 2019-07-19 NOTE — CONSULTS
Follow-up Information     Nic Miguel MD On 8/12/2019.    Specialties:  Cardiology, INTERVENTIONAL CARDIOLOGY  Why:  Outpatient Services@9:00am   Contact information:  [address] [place] [address] [phone]

## 2019-07-19 NOTE — PLAN OF CARE
07/19/19 1133   Discharge Assessment   Assessment Type Discharge Planning Assessment   Confirmed/corrected address and phone number on facesheet? Yes   Assessment information obtained from? Patient   Prior to hospitilization cognitive status: Alert/Oriented   Prior to hospitalization functional status: Assistive Equipment   Current cognitive status: Alert/Oriented   Current Functional Status: Assistive Equipment   Facility Arrived From: home   Lives With spouse   Able to Return to Prior Arrangements yes   Is patient able to care for self after discharge? Unable to determine at this time (comments)   Patient's perception of discharge disposition home or selfcare   Readmission Within the Last 30 Days no previous admission in last 30 days   Patient currently being followed by outpatient case management? No   Equipment Currently Used at Home glucometer;walker, rolling;wheelchair;shower chair   Do you have any problems affording any of your prescribed medications? No   Is the patient taking medications as prescribed? yes   Does the patient have transportation home? Yes   Transportation Anticipated family or friend will provide   Dialysis Name and Scheduled days N/A   Does the patient receive services at the [facility]? No   Discharge Plan A Home with family;Home Health   Discharge Plan B Home with family   DME Needed Upon Discharge  none   Patient/Family in Agreement with Plan yes   Does the patient have transportation to healthcare appointments? Yes       RUDY met with pt and pt's family at bedside. SW explained her role in Care Management. Pt voiced understanding. SW inquired about HELP AT HOME. Pt stated that he will have Maile at home to help for support. SW voiced understanding. SW inquired about responsibilities when it comes to  MANAGING HER/HIS HEALTH at home and what it entails. Pt inquired about details. SW informed pt of RESPONSIBILITIES of:    1. Follow up appointments  2. Getting Prescriptions  "filled  3. Taking medications as prescribed.     Pt voiced understanding.  SW explained "My Health Packet" blue folder and the pink and green tabs that are on the folder as well. Discharge Brochure given to pt with Care Team information. Pt voiced understanding.     Pt's pharmacy:   [id number]  [place]  [address] AT [facility] [address]  [address]  [place] [address]  Phone: [phone] Fax: [phone]    [id number] - [place] - [address]  [address]  [place] [address]  Phone: [phone] Fax: [phone]      Pt's preference for appointments:      "

## 2019-07-19 NOTE — CARE UPDATE
[facility]  ICU Multidisciplinary Bedside Rounds   SUMMARY     Date: 7/19/2019    Prehospitalization: Home  Admit Date / LOS : 7/18/2019/ 1 days    Diagnosis: Atrial fibrillation with rapid ventricular response    Consults:        Active: Cardio       Needed: OT and PT     Code Status: Full Code  Advanced Directive: Not Received    LDA: PIV       Central Lines/Site/Justification:Patient Does Not Have Central Line       Urinary Cath/Order/Justification:Patient Does Not Have Urinary Catheter    Infusions: Amio       GOALS: Volume/ Hemodynamic: N/A                     RASS: 0  alert and calm    CAM ICU: Negative  Pain Management: none       Pain Controlled: yes     Rhythm: A-Fib    Respiratory Device: Room Air           VTE Prophylaxis: Mechanical  Mobility: A: Assist  Stress Ulcer Prophylaxis: No    Dietary: NPO  Tolerance: not applicable  /  Advancement: no    Isolation: No active isolations    Restraints: No    Significant Dates:  Post Op Date: N/A  Rescue Date: N/A  Imaging/ Diagnostics: N/A    Noteworthy Labs:  none    Needs from Care Team: none     ICU LOS 6h  Level of Care: Critical Care

## 2019-07-19 NOTE — CARE UPDATE
[facility]  ICU Multidisciplinary Bedside Rounds     UPDATE     Date: 7/19/2019      Plan of care reviewed with the following, Nurse, Charge Nurse, Physician, , Resp. Therapist and Infection Prevention.       Needs/ Goals for the day: VERITO/ Cardioversion today.      Level of Care: Critical Care

## 2019-07-19 NOTE — ASSESSMENT & PLAN NOTE
Presenting sxs of FTT/fatigue/weakness.  AF/RVR on arrival, persistent despite amio gtt, asymptomatic at rest.  Cont Xarelto  Cont amio gtt  Will attempt restoration of SR with VERITO/DCCV today  Keep NPO    Risks, benefits and alternatives of the VERITO/Cardioversion procedure were discussed with the patient including throat irritation, aspiration, anesthetic complications, esophageal irritation/perforation, skin irritation, arrhythmia, etc.  Patient understands and agrees to proceed.

## 2019-07-19 NOTE — CONSULTS
[facility]  Cardiology  Consult Note    Patient Name: Doni Theodore  MRN: [id number]  Admission Date: 7/18/2019  Hospital Length of Stay: 1 days  Code Status: Full Code   Attending Provider: Dick Melgar MD   Consulting Provider: Nic Miguel MD  Primary Care Physician: Nic Mauro MD  Principal Problem:Atrial fibrillation with rapid ventricular response    Patient information was obtained from patient and ER records.     Inpatient consult to Cardiology  Consult performed by: Nic Miguel MD  Consult ordered by: Dick Melgar MD  Reason for consult: AF/RVR, FTT, CAD        Subjective:     Chief Complaint:  weakness     HPI:   78 y.o. male that (in part)  has a past medical history of A-fib, Anticoagulant long-term use, Arthritis, Coronary artery disease, Diabetes mellitus, Hypertension, Pacemaker, Sleep apnea, Stroke, and Thyroid disease.  has a past surgical history that includes Coronary angioplasty with stent (6/06); Cholecystectomy; Appendectomy; Hernia repair; Eye surgery; Cardiac pacemaker placement; sciatica; and Ankle surgery. Presents to [facility] Emergency Department complaining of bilateral leg pain and weakness when ambulating.  Also complaining of palpitations and vague chest discomfort.  Mild dyspnea with exertion.  Denies syncope, edema, cyanosis fever, chills, or cough.  No joint swelling or unilateral weakness.  No slurred speech, paresthesias, ataxia, vertigo, or tremors.  Acute onset today.  Frequency 1 episode.  Reports compliance with his home medication regimen.  History of persistent atrial fibrillation.  On chronic anticoagulation.  Takes amiodarone.  In the emergency department his heart rate was found to be in the 130s to 140s.  EKG revealed atrial fibrillation with rapid ventricular response.  He was started on amiodarone infusion.  He was rate controlled with diltiazem prior to this.  Cardiology was  consulted.    Follows with Dr. Guo, last seen in 3/2019 (care everywhere note below)    The pt presents with sxs predom of leg weakness and fatigue with ambulation.  No overt anginal sxs.  Also with palps and noted to be in AF/RVR.  Has hx of persistent AF and is on ASA/Plavix/Xarelto.  No recent GIB (but has hx of this).  Also has PPM (unknown type).  Describes some SOB and has minimal LE edema.  Hx FEMI on CPAP.  We discussed VERITO guided DCCV and he agrees to proceed.  Also wishes to follow up with me.       Per Brant note 3/28/19 (care everywhere):  ASSESSMENT/PLAN:   78 y.o. male with: Doing okay s/p labs and non invasive studies. The patient had to be taken off all his statin because of significant myalgia. His LDL is a little higher than what I like to see and therefore I will go ahead and start him on Praluent     1) Ho GI Bleed with coumadin tox due to a pain med; Doing better s/p EGD but nothing was found.    2) CAD; PCI of the mid LAD at [facility] with a 4.0 X 15 resolute stent. using an 8f radial sheath and 8f EBU 4 guide Asymptomatic.   4/2016 NSTEMI S/P PCI of the RCA / CAD with a proximal and distal RCA stents. 10/17 a PCI of the LAD as noted above still with on and off weakness.  Repeat echo. With an ejection fraction 45% left atrial enlargement and PA pressure 26.     3) PAF with RVR s/p VERITO and Cardioversion. On amiodarone. Xarelto for embolic protection. [id number] VASC, DM, CVA, AGE, HTN. Pt states he has had high rate episodes but not symptomatic. HR today 99 in afib. Will get device check and possibly refer back to EP. Repeat echo. As noted above    4) Ho orthostatic hypotension; on midodrine, stable on current tx.  Stable blood pressure is 115/74 heart rate is 112    5) HLD; he was taken off lipitor d/t LFTs elevating and will have abdominal U/S. was consistent with hepatic steatosis/fatty liver. His LFTs remain slightly elevated ALT 62 AST is 39. His the LDL 78 HDL is 44 will proceed with  P CS K9 inhibitor    6) HTN; stable today 115/74    7) S/p AAA repair: Per PCP    8) Carotid Disease: 35% bilat    9) h/o CVA    10) DM: as per PCP    11) thyroid dx; per endo      Past Medical History:   Diagnosis Date    A-fib 7/18/2019    Anticoagulant long-term use     Plavix (on hold for angiogram)    Arthritis     Coronary artery disease     pacemaker, stentsx2    Diabetes mellitus     Hypertension     Pacemaker     Sleep apnea     Stroke     Rt side affected / mild    Thyroid disease        Past Surgical History:   Procedure Laterality Date    ANGIOGRAM N/A 5/31/2013    Performed by [facility] OR    ANKLE SURGERY      APPENDECTOMY      CARDIAC PACEMAKER PLACEMENT      CHOLECYSTECTOMY      CORONARY ANGIOPLASTY WITH STENT PLACEMENT  6/06    stents x 2 placed     EYE SURGERY      HEART CATH-LEFT Left 10/23/2017    [facility]    HERNIA REPAIR      ventral hernia repair    REPAIR, AAA, ENDOVASCULAR N/A 6/18/2013    Performed by Radu Melgar [facility] at [facility] OR    sciatica      sciatica procedure       Review of patient's allergies indicates:   Allergen Reactions    Penicillins Swelling       No current facility-administered medications on file prior to encounter.      Current Outpatient Medications on File Prior to Encounter   Medication Sig    amiodarone (PACERONE) 200 MG Tab Take 200 mg by mouth every evening.     aspirin (ECOTRIN) 81 MG EC tablet Take 81 mg by mouth once daily.    atorvastatin (LIPITOR) 40 MG tablet     clopidogrel (PLAVIX) 75 mg tablet Take 75 mg by mouth once daily.    cyanocobalamin 2000 MCG tablet Take 2,500 mcg by mouth once daily.    fluticasone (FLONASE) 50 mcg/actuation nasal spray 1 spray by Each Nare route nightly as needed.     gabapentin (NEURONTIN) 300 MG capsule Take 300 mg by mouth 3 (three) times daily.    glimepiride (AMARYL) 4 MG tablet Take 4 mg by mouth before breakfast.     isosorbide mononitrate (IMDUR) 60 MG 24 hr tablet Take 60 mg by mouth once daily.    metFORMIN (GLUCOPHAGE) 1000 MG tablet Take 1,000 mg by mouth 2 (two) times daily with meals.    metoprolol succinate (TOPROL-XL) 25 MG 24 hr tablet     midodrine (PROAMATINE) 5 MG Tab     nitroGLYCERIN (NITROSTAT) 0.4 MG SL tablet Place 0.4 mg under the tongue every 5 (five) minutes as needed for Chest pain.    ranitidine (ZANTAC) 300 MG tablet Take 300 mg by mouth every evening.    sertraline (ZOLOFT) 100 MG tablet Take 100 mg by mouth 2 (two) times daily.    SYNTHROID 175 mcg tablet     tamsulosin (FLOMAX) 0.4 mg Cp24 TAKE 1 CAPSULE AT BEDTIME.    tiZANidine (ZANAFLEX) 4 MG tablet TAKE 1 TABLET AS NEEDED EVERY 8 HOURS AS NEEDED FOR MUSCLE RELAXER ORALLY 30 DAYS    traMADol (ULTRAM) 50 mg tablet Take 0.5 tablets (25 mg total) by mouth every 8 (eight) hours as needed (Sever pain not controlled by tylenol or tizanidine).    vitamin D 185 MG Tab Take 2,000 mg by mouth once daily.     XARELTO 20 mg Tab TAKE 1 TABLET BY MOUTH ONCE A DAY WITH EVENING MEAL     Family History     Problem Relation (Age of Onset)    Cancer Mother, Sister    Heart disease Father        Tobacco Use    Smoking status: Former Smoker     Last attempt to quit: 1998     Years since quittin.1    Smokeless tobacco: Never Used   Substance and Sexual Activity    Alcohol use: No     Alcohol/week: 0.0 oz    Drug use: No    Sexual activity: Never     Review of Systems   Constitution: Positive for malaise/fatigue. Negative for chills, diaphoresis and fever.   HENT: Negative for nosebleeds.    Eyes: Negative for blurred vision and double vision.   Cardiovascular: Positive for leg swelling and palpitations. Negative for chest pain, claudication, cyanosis, dyspnea on exertion, orthopnea, paroxysmal nocturnal dyspnea and syncope.   Respiratory: Positive for shortness of breath. Negative for cough and wheezing.    Skin: Negative for dry skin and poor  wound healing.   Musculoskeletal: Negative for back pain, joint swelling and myalgias.   Gastrointestinal: Negative for abdominal pain, nausea and vomiting.   Genitourinary: Negative for hematuria.   Neurological: Negative for dizziness, headaches, numbness, seizures and weakness.   Psychiatric/Behavioral: Negative for altered mental status and depression.     Objective:     Vital Signs (Most Recent):  Temp: 98.7 °F (37.1 °C) (07/19/19 0303)  Pulse: (!) 126 (07/19/19 0726)  Resp: (!) 22 (07/19/19 0600)  BP: (!) 154/115 (07/19/19 0726)  SpO2: (!) 93 % (07/19/19 0600) Vital Signs (24h Range):  Temp:  [98.3 °F (36.8 °C)-99.3 °F (37.4 °C)] 98.7 °F (37.1 °C)  Pulse:  [115-146] 126  Resp:  [16-43] 22  SpO2:  [92 %-100 %] 93 %  BP: (126-173)/() 154/115     Weight: 84.7 kg (186 lb 11.7 oz)  Body mass index is 33.08 kg/m².    SpO2: (!) 93 %  O2 Device (Oxygen Therapy): room air(Simultaneous filing. User may not have seen previous data.)      Intake/Output Summary (Last 24 hours) at 7/19/2019 0737  Last data filed at 7/19/2019 0600  Gross per 24 hour   Intake 235.74 ml   Output --   Net 235.74 ml       Lines/Drains/Airways     Peripheral Intravenous Line                 Peripheral IV - Single Lumen 07/18/19 2020 20 G Left Wrist less than 1 day         Peripheral IV - Single Lumen 07/18/19 2130 20 G Right Antecubital less than 1 day                Physical Exam   Constitutional: He is oriented to person, place, and time. He appears well-developed and well-nourished.   HENT:   Head: Normocephalic and atraumatic.   Eyes: Pupils are equal, round, and reactive to light. Conjunctivae and EOM are normal. No scleral icterus.   Neck: Normal range of motion. Neck supple. No JVD present. Carotid bruit is not present. No tracheal deviation present. No thyromegaly present.   Cardiovascular: S1 normal and S2 normal. An irregularly irregular rhythm present. Tachycardia present. Exam reveals distant heart sounds. Exam reveals no gallop  and no friction rub.   No murmur heard.  Pulmonary/Chest: Effort normal and breath sounds normal. No respiratory distress. He has no wheezes. He has no rales. He exhibits no tenderness.   Abdominal: Soft. He exhibits no distension.   obese   Musculoskeletal: Normal range of motion. He exhibits edema.   Neurological: He is alert and oriented to person, place, and time. He has normal strength. No cranial nerve deficit.   Skin: Skin is warm and dry. No rash noted.   Psychiatric: He has a normal mood and affect. His behavior is normal.       Current Medications:   aspirin  81 mg Oral Daily    atorvastatin  40 mg Oral Daily    clopidogrel  75 mg Oral Daily    gabapentin  300 mg Oral TID    isosorbide mononitrate  60 mg Oral Daily    levothyroxine  175 mcg Oral Daily    metoprolol succinate  50 mg Oral Daily    rivaroxaban  20 mg Oral with dinner    sertraline  100 mg Oral Daily    tamsulosin  1 capsule Oral Nightly      amiodarone in dextrose 5% 0.5 mg/min (07/19/19 0600)     dextrose 50%, glucagon (human recombinant), insulin aspart U-100, nitroGLYCERIN, sodium chloride 0.9%    Laboratory (all labs reviewed):  CBC:  Recent Labs   Lab 06/08/17  0427 10/23/17  0642 10/24/17  0641 04/22/19  2206 07/18/19  2047   WBC 5.35 5.25 6.11 8.86 5.54   Hemoglobin 9.8 L 11.0 L 11.9 L 11.6 L 11.4 L   Hematocrit 29.3 L 33.2 L 35.9 L 36.7 L 36.4 L   Platelets 164 157 176 214 173       CHEMISTRIES:  Recent Labs   Lab 06/05/17  0619 06/06/17  0349 06/07/17  0341 06/08/17  0427 10/23/17  0642 10/24/17  0641 04/22/19  2206 07/18/19  2047   Glucose 166 H 170 H 155 H 186 H 183 H 186 H 193 H 101   Sodium 138 138 140 138 137 137 139 142   Potassium 3.4 L 4.2 4.1 3.8 4.3 4.4 4.5 4.0   BUN, Bld 17 15 16 16 12 11 22 12   Creatinine 0.8 1.0 0.9 1.0 1.0 1.0 1.0 1.0   eGFR if African American >60.0 >60.0 >60.0 >60.0 >60.0 >60.0 >60 >60   eGFR if non African American >60.0 >60.0 >60.0 >60.0 >60.0 >60.0 >60 >60   Calcium 9.1 9.5 9.4 9.6 9.3  10.0 9.2 9.2   Magnesium 1.4 L 1.5 L 1.6 1.6  --   --  1.3 L  --        CARDIAC BIOMARKERS:  Recent Labs   Lab 02/16/17  1311 04/22/19 2206 07/18/19 2047 07/19/19  0238   Troponin I 0.022 0.012 0.007 0.029 H       COAGS:  Recent Labs   Lab 06/07/17  0341 06/08/17  0427 10/23/17  0705 10/24/17  0853 07/18/19  2047   INR 1.8 H 1.9 H 1.2 1.2 1.1       LIPIDS/LFTS:  Recent Labs   Lab 06/06/17  0349 06/07/17  0341 06/08/17  0427 04/22/19 2206 07/18/19  2047   AST 51 H 77 H 65 H 46 H 16   ALT 75 H 101 H 104 H 38 11       BNP:  Recent Labs   Lab 02/16/17  1311 04/22/19 2206 07/18/19 2047   BNP 81 271 H 366 H       TSH:  Recent Labs   Lab 06/05/17 0619 07/18/19  2047   TSH 5.056 H 13.841 H       Free T4:  Recent Labs   Lab 06/05/17 0619 07/18/19 2047   Free T4 1.15 1.05       Diagnostic Results:  ECG (personally reviewed and interpreted tracing(s)):  7/18/19: , IRBBB (AF old).  Was last documented in SR 10/23/17.    Chest X-Ray (personally reviewed and interpreted image(s)): 7/18/19 mild CHF, L PPM    Echo: 3/13/19 (care everywhere; VERITO ordered)    * Mildly increased left ventricular cavity size. Increased left ventricular wall thickness. Wall motion TDS. Mildly decreased left ventricular systolic function. Left ventricular ejection fraction is estimated at 45 %. Rhythm precludes evaluation of diastolic function.      * Normal right ventricular size. Right ventricular systolic pressure 26 mmHg. Catheter/pacemaker wire visualized in the right ventricle. Mild to Moderately increased right atrial size. Mild to Moderately increased left atrial size.      * Mild mitral annular calcification. Mild mitral valve regurgitation. Mild aortic valve calcification. Mild tricuspid valve regurgitation. Trace pulmonary valve regurgitation.      Other findings as noted above.      Cath: 10/23/17 (images personally reviewed and interpreted)  B. Summary/Post-Operative Diagnosis    [id number].    70% mid LAD ISR; FFR=0.79.      of D1.    Successful PCI of mid LAD with 4X15 MARY.    Successful PCI of D1 with 2.25X30 and 2.5X18 MARY.    IVUS utilized for stent sizing.  D. Hemodynamic Results  LVEDP (Pre): 22 mmHg  E. Angiographic Results       Patient has a right dominant coronary artery.      - Left Main Coronary Artery:             The LM has luminal irregularities. There is DONNA 3 flow.     - Left Anterior Descending Artery:             The proximal LAD has a 10% stenosis. There is DONNA 3 flow. The remaining portion of the vessel has luminal irregularities (10).             The mid LAD has a 70% stenosis. There is DONNA 3 flow. FFR was 0.79. The remaining portion of the vessel has luminal irregularities (10).                     Lesion Details:   The length is 10-20 mm, eccentric shape, moderate proximal tortuosity, segment angulation of <45 degrees, irregular contour, mild to moderate calcification. No bifurcation is present. The minimum luminal diameter is   0.5 millimeters.             The distal LAD has a 50% stenosis. There is DONNA 3 flow. The remaining portion of the vessel is of small caliber.     - D1:             The D1 has chronic total occlusion. There is DONNA 0 flow. There is collateral flow from the PLB (faint). The remaining portion of the vessel has luminal irregularities (10).                     Lesion Details:   This is a Type C lesion.  The length is 40mm, eccentric shape, moderate proximal tortuosity, segment angulation of 45-90 degrees, irregular contour, mild to moderate calcification. Bifurcation is present. The minimum   luminal diameter is 0 millimeters.     - Left Circumflex Artery:             The proximal LCX has a 20% stenosis. There is DONNA 3 flow. The remaining portion of the vessel has luminal irregularities (10).             The distal LCX has luminal irregularities. There is DONNA 3 flow.     - OM1:             The OM1 has luminal irregularities has a 30% stenosis. There is DONNA 3 flow. The remaining portion  of the vessel has luminal irregularities (10).     - Right Coronary Artery:             The proximal RCA has luminal irregularities. There is DONNA 3 flow.             The mid RCA has a 20% stenosis. There is DONNA 3 flow. The remaining portion of the vessel has luminal irregularities (10).             The distal RCA has a 50% stenosis. There is DONNA 3 flow. The remaining portion of the vessel has luminal irregularities (10).     - Posterior Lateral Branch:             The PLB has a 40% stenosis. There is DONNA 3 flow. The remaining portion of the vessel has luminal irregularities (10).  Intervention       Mid LAD:              The lesion was successfully intervened. Post-stenosis of 0%, post-DONNA 3 flow and TMP grade 3. The vessel was accessed natively.  The following items were used: Blln Trek Rx 3.0 X 12, Stent Resolute Rx 4.00x15 (MARY) and Cath [facility]   [place].       D1:              The lesion was successfully intervened. Post-stenosis of 0%, post-DONNA 3 flow and TMP grade 3. The vessel was accessed natively.  The following items were used: Cath Mini Trek 1.5 X 20 Rx, Blln Sc Euphora 2.0 X 10, Stent Resolute Rx 2.25x30   (MARY), Stent Resolute Rx 2.50x18 (MARY) and Cath [facility] [place] [place].      Assessment and Plan:     * Atrial fibrillation with rapid ventricular response  Presenting sxs of FTT/fatigue/weakness.  AF/RVR on arrival, persistent despite amio gtt, asymptomatic at rest.  Cont Xarelto  Cont amio gtt  Will attempt restoration of SR with VERITO/DCCV today  Keep NPO    Risks, benefits and alternatives of the VERITO/Cardioversion procedure were discussed with the patient including throat irritation, aspiration, anesthetic complications, esophageal irritation/perforation, skin irritation, arrhythmia, etc.  Patient understands and agrees to proceed.        Persistent atrial fibrillation  As above    Acute on chronic combined systolic and diastolic congestive heart failure  Mild vol overload on CXR  EF  45% by echo 3/2019  Cont BBl  Add lisinopril 5mg qd  Lasix 20mg IV x1      Coronary artery disease involving native coronary artery of native heart without angina pectoris  Known MV CAD s/p LAD/Diag PCI 10/2017  Currently on DAPT, will stop ASA and cont Plavix (with ongoing Xarelto rx)  Cont BBl/Statin  Check lipids    Diabetes mellitus, type 2  Per IM    Pacemaker  ?  ?hx SSS    Essential hypertension  Cont BBl/Imdur  Add lisinopril 5mg qd    Leg weakness, bilateral  Will plan outpatient PAD eval        VTE Risk Mitigation (From admission, onward)        Ordered     rivaroxaban tablet 20 mg  with dinner      07/19/19 0534     IP VTE HIGH RISK PATIENT  Once      07/18/19 2340     Place sequential compression device  Until discontinued      07/18/19 2340        Pt seen in ICU, critical care time 45min.  Multiple records and images reviewed, case d/w RN.    Thank you for your consult. I will follow-up with patient. Please contact us if you have any additional questions.    Nic Miguel MD  Cardiology   [facility]

## 2019-07-20 VITALS
OXYGEN SATURATION: 87 % | RESPIRATION RATE: 27 BRPM | TEMPERATURE: 98 F | DIASTOLIC BLOOD PRESSURE: 70 MMHG | HEIGHT: 63 IN | WEIGHT: 186.75 LBS | HEART RATE: 63 BPM | BODY MASS INDEX: 33.09 KG/M2 | SYSTOLIC BLOOD PRESSURE: 141 MMHG

## 2019-07-20 PROBLEM — R26.81 UNSTEADY GAIT: Status: ACTIVE | Noted: 2019-07-20

## 2019-07-20 LAB
ALBUMIN SERPL BCP-MCNC: 3.3 G/DL (ref 3.5–5.2)
ALP SERPL-CCNC: 63 U/L (ref 55–135)
ALT SERPL W/O P-5'-P-CCNC: 11 U/L (ref 10–44)
ANION GAP SERPL CALC-SCNC: 12 MMOL/L (ref 8–16)
AST SERPL-CCNC: 15 U/L (ref 10–40)
BASOPHILS # BLD AUTO: 0.01 K/UL (ref 0–0.2)
BASOPHILS NFR BLD: 0.2 % (ref 0–1.9)
BILIRUB SERPL-MCNC: 0.7 MG/DL (ref 0.1–1)
BUN SERPL-MCNC: 12 MG/DL (ref 8–23)
CALCIUM SERPL-MCNC: 9.3 MG/DL (ref 8.7–10.5)
CHLORIDE SERPL-SCNC: 105 MMOL/L (ref 95–110)
CO2 SERPL-SCNC: 24 MMOL/L (ref 23–29)
CREAT SERPL-MCNC: 0.9 MG/DL (ref 0.5–1.4)
DIFFERENTIAL METHOD: ABNORMAL
EOSINOPHIL # BLD AUTO: 0.1 K/UL (ref 0–0.5)
EOSINOPHIL NFR BLD: 2 % (ref 0–8)
ERYTHROCYTE [DISTWIDTH] IN BLOOD BY AUTOMATED COUNT: 16.2 % (ref 11.5–14.5)
EST. GFR  (AFRICAN AMERICAN): >60 ML/MIN/1.73 M^2
EST. GFR  (NON AFRICAN AMERICAN): >60 ML/MIN/1.73 M^2
GLUCOSE SERPL-MCNC: 101 MG/DL (ref 70–110)
HCT VFR BLD AUTO: 37 % (ref 40–54)
HGB BLD-MCNC: 11.4 G/DL (ref 14–18)
LYMPHOCYTES # BLD AUTO: 0.6 K/UL (ref 1–4.8)
LYMPHOCYTES NFR BLD: 9.4 % (ref 18–48)
MAGNESIUM SERPL-MCNC: 1.5 MG/DL (ref 1.6–2.6)
MCH RBC QN AUTO: 31.4 PG (ref 27–31)
MCHC RBC AUTO-ENTMCNC: 30.8 G/DL (ref 32–36)
MCV RBC AUTO: 102 FL (ref 82–98)
MONOCYTES # BLD AUTO: 0.6 K/UL (ref 0.3–1)
MONOCYTES NFR BLD: 9.8 % (ref 4–15)
NEUTROPHILS # BLD AUTO: 4.7 K/UL (ref 1.8–7.7)
NEUTROPHILS NFR BLD: 78.8 % (ref 38–73)
PHOSPHATE SERPL-MCNC: 3.5 MG/DL (ref 2.7–4.5)
PLATELET # BLD AUTO: 179 K/UL (ref 150–350)
PMV BLD AUTO: 10.3 FL (ref 9.2–12.9)
POCT GLUCOSE: 135 MG/DL (ref 70–110)
POCT GLUCOSE: 84 MG/DL (ref 70–110)
POCT GLUCOSE: 89 MG/DL (ref 70–110)
POTASSIUM SERPL-SCNC: 3.3 MMOL/L (ref 3.5–5.1)
PROT SERPL-MCNC: 6.4 G/DL (ref 6–8.4)
RBC # BLD AUTO: 3.63 M/UL (ref 4.6–6.2)
SODIUM SERPL-SCNC: 141 MMOL/L (ref 136–145)
WBC # BLD AUTO: 5.93 K/UL (ref 3.9–12.7)

## 2019-07-20 PROCEDURE — 80053 COMPREHEN METABOLIC PANEL: CPT

## 2019-07-20 PROCEDURE — 25000003 PHARM REV CODE 250: Performed by: INTERNAL MEDICINE

## 2019-07-20 PROCEDURE — 97161 PT EVAL LOW COMPLEX 20 MIN: CPT

## 2019-07-20 PROCEDURE — 97165 OT EVAL LOW COMPLEX 30 MIN: CPT

## 2019-07-20 PROCEDURE — 99233 SBSQ HOSP IP/OBS HIGH 50: CPT | Mod: ,,, | Performed by: INTERNAL MEDICINE

## 2019-07-20 PROCEDURE — 36415 COLL VENOUS BLD VENIPUNCTURE: CPT

## 2019-07-20 PROCEDURE — 84100 ASSAY OF PHOSPHORUS: CPT

## 2019-07-20 PROCEDURE — 85025 COMPLETE CBC W/AUTO DIFF WBC: CPT

## 2019-07-20 PROCEDURE — 83735 ASSAY OF MAGNESIUM: CPT

## 2019-07-20 PROCEDURE — 99233 PR SUBSEQUENT HOSPITAL CARE,LEVL III: ICD-10-PCS | Mod: ,,, | Performed by: INTERNAL MEDICINE

## 2019-07-20 PROCEDURE — 25000003 PHARM REV CODE 250: Performed by: HOSPITALIST

## 2019-07-20 RX ORDER — LISINOPRIL 5 MG/1
10 TABLET ORAL DAILY
Status: DISCONTINUED | OUTPATIENT
Start: 2019-07-21 | End: 2019-07-20 | Stop reason: HOSPADM

## 2019-07-20 RX ORDER — AMIODARONE HYDROCHLORIDE 400 MG/1
400 TABLET ORAL 2 TIMES DAILY
Qty: 28 TABLET | Refills: 0 | Status: ON HOLD | OUTPATIENT
Start: 2019-07-20 | End: 2019-08-02 | Stop reason: HOSPADM

## 2019-07-20 RX ORDER — LISINOPRIL 10 MG/1
10 TABLET ORAL DAILY
Qty: 30 TABLET | Refills: 0 | Status: ON HOLD | OUTPATIENT
Start: 2019-07-21 | End: 2019-08-13 | Stop reason: HOSPADM

## 2019-07-20 RX ORDER — POTASSIUM CHLORIDE 20 MEQ/15ML
40 SOLUTION ORAL ONCE
Status: COMPLETED | OUTPATIENT
Start: 2019-07-20 | End: 2019-07-20

## 2019-07-20 RX ORDER — AMIODARONE HYDROCHLORIDE 200 MG/1
200 TABLET ORAL 2 TIMES DAILY
Qty: 28 TABLET | Refills: 0 | Status: ON HOLD | OUTPATIENT
Start: 2019-08-03 | End: 2019-08-02 | Stop reason: HOSPADM

## 2019-07-20 RX ORDER — AMIODARONE HYDROCHLORIDE 200 MG/1
200 TABLET ORAL DAILY
Qty: 30 TABLET | Refills: 0 | Status: ON HOLD | OUTPATIENT
Start: 2019-08-17 | End: 2019-08-02 | Stop reason: HOSPADM

## 2019-07-20 RX ADMIN — LEVOTHYROXINE SODIUM 175 MCG: 125 TABLET ORAL at 05:07

## 2019-07-20 RX ADMIN — CLOPIDOGREL BISULFATE 75 MG: 75 TABLET ORAL at 08:07

## 2019-07-20 RX ADMIN — SERTRALINE HYDROCHLORIDE 100 MG: 50 TABLET ORAL at 08:07

## 2019-07-20 RX ADMIN — GABAPENTIN 300 MG: 300 CAPSULE ORAL at 08:07

## 2019-07-20 RX ADMIN — POTASSIUM CHLORIDE 40 MEQ: 20 SOLUTION ORAL at 05:07

## 2019-07-20 RX ADMIN — GABAPENTIN 300 MG: 300 CAPSULE ORAL at 03:07

## 2019-07-20 RX ADMIN — AMIODARONE HYDROCHLORIDE 400 MG: 200 TABLET ORAL at 08:07

## 2019-07-20 RX ADMIN — ATORVASTATIN CALCIUM 40 MG: 40 TABLET, FILM COATED ORAL at 08:07

## 2019-07-20 NOTE — PROGRESS NOTES
[facility]  Cardiology  Progress Note    Patient Name: Doni Theodore  MRN: [id number]  Admission Date: 7/18/2019  Hospital Length of Stay: 2 days  Code Status: Full Code   Attending Physician: Dick Melgar MD   Primary Care Physician: Nic Mauro MD  Expected Discharge Date: 7/20/2019  Principal Problem:Atrial fibrillation with rapid ventricular response    Subjective:     Hospital Course:   7/18/19: Adm with AF/RVR and CHF  7/19/19: successful VERITO/DCCV AF->NSR    Interval Hx: pt seen in ICU, case d/w RN trisha Kang.  Feeling much better.  No cp/sob.  Edema resolved.  No palps/LH.  Importance of med rx compliance, murali NOAC, d/w pt.    Tele: SB, occ PAC/brief SVT (5 beats) and occ  (personally reviewed and interpreted)      Past Medical History:   Diagnosis Date    A-fib 7/18/2019    Anticoagulant long-term use     Plavix (on hold for angiogram)    Arthritis     Coronary artery disease     pacemaker, stentsx2    Diabetes mellitus     Hypertension     Pacemaker     Sleep apnea     Stroke     Rt side affected / mild    Thyroid disease        Past Surgical History:   Procedure Laterality Date    ANGIOGRAM N/A 5/31/2013    Performed by [facility] OR    ANKLE SURGERY      APPENDECTOMY      CARDIAC PACEMAKER PLACEMENT      CHOLECYSTECTOMY      CORONARY ANGIOPLASTY WITH STENT PLACEMENT  6/06    stents x 2 placed     EYE SURGERY      HEART CATH-LEFT Left 10/23/2017    Performed by [facility] at [facility] CATH LAB    HERNIA REPAIR      ventral hernia repair    REPAIR, AAA, ENDOVASCULAR N/A 6/18/2013    Performed by Radu Melgar MD at [facility] OR    sciatica      sciatica procedure       Review of patient's allergies indicates:   Allergen Reactions    Penicillins Swelling       No current facility-administered medications on file prior to encounter.      Current Outpatient Medications on File Prior to Encounter   Medication Sig     amiodarone (PACERONE) 200 MG Tab Take 200 mg by mouth every evening.     aspirin (ECOTRIN) 81 MG EC tablet Take 81 mg by mouth once daily.    atorvastatin (LIPITOR) 40 MG tablet     clopidogrel (PLAVIX) 75 mg tablet Take 75 mg by mouth once daily.    cyanocobalamin 2000 MCG tablet Take 2,500 mcg by mouth once daily.    fluticasone (FLONASE) 50 mcg/actuation nasal spray 1 spray by Each Nare route nightly as needed.     gabapentin (NEURONTIN) 300 MG capsule Take 300 mg by mouth 3 (three) times daily.    glimepiride (AMARYL) 4 MG tablet Take 4 mg by mouth before breakfast.    isosorbide mononitrate (IMDUR) 60 MG 24 hr tablet Take 60 mg by mouth once daily.    metFORMIN (GLUCOPHAGE) 1000 MG tablet Take 1,000 mg by mouth 2 (two) times daily with meals.    metoprolol succinate (TOPROL-XL) 25 MG 24 hr tablet     midodrine (PROAMATINE) 5 MG Tab     nitroGLYCERIN (NITROSTAT) 0.4 MG SL tablet Place 0.4 mg under the tongue every 5 (five) minutes as needed for Chest pain.    ranitidine (ZANTAC) 300 MG tablet Take 300 mg by mouth every evening.    sertraline (ZOLOFT) 100 MG tablet Take 100 mg by mouth 2 (two) times daily.    SYNTHROID 175 mcg tablet     tamsulosin (FLOMAX) 0.4 mg Cp24 TAKE 1 CAPSULE AT BEDTIME.    tiZANidine (ZANAFLEX) 4 MG tablet TAKE 1 TABLET AS NEEDED EVERY 8 HOURS AS NEEDED FOR MUSCLE RELAXER ORALLY 30 DAYS    traMADol (ULTRAM) 50 mg tablet Take 0.5 tablets (25 mg total) by mouth every 8 (eight) hours as needed (Sever pain not controlled by tylenol or tizanidine).    vitamin D 185 MG Tab Take 2,000 mg by mouth once daily.     XARELTO 20 mg Tab TAKE 1 TABLET BY MOUTH ONCE A DAY WITH EVENING MEAL     Family History     Problem Relation (Age of Onset)    Cancer Mother, Sister    Heart disease Father        Tobacco Use    Smoking status: Former Smoker     Last attempt to quit: 1998     Years since quittin.1    Smokeless tobacco: Never Used   Substance and Sexual Activity     Alcohol use: No     Alcohol/week: 0.0 oz    Drug use: No    Sexual activity: Never     Review of Systems   Gastrointestinal: Negative for melena.   Genitourinary: Negative for hematuria.     Objective:     Vital Signs (Most Recent):  Temp: 97.8 °F (36.6 °C) (07/20/19 0701)  Pulse: (!) 58 (07/20/19 0915)  Resp: 20 (07/20/19 0915)  BP: 130/71 (07/20/19 0900)  SpO2: 97 % (07/20/19 0915) Vital Signs (24h Range):  Temp:  [96.8 °F (36 °C)-98.8 °F (37.1 °C)] 97.8 °F (36.6 °C)  Pulse:  [] 58  Resp:  [11-47] 20  SpO2:  [94 %-100 %] 97 %  BP: ()/(57-92) 130/71     Weight: 84.7 kg (186 lb 11.7 oz)  Body mass index is 33.08 kg/m².    SpO2: 97 %  O2 Device (Oxygen Therapy): nasal cannula      Intake/Output Summary (Last 24 hours) at 7/20/2019 0952  Last data filed at 7/20/2019 0900  Gross per 24 hour   Intake 900.1 ml   Output 1527 ml   Net -626.9 ml       Lines/Drains/Airways     Peripheral Intravenous Line                 Peripheral IV - Single Lumen 07/18/19 2020 20 G Left Wrist 1 day         Peripheral IV - Single Lumen 07/18/19 2130 20 G Right Antecubital 1 day                Physical Exam   Constitutional: He is oriented to person, place, and time. He appears well-developed and well-nourished.   HENT:   Head: Normocephalic and atraumatic.   Eyes: Pupils are equal, round, and reactive to light. Conjunctivae and EOM are normal. No scleral icterus.   Neck: Normal range of motion. Neck supple. No JVD present. Carotid bruit is not present. No tracheal deviation present. No thyromegaly present.   Cardiovascular: Regular rhythm, S1 normal and S2 normal. Bradycardia present. Exam reveals distant heart sounds. Exam reveals no gallop and no friction rub.   No murmur heard.  Pulmonary/Chest: Effort normal and breath sounds normal. No respiratory distress. He has no wheezes. He has no rales. He exhibits no tenderness.   Abdominal: Soft. He exhibits no distension.   obese   Musculoskeletal: Normal range of motion. He  exhibits no edema.   Neurological: He is alert and oriented to person, place, and time. He has normal strength. No cranial nerve deficit.   Skin: Skin is warm and dry. No rash noted.   Psychiatric: He has a normal mood and affect. His behavior is normal.       Current Medications:   amiodarone  400 mg Oral BID    Followed by    [START ON 7/26/2019] amiodarone  200 mg Oral BID    Followed by    [START ON 8/3/2019] amiodarone  200 mg Oral Daily    atorvastatin  40 mg Oral Daily    clopidogrel  75 mg Oral Daily    gabapentin  300 mg Oral TID    isosorbide mononitrate  60 mg Oral Daily    levothyroxine  175 mcg Oral Daily    lisinopril  5 mg Oral Daily    metoprolol succinate  50 mg Oral Daily    rivaroxaban  20 mg Oral with dinner    sertraline  100 mg Oral Daily    tamsulosin  1 capsule Oral Nightly       dextrose 50%, glucagon (human recombinant), insulin aspart U-100, nitroGLYCERIN, sodium chloride 0.9%    Laboratory (all labs reviewed):  CBC:  Recent Labs   Lab 10/23/17  0642 10/24/17  0641 04/22/19 2206 07/18/19 2047 07/20/19  0350   WBC 5.25 6.11 8.86 5.54 5.93   Hemoglobin 11.0 L 11.9 L 11.6 L 11.4 L 11.4 L   Hematocrit 33.2 L 35.9 L 36.7 L 36.4 L 37.0 L   Platelets 157 176 214 173 179       CHEMISTRIES:  Recent Labs   Lab 06/06/17  0349 06/07/17  0341 06/08/17  0427 10/23/17  0642 10/24/17  0641 04/22/19 2206 07/18/19 2047 07/20/19  0350   Glucose 170 H 155 H 186 H 183 H 186 H 193 H 101 101   Sodium 138 140 138 137 137 139 142 141   Potassium 4.2 4.1 3.8 4.3 4.4 4.5 4.0 3.3 L   BUN, Bld 15 16 16 12 11 22 12 12   Creatinine 1.0 0.9 1.0 1.0 1.0 1.0 1.0 0.9   eGFR if African American >60.0 >60.0 >60.0 >60.0 >60.0 >60 >60 >60   eGFR if non African American >60.0 >60.0 >60.0 >60.0 >60.0 >60 >60 >60   Calcium 9.5 9.4 9.6 9.3 10.0 9.2 9.2 9.3   Magnesium 1.5 L 1.6 1.6  --   --  1.3 L  --  1.5 L       CARDIAC BIOMARKERS:  Recent Labs   Lab 07/18/19  2047 07/19/19  0238 07/19/19  0758 07/19/19  1330  07/19/19  1953   Troponin I 0.007 0.029 H 0.018 0.009 0.028 H       COAGS:  Recent Labs   Lab 06/07/17  0341 06/08/17  0427 10/23/17  0705 10/24/17  0853 07/18/19 2047   INR 1.8 H 1.9 H 1.2 1.2 1.1       LIPIDS/LFTS:  Recent Labs   Lab 06/07/17  0341 06/08/17  0427 04/22/19 2206 07/18/19 2047 07/19/19  0758 07/20/19  0350   Cholesterol  --   --   --   --  85 L  --    Triglycerides  --   --   --   --  61  --    HDL  --   --   --   --  46  --    LDL Cholesterol  --   --   --   --  26.8 L  --    Non-HDL Cholesterol  --   --   --   --  39  --    AST 77 H 65 H 46 H 16  --  15    H 104 H 38 11  --  11       BNP:  Recent Labs   Lab 02/16/17  1311 04/22/19 2206 07/18/19 2047   BNP 81 271 H 366 H       TSH:  Recent Labs   Lab 06/05/17  0619 07/18/19 2047   TSH 5.056 H 13.841 H       Free T4:  Recent Labs   Lab 06/05/17 0619 07/18/19 2047   Free T4 1.15 1.05       Diagnostic Results:  ECG (personally reviewed and interpreted tracing(s)):  7/18/19: , IRBBB (AF old).  Was last documented in SR 10/23/17.  7/19/19 1505 SR 59, IRBBB, ant ST abnl    Chest X-Ray (personally reviewed and interpreted image(s)): 7/18/19 mild CHF, L PPM    VERITO/DCCV 7/19/19 (images personally reviewed and interpreted)  LVEF 40-45%, mild global HK  Normal valves  Mild-mod MR  Mild TR  Mild AI  No LA/MAURICIO thrombus  Successful DCCV AF->NSR 360J x1  Pt tolerated procedure well without complications  Plan:  Observe in ICU  Cont Xarelto 20mg qd  Start amio load  Dispo planning appropriate  Pt to follow up with me in 1-2 weeks.    Echo: 3/13/19 (care everywhere; VERITO ordered)    * Mildly increased left ventricular cavity size. Increased left ventricular wall thickness. Wall motion TDS. Mildly decreased left ventricular systolic function. Left ventricular ejection fraction is estimated at 45 %. Rhythm precludes evaluation of diastolic function.      * Normal right ventricular size. Right ventricular systolic pressure 26 mmHg.  Catheter/pacemaker wire visualized in the right ventricle. Mild to Moderately increased right atrial size. Mild to Moderately increased left atrial size.      * Mild mitral annular calcification. Mild mitral valve regurgitation. Mild aortic valve calcification. Mild tricuspid valve regurgitation. Trace pulmonary valve regurgitation.      Other findings as noted above.      Cath: 10/23/17 (images prev personally reviewed and interpreted)  B. Summary/Post-Operative Diagnosis    [id number].    70% mid LAD ISR; FFR=0.79.     of D1.    Successful PCI of mid LAD with 4X15 MARY.    Successful PCI of D1 with 2.25X30 and 2.5X18 MARY.    IVUS utilized for stent sizing.  D. Hemodynamic Results  LVEDP (Pre): 22 mmHg  E. Angiographic Results       Patient has a right dominant coronary artery.      - Left Main Coronary Artery:             The LM has luminal irregularities. There is DONNA 3 flow.     - Left Anterior Descending Artery:             The proximal LAD has a 10% stenosis. There is DONNA 3 flow. The remaining portion of the vessel has luminal irregularities (10).             The mid LAD has a 70% stenosis. There is DONNA 3 flow. FFR was 0.79. The remaining portion of the vessel has luminal irregularities (10).                     Lesion Details:   The length is 10-20 mm, eccentric shape, moderate proximal tortuosity, segment angulation of <45 degrees, irregular contour, mild to moderate calcification. No bifurcation is present. The minimum luminal diameter is   0.5 millimeters.             The distal LAD has a 50% stenosis. There is DONNA 3 flow. The remaining portion of the vessel is of small caliber.     - D1:             The D1 has chronic total occlusion. There is DONNA 0 flow. There is collateral flow from the PLB (faint). The remaining portion of the vessel has luminal irregularities (10).                     Lesion Details:   This is a Type C lesion.  The length is 40mm, eccentric shape, moderate proximal tortuosity,  segment angulation of 45-90 degrees, irregular contour, mild to moderate calcification. Bifurcation is present. The minimum   luminal diameter is 0 millimeters.     - Left Circumflex Artery:             The proximal LCX has a 20% stenosis. There is DONNA 3 flow. The remaining portion of the vessel has luminal irregularities (10).             The distal LCX has luminal irregularities. There is DONNA 3 flow.     - OM1:             The OM1 has luminal irregularities has a 30% stenosis. There is DONNA 3 flow. The remaining portion of the vessel has luminal irregularities (10).     - Right Coronary Artery:             The proximal RCA has luminal irregularities. There is DONNA 3 flow.             The mid RCA has a 20% stenosis. There is DONNA 3 flow. The remaining portion of the vessel has luminal irregularities (10).             The distal RCA has a 50% stenosis. There is DONNA 3 flow. The remaining portion of the vessel has luminal irregularities (10).     - Posterior Lateral Branch:             The PLB has a 40% stenosis. There is DONNA 3 flow. The remaining portion of the vessel has luminal irregularities (10).  Intervention       Mid LAD:              The lesion was successfully intervened. Post-stenosis of 0%, post-DONNA 3 flow and TMP grade 3. The vessel was accessed natively.  The following items were used: Blln Trek Rx 3.0 X 12, Stent Resolute Rx 4.00x15 (MARY) and Cath [facility]   [place].       D1:              The lesion was successfully intervened. Post-stenosis of 0%, post-DONNA 3 flow and TMP grade 3. The vessel was accessed natively.  The following items were used: Cath Mini Trek 1.5 X 20 Rx, Blln Sc Euphora 2.0 X 10, Stent Resolute Rx 2.25x30   (MARY), Stent Resolute Rx 2.50x18 (MARY) and Cath [facility] [place].      Assessment and Plan:     * Atrial fibrillation with rapid ventricular response  Presenting sxs of FTT/fatigue/weakness, now resolved.  AF/RVR on arrival, persistent despite amio gtt,  asymptomatic at rest.  Successful VERITO/DCCV 7/19/19  Cont Xarelto 20mg qd  Cont amio po reload as ordered      Persistent atrial fibrillation  As above    Acute on chronic combined systolic and diastolic congestive heart failure  Appears euvolemic  EF 40% by VERITO 7/2019, ?component of tachymyopathy  Cont Toprol XL 50mg qd  Inc lisinopril 10mg qd  Stop imdur      Coronary artery disease involving native coronary artery of native heart without angina pectoris  Known MV CAD s/p LAD/Diag PCI 10/2017  Mild LV dysfxn  Cont Plavix (with ongoing Xarelto rx)  Cont BBl/Statin/ACEi  Stop imdur  Will consider outpatient stress testing    Diabetes mellitus, type 2  Per IM    Pacemaker  Medtronic  ?hx SSS  Will interrogate as outpatient    Essential hypertension  Cont Toprol  Stop imdur  Inc lisinopril 10mg qd    Leg weakness, bilateral  Will plan outpatient PAD eval        VTE Risk Mitigation (From admission, onward)        Ordered     rivaroxaban tablet 20 mg  with dinner      07/19/19 0534     IP VTE HIGH RISK PATIENT  Once      07/18/19 2340     Place sequential compression device  Until discontinued      07/18/19 2340        Dispo planning appropriate  Pt to follow up with me after discharge.    Nic Miguel MD  Cardiology  [facility]

## 2019-07-20 NOTE — ASSESSMENT & PLAN NOTE
Presenting sxs of FTT/fatigue/weakness, now resolved.  AF/RVR on arrival, persistent despite amio gtt, asymptomatic at rest.  Successful VERITO/DCCV 7/19/19  Cont Xarelto 20mg qd  Cont amio po reload as ordered

## 2019-07-20 NOTE — PLAN OF CARE
07/20/19 1228   Post-Acute Status   Post-Acute Authorization   (Home Health-[place]; [facility]; follow-up )   Discharge Delays None known at this time

## 2019-07-20 NOTE — HOSPITAL COURSE
Doni Theodore is a 78 y.o. male that (in part)  has a past medical history of A-fib, Anticoagulant long-term use, Arthritis, Coronary artery disease, Diabetes mellitus, Hypertension, Pacemaker, Sleep apnea, Stroke, and Thyroid disease.  has a past surgical history that includes Coronary angioplasty with stent (6/06); Cholecystectomy; Appendectomy; Hernia repair; Eye surgery; Cardiac pacemaker placement; sciatica; and Ankle surgery. Presents to [facility] complaining of bilateral leg pain and weakness when ambulating.  Also complaining of palpitations and vague chest discomfort.  Mild dyspnea with exertion.  Denies syncope, edema, cyanosis fever, chills, or cough.  No joint swelling or unilateral weakness.  No slurred speech, paresthesias, ataxia, vertigo, or tremors.  Acute onset today.  Frequency 1 episode.  Reports compliance with his home medication regimen.  History of persistent atrial fibrillation.  On chronic anticoagulation.  Takes amiodarone.  In the emergency department his heart rate was found to be in the 130s to 140s.  EKG revealed atrial fibrillation with rapid ventricular response.  He was started on amiodarone infusion.  He was rate controlled with diltiazem prior to this.  Cardiology was consulted.he had DCCV with conversion to sinus,he was observed closely in ICU  and remains stable,he has justino discharged home with HH and taper dose of amiodarone and follow up with PCP and cardiology in next few days.

## 2019-07-20 NOTE — PLAN OF CARE
07/20/19 1226   Final Note   Assessment Type Final Discharge Note   Anticipated Discharge Disposition Home  ([facility] rolling walker; follow-up scheduled)   What phone number can be called within the next 1-3 days to see how you are doing after discharge?   ([phone] )   Hospital Follow Up  Appt(s) scheduled? Yes   Discharge plans and expectations educations in teach back method with documentation complete? Yes   Right Care Referral Info   Post Acute Recommendation Home-care   [place] [facility]   [facility] [facility]   [facility]   [facility], [facility]

## 2019-07-20 NOTE — PLAN OF CARE
Problem: Physical Therapy Goal  Goal: Physical Therapy Goal  Outcome: Outcome(s) achieved Date Met: 07/20/19  PT eval completed today. Pt at mobility PLOF, does not require skilled PT at this time. Re-consult if there is any change in mobility status

## 2019-07-20 NOTE — PROGRESS NOTES
Rolling walker delivered at 1505. Pt and daughter in law given d/c instructions on diet, meds, activity, and follow up. Pt encouraged to voice questions and concerns. All questions answered. Pt d/c'd home with daughter-in-law. Pt d/c'd home with no distress noted. Pt denies CP and SOB.

## 2019-07-20 NOTE — PLAN OF CARE
Problem: Adult Inpatient Plan of Care  Goal: Plan of Care Review  Outcome: Ongoing (interventions implemented as appropriate)  Pt cardioverted today and has remained in SR SB at times 50's-60. Pt has denied CP and SOB thru out day. Pt has remained safe and free of injury today. Pt tolerating diet and has no skin breakdown noted.'s-180's today.

## 2019-07-20 NOTE — CARE UPDATE
[facility]  ICU Multidisciplinary Bedside Rounds   SUMMARY     Date: 7/20/2019    Prehospitalization: Home  Admit Date / LOS : 7/18/2019/ 2 days    Diagnosis: Atrial fibrillation with rapid ventricular response    Consults:        Active: Cardio       Needed: PT     Code Status: Full Code  Advanced Directive: Not Received    LDA: PIV       Central Lines/Site/Justification:Patient Does Not Have Central Line       Urinary Cath/Order/Justification:Patient Does Not Have Urinary Catheter    Infusions: None       GOALS: Volume/ Hemodynamic: N/A                     RASS: 0  alert and calm    CAM ICU: N/A  Pain Management: none       Pain Controlled: not applicable     Rhythm: SB    Respiratory Device: Room Air           VTE Prophylaxis: Pharm  Mobility: Bedrest  Stress Ulcer Prophylaxis: Yes    Dietary: PO  Tolerance: yes  /  Advancement: yes    Isolation: No active isolations    Restraints: No    Significant Dates:  Post Op Date: N/A  Rescue Date: N/A  Imaging/ Diagnostics: N/A    Noteworthy Labs:  none    Needs from Care Team: none     ICU LOS 1d 7h  Level of Care: OK to Transfer

## 2019-07-20 NOTE — SUBJECTIVE & OBJECTIVE
Past Medical History:   Diagnosis Date    A-fib 7/18/2019    Anticoagulant long-term use     Plavix (on hold for angiogram)    Arthritis     Coronary artery disease     pacemaker, stentsx2    Diabetes mellitus     Hypertension     Pacemaker     Sleep apnea     Stroke     Rt side affected / mild    Thyroid disease        Past Surgical History:   Procedure Laterality Date    ANGIOGRAM N/A 5/31/2013    Performed by [facility] at [facility] OR    ANKLE SURGERY      APPENDECTOMY      CARDIAC PACEMAKER PLACEMENT      CHOLECYSTECTOMY      CORONARY ANGIOPLASTY WITH STENT PLACEMENT  6/06    stents x 2 placed     EYE SURGERY      HEART CATH-LEFT Left 10/23/2017    Performed by Jesus Sanderson MD at [facility] CATH LAB    HERNIA REPAIR      ventral hernia repair    REPAIR, AAA, ENDOVASCULAR N/A 6/18/2013    Performed by Radu Melgar MD at [facility] OR    sciatica      sciatica procedure       Review of patient's allergies indicates:   Allergen Reactions    Penicillins Swelling       No current facility-administered medications on file prior to encounter.      Current Outpatient Medications on File Prior to Encounter   Medication Sig    amiodarone (PACERONE) 200 MG Tab Take 200 mg by mouth every evening.     aspirin (ECOTRIN) 81 MG EC tablet Take 81 mg by mouth once daily.    atorvastatin (LIPITOR) 40 MG tablet     clopidogrel (PLAVIX) 75 mg tablet Take 75 mg by mouth once daily.    cyanocobalamin 2000 MCG tablet Take 2,500 mcg by mouth once daily.    fluticasone (FLONASE) 50 mcg/actuation nasal spray 1 spray by Each Nare route nightly as needed.     gabapentin (NEURONTIN) 300 MG capsule Take 300 mg by mouth 3 (three) times daily.    glimepiride (AMARYL) 4 MG tablet Take 4 mg by mouth before breakfast.    isosorbide mononitrate (IMDUR) 60 MG 24 hr tablet Take 60 mg by mouth once daily.    metFORMIN (GLUCOPHAGE) 1000 MG tablet Take 1,000 mg by mouth 2 (two) times daily with meals.     metoprolol succinate (TOPROL-XL) 25 MG 24 hr tablet     midodrine (PROAMATINE) 5 MG Tab     nitroGLYCERIN (NITROSTAT) 0.4 MG SL tablet Place 0.4 mg under the tongue every 5 (five) minutes as needed for Chest pain.    ranitidine (ZANTAC) 300 MG tablet Take 300 mg by mouth every evening.    sertraline (ZOLOFT) 100 MG tablet Take 100 mg by mouth 2 (two) times daily.    SYNTHROID 175 mcg tablet     tamsulosin (FLOMAX) 0.4 mg Cp24 TAKE 1 CAPSULE AT BEDTIME.    tiZANidine (ZANAFLEX) 4 MG tablet TAKE 1 TABLET AS NEEDED EVERY 8 HOURS AS NEEDED FOR MUSCLE RELAXER ORALLY 30 DAYS    traMADol (ULTRAM) 50 mg tablet Take 0.5 tablets (25 mg total) by mouth every 8 (eight) hours as needed (Sever pain not controlled by tylenol or tizanidine).    vitamin D 185 MG Tab Take 2,000 mg by mouth once daily.     XARELTO 20 mg Tab TAKE 1 TABLET BY MOUTH ONCE A DAY WITH EVENING MEAL     Family History     Problem Relation (Age of Onset)    Cancer Mother, Sister    Heart disease Father        Tobacco Use    Smoking status: Former Smoker     Last attempt to quit: 1998     Years since quittin.1    Smokeless tobacco: Never Used   Substance and Sexual Activity    Alcohol use: No     Alcohol/week: 0.0 oz    Drug use: No    Sexual activity: Never     Review of Systems   Gastrointestinal: Negative for melena.   Genitourinary: Negative for hematuria.     Objective:     Vital Signs (Most Recent):  Temp: 97.8 °F (36.6 °C) (19 0701)  Pulse: (!) 58 (19 0915)  Resp: 20 (19)  BP: 130/71 (19 0900)  SpO2: 97 % (19 0915) Vital Signs (24h Range):  Temp:  [96.8 °F (36 °C)-98.8 °F (37.1 °C)] 97.8 °F (36.6 °C)  Pulse:  [] 58  Resp:  [11-47] 20  SpO2:  [94 %-100 %] 97 %  BP: ()/(57-92) 130/71     Weight: 84.7 kg (186 lb 11.7 oz)  Body mass index is 33.08 kg/m².    SpO2: 97 %  O2 Device (Oxygen Therapy): nasal cannula      Intake/Output Summary (Last 24 hours) at 2019 0901  Last data filed  at 7/20/2019 0900  Gross per 24 hour   Intake 900.1 ml   Output 1527 ml   Net -626.9 ml       Lines/Drains/Airways     Peripheral Intravenous Line                 Peripheral IV - Single Lumen 07/18/19 2020 20 G Left Wrist 1 day         Peripheral IV - Single Lumen 07/18/19 2130 20 G Right Antecubital 1 day                Physical Exam   Constitutional: He is oriented to person, place, and time. He appears well-developed and well-nourished.   HENT:   Head: Normocephalic and atraumatic.   Eyes: Pupils are equal, round, and reactive to light. Conjunctivae and EOM are normal. No scleral icterus.   Neck: Normal range of motion. Neck supple. No JVD present. Carotid bruit is not present. No tracheal deviation present. No thyromegaly present.   Cardiovascular: Regular rhythm, S1 normal and S2 normal. Bradycardia present. Exam reveals distant heart sounds. Exam reveals no gallop and no friction rub.   No murmur heard.  Pulmonary/Chest: Effort normal and breath sounds normal. No respiratory distress. He has no wheezes. He has no rales. He exhibits no tenderness.   Abdominal: Soft. He exhibits no distension.   obese   Musculoskeletal: Normal range of motion. He exhibits no edema.   Neurological: He is alert and oriented to person, place, and time. He has normal strength. No cranial nerve deficit.   Skin: Skin is warm and dry. No rash noted.   Psychiatric: He has a normal mood and affect. His behavior is normal.       Current Medications:   amiodarone  400 mg Oral BID    Followed by    [START ON 7/26/2019] amiodarone  200 mg Oral BID    Followed by    [START ON 8/3/2019] amiodarone  200 mg Oral Daily    atorvastatin  40 mg Oral Daily    clopidogrel  75 mg Oral Daily    gabapentin  300 mg Oral TID    isosorbide mononitrate  60 mg Oral Daily    levothyroxine  175 mcg Oral Daily    lisinopril  5 mg Oral Daily    metoprolol succinate  50 mg Oral Daily    rivaroxaban  20 mg Oral with dinner    sertraline  100 mg Oral  Daily    tamsulosin  1 capsule Oral Nightly       dextrose 50%, glucagon (human recombinant), insulin aspart U-100, nitroGLYCERIN, sodium chloride 0.9%    Laboratory (all labs reviewed):  CBC:  Recent Labs   Lab 10/23/17  0642 10/24/17  0641 04/22/19 2206 07/18/19 2047 07/20/19  0350   WBC 5.25 6.11 8.86 5.54 5.93   Hemoglobin 11.0 L 11.9 L 11.6 L 11.4 L 11.4 L   Hematocrit 33.2 L 35.9 L 36.7 L 36.4 L 37.0 L   Platelets 157 176 214 173 179       CHEMISTRIES:  Recent Labs   Lab 06/06/17  0349 06/07/17  0341 06/08/17  0427 10/23/17  0642 10/24/17  0641 04/22/19 2206 07/18/19 2047 07/20/19  0350   Glucose 170 H 155 H 186 H 183 H 186 H 193 H 101 101   Sodium 138 140 138 137 137 139 142 141   Potassium 4.2 4.1 3.8 4.3 4.4 4.5 4.0 3.3 L   BUN, Bld 15 16 16 12 11 22 12 12   Creatinine 1.0 0.9 1.0 1.0 1.0 1.0 1.0 0.9   eGFR if African American >60.0 >60.0 >60.0 >60.0 >60.0 >60 >60 >60   eGFR if non African American >60.0 >60.0 >60.0 >60.0 >60.0 >60 >60 >60   Calcium 9.5 9.4 9.6 9.3 10.0 9.2 9.2 9.3   Magnesium 1.5 L 1.6 1.6  --   --  1.3 L  --  1.5 L       CARDIAC BIOMARKERS:  Recent Labs   Lab 07/18/19 2047 07/19/19  0238 07/19/19  0758 07/19/19  1338 07/19/19 1953   Troponin I 0.007 0.029 H 0.018 0.009 0.028 H       COAGS:  Recent Labs   Lab 06/07/17  0341 06/08/17  0427 10/23/17  0705 10/24/17  0853 07/18/19 2047   INR 1.8 H 1.9 H 1.2 1.2 1.1       LIPIDS/LFTS:  Recent Labs   Lab 06/07/17  0341 06/08/17  0427 04/22/19 2206 07/18/19 2047 07/19/19  0758 07/20/19  0350   Cholesterol  --   --   --   --  85 L  --    Triglycerides  --   --   --   --  61  --    HDL  --   --   --   --  46  --    LDL Cholesterol  --   --   --   --  26.8 L  --    Non-HDL Cholesterol  --   --   --   --  39  --    AST 77 H 65 H 46 H 16  --  15    H 104 H 38 11  --  11       BNP:  Recent Labs   Lab 02/16/17  1311 04/22/19 2206 07/18/19 2047   BNP 81 271 H 366 H       TSH:  Recent Labs   Lab 06/05/17 0619 07/18/19 2047   TSH 5.056  H 13.841 H       Free T4:  Recent Labs   Lab 06/05/17  0619 07/18/19  2047   Free T4 1.15 1.05       Diagnostic Results:  ECG (personally reviewed and interpreted tracing(s)):  7/18/19: , IRBBB (AF old).  Was last documented in SR 10/23/17.  7/19/19 1505 SR 59, IRBBB, ant ST abnl    Chest X-Ray (personally reviewed and interpreted image(s)): 7/18/19 mild CHF, L PPM    VERITO/DCCV 7/19/19 (images personally reviewed and interpreted)  LVEF 40-45%, mild global HK  Normal valves  Mild-mod MR  Mild TR  Mild AI  No LA/MAURICIO thrombus  Successful DCCV AF->NSR 360J x1  Pt tolerated procedure well without complications  Plan:  Observe in ICU  Cont Xarelto 20mg qd  Start amio load  Dispo planning appropriate  Pt to follow up with me in 1-2 weeks.    Echo: 3/13/19 (care everywhere; VERITO ordered)    * Mildly increased left ventricular cavity size. Increased left ventricular wall thickness. Wall motion TDS. Mildly decreased left ventricular systolic function. Left ventricular ejection fraction is estimated at 45 %. Rhythm precludes evaluation of diastolic function.      * Normal right ventricular size. Right ventricular systolic pressure 26 mmHg. Catheter/pacemaker wire visualized in the right ventricle. Mild to Moderately increased right atrial size. Mild to Moderately increased left atrial size.      * Mild mitral annular calcification. Mild mitral valve regurgitation. Mild aortic valve calcification. Mild tricuspid valve regurgitation. Trace pulmonary valve regurgitation.      Other findings as noted above.      Cath: 10/23/17 (images prev personally reviewed and interpreted)  B. Summary/Post-Operative Diagnosis    [id number].    70% mid LAD ISR; FFR=0.79.     of D1.    Successful PCI of mid LAD with 4X15 MARY.    Successful PCI of D1 with 2.25X30 and 2.5X18 MARY.    IVUS utilized for stent sizing.  D. Hemodynamic Results  LVEDP (Pre): 22 mmHg  E. Angiographic Results       Patient has a right dominant coronary artery.       - Left Main Coronary Artery:             The LM has luminal irregularities. There is DONNA 3 flow.     - Left Anterior Descending Artery:             The proximal LAD has a 10% stenosis. There is DONNA 3 flow. The remaining portion of the vessel has luminal irregularities (10).             The mid LAD has a 70% stenosis. There is DONNA 3 flow. FFR was 0.79. The remaining portion of the vessel has luminal irregularities (10).                     Lesion Details:   The length is 10-20 mm, eccentric shape, moderate proximal tortuosity, segment angulation of <45 degrees, irregular contour, mild to moderate calcification. No bifurcation is present. The minimum luminal diameter is   0.5 millimeters.             The distal LAD has a 50% stenosis. There is DONNA 3 flow. The remaining portion of the vessel is of small caliber.     - D1:             The D1 has chronic total occlusion. There is DONNA 0 flow. There is collateral flow from the PLB (faint). The remaining portion of the vessel has luminal irregularities (10).                     Lesion Details:   This is a Type C lesion.  The length is 40mm, eccentric shape, moderate proximal tortuosity, segment angulation of 45-90 degrees, irregular contour, mild to moderate calcification. Bifurcation is present. The minimum   luminal diameter is 0 millimeters.     - Left Circumflex Artery:             The proximal LCX has a 20% stenosis. There is DONNA 3 flow. The remaining portion of the vessel has luminal irregularities (10).             The distal LCX has luminal irregularities. There is DONNA 3 flow.     - OM1:             The OM1 has luminal irregularities has a 30% stenosis. There is DONNA 3 flow. The remaining portion of the vessel has luminal irregularities (10).     - Right Coronary Artery:             The proximal RCA has luminal irregularities. There is DONNA 3 flow.             The mid RCA has a 20% stenosis. There is DONNA 3 flow. The remaining portion of the vessel has  luminal irregularities (10).             The distal RCA has a 50% stenosis. There is DONNA 3 flow. The remaining portion of the vessel has luminal irregularities (10).     - Posterior Lateral Branch:             The PLB has a 40% stenosis. There is DONNA 3 flow. The remaining portion of the vessel has luminal irregularities (10).  Intervention       Mid LAD:              The lesion was successfully intervened. Post-stenosis of 0%, post-DONNA 3 flow and TMP grade 3. The vessel was accessed natively.  The following items were used: Blln Trek Rx 3.0 X 12, Stent Resolute Rx 4.00x15 (MARY) and Cath [facility]   [place].       D1:              The lesion was successfully intervened. Post-stenosis of 0%, post-DONNA 3 flow and TMP grade 3. The vessel was accessed natively.  The following items were used: Cath Mini Trek 1.5 X 20 Rx, Blln Sc Euphora 2.0 X 10, Stent Resolute Rx 2.25x30   (MARY), Stent Resolute Rx 2.50x18 (MARY) and Cath [facility] [place].

## 2019-07-20 NOTE — HOSPITAL COURSE
7/18/19: Adm with AF/RVR and CHF  7/19/19: successful VERITO/DCCV AF->NSR    Interval Hx: pt seen in ICU, case d/w RN trisha Kang.  Feeling much better.  No cp/sob.  Edema resolved.  No palps/LH.  Importance of med rx compliance, murali NOAC, d/w pt.    Tele: SB, occ PAC/brief SVT (5 beats) and occ  (personally reviewed and interpreted)

## 2019-07-20 NOTE — ASSESSMENT & PLAN NOTE
Appears euvolemic  EF 40% by VERITO 7/2019, ?component of tachymyopathy  Cont Toprol XL 50mg qd  Inc lisinopril 10mg qd  Stop imdur

## 2019-07-20 NOTE — PLAN OF CARE
Problem: Adult Inpatient Plan of Care  Goal: Plan of Care Review  Outcome: Ongoing (interventions implemented as appropriate)    Patient is awake alert oriented. Able to move on bed with moderate assistance. Void per urinal with adequate urine output. No fall or injury during the shift.Remain sinus rhythm on the monitor the whole night Sinus Benito on the 50's blood pressure within limits. Possible discharge today.    Problem: Diabetes Comorbidity  Goal: Blood Glucose Level Within Desired Range  Outcome: Ongoing (interventions implemented as appropriate)  Intervention: Maintain Glycemic Control     07/20/19 0638   Monitor and Manage Ketoacidosis   Glycemic Management blood glucose monitoring;oral hydration promoted         Problem: Skin Injury Risk Increased  Goal: Skin Health and Integrity  Outcome: Ongoing (interventions implemented as appropriate)  Intervention: Optimize Skin Protection     07/20/19 0638   Prevent Additional Skin Injury   Head of Bed (HOB) other (see comments)  (patient adjusted his own bed for head height)   Pressure Reduction Devices positioning supports utilized;pressure-redistributing mattress utilized   Pressure Reduction Techniques frequent weight shift encouraged;sit time limited to 2 hours   Monitor and Manage Hypervolemia   Skin Protection incontinence pads utilized;pulse oximeter probe site changed;tubing/devices free from skin contact     Intervention: Promote and Optimize Oral Intake     07/20/19 0638   Monitor and Manage Anemia   Oral Nutrition Promotion rest periods promoted;social interaction promoted

## 2019-07-20 NOTE — ASSESSMENT & PLAN NOTE
Known MV CAD s/p LAD/Diag PCI 10/2017  Mild LV dysfxn  Cont Plavix (with ongoing Xarelto rx)  Cont BBl/Statin/ACEi  Stop imdur  Will consider outpatient stress testing

## 2019-07-20 NOTE — PLAN OF CARE
[facility] HOME HEALTH ORDERS  FACE TO FACE ENCOUNTER    Patient Name: Doni Theodore  YOB: 1940    PCP: Nic Mauro MD   PCP Address: [address] [facility] / [address]  PCP Phone Number: [phone]  PCP Fax: [phone]    Encounter Date: 07/20/2019    Admit to Home Health    Diagnoses:  Active Hospital Problems    Diagnosis  POA    *Atrial fibrillation with rapid ventricular response [I48.91]  Yes    Diabetes mellitus, type 2 [E11.9]  Yes    Essential hypertension [I10]  Yes    Anticoagulant long-term use [Z79.01]  Not Applicable     Plavix (on hold for angiogram)      Acute on chronic combined systolic and diastolic congestive heart failure [I50.43]  Yes    Leg weakness, bilateral [R29.898]  Yes    Coronary artery disease [I25.10]  Yes    Pacemaker [Z95.0]  Yes    CVA (cerebral vascular accident) [I63.9]  Yes    Persistent atrial fibrillation [I48.1]  Yes    Coronary artery disease involving native coronary artery of native heart without angina pectoris [I25.10]  Yes      Resolved Hospital Problems   No resolved problems to display.       Future Appointments   Date Time Provider Department Center   8/12/2019  9:00 AM Nic Miguel MD [facility] CARDIO [place]     Follow-up Information     Nic Miguel MD On 8/12/2019.    Specialties:  Cardiology, INTERVENTIONAL CARDIOLOGY  Why:  Outpatient Services@9:00am   Contact information:  [address]  [place] [address]  [phone]                     I have seen and examined this patient face to face today. My clinical findings that support the need for the home health skilled services and home bound status are the following:  Weakness/numbness causing balance and gait disturbance due to Coronary Heart Disease and Weakness/Debility making it taxing to leave home.    Allergies:  Review of patient's allergies indicates:   Allergen Reactions    Penicillins Swelling       Diet:  cardiac diet    Activities: activity as tolerated    Nursing:   SN to complete comprehensive assessment including routine vital signs. Instruct on disease process and s/s of complications to report to MD. Review/verify medication list sent home with the patient at time of discharge  and instruct patient/caregiver as needed. Frequency may be adjusted depending on start of care date.    Notify MD if SBP > 160 or < 90; DBP > 90 or < 50; HR > 120 or < 50; Temp > 101; Other:         CONSULTS:    Physical Therapy to evaluate and treat. Evaluate for home safety and equipment needs; Establish/upgrade home exercise program. Perform / instruct on therapeutic exercises, gait training, transfer training, and Range of Motion.  Occupational Therapy to evaluate and treat. Evaluate home environment for safety and equipment needs. Perform/Instruct on transfers, ADL training, ROM, and therapeutic exercises.    MISCELLANEOUS CARE:  Routine Skin for Bedridden Patients: Instruct patient/caregiver to apply moisture barrier cream to all skin folds and wet areas in perineal area daily and after baths and all bowel movements.    WOUND CARE ORDERS  n/a      Medications: Review discharge medications with patient and family and provide education.      Current Discharge Medication List      CONTINUE these medications which have NOT CHANGED    Details   amiodarone (PACERONE) 200 MG Tab Take 200 mg by mouth every evening.       aspirin (ECOTRIN) 81 MG EC tablet Take 81 mg by mouth once daily.      atorvastatin (LIPITOR) 40 MG tablet       clopidogrel (PLAVIX) 75 mg tablet Take 75 mg by mouth once daily.  Refills: 2      cyanocobalamin 2000 MCG tablet Take 2,500 mcg by mouth once daily.      fluticasone (FLONASE) 50 mcg/actuation nasal spray 1 spray by Each Nare route nightly as needed.       gabapentin (NEURONTIN) 300 MG capsule Take 300 mg by mouth 3 (three) times daily.  Refills: 3      glimepiride (AMARYL) 4 MG tablet Take 4 mg by mouth before  breakfast.      isosorbide mononitrate (IMDUR) 60 MG 24 hr tablet Take 60 mg by mouth once daily.  Refills: 2      metFORMIN (GLUCOPHAGE) 1000 MG tablet Take 1,000 mg by mouth 2 (two) times daily with meals.  Refills: 3      metoprolol succinate (TOPROL-XL) 25 MG 24 hr tablet       midodrine (PROAMATINE) 5 MG Tab       nitroGLYCERIN (NITROSTAT) 0.4 MG SL tablet Place 0.4 mg under the tongue every 5 (five) minutes as needed for Chest pain.      ranitidine (ZANTAC) 300 MG tablet Take 300 mg by mouth every evening.      sertraline (ZOLOFT) 100 MG tablet Take 100 mg by mouth 2 (two) times daily.  Refills: 2      SYNTHROID 175 mcg tablet       tamsulosin (FLOMAX) 0.4 mg Cp24 TAKE 1 CAPSULE AT BEDTIME.  Qty: 90 capsule, Refills: 3    Associated Diagnoses: BPH without urinary obstruction      tiZANidine (ZANAFLEX) 4 MG tablet TAKE 1 TABLET AS NEEDED EVERY 8 HOURS AS NEEDED FOR MUSCLE RELAXER ORALLY 30 DAYS  Refills: 1      traMADol (ULTRAM) 50 mg tablet Take 0.5 tablets (25 mg total) by mouth every 8 (eight) hours as needed (Sever pain not controlled by tylenol or tizanidine).  Qty: 10 tablet, Refills: 0      vitamin D 185 MG Tab Take 2,000 mg by mouth once daily.       XARELTO 20 mg Tab TAKE 1 TABLET BY MOUTH ONCE A DAY WITH EVENING MEAL  Refills: 6             I certify that this patient is confined to his home and needs intermittent skilled nursing care, physical therapy and occupational therapy.

## 2019-07-20 NOTE — PLAN OF CARE
Problem: Adult Inpatient Plan of Care  Goal: Plan of Care Review  Outcome: Outcome(s) achieved Date Met: 07/20/19  Pt d/c'd home with no distress noted. Pt has denied CP and SOB today. Pt has remained with SR- SB today HR 50's-60's. BP has been 110's-140. Pt has remained safe and has no skin breakdown noted. Pt d/c'd home with family and is aware of follow up and POC.

## 2019-07-20 NOTE — PROGRESS NOTES
Patient will discharge with  and Rolling Walker; N patient; patient choice form signed for N to choose; contacted Michaela with N to set up services; faxed orders and plan of care with patient information to [facility]; awaiting return call with providers.    [facility] accepted patient for HH services; [facility] will provide rolling walker--patient did just get one 1-2 years ago; may need repair or replace. Will call [facility] when explored: [phone]  [facility] indicated that patient will receive rolling walker; patient has rollator but treatment team indicated that rolling walker is necessary at this time.

## 2019-07-20 NOTE — PLAN OF CARE
Problem: Occupational Therapy Goal  Goal: Occupational Therapy Goal  Outcome: Outcome(s) achieved Date Met: 07/20/19  OT eval is complete. The patient performs functional mobility and self care with CGA.     Comments: The patient will benefit from  OT and assist from family. The patient will benefit from a RW.

## 2019-07-20 NOTE — DISCHARGE SUMMARY
[facility] Hospital Medicine  Discharge Summary      Patient Name: Doni Theodore  MRN: [id number]  Admission Date: 7/18/2019  Hospital Length of Stay: 2 days  Discharge Date and Time:  07/20/2019 1:24 PM  Attending Physician: Dick Melgar MD   Discharging Provider: Dick Melgar MD  Primary Care Provider: Nic Mauro MD      HPI:     Doni Theodore is a 78 y.o. male that (in part)  has a past medical history of A-fib, Anticoagulant long-term use, Arthritis, Coronary artery disease, Diabetes mellitus, Hypertension, Pacemaker, Sleep apnea, Stroke, and Thyroid disease.  has a past surgical history that includes Coronary angioplasty with stent (6/06); Cholecystectomy; Appendectomy; Hernia repair; Eye surgery; Cardiac pacemaker placement; sciatica; and Ankle surgery. Presents to [facility] Emergency Department complaining of bilateral leg pain and weakness when ambulating.  Also complaining of palpitations and vague chest discomfort.  Mild dyspnea with exertion.  Denies syncope, edema, cyanosis fever, chills, or cough.  No joint swelling or unilateral weakness.  No slurred speech, paresthesias, ataxia, vertigo, or tremors.  Acute onset today.  Frequency 1 episode.  Reports compliance with his home medication regimen.  History of persistent atrial fibrillation.  On chronic anticoagulation.  Takes amiodarone.    In the emergency department his heart rate was found to be in the 130s to 140s.  EKG revealed atrial fibrillation with rapid ventricular response.  He was started on amiodarone infusion.  He was rate controlled with diltiazem prior to this.  Cardiology was consulted.    Hospital medicine has been asked to admit for further evaluation and treatment.     Procedure(s) (LRB):  Transesophageal echo (VERITO) intra-procedure log documentation (N/A)  Cardioversion or Defibrillation      Hospital Course:     Doni Theodore is a 78 y.o. male that (in part)   has a past medical history of A-fib, Anticoagulant long-term use, Arthritis, Coronary artery disease, Diabetes mellitus, Hypertension, Pacemaker, Sleep apnea, Stroke, and Thyroid disease.  has a past surgical history that includes Coronary angioplasty with stent (6/06); Cholecystectomy; Appendectomy; Hernia repair; Eye surgery; Cardiac pacemaker placement; sciatica; and Ankle surgery. Presents to [facility] Emergency Department complaining of bilateral leg pain and weakness when ambulating.  Also complaining of palpitations and vague chest discomfort.  Mild dyspnea with exertion.  Denies syncope, edema, cyanosis fever, chills, or cough.  No joint swelling or unilateral weakness.  No slurred speech, paresthesias, ataxia, vertigo, or tremors.  Acute onset today.  Frequency 1 episode.  Reports compliance with his home medication regimen.  History of persistent atrial fibrillation.  On chronic anticoagulation.  Takes amiodarone.  In the emergency department his heart rate was found to be in the 130s to 140s.  EKG revealed atrial fibrillation with rapid ventricular response.  He was started on amiodarone infusion.  He was rate controlled with diltiazem prior to this.  Cardiology was consulted.he had DCCV with conversion to sinus,he was observed closely in ICU  and remains stable,he has justino discharged home with HH and taper dose of amiodarone and follow up with PCP and cardiology in next few days.         Consults:   Consults (From admission, onward)        Status Ordering Provider     Inpatient consult to Cardiology  Once     Provider:  Abdoul Max MD    Completed KIRSTEN THORNE     Inpatient consult to Social Work  Once     Provider:  (Not yet assigned)    Completed KIANNA FISHER     Inpatient consult to Social Work  Once     Provider:  (Not yet assigned)    Completed KIRSTEN THORNE          No new Assessment & Plan notes have been filed under this hospital service  "since the last note was generated.  Service: Hospital Medicine    Final Active Diagnoses:    Diagnosis Date Noted POA    PRINCIPAL PROBLEM:  Atrial fibrillation with rapid ventricular response [I48.91] 07/19/2019 Yes    Unsteady gait [R26.81] 07/20/2019 Yes    Diabetes mellitus, type 2 [E11.9] 07/19/2019 Yes    Essential hypertension [I10] 07/19/2019 Yes    Anticoagulant long-term use [Z79.01] 07/19/2019 Not Applicable    Acute on chronic combined systolic and diastolic congestive heart failure [I50.43] 07/19/2019 Yes    Leg weakness, bilateral [R29.898] 07/19/2019 Yes    Coronary artery disease [I25.10] 10/23/2017 Yes    Pacemaker [Z95.0] 09/21/2017 Yes    CVA (cerebral vascular accident) [I63.9] 09/21/2017 Yes    Persistent atrial fibrillation [I48.1] 06/04/2017 Yes    Coronary artery disease involving native coronary artery of native heart without angina pectoris [I25.10] 06/04/2017 Yes      Problems Resolved During this Admission:       Discharged Condition: stable    Disposition: Home or Self Care    Follow Up:  Follow-up Information     Nic Miguel MD. Go on 8/12/2019.    Specialties:  Cardiology, INTERVENTIONAL CARDIOLOGY  Why:  Outpatient Services@9:00am   Contact information:  [address] [facility] [address] [phone]             [facility].    Specialties:  DME Provider, Home Health Services  Why:  Home Health: SN; PT; OT  Contact information:  [address] [facility] [address] [facility] [id number] [phone]             [facility].    Specialty:  DME Provider  Why:  DME: Rolling Walker  Contact information:  [address] [id number] [phone]                 Patient Instructions:      WALKER FOR HOME USE     Order Specific Question Answer Comments   Type of Walker: Adult (5'4"-6'6")    With wheels? Yes    Height: 5' 3" (1.6 m)    Weight: 84.7 kg (186 lb 11.7 oz)    Length of need (1-99 months): 99    Does patient have medical equipment at home? " glucometer    Does patient have medical equipment at home? walker, rolling    Does patient have medical equipment at home? wheelchair    Does patient have medical equipment at home? shower chair    Please check all that apply: Patient's condition impairs ambulation.        Significant Diagnostic Studies: Labs:   BMP:   Recent Labs   Lab 07/18/19 2047 07/20/19  0350    101    141   K 4.0 3.3*   * 105   CO2 21* 24   BUN 12 12   CREATININE 1.0 0.9   CALCIUM 9.2 9.3   MG  --  1.5*   , CBC   Recent Labs   Lab 07/18/19 2047 07/20/19  0350   WBC 5.54 5.93   HGB 11.4* 11.4*   HCT 36.4* 37.0*    179    and Troponin   Recent Labs   Lab 07/19/19 1953   TROPONINI 0.028*     Radiology: X-Ray: CXR: X-Ray Chest 1 View (CXR): No results found for this visit on 07/18/19. and X-Ray Chest PA and Lateral (CXR): No results found for this visit on 07/18/19.  Cardiac Graphics: Echocardiogram:   2D echo with color flow doppler:   Results for orders placed or performed during the hospital encounter of 06/03/17   2D echo with color flow doppler   Result Value Ref Range    QEF 60 55 - 65    Mitral Valve Regurgitation TRIVIAL     Est. PA Systolic Pressure 21.34     Tricuspid Valve Regurgitation TRIVIAL     Narrative    Date of Procedure: 06/05/2017        TEST DESCRIPTION       Aorta: The aortic root is normal in size, measuring 3.3 cm at sinotubular junction and 3.8 cm at Sinuses of Valsalva. The proximal ascending aorta is normal in size, measuring 3.9 cm across.     Left Atrium: The left atrial volume index is mildly enlarged, measuring 35.70 cc/m2.     Left Ventricle: The left ventricle is normal in size, with an end-diastolic diameter of 4.7 cm, and an end-systolic diameter of 3.2 cm. LV wall thickness is normal, with the septum measuring 0.9 cm and the posterior wall measuring 0.8 cm across. Relative   wall thickness was normal at 0.34, and the LV mass index was 68.4 g/m2 consistent with normal left  ventricular mass. There are no regional wall motion abnormalities. Left ventricular systolic function appears normal. Visually estimated ejection fraction   is 60-65%. The LV Doppler derived stroke volume equals 71.0 ccs.     Diastolic indices: E wave velocity 0.6 m/s, E/A ratio 1.0,  msec., E/e' ratio(avg) 9. Diastolic function is indeterminate.     Right Atrium: The right atrium is normal in size, measuring 5.9 cm in length and 3.0 cm in width in the apical view.     Right Ventricle: The right ventricle is normal in size measuring 2.4 cm at the base in the apical right ventricle-focused view. Global right ventricular systolic function appears normal. Tricuspid annular plane systolic excursion (TAPSE) is 1.8 cm.   Tissue Doppler-derived tricuspid annular peak systolic velocity (S prime) is 12.5 cm/s. The estimated PA systolic pressure is greater than 21 mmHg.     Aortic Valve:  The aortic valve is mildly sclerotic. The aortic valve is tri-leaflet in structure. There is marked aortic annular calcification.     Mitral Valve:  The mitral valve is mildly sclerotic. There is trivial mitral regurgitation.     Tricuspid Valve:  There is trivial tricuspid regurgitation. Tricuspid valve is normal in structure with normal leaflet mobility.     Pulmonary Valve:  Pulmonary valve is normal in structure with normal leaflet mobility.     Intracavitary: There is no evidence of pericardial effusion, intracavity mass, thrombi, or vegetation.     Calicification of the aorta and ST junction..         CONCLUSIONS     1 - Normal left ventricular systolic function (EF 60-65%).     2 - Indeterminate LV diastolic function.     3 - Normal right ventricular systolic function .     4 - The estimated PA systolic pressure is greater than 21 mmHg.     5 - Trivial mitral regurgitation.     6 - Trivial tricuspid regurgitation.     7 - Calicification of the aorta and ST junction..     8 - Mild left atrial enlargement.             This  document has been electronically    SIGNED BY: Joanna Chin MD On: 06/05/2017 10:43    and Transthoracic echo (TTE) complete (Cupid Only): No results found for this or any previous visit.    Pending Diagnostic Studies:     Procedure Component Value Units Date/Time    EKG 12-lead [[id number]] Collected:  07/19/19 0953    Order Status:  Sent Lab Status:  In process Updated:  07/19/19 0959    Narrative:       Test Reason : I48.91,    Vent. Rate : 124 BPM     Atrial Rate : 096 BPM     P-R Int : 000 ms          QRS Dur : 100 ms      QT Int : 352 ms       P-R-T Axes : 000 -32 124 degrees     QTc Int : 505 ms    Atrial fibrillation with rapid ventricular response  Left axis deviation  ST and T wave abnormality, consider lateral ischemia  Abnormal ECG  When compared with ECG of 19-JUL-2019 09:49,  Significant changes have occurred    Referred By: [email or web address]   SELF           Confirmed By:          Medications:  Reconciled Home Medications:      Medication List      START taking these medications    lisinopril 10 MG tablet  Take 1 tablet (10 mg total) by mouth once daily.  Start taking on:  7/21/2019        CHANGE how you take these medications    * amiodarone 400 MG tablet  Commonly known as:  PACERONE  Take 1 tablet (400 mg total) by mouth 2 (two) times daily. for 14 days  What changed:    · medication strength  · how much to take  · when to take this     * amiodarone 200 MG Tab  Commonly known as:  PACERONE  Take 1 tablet (200 mg total) by mouth 2 (two) times daily. for 14 days  Start taking on:  8/3/2019  What changed:  You were already taking a medication with the same name, and this prescription was added. Make sure you understand how and when to take each.     * amiodarone 200 MG Tab  Commonly known as:  PACERONE  Take 1 tablet (200 mg total) by mouth once daily.  Start taking on:  8/17/2019  What changed:  You were already taking a medication with the same name, and this prescription was added. Make sure you  understand how and when to take each.         * This list has 3 medication(s) that are the same as other medications prescribed for you. Read the directions carefully, and ask your doctor or other care provider to review them with you.            CONTINUE taking these medications    aspirin 81 MG EC tablet  Commonly known as:  ECOTRIN  Take 81 mg by mouth once daily.     atorvastatin 40 MG tablet  Commonly known as:  LIPITOR     clopidogrel 75 mg tablet  Commonly known as:  PLAVIX  Take 75 mg by mouth once daily.     cyanocobalamin 2000 MCG tablet  Take 2,500 mcg by mouth once daily.     fluticasone propionate 50 mcg/actuation nasal spray  Commonly known as:  FLONASE  1 spray by Each Nare route nightly as needed.     gabapentin 300 MG capsule  Commonly known as:  NEURONTIN  Take 300 mg by mouth 3 (three) times daily.     glimepiride 4 MG tablet  Commonly known as:  AMARYL  Take 4 mg by mouth before breakfast.     isosorbide mononitrate 60 MG 24 hr tablet  Commonly known as:  IMDUR  Take 60 mg by mouth once daily.     metFORMIN 1000 MG tablet  Commonly known as:  GLUCOPHAGE  Take 1,000 mg by mouth 2 (two) times daily with meals.     metoprolol succinate 25 MG 24 hr tablet  Commonly known as:  TOPROL-XL     midodrine 5 MG Tab  Commonly known as:  PROAMATINE     nitroGLYCERIN 0.4 MG SL tablet  Commonly known as:  NITROSTAT  Place 0.4 mg under the tongue every 5 (five) minutes as needed for Chest pain.     ranitidine 300 MG tablet  Commonly known as:  ZANTAC  Take 300 mg by mouth every evening.     sertraline 100 MG tablet  Commonly known as:  ZOLOFT  Take 100 mg by mouth 2 (two) times daily.     SYNTHROID 175 MCG tablet  Generic drug:  levothyroxine     tamsulosin 0.4 mg Cap  Commonly known as:  FLOMAX  TAKE 1 CAPSULE AT BEDTIME.     traMADol 50 mg tablet  Commonly known as:  ULTRAM  Take 0.5 tablets (25 mg total) by mouth every 8 (eight) hours as needed (Sever pain not controlled by tylenol or tizanidine).     vitamin  D 1000 units Tab  Commonly known as:  VITAMIN D3  Take 2,000 mg by mouth once daily.     XARELTO 20 mg Tab  Generic drug:  rivaroxaban  TAKE 1 TABLET BY MOUTH ONCE A DAY WITH EVENING MEAL        STOP taking these medications    tiZANidine 4 MG tablet  Commonly known as:  ZANAFLEX            Indwelling Lines/Drains at time of discharge:   Lines/Drains/Airways          None          Time spent on the discharge of patient: over 45  minutes  Patient was seen and examined on the date of discharge and determined to be suitable for discharge.    Critical care time spent on the evaluation and treatment of severe organ dysfunction, review of pertinent labs and imaging studies, discussions with consulting providers and discussions with patient/family:over 45 minutes.     Dick Melgar MD  Department of Hospital Medicine  [facility]

## 2019-07-20 NOTE — PT/OT/SLP EVAL
Occupational Therapy   Evaluation and Discharge Note    Name: Doni Theodore  MRN: [id number]  Admitting Diagnosis:  Atrial fibrillation with rapid ventricular response 1 Day Post-Op    Recommendations:     Discharge Recommendations: home health OT(with family assist)  Discharge Equipment Recommendations:  walker, rolling  Barriers to discharge:  None    Assessment:     Doni Theodore is a 78 y.o. male with a medical diagnosis of Atrial fibrillation with rapid ventricular response. The patient performs functional mobility and self care with CGA. The patient will benefit from HH OT and assist from family.     Plan:     During this hospitalization, patient does not require further acute OT services.  Please re-consult if situation changes.    · Plan of Care Reviewed with: patient    Subjective     Chief Complaint: ready to go home  Patient/Family Comments/goals: The patient wants to go home with HH services    Occupational Profile:  Living Environment: The patient lives with his spouse in a [place] house with a ramp ramp present.  Previous level of function: The patient reports he was (I) with self care and ambulated short distances using his rollator or used a WE/C in the house.  Roles and Routines: The patient's spouse uses a W/C 2* ankle fx. The patient's spouse cookd from her W/C. The patient and spouse do not drive. The patient's daughter-in-law assists with transportation.  Equipment Used at home:  wheelchair, rollator, shower chair, grab bar  Assistance upon Discharge: The spouse is unable to assist. The patient will have assist from his Son and daughter-in-law.  Pain/Comfort:  · Pain Rating 1: 0/10    Patients cultural, spiritual, Anglican conflicts given the current situation: no    Objective:     Communicated with: Kristina varela prior to session.  Patient found HOB elevated with blood pressure cuff, telemetry, peripheral IV, pulse ox (continuous) upon OT entry to room.    General Precautions: Standard, fall    Orthopedic Precautions:N/A   Braces: N/A     Occupational Performance:    Bed Mobility:    · Patient completed Scooting/Bridging with stand by assistance  · Patient completed Supine to Sit with stand by assistance and contact guard assistance    Functional Mobility/Transfers:  · Patient completed Sit <> Stand Transfer with contact guard assistance  with  rolling walker   · Functional Mobility: The patient amg using a RW with CGA    Activities of Daily Living:  · Upper Body Dressing: contact guard assistance to don back gown while seated on the EOB      Cognitive/Visual Perceptual:  Cognitive/Psychosocial Skills:     -       Oriented to: Person, Place and Situation   -       Follows Commands/attention:Follows two-step commands  -       Communication: clear/fluent  -       Memory: No Deficits noted  -       Safety awareness/insight to disability: impaired   -       Mood/Affect/Coping skills/emotional control: Appropriate to situation    Physical Exam:  Balance: -       good sitting, fair+ stand  Postural examination/scapula alignment:    -       Forward head  Skin integrity: Visible skin intact  Edema:  None noted  Upper Extremity Range of Motion:     -       Right Upper Extremity: WFL  -       Left Upper Extremity: WFL  Upper Extremity Strength:    -       Right Upper Extremity: WFL  -       Left Upper Extremity: WFL    AMPAC 6 Click ADL:  AMPAC Total Score: 21    Treatment & Education:  The patient participated in the OT eval and was educated re: OT role and recommendations for HH OT and a RW.   Education:    Patient left up in chair, reclined with all lines intact, call button in reach and nurseKristina notified    GOALS:   Multidisciplinary Problems     Occupational Therapy Goals     Not on file          Multidisciplinary Problems (Resolved)        Problem: Occupational Therapy Goal    Goal Priority Disciplines Outcome Interventions   Occupational Therapy Goal   (Resolved)     OT, PT/OT Outcome(s) achieved                     History:     Past Medical History:   Diagnosis Date    A-fib 7/18/2019    Anticoagulant long-term use     Plavix (on hold for angiogram)    Arthritis     Coronary artery disease     pacemaker, stentsx2    Diabetes mellitus     Hypertension     Pacemaker     Sleep apnea     Stroke     Rt side affected / mild    Thyroid disease        Past Surgical History:   Procedure Laterality Date    ANGIOGRAM N/A 5/31/2013    Performed by [facility] at [facility] OR    ANKLE SURGERY      APPENDECTOMY      CARDIAC PACEMAKER PLACEMENT      CHOLECYSTECTOMY      CORONARY ANGIOPLASTY WITH STENT PLACEMENT  6/06    stents x 2 placed     EYE SURGERY      HEART CATH-LEFT Left 10/23/2017    Performed by [facility] at [facility] CATH LAB    HERNIA REPAIR      ventral hernia repair    REPAIR, AAA, ENDOVASCULAR N/A 6/18/2013    Performed by Radu Melgar [facility] at [facility] OR    sciatica      sciatica procedure       Time Tracking:     OT Date of Treatment: 07/20/19  OT Start Time: 1002  OT Stop Time: 1022  OT Total Time (min): 20 min    Billable Minutes:Evaluation 20 (with PT)    Margie Hopper, OT  7/20/2019

## 2019-07-20 NOTE — PT/OT/SLP EVAL
Physical Therapy Evaluation and Discharge Note    Patient Name:  Doni Theodore   MRN:  [id number]    Recommendations:     Discharge Recommendations:  home health PT   Discharge Equipment Recommendations: walker, rolling   Barriers to discharge: Inaccessible home and Decreased caregiver support    Assessment:     Doni Theodore is a 78 y.o. male admitted with a medical diagnosis of Atrial fibrillation with rapid ventricular response. .  At this time, patient is functioning at their prior level of function and does not require further acute PT services.     Recent Surgery: Procedure(s) (LRB):  Transesophageal echo (VERITO) intra-procedure log documentation (N/A)  Cardioversion or Defibrillation 1 Day Post-Op    Plan:     During this hospitalization, patient does not require further acute PT services.  Please re-consult if situation changes.      Subjective     Chief Complaint: would like to go home  Patient/Family Comments/goals: Return home w/ HH services  Pain/Comfort:  · Pain Rating 1: 0/10    Patients cultural, spiritual, Sabianist conflicts given the current situation: no    Living Environment:  Pt lives w/ his wife in [facility] w/ ramp to enter  Prior to admission, patients level of function was mod I w/ amb, uses rollator or w/c as AD for amb, if fatigued, pt will sit in w/c to get around the house. Pt's wife also uses w/c d/t recent ankle fx. Pt's step daughter helps w/ driving Equipment used at home: wheelchair, rollator, shower chair, grab bar.   Upon discharge, patient will have assistance from daughter in law, pt's wife unable to assist at this time.    Objective:     Communicated with nurse Acevedo prior to session.  Patient found HOB elevated with blood pressure cuff, telemetry, peripheral IV, pulse ox (continuous) upon PT entry to room.    General Precautions: Standard, fall   Orthopedic Precautions:N/A   Braces: N/A     Exams:  · Cognitive Exam:  Patient is oriented to Person, Place, Time and  Situation  · Postural Exam:  Patient presented with the following abnormalities:    · -       Rounded shoulders  · -       Forward head  · RLE ROM: WFL  · RLE Strength: WFL  · LLE ROM: WFL  · LLE Strength: WFL    Functional Mobility:  · Bed Mobility:     · Supine to Sit: supervision  · Sit to Supine: supervision  · Transfers:     · Sit to Stand:  contact guard assistance with rolling walker  · Gait: Pt amb 15 ft w/ RW and CGA x1  · Balance: Good in sitting, good- in standing    AM-PAC 6 CLICK MOBILITY  Total Score:23     Therapeutic Activities and Exercises:   Pt instructed to perform ankle pumps 2x20 while sitting up in the chair every hour    AM-PAC 6 CLICK MOBILITY  Total Score:23     Patient left up in chair with call button in reach and nsg notified.    GOALS:   Multidisciplinary Problems     Physical Therapy Goals     Not on file          Multidisciplinary Problems (Resolved)        Problem: Physical Therapy Goal    Goal Priority Disciplines Outcome Goal Variances Interventions   Physical Therapy Goal   (Resolved)     PT, PT/OT Outcome(s) achieved                     History:     Past Medical History:   Diagnosis Date    A-fib 7/18/2019    Anticoagulant long-term use     Plavix (on hold for angiogram)    Arthritis     Coronary artery disease     pacemaker, stentsx2    Diabetes mellitus     Hypertension     Pacemaker     Sleep apnea     Stroke     Rt side affected / mild    Thyroid disease        Past Surgical History:   Procedure Laterality Date    ANGIOGRAM N/A 5/31/2013    Performed [facility] OR    ANKLE SURGERY      APPENDECTOMY      CARDIAC PACEMAKER PLACEMENT      CHOLECYSTECTOMY      CORONARY ANGIOPLASTY WITH STENT PLACEMENT  6/06    stents x 2 placed     EYE SURGERY      HEART CATH-LEFT Left 10/23/2017    Performed by [facility]erson MD at [facility] CATH LAB    HERNIA REPAIR      ventral hernia repair    REPAIR, AAA, ENDOVASCULAR N/A 6/18/2013    Performed  [facility] OR    sciatica      sciatica procedure       Time Tracking:     PT Received On: 07/20/19  PT Start Time: 1000     PT Stop Time: 1020  PT Total Time (min): 20 min     Billable Minutes: Evaluation 20  (Co-treat w/ OT)    Tony Beltran, PT  07/20/2019

## 2019-07-20 NOTE — PROGRESS NOTES
Informed patient's nurseConcepción that patient will be ready to discharge from CM standpoint; just need to wait for the rolling walker from [facility]

## 2019-07-29 ENCOUNTER — EXTERNAL HOME HEALTH (OUTPATIENT)
Dept: HOME HEALTH SERVICES | Facility: HOSPITAL | Age: 79
End: 2019-07-29
Payer: MEDICARE

## 2019-08-01 ENCOUNTER — HOSPITAL ENCOUNTER (OUTPATIENT)
Facility: HOSPITAL | Age: 79
Discharge: HOME | End: 2019-08-02
Attending: EMERGENCY MEDICINE | Admitting: INTERNAL MEDICINE
Payer: MEDICARE

## 2019-08-01 ENCOUNTER — NURSE TRIAGE (OUTPATIENT)
Dept: ADMINISTRATIVE | Facility: CLINIC | Age: 79
End: 2019-08-01

## 2019-08-01 DIAGNOSIS — E11.00 TYPE 2 DIABETES MELLITUS WITH HYPEROSMOLARITY WITHOUT COMA, WITHOUT LONG-TERM CURRENT USE OF INSULIN: ICD-10-CM

## 2019-08-01 DIAGNOSIS — R00.1 SYMPTOMATIC BRADYCARDIA: Primary | ICD-10-CM

## 2019-08-01 DIAGNOSIS — R53.1 WEAKNESS: ICD-10-CM

## 2019-08-01 DIAGNOSIS — I25.10 CORONARY ARTERY DISEASE INVOLVING NATIVE CORONARY ARTERY OF NATIVE HEART WITHOUT ANGINA PECTORIS: ICD-10-CM

## 2019-08-01 DIAGNOSIS — I10 ESSENTIAL HYPERTENSION: ICD-10-CM

## 2019-08-01 DIAGNOSIS — I48.0 PAF (PAROXYSMAL ATRIAL FIBRILLATION): ICD-10-CM

## 2019-08-01 DIAGNOSIS — Z95.0 PACEMAKER: ICD-10-CM

## 2019-08-01 DIAGNOSIS — I50.42 CHRONIC COMBINED SYSTOLIC AND DIASTOLIC CONGESTIVE HEART FAILURE: ICD-10-CM

## 2019-08-01 DIAGNOSIS — I49.5 SICK SINUS SYNDROME: ICD-10-CM

## 2019-08-01 LAB
ALBUMIN SERPL BCP-MCNC: 2.5 G/DL (ref 3.5–5.2)
ALP SERPL-CCNC: 65 U/L (ref 55–135)
ALT SERPL W/O P-5'-P-CCNC: 13 U/L (ref 10–44)
ANION GAP SERPL CALC-SCNC: 7 MMOL/L (ref 8–16)
AST SERPL-CCNC: 19 U/L (ref 10–40)
BACTERIA #/AREA URNS HPF: NORMAL /HPF
BASOPHILS # BLD AUTO: 0.02 K/UL (ref 0–0.2)
BASOPHILS NFR BLD: 0.3 % (ref 0–1.9)
BILIRUB SERPL-MCNC: 0.4 MG/DL (ref 0.1–1)
BILIRUB UR QL STRIP: NEGATIVE
BNP SERPL-MCNC: 240 PG/ML (ref 0–99)
BUN SERPL-MCNC: 14 MG/DL (ref 8–23)
CALCIUM SERPL-MCNC: 8 MG/DL (ref 8.7–10.5)
CHLORIDE SERPL-SCNC: 111 MMOL/L (ref 95–110)
CLARITY UR: CLEAR
CO2 SERPL-SCNC: 22 MMOL/L (ref 23–29)
COLOR UR: YELLOW
CREAT SERPL-MCNC: 0.8 MG/DL (ref 0.5–1.4)
DIFFERENTIAL METHOD: ABNORMAL
EOSINOPHIL # BLD AUTO: 0.1 K/UL (ref 0–0.5)
EOSINOPHIL NFR BLD: 1.7 % (ref 0–8)
ERYTHROCYTE [DISTWIDTH] IN BLOOD BY AUTOMATED COUNT: 14.9 % (ref 11.5–14.5)
EST. GFR  (AFRICAN AMERICAN): >60 ML/MIN/1.73 M^2
EST. GFR  (NON AFRICAN AMERICAN): >60 ML/MIN/1.73 M^2
GLUCOSE SERPL-MCNC: 127 MG/DL (ref 70–110)
GLUCOSE SERPL-MCNC: 57 MG/DL (ref 70–110)
GLUCOSE UR QL STRIP: ABNORMAL
HCT VFR BLD AUTO: 31.9 % (ref 40–54)
HGB BLD-MCNC: 9.7 G/DL (ref 14–18)
HGB UR QL STRIP: NEGATIVE
HYALINE CASTS #/AREA URNS LPF: 0 /LPF
KETONES UR QL STRIP: NEGATIVE
LACTATE SERPL-SCNC: 2.6 MMOL/L (ref 0.5–2.2)
LACTATE SERPL-SCNC: 2.7 MMOL/L (ref 0.5–2.2)
LEUKOCYTE ESTERASE UR QL STRIP: NEGATIVE
LYMPHOCYTES # BLD AUTO: 0.5 K/UL (ref 1–4.8)
LYMPHOCYTES NFR BLD: 8 % (ref 18–48)
MAGNESIUM SERPL-MCNC: 1.3 MG/DL (ref 1.6–2.6)
MCH RBC QN AUTO: 30.3 PG (ref 27–31)
MCHC RBC AUTO-ENTMCNC: 30.4 G/DL (ref 32–36)
MCV RBC AUTO: 100 FL (ref 82–98)
MICROSCOPIC COMMENT: NORMAL
MONOCYTES # BLD AUTO: 0.5 K/UL (ref 0.3–1)
MONOCYTES NFR BLD: 7.9 % (ref 4–15)
NEUTROPHILS # BLD AUTO: 5.3 K/UL (ref 1.8–7.7)
NEUTROPHILS NFR BLD: 82.1 % (ref 38–73)
NITRITE UR QL STRIP: NEGATIVE
PH UR STRIP: 5 [PH] (ref 5–8)
PLATELET # BLD AUTO: 211 K/UL (ref 150–350)
PMV BLD AUTO: 9.6 FL (ref 9.2–12.9)
POCT GLUCOSE: 50 MG/DL (ref 70–110)
POTASSIUM SERPL-SCNC: 3.2 MMOL/L (ref 3.5–5.1)
PROT SERPL-MCNC: 5.6 G/DL (ref 6–8.4)
PROT UR QL STRIP: ABNORMAL
RBC # BLD AUTO: 3.2 M/UL (ref 4.6–6.2)
RBC #/AREA URNS HPF: 1 /HPF (ref 0–4)
SODIUM SERPL-SCNC: 140 MMOL/L (ref 136–145)
SP GR UR STRIP: 1.02 (ref 1–1.03)
SQUAMOUS #/AREA URNS HPF: 1 /HPF
T4 FREE SERPL-MCNC: 0.88 NG/DL (ref 0.71–1.51)
TROPONIN I SERPL DL<=0.01 NG/ML-MCNC: 0.02 NG/ML (ref 0–0.03)
TSH SERPL DL<=0.005 MIU/L-ACNC: 12.4 UIU/ML (ref 0.4–4)
URN SPEC COLLECT METH UR: ABNORMAL
UROBILINOGEN UR STRIP-ACNC: NEGATIVE EU/DL
WBC # BLD AUTO: 6.47 K/UL (ref 3.9–12.7)
WBC #/AREA URNS HPF: 1 /HPF (ref 0–5)
YEAST URNS QL MICRO: NORMAL

## 2019-08-01 PROCEDURE — 83605 ASSAY OF LACTIC ACID: CPT

## 2019-08-01 PROCEDURE — 87040 BLOOD CULTURE FOR BACTERIA: CPT | Mod: 59

## 2019-08-01 PROCEDURE — 82962 GLUCOSE BLOOD TEST: CPT

## 2019-08-01 PROCEDURE — 25000003 PHARM REV CODE 250: Performed by: EMERGENCY MEDICINE

## 2019-08-01 PROCEDURE — 84439 ASSAY OF FREE THYROXINE: CPT

## 2019-08-01 PROCEDURE — 93010 ELECTROCARDIOGRAM REPORT: CPT | Mod: ,,, | Performed by: INTERNAL MEDICINE

## 2019-08-01 PROCEDURE — 81000 URINALYSIS NONAUTO W/SCOPE: CPT

## 2019-08-01 PROCEDURE — 63600175 PHARM REV CODE 636 W HCPCS: Performed by: EMERGENCY MEDICINE

## 2019-08-01 PROCEDURE — 83735 ASSAY OF MAGNESIUM: CPT

## 2019-08-01 PROCEDURE — 99285 EMERGENCY DEPT VISIT HI MDM: CPT | Mod: 25

## 2019-08-01 PROCEDURE — 93005 ELECTROCARDIOGRAM TRACING: CPT

## 2019-08-01 PROCEDURE — 84443 ASSAY THYROID STIM HORMONE: CPT

## 2019-08-01 PROCEDURE — 80053 COMPREHEN METABOLIC PANEL: CPT

## 2019-08-01 PROCEDURE — 96374 THER/PROPH/DIAG INJ IV PUSH: CPT

## 2019-08-01 PROCEDURE — 85025 COMPLETE CBC W/AUTO DIFF WBC: CPT

## 2019-08-01 PROCEDURE — 84484 ASSAY OF TROPONIN QUANT: CPT

## 2019-08-01 PROCEDURE — G0378 HOSPITAL OBSERVATION PER HR: HCPCS

## 2019-08-01 PROCEDURE — 83880 ASSAY OF NATRIURETIC PEPTIDE: CPT

## 2019-08-01 PROCEDURE — 93010 EKG 12-LEAD: ICD-10-PCS | Mod: ,,, | Performed by: INTERNAL MEDICINE

## 2019-08-01 RX ORDER — DEXTROSE 50 % IN WATER (D50W) INTRAVENOUS SYRINGE
25
Status: COMPLETED | OUTPATIENT
Start: 2019-08-01 | End: 2019-08-01

## 2019-08-01 RX ORDER — CLOPIDOGREL BISULFATE 75 MG/1
75 TABLET ORAL DAILY
Status: DISCONTINUED | OUTPATIENT
Start: 2019-08-02 | End: 2019-08-02 | Stop reason: HOSPADM

## 2019-08-01 RX ORDER — POTASSIUM CHLORIDE 20 MEQ/15ML
40 SOLUTION ORAL
Status: COMPLETED | OUTPATIENT
Start: 2019-08-01 | End: 2019-08-01

## 2019-08-01 RX ORDER — SODIUM CHLORIDE 0.9 % (FLUSH) 0.9 %
10 SYRINGE (ML) INJECTION
Status: DISCONTINUED | OUTPATIENT
Start: 2019-08-01 | End: 2019-08-02 | Stop reason: HOSPADM

## 2019-08-01 RX ADMIN — DEXTROSE MONOHYDRATE 25 G: 25 INJECTION, SOLUTION INTRAVENOUS at 06:08

## 2019-08-01 RX ADMIN — SODIUM CHLORIDE 1000 ML: 0.9 INJECTION, SOLUTION INTRAVENOUS at 05:08

## 2019-08-01 RX ADMIN — POTASSIUM CHLORIDE 40 MEQ: 20 SOLUTION ORAL at 11:08

## 2019-08-01 NOTE — ED TRIAGE NOTES
Patient arrived via ems  Family at bedside  Placed patient on cardiac monitor:  Sinus dev Hr in low 50's

## 2019-08-01 NOTE — ED NOTES
Administered D50 per emar orders  Patient provided urinal per patient request not to have straight cath

## 2019-08-01 NOTE — ED PROVIDER NOTES
Encounter Date: 8/1/2019       History     Chief Complaint   Patient presents with    Weakness     weakness and dizziness x 4 days d/c from hospital 10 days ago     78 y.o. male Past Medical History:  7/18/2019: A-fib  No date: Anticoagulant long-term use      Comment:  Plavix (on hold for angiogram)  No date: Arthritis  No date: Coronary artery disease      Comment:  pacemaker, stentsx2  No date: Diabetes mellitus  No date: Hypertension  No date: Pacemaker  No date: Sleep apnea  No date: Stroke      Comment:  Rt side affected / mild  No date: Thyroid disease     Presents for evaluation of generalized weakness x 4 days. Was recently admitted for afib/rvr and electrically cardioverted, now on amiodarone. Pt denies cp, sob, n/v, diarrhea/dysuria, f/c or other c/o.      7/9/19 cardiology note:  TTE  LVEF 40-45%, mild global HK  Normal valves  Mild-mod MR  Mild TR  Mild AI  No LA/MAURICIO thrombus     Successful DCCV AF->NSR 360J x1          Review of patient's allergies indicates:   Allergen Reactions    Penicillins Swelling     Past Medical History:   Diagnosis Date    A-fib 7/18/2019    Anticoagulant long-term use     Plavix (on hold for angiogram)    Arthritis     Coronary artery disease     pacemaker, stentsx2    Diabetes mellitus     Hypertension     Pacemaker     Sleep apnea     Stroke     Rt side affected / mild    Thyroid disease      Past Surgical History:   Procedure Laterality Date    ANGIOGRAM N/A 5/31/2013    Performed by [facility] OR    ANKLE SURGERY      APPENDECTOMY      CARDIAC PACEMAKER PLACEMENT      Cardioversion or Defibrillation  7/19/2019    Performed by Nic Miguel [facility] at [facility] CATH LAB    CHOLECYSTECTOMY      CORONARY ANGIOPLASTY WITH STENT PLACEMENT  6/06    stents x 2 placed     EYE SURGERY      HEART CATH-LEFT Left 10/23/2017    [facility]    HERNIA REPAIR      ventral hernia repair    REPAIR, AAA,  ENDOVASCULAR N/A 2013    [facility] at [facility] OR    sciatica      sciatica procedure    Transesophageal echo (VERITO) intra-procedure log documentation N/A 2019    Performed by [facility] at [facility] CATH LAB     Family History   Problem Relation Age of Onset    Cancer Mother     Heart disease Father     Cancer Sister      Social History     Tobacco Use    Smoking status: Former Smoker     Last attempt to quit: 1998     Years since quittin.1    Smokeless tobacco: Never Used   Substance Use Topics    Alcohol use: No     Alcohol/week: 0.0 oz    Drug use: No     Review of Systems   Constitutional: Negative for fever.   HENT: Negative for sore throat.    Respiratory: Negative for shortness of breath.    Cardiovascular: Negative for chest pain.   Gastrointestinal: Negative for nausea.   Genitourinary: Negative for dysuria.   Musculoskeletal: Negative for back pain.   Skin: Negative for rash.   Neurological: Positive for weakness.   Hematological: Does not bruise/bleed easily.       Physical Exam     Initial Vitals [19 1554]   BP Pulse Resp Temp SpO2   (!) 109/59 (!) 53 20 98.6 °F (37 °C) 98 %      MAP       --         Physical Exam    Nursing note and vitals reviewed.  Constitutional: He appears well-developed and well-nourished.   HENT:   Head: Normocephalic and atraumatic.   Eyes: EOM are normal. Pupils are equal, round, and reactive to light.   Cardiovascular: Normal rate, regular rhythm and normal heart sounds.   Pulmonary/Chest: Effort normal and breath sounds normal. No respiratory distress. He has no wheezes. He has no rales.   Abdominal: He exhibits no distension.   Musculoskeletal: He exhibits no edema or tenderness.   Neurological: He is alert and oriented to person, place, and time.   Skin: Skin is warm and dry.   Psychiatric: He has a normal mood and affect.         ED Course   Procedures  Labs Reviewed   CULTURE, BLOOD   CULTURE, BLOOD   B-TYPE  NATRIURETIC PEPTIDE   CBC W/ AUTO DIFFERENTIAL   COMPREHENSIVE METABOLIC PANEL   MAGNESIUM   TROPONIN I   TSH   URINALYSIS, REFLEX TO URINE CULTURE   POCT GLUCOSE MONITORING CONTINUOUS     EKG Readings: (Independently Interpreted)   Hr 54, paced, nl axis, no genesis/twi, non acute, no stemi.        Imaging Results          X-Ray Chest AP Portable (In process)                                  pt o2 sats noted to be low during a few times this visit, he was asleep and snoring and has known sleep apnea. As soon as he was awakened o2 sat normal. NS was ordered but pt never got any.    Pt has been very bradycardic during this visit. I suspect he has symptomatic bradycardia. I have spoken with Vianney the [employer] rep who informs me that his device is set to pace at 50, she feels that it is working at this time, however he is on amio as well.      Clinical Impression:       ICD-10-CM ICD-9-CM   1. Symptomatic bradycardia R00.1 427.89   2. Weakness R53.1 780.79                                Chey Lamb MD  08/01/19 2052

## 2019-08-01 NOTE — ED NOTES
Patient placed on continuous cardiac monitor, automatic blood pressure cuff and continuous pulse oximeter.  Sinus dev with hr that drops to 48 intermittently

## 2019-08-01 NOTE — TELEPHONE ENCOUNTER
Darren, OT, states that his CBG yesterday was 32. CBG 20 minutes ago was 187. BP is 84/50 and feeling weak at this time. Advised that the patient call 911 and go to the ED immediately. Darren, OT, will relay this message to this patient and his wife at this time.     Reason for Disposition   Systolic BP < 90 and feeling dizzy, lightheaded, or weak    Protocols used: LOW BLOOD PRESSURE-A-OH

## 2019-08-02 VITALS
WEIGHT: 175.5 LBS | SYSTOLIC BLOOD PRESSURE: 164 MMHG | RESPIRATION RATE: 17 BRPM | HEIGHT: 65 IN | BODY MASS INDEX: 29.24 KG/M2 | OXYGEN SATURATION: 92 % | TEMPERATURE: 99 F | DIASTOLIC BLOOD PRESSURE: 82 MMHG | HEART RATE: 62 BPM

## 2019-08-02 PROBLEM — T46.2X5A ADVERSE EFFECT OF AMIODARONE: Status: ACTIVE | Noted: 2019-08-02

## 2019-08-02 PROBLEM — I50.42 CHRONIC COMBINED SYSTOLIC AND DIASTOLIC CONGESTIVE HEART FAILURE: Status: ACTIVE | Noted: 2019-07-19

## 2019-08-02 PROBLEM — I48.0 PAF (PAROXYSMAL ATRIAL FIBRILLATION): Status: ACTIVE | Noted: 2019-07-19

## 2019-08-02 LAB
LACTATE SERPL-SCNC: 1.2 MMOL/L (ref 0.5–2.2)
POCT GLUCOSE: 132 MG/DL (ref 70–110)
POCT GLUCOSE: 165 MG/DL (ref 70–110)

## 2019-08-02 PROCEDURE — 99220 PR INITIAL OBSERVATION CARE,LEVL III: CPT | Mod: ,,, | Performed by: INTERNAL MEDICINE

## 2019-08-02 PROCEDURE — 36415 COLL VENOUS BLD VENIPUNCTURE: CPT

## 2019-08-02 PROCEDURE — 83605 ASSAY OF LACTIC ACID: CPT

## 2019-08-02 PROCEDURE — 25000003 PHARM REV CODE 250: Performed by: EMERGENCY MEDICINE

## 2019-08-02 PROCEDURE — 94761 N-INVAS EAR/PLS OXIMETRY MLT: CPT

## 2019-08-02 PROCEDURE — G0378 HOSPITAL OBSERVATION PER HR: HCPCS

## 2019-08-02 PROCEDURE — 25000003 PHARM REV CODE 250: Performed by: HOSPITALIST

## 2019-08-02 PROCEDURE — 99220 PR INITIAL OBSERVATION CARE,LEVL III: ICD-10-PCS | Mod: ,,, | Performed by: INTERNAL MEDICINE

## 2019-08-02 RX ORDER — METOPROLOL SUCCINATE 25 MG/1
50 TABLET, EXTENDED RELEASE ORAL DAILY
Qty: 30 TABLET | Refills: 3 | Status: ON HOLD | OUTPATIENT
Start: 2019-08-02 | End: 2019-08-13 | Stop reason: HOSPADM

## 2019-08-02 RX ORDER — ACETAMINOPHEN 325 MG/1
650 TABLET ORAL EVERY 6 HOURS PRN
Status: DISCONTINUED | OUTPATIENT
Start: 2019-08-02 | End: 2019-08-02 | Stop reason: HOSPADM

## 2019-08-02 RX ADMIN — LEVOTHYROXINE SODIUM 175 MCG: 150 TABLET ORAL at 05:08

## 2019-08-02 RX ADMIN — CLOPIDOGREL BISULFATE 75 MG: 75 TABLET ORAL at 08:08

## 2019-08-02 RX ADMIN — ACETAMINOPHEN 650 MG: 325 TABLET, FILM COATED ORAL at 05:08

## 2019-08-02 NOTE — NURSING
Received report from JOEL Malik RN. Pt AAOx3 with no c/o pain. Telemetry monitor in place. Saline lock in place to site is clear. Pt informed of plan of care and safety maintained with bed low side rails up x2 with nurse call bell within reach and bed alarm set

## 2019-08-02 NOTE — PLAN OF CARE
08/02/19 1103   Discharge Assessment   Assessment Type Discharge Planning Assessment   Assessment information obtained from? Medical Record   Prior to hospitilization cognitive status: Alert/Oriented   Prior to hospitalization functional status: Independent;Assistive Equipment   Current cognitive status: Alert/Oriented   Current Functional Status: Independent;Assistive Equipment   Facility Arrived From: home   Lives With spouse   Able to Return to Prior Arrangements yes   Is patient able to care for self after discharge? Yes   Who are your caregiver(s) and their phone number(s)? Antonia- [phone]   Patient's perception of discharge disposition home or selfcare   Readmission Within the Last 30 Days no previous admission in last 30 days   Patient currently being followed by outpatient case management? No   Patient currently receives any other outside agency services? No   Equipment Currently Used at Home walker, rolling;wheelchair;CPAP;glucometer   Do you have any problems affording any of your prescribed medications? No   Is the patient taking medications as prescribed? yes   Does the patient have transportation home? Yes   Transportation Anticipated family or friend will provide   Does the patient receive services at the [facility]? No   Discharge Plan A Home with family  (pt has [facility] currently till 8/4/19)   DME Needed Upon Discharge  none   Patient/Family in Agreement with Plan yes     [id number] - [place] - [address] AT [facility] [address]  [address]  [place] [address]  Phone: [phone] Fax: [phone]    [id number] - [place] - [address]  [address]  [place] [address]  Phone: [phone] Fax: [phone]

## 2019-08-02 NOTE — NURSING
Called spouse Antonia Theodore to notify pt is discharge and waiting for family to arrive for discharge instructions to be reviewed

## 2019-08-02 NOTE — PROGRESS NOTES
WRITTEN HEALTHCARE DISCHARGE INFORMATION      Things that YOU are RESPONSIBLE for to Manage Your Care At Home:     1. Getting your prescriptions filled.  2. Taking you medications as directed. DO NOT MISS ANY DOSES!  3. Going to your follow-up doctor appointments. This is important because it allows the doctor to monitor your progress and to determine if any changes need to be made to your treatment plan.     If you are unable to make your follow up appointments, please call the number listed and reschedule this appointment.      ____________HELP AT HOME____________________     Experiencing any SIGNS or SYMPTOMS: YOU CAN     Schedule a same day appopintment with your Primary Care Doctor or  you can call [facility] On Call Nurse Care Line for 24/7 assistance at [phone]     If you are experience any signs or symptoms that have become severe, Call 911 and come to your nearest Emergency Room.     Thank you for choosing [facility] and allowing us to care for you.   From your care management team:      You should receive a call from [facility] Discharge Department within 48-72 hours to help manage your care after discharge. Please try to make sure that you answer your phone for this important phone call.    Follow-up Information     Nic Mauro MD In 1 week.    Specialty:  Family Medicine  Why:  Call the office to schedule appointment, For outpatient follow-up/post hospitalizaton  Contact information:  [address] [facility] [address] [facility] [address] [phone]             Nic Miguel MD On 8/12/2019.    Specialties:  Cardiology, INTERVENTIONAL CARDIOLOGY  Why:  Appointment scheduled for Monday August 12th 9am  Contact information:  [address] [facility] [address]  [address]  [facility] [address]  [phone]

## 2019-08-02 NOTE — CONSULTS
[facility]  Cardiology  Consult Note    Patient Name: Doni Theodore  MRN: [id number]  Admission Date: 8/1/2019  Hospital Length of Stay: 0 days  Code Status: Full Code   Attending Provider: Destiny Bernardo MD   Consulting Provider: Nic Miguel MD  Primary Care Physician: Nic Mauro MD  Principal Problem:Symptomatic bradycardia    Patient information was obtained from patient and ER records.     Inpatient consult to Cardiology  Consult performed by: Nic Miguel MD  Consult ordered by: Chey Lamb MD  Reason for consult: bradycardia, hx of PAF/DCCV on amio        Subjective:     Chief Complaint:  Weakness     HPI:   Presents for evaluation of generalized weakness x 4 days. Was recently admitted for afib/rvr and electrically cardioverted, now on amiodarone. Pt denies cp, sob, n/v, diarrhea/dysuria, f/c or other c/o.    Follows with me, recently seen for VERITO/DCCV as inpat 7/2019.    The patient is well known to me from his recent admission for atrial fibrillation underwent VERITO guided cardioversion.  He presents with complaints predominantly weakness.  He denies any angina or dyspnea.  He has had no palpitations or syncope.  There has been no PND, orthopnea, or lower extremity edema.  He denies melena, hematuria, or claudicant symptoms.  He was found to be bradycardic in the 50s.  Note is also made of an elevated TSH with normal T4 concerning for possible amiodarone toxicity.  There is no evidence of heart block or pacemaker malfunction.  At present, the patient is feeling back to his usual common wishes to be discharged.  He has a follow-up visit planned with me in 1 week.        [facility] Inpat Progress Note 7/20/19:  ECG (personally reviewed and interpreted tracing(s)):  7/18/19: , IRBBB (AF old).  Was last documented in SR 10/23/17.  7/19/19 1505 SR 59, IRBBB, ant ST abnl     Chest X-Ray (personally reviewed and interpreted image(s)): 7/18/19 mild CHF, L  PPM     VERITO/DCCV 7/19/19 (images personally reviewed and interpreted)  LVEF 40-45%, mild global HK  Normal valves  Mild-mod MR  Mild TR  Mild AI  No LA/MAURICIO thrombus  Successful DCCV AF->NSR 360J x1  Pt tolerated procedure well without complications  Plan:  Observe in ICU  Cont Xarelto 20mg qd  Start amio load  Dispo planning appropriate  Pt to follow up with me in 1-2 weeks.     Echo: 3/13/19 (care everywhere; VERITO ordered)    * Mildly increased left ventricular cavity size. Increased left ventricular wall thickness. Wall motion TDS. Mildly decreased left ventricular systolic function. Left ventricular ejection fraction is estimated at 45 %. Rhythm precludes evaluation of diastolic function.      * Normal right ventricular size. Right ventricular systolic pressure 26 mmHg. Catheter/pacemaker wire visualized in the right ventricle. Mild to Moderately increased right atrial size. Mild to Moderately increased left atrial size.      * Mild mitral annular calcification. Mild mitral valve regurgitation. Mild aortic valve calcification. Mild tricuspid valve regurgitation. Trace pulmonary valve regurgitation.      Other findings as noted above.       Cath: 10/23/17 (images prev personally reviewed and interpreted)  B. Summary/Post-Operative Diagnosis    [id number].    70% mid LAD ISR; FFR=0.79.     of D1.    Successful PCI of mid LAD with 4X15 MARY.    Successful PCI of D1 with 2.25X30 and 2.5X18 MARY.    IVUS utilized for stent sizing.  D. Hemodynamic Results  LVEDP (Pre): 22 mmHg  E. Angiographic Results       Patient has a right dominant coronary artery.      - Left Main Coronary Artery:             The LM has luminal irregularities. There is DONNA 3 flow.     - Left Anterior Descending Artery:             The proximal LAD has a 10% stenosis. There is DONNA 3 flow. The remaining portion of the vessel has luminal irregularities (10).             The mid LAD has a 70% stenosis. There is DONNA 3 flow. FFR was 0.79. The  remaining portion of the vessel has luminal irregularities (10).                     Lesion Details:   The length is 10-20 mm, eccentric shape, moderate proximal tortuosity, segment angulation of <45 degrees, irregular contour, mild to moderate calcification. No bifurcation is present. The minimum luminal diameter is   0.5 millimeters.             The distal LAD has a 50% stenosis. There is DONNA 3 flow. The remaining portion of the vessel is of small caliber.     - D1:             The D1 has chronic total occlusion. There is DONNA 0 flow. There is collateral flow from the PLB (faint). The remaining portion of the vessel has luminal irregularities (10).                     Lesion Details:   This is a Type C lesion.  The length is 40mm, eccentric shape, moderate proximal tortuosity, segment angulation of 45-90 degrees, irregular contour, mild to moderate calcification. Bifurcation is present. The minimum   luminal diameter is 0 millimeters.     - Left Circumflex Artery:             The proximal LCX has a 20% stenosis. There is DONNA 3 flow. The remaining portion of the vessel has luminal irregularities (10).             The distal LCX has luminal irregularities. There is DONNA 3 flow.     - OM1:             The OM1 has luminal irregularities has a 30% stenosis. There is DONNA 3 flow. The remaining portion of the vessel has luminal irregularities (10).     - Right Coronary Artery:             The proximal RCA has luminal irregularities. There is DONNA 3 flow.             The mid RCA has a 20% stenosis. There is DONNA 3 flow. The remaining portion of the vessel has luminal irregularities (10).             The distal RCA has a 50% stenosis. There is DONNA 3 flow. The remaining portion of the vessel has luminal irregularities (10).     - Posterior Lateral Branch:             The PLB has a 40% stenosis. There is DONNA 3 flow. The remaining portion of the vessel has luminal irregularities (10).  Intervention       Mid LAD:               The lesion was successfully intervened. Post-stenosis of 0%, post-DONNA 3 flow and TMP grade 3. The vessel was accessed natively.  The following items were used: Blln Trek Rx 3.0 X 12, Stent Resolute Rx 4.00x15 (MARY) and Cath [facility]   [place].       D1:              The lesion was successfully intervened. Post-stenosis of 0%, post-DONNA 3 flow and TMP grade 3. The vessel was accessed natively.  The following items were used: Cath Mini Trek 1.5 X 20 Rx, Blln Sc Euphora 2.0 X 10, Stent Resolute Rx 2.25x30   (MARY), Stent Resolute Rx 2.50x18 (MARY) and Cath Ivus [facility] [place].        Assessment and Plan:      * Atrial fibrillation with rapid ventricular response  Presenting sxs of FTT/fatigue/weakness, now resolved.  AF/RVR on arrival, persistent despite amio gtt, asymptomatic at rest.  Successful VERITO/DCCV 7/19/19  Cont Xarelto 20mg qd  Cont amio po reload as ordered        Persistent atrial fibrillation  As above     Acute on chronic combined systolic and diastolic congestive heart failure  Appears euvolemic  EF 40% by VERITO 7/2019, ?component of tachymyopathy  Cont Toprol XL 50mg qd  Inc lisinopril 10mg qd  Stop imdur        Coronary artery disease involving native coronary artery of native heart without angina pectoris  Known MV CAD s/p LAD/Diag PCI 10/2017  Mild LV dysfxn  Cont Plavix (with ongoing Xarelto rx)  Cont BBl/Statin/ACEi  Stop imdur  Will consider outpatient stress testing     Diabetes mellitus, type 2  Per IM     Pacemaker  Medtronic  ?hx SSS  Will interrogate as outpatient     Essential hypertension  Cont Toprol  Stop imdur  Inc lisinopril 10mg qd     Leg weakness, bilateral  Will plan outpatient PAD eval      Past Medical History:   Diagnosis Date    A-fib 7/18/2019    Anticoagulant long-term use     Plavix (on hold for angiogram)    Arthritis     Coronary artery disease     pacemaker, stentsx2    Diabetes mellitus     Hypertension     Pacemaker     Sleep apnea     Stroke     Rt  side affected / mild    Thyroid disease     hypothyroidism       Past Surgical History:   Procedure Laterality Date    ANGIOGRAM N/A 5/31/2013    Performed [facility] OR    ANKLE SURGERY      APPENDECTOMY      CARDIAC PACEMAKER PLACEMENT      Cardioversion or Defibrillation  7/19/2019    Performed by [facility] Miguel [facility] at [facility] CATH LAB    CHOLECYSTECTOMY      CORONARY ANGIOPLASTY WITH STENT PLACEMENT  6/06    stents x 2 placed     EYE SURGERY      HEART CATH-LEFT Left 10/23/2017    [facility]formed [facility]    HERNIA REPAIR      ventral hernia repair    REPAIR, AAA, ENDOVASCULAR N/A 6/18/2013    Performed by Radu [facility]gar [facility] at [facility] OR    sciatica      sciatica procedure    Transesophageal echo (VERITO) intra-procedure log documentation N/A 7/19/2019    Performed by Nic Migule MD at [facility] CATH LAB       Review of patient's allergies indicates:   Allergen Reactions    Penicillins Swelling       No current facility-administered medications on file prior to encounter.      Current Outpatient Medications on File Prior to Encounter   Medication Sig    [START ON 8/3/2019] amiodarone (PACERONE) 200 MG Tab Take 1 tablet (200 mg total) by mouth 2 (two) times daily. for 14 days    amiodarone (PACERONE) 400 MG tablet Take 1 tablet (400 mg total) by mouth 2 (two) times daily. for 14 days    aspirin (ECOTRIN) 81 MG EC tablet Take 81 mg by mouth once daily.    clopidogrel (PLAVIX) 75 mg tablet Take 75 mg by mouth once daily.    cyanocobalamin 2000 MCG tablet Take 2,500 mcg by mouth once daily.    fluticasone (FLONASE) 50 mcg/actuation nasal spray 1 spray by Each Nare route nightly as needed.     gabapentin (NEURONTIN) 300 MG capsule Take 300 mg by mouth 3 (three) times daily.    glimepiride (AMARYL) 4 MG tablet Take 4 mg by mouth before breakfast.    isosorbide mononitrate (IMDUR) 60 MG 24 hr tablet Take 60 mg by mouth once daily.     lisinopril 10 MG tablet Take 1 tablet (10 mg total) by mouth once daily.    metFORMIN (GLUCOPHAGE) 1000 MG tablet Take 1,000 mg by mouth 2 (two) times daily with meals.    metoprolol succinate (TOPROL-XL) 25 MG 24 hr tablet     ranitidine (ZANTAC) 300 MG tablet Take 300 mg by mouth every evening.    sertraline (ZOLOFT) 100 MG tablet Take 100 mg by mouth 2 (two) times daily.    SYNTHROID 175 mcg tablet     tamsulosin (FLOMAX) 0.4 mg Cp24 TAKE 1 CAPSULE AT BEDTIME.    traMADol (ULTRAM) 50 mg tablet Take 0.5 tablets (25 mg total) by mouth every 8 (eight) hours as needed (Sever pain not controlled by tylenol or tizanidine).    vitamin D 185 MG Tab Take 2,000 mg by mouth once daily.     XARELTO 20 mg Tab TAKE 1 TABLET BY MOUTH ONCE A DAY WITH EVENING MEAL    [START ON 2019] amiodarone (PACERONE) 200 MG Tab Take 1 tablet (200 mg total) by mouth once daily.    midodrine (PROAMATINE) 5 MG Tab     nitroGLYCERIN (NITROSTAT) 0.4 MG SL tablet Place 0.4 mg under the tongue every 5 (five) minutes as needed for Chest pain.     Family History     Problem Relation (Age of Onset)    Cancer Mother, Sister    Heart disease Father        Tobacco Use    Smoking status: Former Smoker     Last attempt to quit: 1998     Years since quittin.1    Smokeless tobacco: Never Used   Substance and Sexual Activity    Alcohol use: No     Alcohol/week: 0.0 oz    Drug use: No    Sexual activity: Never     Review of Systems   Constitution: Positive for malaise/fatigue. Negative for chills, diaphoresis and fever.   HENT: Negative for nosebleeds.    Eyes: Negative for blurred vision and double vision.   Cardiovascular: Negative for chest pain, claudication, cyanosis, dyspnea on exertion, leg swelling, orthopnea, palpitations, paroxysmal nocturnal dyspnea and syncope.   Respiratory: Negative for cough, shortness of breath and wheezing.    Skin: Negative for dry skin and poor wound healing.   Musculoskeletal: Negative for  back pain, joint swelling and myalgias.   Gastrointestinal: Negative for abdominal pain, nausea and vomiting.   Genitourinary: Negative for hematuria.   Neurological: Negative for dizziness, headaches, numbness, seizures and weakness.   Psychiatric/Behavioral: Negative for altered mental status and depression.     Objective:     Vital Signs (Most Recent):  Temp: 98.9 °F (37.2 °C) (08/02/19 0738)  Pulse: 61 (08/02/19 0738)  Resp: 18 (08/02/19 0738)  BP: (!) 164/82 (08/02/19 0738)  SpO2: (!) 94 % (08/02/19 0738) Vital Signs (24h Range):  Temp:  [98.6 °F (37 °C)-101 °F (38.3 °C)] 98.9 °F (37.2 °C)  Pulse:  [49-72] 61  Resp:  [15-20] 18  SpO2:  [91 %-98 %] 94 %  BP: ()/(57-91) 164/82     Weight: 79.6 kg (175 lb 7.8 oz)  Body mass index is 29.2 kg/m².    SpO2: (!) 94 %  O2 Device (Oxygen Therapy): room air      Intake/Output Summary (Last 24 hours) at 8/2/2019 0741  Last data filed at 8/1/2019 2231  Gross per 24 hour   Intake 600 ml   Output --   Net 600 ml       Lines/Drains/Airways     Peripheral Intravenous Line                 Peripheral IV - Single Lumen 08/01/19 20 G Left Wrist 1 day                Physical Exam   Constitutional: He is oriented to person, place, and time. He appears well-developed and well-nourished.   HENT:   Head: Normocephalic and atraumatic.   Eyes: Pupils are equal, round, and reactive to light. Conjunctivae and EOM are normal. No scleral icterus.   Neck: Normal range of motion. Neck supple. No JVD present. Carotid bruit is not present. No tracheal deviation present. No thyromegaly present.   Cardiovascular: Normal rate, regular rhythm, S1 normal and S2 normal. Exam reveals no gallop and no friction rub.   No murmur heard.  Pulmonary/Chest: Effort normal and breath sounds normal. No respiratory distress. He has no wheezes. He has no rales. He exhibits no tenderness.   Abdominal: Soft. He exhibits no distension.   Musculoskeletal: He exhibits no edema.   Neurological: He is alert and  oriented to person, place, and time. He has normal strength. No cranial nerve deficit.   Skin: Skin is warm and dry. No rash noted.   Psychiatric: He has a normal mood and affect. His behavior is normal.       Current Medications:   clopidogrel  75 mg Oral Daily    levothyroxine  175 mcg Oral Before breakfast    rivaroxaban  20 mg Oral Daily with dinner       acetaminophen, sodium chloride 0.9%    Laboratory (all labs reviewed):  CBC:  Recent Labs   Lab 10/24/17  0641 04/22/19  2206 07/18/19 2047 07/20/19  0350 08/01/19  1717   WBC 6.11 8.86 5.54 5.93 6.47   Hemoglobin 11.9 L 11.6 L 11.4 L 11.4 L 9.7 L   Hematocrit 35.9 L 36.7 L 36.4 L 37.0 L 31.9 L   Platelets 176 214 173 179 211       CHEMISTRIES:  Recent Labs   Lab 06/07/17  0341 06/08/17  0427  10/24/17  0641 04/22/19 2206 07/18/19 2047 07/20/19  0350 08/01/19  1717   Glucose 155 H 186 H   < > 186 H 193 H 101 101 57 L   Sodium 140 138   < > 137 139 142 141 140   Potassium 4.1 3.8   < > 4.4 4.5 4.0 3.3 L 3.2 L   BUN, Bld 16 16   < > 11 22 12 12 14   Creatinine 0.9 1.0   < > 1.0 1.0 1.0 0.9 0.8   eGFR if African American >60.0 >60.0   < > >60.0 >60 >60 >60 >60   eGFR if non African American >60.0 >60.0   < > >60.0 >60 >60 >60 >60   Calcium 9.4 9.6   < > 10.0 9.2 9.2 9.3 8.0 L   Magnesium 1.6 1.6  --   --  1.3 L  --  1.5 L 1.3 L    < > = values in this interval not displayed.       CARDIAC BIOMARKERS:  Recent Labs   Lab 07/19/19  0238 07/19/19  0758 07/19/19  1338 07/19/19  1953 08/01/19  1717   Troponin I 0.029 H 0.018 0.009 0.028 H 0.016       COAGS:  Recent Labs   Lab 06/07/17  0341 06/08/17  0427 10/23/17  0705 10/24/17  0853 07/18/19  2047   INR 1.8 H 1.9 H 1.2 1.2 1.1       LIPIDS/LFTS:  Recent Labs   Lab 06/08/17  0427 04/22/19  2206 07/18/19  2047 07/19/19  0758 07/20/19  0350 08/01/19  1717   Cholesterol  --   --   --  85 L  --   --    Triglycerides  --   --   --  61  --   --    HDL  --   --   --  46  --   --    LDL Cholesterol  --   --   --  26.8 L   --   --    Non-HDL Cholesterol  --   --   --  39  --   --    AST 65 H 46 H 16  --  15 19    H 38 11  --  11 13       BNP:  Recent Labs   Lab 02/16/17  1311 04/22/19  2206 07/18/19 2047 08/01/19 1717   BNP 81 271 H 366 H 240 H       TSH:  Recent Labs   Lab 06/05/17 0619 07/18/19 2047 08/01/19  1717   TSH 5.056 H 13.841 H 12.396 H       Free T4:  Recent Labs   Lab 06/05/17 0619 07/18/19 2047 08/01/19 1717   Free T4 1.15 1.05 0.88       Diagnostic Results:  ECG (personally reviewed and interpreted tracing(s)):  8/1/19 1613 SR 54, occ     Chest X-Ray (personally reviewed and interpreted image(s)): 8/1/19 mild CHF, L PPM 1 lead    VERITO/DCCV 7/19/19  LVEF 40-45%, mild global HK  Normal valves  Mild-mod MR  Mild TR  Mild AI  No LA/MAURICIO thrombus  Successful DCCV AF->NSR 360J x1  Pt tolerated procedure well without complications  Plan:  Observe in ICU  Cont Xarelto 20mg qd  Start amio load  Dispo planning appropriate  Pt to follow up with me in 1-2 weeks.     Echo: 3/13/19     * Mildly increased left ventricular cavity size. Increased left ventricular wall thickness. Wall motion TDS. Mildly decreased left ventricular systolic function. Left ventricular ejection fraction is estimated at 45 %. Rhythm precludes evaluation of diastolic function.      * Normal right ventricular size. Right ventricular systolic pressure 26 mmHg. Catheter/pacemaker wire visualized in the right ventricle. Mild to Moderately increased right atrial size. Mild to Moderately increased left atrial size.      * Mild mitral annular calcification. Mild mitral valve regurgitation. Mild aortic valve calcification. Mild tricuspid valve regurgitation. Trace pulmonary valve regurgitation.      Other findings as noted above.       Cath: 10/23/17   B. Summary/Post-Operative Diagnosis    [id number].    70% mid LAD ISR; FFR=0.79.     of D1.    Successful PCI of mid LAD with 4X15 MARY.    Successful PCI of D1 with 2.25X30 and 2.5X18 MARY.    IVUS  utilized for stent sizing.  D. Hemodynamic Results  LVEDP (Pre): 22 mmHg  E. Angiographic Results       Patient has a right dominant coronary artery.      - Left Main Coronary Artery:             The LM has luminal irregularities. There is DONNA 3 flow.     - Left Anterior Descending Artery:             The proximal LAD has a 10% stenosis. There is DONNA 3 flow. The remaining portion of the vessel has luminal irregularities (10).             The mid LAD has a 70% stenosis. There is DONNA 3 flow. FFR was 0.79. The remaining portion of the vessel has luminal irregularities (10).                     Lesion Details:   The length is 10-20 mm, eccentric shape, moderate proximal tortuosity, segment angulation of <45 degrees, irregular contour, mild to moderate calcification. No bifurcation is present. The minimum luminal diameter is   0.5 millimeters.             The distal LAD has a 50% stenosis. There is DONNA 3 flow. The remaining portion of the vessel is of small caliber.     - D1:             The D1 has chronic total occlusion. There is DONNA 0 flow. There is collateral flow from the PLB (faint). The remaining portion of the vessel has luminal irregularities (10).                     Lesion Details:   This is a Type C lesion.  The length is 40mm, eccentric shape, moderate proximal tortuosity, segment angulation of 45-90 degrees, irregular contour, mild to moderate calcification. Bifurcation is present. The minimum   luminal diameter is 0 millimeters.     - Left Circumflex Artery:             The proximal LCX has a 20% stenosis. There is DONNA 3 flow. The remaining portion of the vessel has luminal irregularities (10).             The distal LCX has luminal irregularities. There is DONNA 3 flow.     - OM1:             The OM1 has luminal irregularities has a 30% stenosis. There is DONNA 3 flow. The remaining portion of the vessel has luminal irregularities (10).     - Right Coronary Artery:             The proximal RCA has  luminal irregularities. There is DONNA 3 flow.             The mid RCA has a 20% stenosis. There is DONNA 3 flow. The remaining portion of the vessel has luminal irregularities (10).             The distal RCA has a 50% stenosis. There is DONNA 3 flow. The remaining portion of the vessel has luminal irregularities (10).     - Posterior Lateral Branch:             The PLB has a 40% stenosis. There is DONNA 3 flow. The remaining portion of the vessel has luminal irregularities (10).  Intervention       Mid LAD:              The lesion was successfully intervened. Post-stenosis of 0%, post-DONNA 3 flow and TMP grade 3. The vessel was accessed natively.  The following items were used: Blln Trek Rx 3.0 X 12, Stent Resolute Rx 4.00x15 (MARY) and Cath [facility]   [place].       D1:              The lesion was successfully intervened. Post-stenosis of 0%, post-DONNA 3 flow and TMP grade 3. The vessel was accessed natively.  The following items were used: Cath Mini Trek 1.5 X 20 Rx, Blln Sc Euphora 2.0 X 10, Stent Resolute Rx 2.25x30   (MARY), Stent Resolute Rx 2.50x18 (MARY) and Cath [facility] [place].    Assessment and Plan:     * Symptomatic bradycardia  Presenting sxs of fatigue, resolved  Bradycardia improved  Medtronic PPM in place, normally functioning  Will stop amio given bradycardia and elev TSH, may need alternative AAD if AF recurs.        Adverse effect of amiodarone  Elev TSH with normal T4  Stop amio  Will need to consider alternative AAD if recurrent PAF    PAF (paroxysmal atrial fibrillation)  Presenting sxs of fatigue, now resolved.    Successful VERITO/DCCV 7/19/19, currently in SR  Cont Xarelto 20mg qd  Stop amio given elev TSH (T4 normal), may need alternative AAD if recurrent AF.         Chronic combined systolic and diastolic congestive heart failure  Appears euvolemic  EF 40% by VERITO 7/2019, ?component of tachymyopathy  Cont Toprol XL 50mg qd  Cont lisinopril 10mg qd      Coronary artery disease  involving native coronary artery of native heart without angina pectoris  Known MV CAD s/p LAD/Diag PCI 10/2017  Mild LV dysfxn  Cont Plavix (with ongoing Xarelto rx)  Cont BBl/Statin/ACEi  Stop imdur  Will consider outpatient stress testing       Diabetes mellitus, type 2  Per IM    Pacemaker  Medtronic  ?hx SSS  Will interrogate as outpatient      Essential hypertension  Cont med rx    Leg weakness, bilateral  Will plan outpat PAD eval    Sick sinus syndrome  Medtronic PPM, normally functioning  Outpat interrogation        VTE Risk Mitigation (From admission, onward)        Ordered     rivaroxaban tablet 20 mg  With dinner      08/01/19 2223     IP VTE HIGH RISK PATIENT  Once      08/01/19 2223     Place sequential compression device  Until discontinued      08/01/19 2223        Dispo planning appropriate  Pt to follow up with me on 8/12/19 as planned  No further inpatient cardiac testing necessary    Thank you for your consult. I will follow-up with patient. Please contact us if you have any additional questions.    Nic Miguel MD  Cardiology   [facility]

## 2019-08-02 NOTE — PLAN OF CARE
08/02/19 1108   Final Note   Assessment Type Final Discharge Note   Anticipated Discharge Disposition Home   Hospital Follow Up  Appt(s) scheduled? Yes   Discharge plans and expectations educations in teach back method with documentation complete? Yes   Right Care Referral Info   Post Acute Recommendation No Care   pts nurse Leticia advised that pt can d/c from CM standpoint

## 2019-08-02 NOTE — SUBJECTIVE & OBJECTIVE
Past Medical History:   Diagnosis Date    A-fib 7/18/2019    Anticoagulant long-term use     Plavix (on hold for angiogram)    Arthritis     Coronary artery disease     pacemaker, stentsx2    Diabetes mellitus     Hypertension     Pacemaker     Sleep apnea     Stroke     Rt side affected / mild    Thyroid disease     hypothyroidism       Past Surgical History:   Procedure Laterality Date    ANGIOGRAM N/A 5/31/2013    Performed by [facility] OR    ANKLE SURGERY      APPENDECTOMY      CARDIAC PACEMAKER PLACEMENT      Cardioversion or Defibrillation  7/19/2019    Performed by Nic Miguel [facility] at [facility] CATH LAB    CHOLECYSTECTOMY      CORONARY ANGIOPLASTY WITH STENT PLACEMENT  6/06    stents x 2 placed     EYE SURGERY      HEART CATH-LEFT Left 10/23/2017    [facility]    HERNIA REPAIR      ventral hernia repair    REPAIR, AAA, ENDOVASCULAR N/A 6/18/2013    [facility] by Radu Melgar [facility] at [facility] OR    sciatica      sciatica procedure    Transesophageal echo (VERITO) intra-procedure log documentation N/A 7/19/2019    Performed by Nic Miguel [facility] at [facility] CATH LAB       Review of patient's allergies indicates:   Allergen Reactions    Penicillins Swelling       No current facility-administered medications on file prior to encounter.      Current Outpatient Medications on File Prior to Encounter   Medication Sig    [START ON 8/3/2019] amiodarone (PACERONE) 200 MG Tab Take 1 tablet (200 mg total) by mouth 2 (two) times daily. for 14 days    amiodarone (PACERONE) 400 MG tablet Take 1 tablet (400 mg total) by mouth 2 (two) times daily. for 14 days    aspirin (ECOTRIN) 81 MG EC tablet Take 81 mg by mouth once daily.    clopidogrel (PLAVIX) 75 mg tablet Take 75 mg by mouth once daily.    cyanocobalamin 2000 MCG tablet Take 2,500 mcg by mouth once daily.    fluticasone (FLONASE) 50 mcg/actuation nasal spray 1 spray by Each  Nare route nightly as needed.     gabapentin (NEURONTIN) 300 MG capsule Take 300 mg by mouth 3 (three) times daily.    glimepiride (AMARYL) 4 MG tablet Take 4 mg by mouth before breakfast.    isosorbide mononitrate (IMDUR) 60 MG 24 hr tablet Take 60 mg by mouth once daily.    lisinopril 10 MG tablet Take 1 tablet (10 mg total) by mouth once daily.    metFORMIN (GLUCOPHAGE) 1000 MG tablet Take 1,000 mg by mouth 2 (two) times daily with meals.    metoprolol succinate (TOPROL-XL) 25 MG 24 hr tablet     ranitidine (ZANTAC) 300 MG tablet Take 300 mg by mouth every evening.    sertraline (ZOLOFT) 100 MG tablet Take 100 mg by mouth 2 (two) times daily.    SYNTHROID 175 mcg tablet     tamsulosin (FLOMAX) 0.4 mg Cp24 TAKE 1 CAPSULE AT BEDTIME.    traMADol (ULTRAM) 50 mg tablet Take 0.5 tablets (25 mg total) by mouth every 8 (eight) hours as needed (Sever pain not controlled by tylenol or tizanidine).    vitamin D 185 MG Tab Take 2,000 mg by mouth once daily.     XARELTO 20 mg Tab TAKE 1 TABLET BY MOUTH ONCE A DAY WITH EVENING MEAL    [START ON 2019] amiodarone (PACERONE) 200 MG Tab Take 1 tablet (200 mg total) by mouth once daily.    midodrine (PROAMATINE) 5 MG Tab     nitroGLYCERIN (NITROSTAT) 0.4 MG SL tablet Place 0.4 mg under the tongue every 5 (five) minutes as needed for Chest pain.     Family History     Problem Relation (Age of Onset)    Cancer Mother, Sister    Heart disease Father        Tobacco Use    Smoking status: Former Smoker     Last attempt to quit: 1998     Years since quittin.1    Smokeless tobacco: Never Used   Substance and Sexual Activity    Alcohol use: No     Alcohol/week: 0.0 oz    Drug use: No    Sexual activity: Never     Review of Systems   Constitution: Positive for malaise/fatigue. Negative for chills, diaphoresis and fever.   HENT: Negative for nosebleeds.    Eyes: Negative for blurred vision and double vision.   Cardiovascular: Negative for chest pain,  claudication, cyanosis, dyspnea on exertion, leg swelling, orthopnea, palpitations, paroxysmal nocturnal dyspnea and syncope.   Respiratory: Negative for cough, shortness of breath and wheezing.    Skin: Negative for dry skin and poor wound healing.   Musculoskeletal: Negative for back pain, joint swelling and myalgias.   Gastrointestinal: Negative for abdominal pain, nausea and vomiting.   Genitourinary: Negative for hematuria.   Neurological: Negative for dizziness, headaches, numbness, seizures and weakness.   Psychiatric/Behavioral: Negative for altered mental status and depression.     Objective:     Vital Signs (Most Recent):  Temp: 98.9 °F (37.2 °C) (08/02/19 0738)  Pulse: 61 (08/02/19 0738)  Resp: 18 (08/02/19 0738)  BP: (!) 164/82 (08/02/19 0738)  SpO2: (!) 94 % (08/02/19 0738) Vital Signs (24h Range):  Temp:  [98.6 °F (37 °C)-101 °F (38.3 °C)] 98.9 °F (37.2 °C)  Pulse:  [49-72] 61  Resp:  [15-20] 18  SpO2:  [91 %-98 %] 94 %  BP: ()/(57-91) 164/82     Weight: 79.6 kg (175 lb 7.8 oz)  Body mass index is 29.2 kg/m².    SpO2: (!) 94 %  O2 Device (Oxygen Therapy): room air      Intake/Output Summary (Last 24 hours) at 8/2/2019 0741  Last data filed at 8/1/2019 2231  Gross per 24 hour   Intake 600 ml   Output --   Net 600 ml       Lines/Drains/Airways     Peripheral Intravenous Line                 Peripheral IV - Single Lumen 08/01/19 20 G Left Wrist 1 day                Physical Exam   Constitutional: He is oriented to person, place, and time. He appears well-developed and well-nourished.   HENT:   Head: Normocephalic and atraumatic.   Eyes: Pupils are equal, round, and reactive to light. Conjunctivae and EOM are normal. No scleral icterus.   Neck: Normal range of motion. Neck supple. No JVD present. Carotid bruit is not present. No tracheal deviation present. No thyromegaly present.   Cardiovascular: Normal rate, regular rhythm, S1 normal and S2 normal. Exam reveals no gallop and no friction rub.   No  murmur heard.  Pulmonary/Chest: Effort normal and breath sounds normal. No respiratory distress. He has no wheezes. He has no rales. He exhibits no tenderness.   Abdominal: Soft. He exhibits no distension.   Musculoskeletal: He exhibits no edema.   Neurological: He is alert and oriented to person, place, and time. He has normal strength. No cranial nerve deficit.   Skin: Skin is warm and dry. No rash noted.   Psychiatric: He has a normal mood and affect. His behavior is normal.       Current Medications:   clopidogrel  75 mg Oral Daily    levothyroxine  175 mcg Oral Before breakfast    rivaroxaban  20 mg Oral Daily with dinner       acetaminophen, sodium chloride 0.9%    Laboratory (all labs reviewed):  CBC:  Recent Labs   Lab 10/24/17  0641 04/22/19  2206 07/18/19  2047 07/20/19  0350 08/01/19  1717   WBC 6.11 8.86 5.54 5.93 6.47   Hemoglobin 11.9 L 11.6 L 11.4 L 11.4 L 9.7 L   Hematocrit 35.9 L 36.7 L 36.4 L 37.0 L 31.9 L   Platelets 176 214 173 179 211       CHEMISTRIES:  Recent Labs   Lab 06/07/17  0341 06/08/17  0427  10/24/17  0641 04/22/19  2206 07/18/19  2047 07/20/19  0350 08/01/19  1717   Glucose 155 H 186 H   < > 186 H 193 H 101 101 57 L   Sodium 140 138   < > 137 139 142 141 140   Potassium 4.1 3.8   < > 4.4 4.5 4.0 3.3 L 3.2 L   BUN, Bld 16 16   < > 11 22 12 12 14   Creatinine 0.9 1.0   < > 1.0 1.0 1.0 0.9 0.8   eGFR if African American >60.0 >60.0   < > >60.0 >60 >60 >60 >60   eGFR if non African American >60.0 >60.0   < > >60.0 >60 >60 >60 >60   Calcium 9.4 9.6   < > 10.0 9.2 9.2 9.3 8.0 L   Magnesium 1.6 1.6  --   --  1.3 L  --  1.5 L 1.3 L    < > = values in this interval not displayed.       CARDIAC BIOMARKERS:  Recent Labs   Lab 07/19/19  0238 07/19/19  0758 07/19/19  1338 07/19/19  1953 08/01/19  1717   Troponin I 0.029 H 0.018 0.009 0.028 H 0.016       COAGS:  Recent Labs   Lab 06/07/17  0341 06/08/17  0427 10/23/17  0705 10/24/17  0853 07/18/19  2047   INR 1.8 H 1.9 H 1.2 1.2 1.1        LIPIDS/LFTS:  Recent Labs   Lab 06/08/17  0427 04/22/19  2206 07/18/19 2047 07/19/19  0758 07/20/19  0350 08/01/19 1717   Cholesterol  --   --   --  85 L  --   --    Triglycerides  --   --   --  61  --   --    HDL  --   --   --  46  --   --    LDL Cholesterol  --   --   --  26.8 L  --   --    Non-HDL Cholesterol  --   --   --  39  --   --    AST 65 H 46 H 16  --  15 19    H 38 11  --  11 13       BNP:  Recent Labs   Lab 02/16/17  1311 04/22/19 2206 07/18/19 2047 08/01/19 1717   BNP 81 271 H 366 H 240 H       TSH:  Recent Labs   Lab 06/05/17  0619 07/18/19 2047 08/01/19 1717   TSH 5.056 H 13.841 H 12.396 H       Free T4:  Recent Labs   Lab 06/05/17 0619 07/18/19 2047 08/01/19 1717   Free T4 1.15 1.05 0.88       Diagnostic Results:  ECG (personally reviewed and interpreted tracing(s)):  8/1/19 1613 SR 54, occ     Chest X-Ray (personally reviewed and interpreted image(s)): 8/1/19 mild CHF, L PPM 1 lead    VERITO/DCCV 7/19/19  LVEF 40-45%, mild global HK  Normal valves  Mild-mod MR  Mild TR  Mild AI  No LA/MAURICIO thrombus  Successful DCCV AF->NSR 360J x1  Pt tolerated procedure well without complications  Plan:  Observe in ICU  Cont Xarelto 20mg qd  Start amio load  Dispo planning appropriate  Pt to follow up with me in 1-2 weeks.     Echo: 3/13/19     * Mildly increased left ventricular cavity size. Increased left ventricular wall thickness. Wall motion TDS. Mildly decreased left ventricular systolic function. Left ventricular ejection fraction is estimated at 45 %. Rhythm precludes evaluation of diastolic function.      * Normal right ventricular size. Right ventricular systolic pressure 26 mmHg. Catheter/pacemaker wire visualized in the right ventricle. Mild to Moderately increased right atrial size. Mild to Moderately increased left atrial size.      * Mild mitral annular calcification. Mild mitral valve regurgitation. Mild aortic valve calcification. Mild tricuspid valve regurgitation. Trace  pulmonary valve regurgitation.      Other findings as noted above.       Cath: 10/23/17   B. Summary/Post-Operative Diagnosis    [id number].    70% mid LAD ISR; FFR=0.79.     of D1.    Successful PCI of mid LAD with 4X15 MARY.    Successful PCI of D1 with 2.25X30 and 2.5X18 MARY.    IVUS utilized for stent sizing.  D. Hemodynamic Results  LVEDP (Pre): 22 mmHg  E. Angiographic Results       Patient has a right dominant coronary artery.      - Left Main Coronary Artery:             The LM has luminal irregularities. There is DONAN 3 flow.     - Left Anterior Descending Artery:             The proximal LAD has a 10% stenosis. There is DONNA 3 flow. The remaining portion of the vessel has luminal irregularities (10).             The mid LAD has a 70% stenosis. There is DONNA 3 flow. FFR was 0.79. The remaining portion of the vessel has luminal irregularities (10).                     Lesion Details:   The length is 10-20 mm, eccentric shape, moderate proximal tortuosity, segment angulation of <45 degrees, irregular contour, mild to moderate calcification. No bifurcation is present. The minimum luminal diameter is   0.5 millimeters.             The distal LAD has a 50% stenosis. There is DONNA 3 flow. The remaining portion of the vessel is of small caliber.     - D1:             The D1 has chronic total occlusion. There is DONNA 0 flow. There is collateral flow from the PLB (faint). The remaining portion of the vessel has luminal irregularities (10).                     Lesion Details:   This is a Type C lesion.  The length is 40mm, eccentric shape, moderate proximal tortuosity, segment angulation of 45-90 degrees, irregular contour, mild to moderate calcification. Bifurcation is present. The minimum   luminal diameter is 0 millimeters.     - Left Circumflex Artery:             The proximal LCX has a 20% stenosis. There is DONNA 3 flow. The remaining portion of the vessel has luminal irregularities (10).             The  distal LCX has luminal irregularities. There is DONNA 3 flow.     - OM1:             The OM1 has luminal irregularities has a 30% stenosis. There is DONNA 3 flow. The remaining portion of the vessel has luminal irregularities (10).     - Right Coronary Artery:             The proximal RCA has luminal irregularities. There is DONNA 3 flow.             The mid RCA has a 20% stenosis. There is DONNA 3 flow. The remaining portion of the vessel has luminal irregularities (10).             The distal RCA has a 50% stenosis. There is DONNA 3 flow. The remaining portion of the vessel has luminal irregularities (10).     - Posterior Lateral Branch:             The PLB has a 40% stenosis. There is DONNA 3 flow. The remaining portion of the vessel has luminal irregularities (10).  Intervention       Mid LAD:              The lesion was successfully intervened. Post-stenosis of 0%, post-DONNA 3 flow and TMP grade 3. The vessel was accessed natively.  The following items were used: Blln Trek Rx 3.0 X 12, Stent Resolute Rx 4.00x15 (MARY) and Cath [facility]   [place].       D1:              The lesion was successfully intervened. Post-stenosis of 0%, post-DONNA 3 flow and TMP grade 3. The vessel was accessed natively.  The following items were used: Cath Mini Trek 1.5 X 20 Rx, Blln Sc Euphora 2.0 X 10, Stent Resolute Rx 2.25x30   (MARY), Stent Resolute Rx 2.50x18 (MARY) and Cath [facility] [place].

## 2019-08-02 NOTE — NURSING
Pt and family received D/C instructions and verbalized understanding. Pt waiting for transport to arrvie

## 2019-08-02 NOTE — ANESTHESIA POSTPROCEDURE EVALUATION
Anesthesia Post Evaluation    Patient: Doni Theodore    Procedure(s) Performed: Procedure(s) (LRB):  Transesophageal echo (VERITO) intra-procedure log documentation (N/A)  Cardioversion or Defibrillation    Final Anesthesia Type: general  Patient location during evaluation: PACU  Patient participation: Yes- Able to Participate  Level of consciousness: awake and alert and oriented  Post-procedure vital signs: reviewed and stable  Pain management: adequate  Airway patency: patent  PONV status at discharge: No PONV  Anesthetic complications: no      Cardiovascular status: blood pressure returned to baseline, hemodynamically stable and stable  Respiratory status: unassisted, spontaneous ventilation and room air  Hydration status: euvolemic  Follow-up not needed.          Vitals Value Taken Time   /82 7/19/19 2:58 PM   Temp 36.0 °C (96.8 °F) 7/19/19 2:58 PM   Pulse 62 7/19/19 2:58 PM   Resp 11 7/19/19 2:58 PM   SpO2 95 % 7/19/19 2:58 PM         No case tracking events are documented in the log.      Pain/Ramses Score: Pain Rating Prior to Med Admin: 3 (8/2/2019  5:18 AM)

## 2019-08-02 NOTE — HPI
Doni Theodore is a 78 y.o. WM with extensive medical history including Afib, CAD (stents X 2), DMII, HTN,stroke, thyroid disease, and FEMI. Patient presented for evaluation of generalized weakness, palpitations, and SOB which he states was about 3-4 days. Important to mention is that the patient was recently admitted for afib/rvr and was cardioverted, subsequently placed on amiodarone. Patient denies fever, chills, URI, HA, CP, NVD, constipation, dysuria, long trips, focal deficits, numbness or tingling in any extremity, leg or calve pain, sick contacts, recent hormone use, recent long trips, or recent trauma.    Patient was found to be bradycardic at the time of presentation around 53 and did dip overnight to as low as 49. This morning his HR is 61. He is non-distressed

## 2019-08-02 NOTE — ASSESSMENT & PLAN NOTE
History - cardiology following and evaluated patient with medical management for bradycardia  -stop amio and imdur as above  -Cont Plavix (with ongoing Xarelto rx)  Cont BBl/Statin/ACEi  Stop imdur  Per cards: Will consider outpatient stress testing  Interrogate pacemaker as outpatient

## 2019-08-02 NOTE — DISCHARGE SUMMARY
[facility] Hospital Medicine  Discharge Summary      Patient Name: Doni Theodore  MRN: [id number]  Admission Date: 8/1/2019  Hospital Length of Stay: 0 days  Discharge Date and Time:  08/02/2019 11:07 AM  Attending Physician: Destiny Bernardo MD   Discharging Provider: MILO Ness  Primary Care Provider: Nic Mauro MD      HPI:   Doni Theodore is a 78 y.o. WM with extensive medical history including Afib, CAD (stents X 2), DMII, HTN,stroke, thyroid disease, and FEMI. Patient presented for evaluation of generalized weakness, palpitations, and SOB which he states was about 3-4 days. Important to mention is that the patient was recently admitted for afib/rvr and was cardioverted, subsequently placed on amiodarone. Patient denies fever, chills, URI, HA, CP, NVD, constipation, dysuria, long trips, focal deficits, numbness or tingling in any extremity, leg or calve pain, sick contacts, recent hormone use, recent long trips, or recent trauma.    Patient was found to be bradycardic at the time of presentation around 53 and did dip overnight to as low as 49. This morning his HR is 61. He is non-distressed        * No surgery found *      Hospital Course:   Stopped amiodarone and imdur stated toprol XL - dev improved now 61. Patient is asymptomatic, seen by cardiology, continue plavix, asa, statin, & aceI; Noted will consider outpatient stress testing and pacer interrogation - follow up in clinic in 1-2 weeks with cardiology.      Consults:   Consults (From admission, onward)        Status Ordering Provider     Inpatient consult to Cardiology  Once     Provider:  Nic Miguel MD    Completed TERRY ADAME consult to case management  Once     Provider:  (Not yet assigned)    Acknowledged DESTINY BERNARDO          No new Assessment & Plan notes have been filed under this hospital service since the last note was generated.  Service: [facility]  Medicine    Final Active Diagnoses:    Diagnosis Date Noted POA    PRINCIPAL PROBLEM:  Symptomatic bradycardia [R00.1] 08/01/2019 Yes    Coronary artery disease involving native coronary artery of native heart without angina pectoris [I25.10] 06/04/2017 Yes    Diabetes mellitus, type 2 [E11.9] 07/19/2019 Yes    Essential hypertension [I10] 07/19/2019 Yes    Chronic combined systolic and diastolic congestive heart failure [I50.42] 07/19/2019 Yes    Adverse effect of amiodarone [T46.2X5A] 08/02/2019 Yes    PAF (paroxysmal atrial fibrillation) [I48.0] 07/19/2019 Yes    Leg weakness, bilateral [R29.898] 07/19/2019 Yes    Pacemaker [Z95.0] 09/21/2017 Yes    Sick sinus syndrome [I49.5] 09/21/2017 Yes      Problems Resolved During this Admission:       Discharged Condition: stable    Disposition: Home or Self Care    Follow Up:  Follow-up Information     Nic Mauro MD In 1 week.    Specialty:  Family Medicine  Why:  Call the office to schedule appointment, For outpatient follow-up/post hospitalizaton  Contact information:  [address] [facility] [id number] [phone]             Nic Miguel MD On 8/12/2019.    Specialties:  Cardiology, INTERVENTIONAL CARDIOLOGY  Why:  Appointment scheduled for Monday August 12th 9am  Contact information:  [address]  [address]  [facility] [id number] [phone]                 Patient Instructions:      Diet Cardiac     Activity as tolerated       Significant Diagnostic Studies: Labs:   CMP   Recent Labs   Lab 08/01/19  1717      K 3.2*   *   CO2 22*   GLU 57*   BUN 14   CREATININE 0.8   CALCIUM 8.0*   PROT 5.6*   ALBUMIN 2.5*   BILITOT 0.4   ALKPHOS 65   AST 19   ALT 13   ANIONGAP 7*   ESTGFRAFRICA >60   EGFRNONAA >60   , CBC   Recent Labs   Lab 08/01/19  1717   WBC 6.47   HGB 9.7*   HCT 31.9*      , INR   Lab Results   Component Value Date    INR 1.1 07/18/2019    INR 1.2 10/24/2017    INR 1.2 10/23/2017   ,  Lipid Panel   Lab Results   Component Value Date    CHOL 85 (L) 07/19/2019    HDL 46 07/19/2019    LDLCALC 26.8 (L) 07/19/2019    TRIG 61 07/19/2019    CHOLHDL 54.1 (H) 07/19/2019   , Troponin   Recent Labs   Lab 08/01/19  1717   TROPONINI 0.016    and A1C:   Recent Labs   Lab 07/19/19  0238   HGBA1C 5.7*  5.7*       Pending Diagnostic Studies:     None         Medications:  Reconciled Home Medications:      Medication List      CHANGE how you take these medications    metoprolol succinate 25 MG 24 hr tablet  Commonly known as:  TOPROL-XL  Take 2 tablets (50 mg total) by mouth once daily.  What changed:    · how much to take  · how to take this  · when to take this        CONTINUE taking these medications    aspirin 81 MG EC tablet  Commonly known as:  ECOTRIN  Take 81 mg by mouth once daily.     clopidogrel 75 mg tablet  Commonly known as:  PLAVIX  Take 75 mg by mouth once daily.     cyanocobalamin 2000 MCG tablet  Take 2,500 mcg by mouth once daily.     fluticasone propionate 50 mcg/actuation nasal spray  Commonly known as:  FLONASE  1 spray by Each Nare route nightly as needed.     gabapentin 300 MG capsule  Commonly known as:  NEURONTIN  Take 300 mg by mouth 3 (three) times daily.     glimepiride 4 MG tablet  Commonly known as:  AMARYL  Take 4 mg by mouth before breakfast.     lisinopril 10 MG tablet  Take 1 tablet (10 mg total) by mouth once daily.     metFORMIN 1000 MG tablet  Commonly known as:  GLUCOPHAGE  Take 1,000 mg by mouth 2 (two) times daily with meals.     midodrine 5 MG Tab  Commonly known as:  PROAMATINE     nitroGLYCERIN 0.4 MG SL tablet  Commonly known as:  NITROSTAT  Place 0.4 mg under the tongue every 5 (five) minutes as needed for Chest pain.     ranitidine 300 MG tablet  Commonly known as:  ZANTAC  Take 300 mg by mouth every evening.     sertraline 100 MG tablet  Commonly known as:  ZOLOFT  Take 100 mg by mouth 2 (two) times daily.     SYNTHROID 175 MCG tablet  Generic drug:  levothyroxine      tamsulosin 0.4 mg Cap  Commonly known as:  FLOMAX  TAKE 1 CAPSULE AT BEDTIME.     traMADol 50 mg tablet  Commonly known as:  ULTRAM  Take 0.5 tablets (25 mg total) by mouth every 8 (eight) hours as needed (Sever pain not controlled by tylenol or tizanidine).     vitamin D 1000 units Tab  Commonly known as:  VITAMIN D3  Take 2,000 mg by mouth once daily.     XARELTO 20 mg Tab  Generic drug:  rivaroxaban  TAKE 1 TABLET BY MOUTH ONCE A DAY WITH EVENING MEAL        STOP taking these medications    amiodarone 200 MG Tab  Commonly known as:  PACERONE     amiodarone 400 MG tablet  Commonly known as:  PACERONE     isosorbide mononitrate 60 MG 24 hr tablet  Commonly known as:  IMDUR            Indwelling Lines/Drains at time of discharge:   Lines/Drains/Airways          None          Time spent on the discharge of patient: 35 minutes  Patient was seen and examined on the date of discharge and determined to be suitable for discharge.         ETHAN Monaco, FNP-C  Hospitalist - Department of Hospital Medicine  [address] [place] [address]  Office [phone]; Pager [phone]

## 2019-08-02 NOTE — H&P
[facility] Medicine  History & Physical    Patient Name: Doni Theodore  MRN: [id number]  Admission Date: 8/1/2019  Attending Physician: Destiny Bernardo MD   Primary Care Provider: Nic Mauro MD         Patient information was obtained from patient and ER records.     Subjective:     Principal Problem:Symptomatic bradycardia    Chief Complaint:   Chief Complaint   Patient presents with    Weakness     weakness and dizziness x 4 days d/c from hospital 10 days ago        HPI: Doni Theodore is a 78 y.o. WM with extensive medical history including Afib, CAD (stents X 2), DMII, HTN,stroke, thyroid disease, and FEMI. Patient presented for evaluation of generalized weakness, palpitations, and SOB which he states was about 3-4 days. Important to mention is that the patient was recently admitted for afib/rvr and was cardioverted, subsequently placed on amiodarone. Patient denies fever, chills, URI, HA, CP, NVD, constipation, dysuria, long trips, focal deficits, numbness or tingling in any extremity, leg or calve pain, sick contacts, recent hormone use, recent long trips, or recent trauma.    Patient was found to be bradycardic at the time of presentation around 53 and did dip overnight to as low as 49. This morning his HR is 61. He is non-distressed        Past Medical History:   Diagnosis Date    A-fib 7/18/2019    Anticoagulant long-term use     Plavix (on hold for angiogram)    Arthritis     Coronary artery disease     pacemaker, stentsx2    Diabetes mellitus     Hypertension     Pacemaker     Sleep apnea     Stroke     Rt side affected / mild    Thyroid disease     hypothyroidism       Past Surgical History:   Procedure Laterality Date    ANGIOGRAM N/A 5/31/2013    Performed by [facility] OR    ANKLE SURGERY      APPENDECTOMY      CARDIAC PACEMAKER PLACEMENT      Cardioversion or Defibrillation  7/19/2019    Performed by Nic Miguel,  MD at [facility] CATH LAB    CHOLECYSTECTOMY      CORONARY ANGIOPLASTY WITH STENT PLACEMENT  6/06    stents x 2 placed     EYE SURGERY      HEART CATH-LEFT Left 10/23/2017    Performed by [facility] at [facility] CATH LAB    HERNIA REPAIR      ventral hernia repair    REPAIR, AAA, ENDOVASCULAR N/A 6/18/2013    Performed by Radu Melgar MD at [facility] OR    sciatica      sciatica procedure    Transesophageal echo (VERITO) intra-procedure log documentation N/A 7/19/2019    Performed by Nic Miguel MD at [facility] CATH LAB       Review of patient's allergies indicates:   Allergen Reactions    Penicillins Swelling       No current facility-administered medications on file prior to encounter.      Current Outpatient Medications on File Prior to Encounter   Medication Sig    aspirin (ECOTRIN) 81 MG EC tablet Take 81 mg by mouth once daily.    clopidogrel (PLAVIX) 75 mg tablet Take 75 mg by mouth once daily.    cyanocobalamin 2000 MCG tablet Take 2,500 mcg by mouth once daily.    fluticasone (FLONASE) 50 mcg/actuation nasal spray 1 spray by Each Nare route nightly as needed.     gabapentin (NEURONTIN) 300 MG capsule Take 300 mg by mouth 3 (three) times daily.    glimepiride (AMARYL) 4 MG tablet Take 4 mg by mouth before breakfast.    lisinopril 10 MG tablet Take 1 tablet (10 mg total) by mouth once daily.    metFORMIN (GLUCOPHAGE) 1000 MG tablet Take 1,000 mg by mouth 2 (two) times daily with meals.    ranitidine (ZANTAC) 300 MG tablet Take 300 mg by mouth every evening.    sertraline (ZOLOFT) 100 MG tablet Take 100 mg by mouth 2 (two) times daily.    SYNTHROID 175 mcg tablet     tamsulosin (FLOMAX) 0.4 mg Cp24 TAKE 1 CAPSULE AT BEDTIME.    traMADol (ULTRAM) 50 mg tablet Take 0.5 tablets (25 mg total) by mouth every 8 (eight) hours as needed (Sever pain not controlled by tylenol or tizanidine).    vitamin D 185 MG Tab Take 2,000 mg by mouth once daily.     XARELTO 20 mg Tab TAKE 1 TABLET BY MOUTH  ONCE A DAY WITH EVENING MEAL    [DISCONTINUED] amiodarone (PACERONE) 200 MG Tab Take 1 tablet (200 mg total) by mouth 2 (two) times daily. for 14 days    [DISCONTINUED] amiodarone (PACERONE) 400 MG tablet Take 1 tablet (400 mg total) by mouth 2 (two) times daily. for 14 days    [DISCONTINUED] isosorbide mononitrate (IMDUR) 60 MG 24 hr tablet Take 60 mg by mouth once daily.    [DISCONTINUED] metoprolol succinate (TOPROL-XL) 25 MG 24 hr tablet     midodrine (PROAMATINE) 5 MG Tab     nitroGLYCERIN (NITROSTAT) 0.4 MG SL tablet Place 0.4 mg under the tongue every 5 (five) minutes as needed for Chest pain.    [DISCONTINUED] amiodarone (PACERONE) 200 MG Tab Take 1 tablet (200 mg total) by mouth once daily.     Family History     Problem Relation (Age of Onset)    Cancer Mother, Sister    Heart disease Father        Tobacco Use    Smoking status: Former Smoker     Last attempt to quit: 1998     Years since quittin.1    Smokeless tobacco: Never Used   Substance and Sexual Activity    Alcohol use: No     Alcohol/week: 0.0 oz    Drug use: No    Sexual activity: Never     Review of Systems   Constitutional: Positive for activity change and fatigue. Negative for appetite change, chills, diaphoresis and fever.   HENT: Negative for congestion, hearing loss, sore throat, tinnitus and trouble swallowing.    Eyes: Negative for photophobia, discharge, itching and visual disturbance.   Respiratory: Positive for shortness of breath. Negative for apnea, cough, wheezing and stridor.    Cardiovascular: Positive for palpitations. Negative for chest pain and leg swelling.   Gastrointestinal: Negative for abdominal distention, abdominal pain, blood in stool, constipation, diarrhea and nausea.   Endocrine: Negative for polydipsia, polyphagia and polyuria.   Genitourinary: Negative for difficulty urinating, dysuria, flank pain and frequency.   Musculoskeletal: Positive for myalgias. Negative for arthralgias, joint swelling  and neck stiffness.   Skin: Negative for color change, rash and wound.   Neurological: Positive for weakness. Negative for dizziness, tremors, seizures, light-headedness, numbness and headaches.   Hematological: Negative for adenopathy.   Psychiatric/Behavioral: Negative for hallucinations and self-injury.     Objective:     Vital Signs (Most Recent):  Temp: 98.9 °F (37.2 °C) (08/02/19 0738)  Pulse: 61 (08/02/19 0738)  Resp: 18 (08/02/19 0738)  BP: (!) 164/82 (08/02/19 0738)  SpO2: (!) 94 % (08/02/19 0738) Vital Signs (24h Range):  Temp:  [98.6 °F (37 °C)-101 °F (38.3 °C)] 98.9 °F (37.2 °C)  Pulse:  [49-72] 61  Resp:  [15-20] 18  SpO2:  [91 %-98 %] 94 %  BP: ()/(57-91) 164/82     Weight: 79.6 kg (175 lb 7.8 oz)  Body mass index is 29.2 kg/m².    Physical Exam   Constitutional: He appears well-developed. He is cooperative.   Body mass index is 29.2 kg/m².   HENT:   Head: Normocephalic and atraumatic.   Eyes: Conjunctivae and lids are normal.   Neck: Full passive range of motion without pain. Neck supple. No JVD present. No edema present. No thyroid mass present.   Cardiovascular: S1 normal, S2 normal and intact distal pulses.   No murmur heard.  Abdominal: Soft. Bowel sounds are normal. He exhibits no distension and no abdominal bruit. There is no splenomegaly or hepatomegaly. There is no tenderness. There is no CVA tenderness.   Lymphadenopathy:     He has no cervical adenopathy.     He has no axillary adenopathy.   Neurological: He is alert. He has normal reflexes. He displays no tremor. He displays no seizure activity.   Skin: Skin is warm, dry and intact.   Psychiatric: He has a normal mood and affect. His speech is normal. Thought content normal. Cognition and memory are normal.           Significant Labs: All pertinent labs within the past 24 hours have been reviewed.    Significant Imaging: I have reviewed all pertinent imaging results/findings within the past 24 hours.    Assessment/Plan:     *  Symptomatic bradycardia  Presented with bradycardia - now better in the 60's; holding amiodarone and imdur per cardiology  -consult to cards  -cardiac monitoring  -Toprol XL 50 mg   -Conitnue Plavix      Coronary artery disease involving native coronary artery of native heart without angina pectoris  History - cardiology following and evaluated patient with medical management for bradycardia  -stop amio and imdur as above  -Cont Plavix (with ongoing Xarelto rx)  Cont BBl/Statin/ACEi  Stop imdur  Per cards: Will consider outpatient stress testing  Interrogate pacemaker as outpatient    Diabetes mellitus, type 2  Hold home oral hyperglycemics if indicated - while hospitalized will use combined insulin therapy with basal and prandial insulin coverage, POCT glucose checks, hypoglycemic protocol and correction scale - HgA1c      Chronic combined systolic and diastolic congestive heart failure  Will admit with continuous cardiac monitoring and continue to trend CE  Diuresis patient  Pulse OX Q4 with vitals  Obtain 2D echo LVEF 40-45%  Xarelto        Essential hypertension  See #1      Sick sinus syndrome  As above; pacemaker interrogation outpatient        VTE Risk Mitigation (From admission, onward)        Ordered     rivaroxaban tablet 20 mg  With dinner      08/01/19 2223     IP VTE HIGH RISK PATIENT  Once      08/01/19 2223     Place sequential compression device  Until discontinued      08/01/19 2223             ETHAN Monaco, FNP-C  Hospitalist - Department of Hospital Medicine  [address], [place] [address]  Office [phone]; Pager [phone]

## 2019-08-02 NOTE — PLAN OF CARE
Problem: Adult Inpatient Plan of Care  Goal: Plan of Care Review     08/01/19 7230   Plan of Care Review   Plan of Care Reviewed With patient   Pt remained free of falls during current shift. Denied pain and did not receive any prn pain medications. Pt's temp became elevated to 101, therefore prn tylenol givnen. Awaitng pt's blood cultlures per MD order. Pt remained NPO and cardiolcogPlan of care and fall precautions reviewed with pt and verbalized understanding. Bed locked, lowered, SR up x2 and call light placed within reach.

## 2019-08-02 NOTE — PROGRESS NOTES
Bradycardia    When your heart rate is slow, less than 60 beats per minute, it is called bradycardia. Bradycardia can be normal, caused by medicines, or a sign of a disease. The slow heart rate may not be constant; it can come and go. It is a concern when it is very low, or you have symptoms.  Signs and symptoms  The following are signs and symptoms of bradycardia:  · Heart rate less than 60 per minute  · Dizziness or feeling lightheaded  · Weakness  · Trouble breathing  · Fainting  · Sleepiness  · More trouble exercising than usual because of fatigue  · Confusion or trouble concentrating  Causes  There are many causes of bradycardia. Some can be related to your heart, but some may be related to other factors.  Non-heart-related causes:  · Advanced age  · Side effect of certain medicines (such as beta-blockers, calcium channel blockers, digitalis, antiarrhythmic medicines like amiodarone, clonidine, lithium)  · Medical conditions such as hypoglycemia (low blood sugar), hypothyroidism (low thyroid), electrolyte disorder,  hypothermia, sleep apnea  · Athletes, especially long-distance runners, may have a slow heart rate. This can be normal.  · Sleep apnea  · Brain injury such as stroke or bleeding inside the brain  Heart-related causes:  · Coronary artery disease (angina or prior heart attack, also known as acute myocardial infarction, or AMI)  · Heart valve disease  · Heart muscle disease (cardiomyopathy)  · Congestive heart failure  · Sick sinus syndrome, which is when your heart's natural pacemaker is no longer working properly  · Diseases that infiltrate the heart such as sarcoid  · Heart infections  Sometimes the cause for the arrhythmia cannot be found.  Bradycardia that causes symptoms is sometimes reversible, and can be treated with medicines. When more severe bradycardia persists, a pacemaker is generally recommended. When the bradycardia does not cause symptoms, your doctor may decide to evaluate it in his  or her office.  Home care  The following will help you care for yourself at home:  · Resume your usual activities when you are feeling back to normal.  · If you develop any of the symptoms below during exertion, then you should not exert yourself until evaluated further by your doctor.  · Work with your doctor on any needed lifestyle changes, such as changing your diet, stopping smoking if you are a smoker, and a planned exercise program.  Follow-up care  Follow up with your doctor, or as advised.  Call 911  Call 911 if any of the following occur:  · Chest pain  · Trouble breathing  · Slow heart rate with dizziness or lightheadedness  · Fainting or loss of consciousness  · Chest, shoulder, arm, neck, or back pain  · Slow heart rate (under 50 beats per minute) if associated with symptoms  When to seek medical advice  Get prompt medical attention if any of the following occur:  · Occasional weakness, dizziness, or lightheadedness

## 2019-08-02 NOTE — NURSING
Pt IV and telemetry monitor removed. Pt waiting for wife to arrive for D/C instructions to be reviewed.

## 2019-08-02 NOTE — ED NOTES
Pt's wife, Antonia, called and updated on pt's admitted room number. Pt notified that his wife was updated.

## 2019-08-02 NOTE — HOSPITAL COURSE
Stopped amiodarone and imdur stated toprol XL - dev improved now 61. Patient is asymptomatic, seen by cardiology, continue plavix, asa, statin, & aceI; Noted will consider outpatient stress testing and pacer interrogation - follow up in clinic in 1-2 weeks with cardiology.

## 2019-08-02 NOTE — ASSESSMENT & PLAN NOTE
Presented with bradycardia - now better in the 60's; holding amiodarone and imdur per cardiology  -consult to cards  -cardiac monitoring  -Toprol XL 50 mg   -Conitnue Plavix

## 2019-08-02 NOTE — SUBJECTIVE & OBJECTIVE
Past Medical History:   Diagnosis Date    A-fib 7/18/2019    Anticoagulant long-term use     Plavix (on hold for angiogram)    Arthritis     Coronary artery disease     pacemaker, stentsx2    Diabetes mellitus     Hypertension     Pacemaker     Sleep apnea     Stroke     Rt side affected / mild    Thyroid disease     hypothyroidism       Past Surgical History:   Procedure Laterality Date    ANGIOGRAM N/A 5/31/2013    Performed by [facility] OR    ANKLE SURGERY      APPENDECTOMY      CARDIAC PACEMAKER PLACEMENT      Cardioversion or Defibrillation  7/19/2019    Performed by Nic Miguel MD at [facility] CATH LAB    CHOLECYSTECTOMY      CORONARY ANGIOPLASTY WITH STENT PLACEMENT  6/06    stents x 2 placed     EYE SURGERY      HEART CATH-LEFT Left 10/23/2017    Performed by [facility] at [facility] CATH LAB    HERNIA REPAIR      ventral hernia repair    REPAIR, AAA, ENDOVASCULAR N/A 6/18/2013    Performed by Radu Melgar MD at [facility] OR    sciatica      sciatica procedure    Transesophageal echo (VERITO) intra-procedure log documentation N/A 7/19/2019    Performed by Nic Miguel MD at [facility] CATH LAB       Review of patient's allergies indicates:   Allergen Reactions    Penicillins Swelling       No current facility-administered medications on file prior to encounter.      Current Outpatient Medications on File Prior to Encounter   Medication Sig    aspirin (ECOTRIN) 81 MG EC tablet Take 81 mg by mouth once daily.    clopidogrel (PLAVIX) 75 mg tablet Take 75 mg by mouth once daily.    cyanocobalamin 2000 MCG tablet Take 2,500 mcg by mouth once daily.    fluticasone (FLONASE) 50 mcg/actuation nasal spray 1 spray by Each Nare route nightly as needed.     gabapentin (NEURONTIN) 300 MG capsule Take 300 mg by mouth 3 (three) times daily.    glimepiride (AMARYL) 4 MG tablet Take 4 mg by mouth before breakfast.    lisinopril 10 MG tablet Take 1 tablet (10 mg  total) by mouth once daily.    metFORMIN (GLUCOPHAGE) 1000 MG tablet Take 1,000 mg by mouth 2 (two) times daily with meals.    ranitidine (ZANTAC) 300 MG tablet Take 300 mg by mouth every evening.    sertraline (ZOLOFT) 100 MG tablet Take 100 mg by mouth 2 (two) times daily.    SYNTHROID 175 mcg tablet     tamsulosin (FLOMAX) 0.4 mg Cp24 TAKE 1 CAPSULE AT BEDTIME.    traMADol (ULTRAM) 50 mg tablet Take 0.5 tablets (25 mg total) by mouth every 8 (eight) hours as needed (Sever pain not controlled by tylenol or tizanidine).    vitamin D 185 MG Tab Take 2,000 mg by mouth once daily.     XARELTO 20 mg Tab TAKE 1 TABLET BY MOUTH ONCE A DAY WITH EVENING MEAL    [DISCONTINUED] amiodarone (PACERONE) 200 MG Tab Take 1 tablet (200 mg total) by mouth 2 (two) times daily. for 14 days    [DISCONTINUED] amiodarone (PACERONE) 400 MG tablet Take 1 tablet (400 mg total) by mouth 2 (two) times daily. for 14 days    [DISCONTINUED] isosorbide mononitrate (IMDUR) 60 MG 24 hr tablet Take 60 mg by mouth once daily.    [DISCONTINUED] metoprolol succinate (TOPROL-XL) 25 MG 24 hr tablet     midodrine (PROAMATINE) 5 MG Tab     nitroGLYCERIN (NITROSTAT) 0.4 MG SL tablet Place 0.4 mg under the tongue every 5 (five) minutes as needed for Chest pain.    [DISCONTINUED] amiodarone (PACERONE) 200 MG Tab Take 1 tablet (200 mg total) by mouth once daily.     Family History     Problem Relation (Age of Onset)    Cancer Mother, Sister    Heart disease Father        Tobacco Use    Smoking status: Former Smoker     Last attempt to quit: 1998     Years since quittin.1    Smokeless tobacco: Never Used   Substance and Sexual Activity    Alcohol use: No     Alcohol/week: 0.0 oz    Drug use: No    Sexual activity: Never     Review of Systems   Constitutional: Positive for activity change and fatigue. Negative for appetite change, chills, diaphoresis and fever.   HENT: Negative for congestion, hearing loss, sore throat, tinnitus and  trouble swallowing.    Eyes: Negative for photophobia, discharge, itching and visual disturbance.   Respiratory: Positive for shortness of breath. Negative for apnea, cough, wheezing and stridor.    Cardiovascular: Positive for palpitations. Negative for chest pain and leg swelling.   Gastrointestinal: Negative for abdominal distention, abdominal pain, blood in stool, constipation, diarrhea and nausea.   Endocrine: Negative for polydipsia, polyphagia and polyuria.   Genitourinary: Negative for difficulty urinating, dysuria, flank pain and frequency.   Musculoskeletal: Positive for myalgias. Negative for arthralgias, joint swelling and neck stiffness.   Skin: Negative for color change, rash and wound.   Neurological: Positive for weakness. Negative for dizziness, tremors, seizures, light-headedness, numbness and headaches.   Hematological: Negative for adenopathy.   Psychiatric/Behavioral: Negative for hallucinations and self-injury.     Objective:     Vital Signs (Most Recent):  Temp: 98.9 °F (37.2 °C) (08/02/19 0738)  Pulse: 61 (08/02/19 0738)  Resp: 18 (08/02/19 0738)  BP: (!) 164/82 (08/02/19 0738)  SpO2: (!) 94 % (08/02/19 0738) Vital Signs (24h Range):  Temp:  [98.6 °F (37 °C)-101 °F (38.3 °C)] 98.9 °F (37.2 °C)  Pulse:  [49-72] 61  Resp:  [15-20] 18  SpO2:  [91 %-98 %] 94 %  BP: ()/(57-91) 164/82     Weight: 79.6 kg (175 lb 7.8 oz)  Body mass index is 29.2 kg/m².    Physical Exam   Constitutional: He appears well-developed. He is cooperative.   Body mass index is 29.2 kg/m².   HENT:   Head: Normocephalic and atraumatic.   Eyes: Conjunctivae and lids are normal.   Neck: Full passive range of motion without pain. Neck supple. No JVD present. No edema present. No thyroid mass present.   Cardiovascular: S1 normal, S2 normal and intact distal pulses.   No murmur heard.  Abdominal: Soft. Bowel sounds are normal. He exhibits no distension and no abdominal bruit. There is no splenomegaly or hepatomegaly. There  is no tenderness. There is no CVA tenderness.   Lymphadenopathy:     He has no cervical adenopathy.     He has no axillary adenopathy.   Neurological: He is alert. He has normal reflexes. He displays no tremor. He displays no seizure activity.   Skin: Skin is warm, dry and intact.   Psychiatric: He has a normal mood and affect. His speech is normal. Thought content normal. Cognition and memory are normal.           Significant Labs: All pertinent labs within the past 24 hours have been reviewed.    Significant Imaging: I have reviewed all pertinent imaging results/findings within the past 24 hours.

## 2019-08-02 NOTE — ASSESSMENT & PLAN NOTE
Presenting sxs of fatigue, resolved  Bradycardia improved  [employer] PPM in place, normally functioning  Will stop amio given bradycardia and elev TSH, may need alternative AAD if AF recurs.

## 2019-08-02 NOTE — ASSESSMENT & PLAN NOTE
Will admit with continuous cardiac monitoring and continue to trend CE  Diuresis patient  Pulse OX Q4 with vitals  Obtain 2D echo LVEF 40-45%  Xarelto

## 2019-08-02 NOTE — DISCHARGE INSTRUCTIONS
STOP TAKING AMIODARONE  STOP TAKING IMDUR      Complete medications as ordered  Follow all discharge instructions.  Please schedule follow up appointments as necessary      When to Call Your Doctor  Call your doctor immediately if you have any of the following:  · Severe headache  · Severe dizziness, or fainting  · Nausea or vomiting  · Fast heartbeat (higher than 100 beats per minute)  · Fever or chills  · Swollen ankles  · Weakness  · Chest Pain attacks that last longer, occur more often, or are more severe than in the past

## 2019-08-02 NOTE — ED NOTES
Pacemaker interrogated with [employer] equipment. Fax of results received from company and given to MD Lamb.

## 2019-08-02 NOTE — ASSESSMENT & PLAN NOTE
Presenting sxs of fatigue, now resolved.    Successful VERITO/DCCV 7/19/19, currently in SR  Cont Xarelto 20mg qd  Stop amio given elev TSH (T4 normal), may need alternative AAD if recurrent AF.

## 2019-08-02 NOTE — HPI
Presents for evaluation of generalized weakness x 4 days. Was recently admitted for afib/rvr and electrically cardioverted, now on amiodarone. Pt denies cp, sob, n/v, diarrhea/dysuria, f/c or other c/o.    Follows with me, recently seen for VERITO/DCCV as inpat 7/2019.    The patient is well known to me from his recent admission for atrial fibrillation underwent VERITO guided cardioversion.  He presents with complaints predominantly weakness.  He denies any angina or dyspnea.  He has had no palpitations or syncope.  There has been no PND, orthopnea, or lower extremity edema.  He denies melena, hematuria, or claudicant symptoms.  He was found to be bradycardic in the 50s.  Note is also made of an elevated TSH with normal T4 concerning for possible amiodarone toxicity.  There is no evidence of heart block or pacemaker malfunction.  At present, the patient is feeling back to his usual common wishes to be discharged.  He has a follow-up visit planned with me in 1 week.        [facility] Inpat Progress Note 7/20/19:  ECG (personally reviewed and interpreted tracing(s)):  7/18/19: , IRBBB (AF old).  Was last documented in SR 10/23/17.  7/19/19 1505 SR 59, IRBBB, ant ST abnl     Chest X-Ray (personally reviewed and interpreted image(s)): 7/18/19 mild CHF, L PPM     VERITO/DCCV 7/19/19 (images personally reviewed and interpreted)  LVEF 40-45%, mild global HK  Normal valves  Mild-mod MR  Mild TR  Mild AI  No LA/MAURICIO thrombus  Successful DCCV AF->NSR 360J x1  Pt tolerated procedure well without complications  Plan:  Observe in ICU  Cont Xarelto 20mg qd  Start amio load  Dispo planning appropriate  Pt to follow up with me in 1-2 weeks.     Echo: 3/13/19 (care everywhere; VERITO ordered)    * Mildly increased left ventricular cavity size. Increased left ventricular wall thickness. Wall motion TDS. Mildly decreased left ventricular systolic function. Left ventricular ejection fraction is estimated at 45 %. Rhythm precludes evaluation of  diastolic function.      * Normal right ventricular size. Right ventricular systolic pressure 26 mmHg. Catheter/pacemaker wire visualized in the right ventricle. Mild to Moderately increased right atrial size. Mild to Moderately increased left atrial size.      * Mild mitral annular calcification. Mild mitral valve regurgitation. Mild aortic valve calcification. Mild tricuspid valve regurgitation. Trace pulmonary valve regurgitation.      Other findings as noted above.       Cath: 10/23/17 (images prev personally reviewed and interpreted)  B. Summary/Post-Operative Diagnosis    [id number].    70% mid LAD ISR; FFR=0.79.     of D1.    Successful PCI of mid LAD with 4X15 MARY.    Successful PCI of D1 with 2.25X30 and 2.5X18 MARY.    IVUS utilized for stent sizing.  D. Hemodynamic Results  LVEDP (Pre): 22 mmHg  E. Angiographic Results       Patient has a right dominant coronary artery.      - Left Main Coronary Artery:             The LM has luminal irregularities. There is DONNA 3 flow.     - Left Anterior Descending Artery:             The proximal LAD has a 10% stenosis. There is DONNA 3 flow. The remaining portion of the vessel has luminal irregularities (10).             The mid LAD has a 70% stenosis. There is DONNA 3 flow. FFR was 0.79. The remaining portion of the vessel has luminal irregularities (10).                     Lesion Details:   The length is 10-20 mm, eccentric shape, moderate proximal tortuosity, segment angulation of <45 degrees, irregular contour, mild to moderate calcification. No bifurcation is present. The minimum luminal diameter is   0.5 millimeters.             The distal LAD has a 50% stenosis. There is DONNA 3 flow. The remaining portion of the vessel is of small caliber.     - D1:             The D1 has chronic total occlusion. There is DONNA 0 flow. There is collateral flow from the PLB (faint). The remaining portion of the vessel has luminal irregularities (10).                     Lesion  Details:   This is a Type C lesion.  The length is 40mm, eccentric shape, moderate proximal tortuosity, segment angulation of 45-90 degrees, irregular contour, mild to moderate calcification. Bifurcation is present. The minimum   luminal diameter is 0 millimeters.     - Left Circumflex Artery:             The proximal LCX has a 20% stenosis. There is DONNA 3 flow. The remaining portion of the vessel has luminal irregularities (10).             The distal LCX has luminal irregularities. There is DONNA 3 flow.     - OM1:             The OM1 has luminal irregularities has a 30% stenosis. There is DONNA 3 flow. The remaining portion of the vessel has luminal irregularities (10).     - Right Coronary Artery:             The proximal RCA has luminal irregularities. There is DONNA 3 flow.             The mid RCA has a 20% stenosis. There is DONNA 3 flow. The remaining portion of the vessel has luminal irregularities (10).             The distal RCA has a 50% stenosis. There is DONNA 3 flow. The remaining portion of the vessel has luminal irregularities (10).     - Posterior Lateral Branch:             The PLB has a 40% stenosis. There is DONNA 3 flow. The remaining portion of the vessel has luminal irregularities (10).  Intervention       Mid LAD:              The lesion was successfully intervened. Post-stenosis of 0%, post-DONNA 3 flow and TMP grade 3. The vessel was accessed natively.  The following items were used: Blln Trek Rx 3.0 X 12, Stent Resolute Rx 4.00x15 (MARY) and Cath [facility]   [place].       D1:              The lesion was successfully intervened. Post-stenosis of 0%, post-DONNA 3 flow and TMP grade 3. The vessel was accessed natively.  The following items were used: Cath Mini Trek 1.5 X 20 Rx, Blln Sc Euphora 2.0 X 10, Stent Resolute Rx 2.25x30   (MARY), Stent Resolute Rx 2.50x18 (MARY) and Cath [facility] [place].        Assessment and Plan:      * Atrial fibrillation with rapid ventricular  response  Presenting sxs of FTT/fatigue/weakness, now resolved.  AF/RVR on arrival, persistent despite amio gtt, asymptomatic at rest.  Successful VERITO/DCCV 7/19/19  Cont Xarelto 20mg qd  Cont amio po reload as ordered        Persistent atrial fibrillation  As above     Acute on chronic combined systolic and diastolic congestive heart failure  Appears euvolemic  EF 40% by VERITO 7/2019, ?component of tachymyopathy  Cont Toprol XL 50mg qd  Inc lisinopril 10mg qd  Stop imdur        Coronary artery disease involving native coronary artery of native heart without angina pectoris  Known MV CAD s/p LAD/Diag PCI 10/2017  Mild LV dysfxn  Cont Plavix (with ongoing Xarelto rx)  Cont BBl/Statin/ACEi  Stop imdur  Will consider outpatient stress testing     Diabetes mellitus, type 2  Per IM     Pacemaker  [employer]  ?hx SSS  Will interrogate as outpatient     Essential hypertension  Cont Toprol  Stop imdur  Inc lisinopril 10mg qd     Leg weakness, bilateral  Will plan outpatient PAD eval

## 2019-08-02 NOTE — ASSESSMENT & PLAN NOTE
Appears euvolemic  EF 40% by VERITO 7/2019, ?component of tachymyopathy  Cont Toprol XL 50mg qd  Cont lisinopril 10mg qd

## 2019-08-02 NOTE — NURSING TRANSFER
Nursing Transfer Note      8/1/2019    Transfer From: ED; To: Obs room 330B    Transfer via stretcher    Transfer with cardiac monitoring    Transported by Transport    Medicines sent: No    Chart send with patient: Yes    Notified: spouse    Patient reassessed at: 2250 on 8/1/2019    Upon arrival to floor: Pt ambulated from stretcher to wheelchair with standby assist. Cardiac monitor applied, patient oriented to room, call bell in reach and bed in lowest position and SR up x2. Pt denies having any pain at appears to be in no distress. Assessment completed per Doc Flowsheet. Will continue to monitor pt closely.

## 2019-08-05 ENCOUNTER — TELEPHONE (OUTPATIENT)
Dept: CARDIOLOGY | Facility: CLINIC | Age: 79
End: 2019-08-05

## 2019-08-06 LAB
BACTERIA BLD CULT: NORMAL
BACTERIA BLD CULT: NORMAL

## 2019-08-09 ENCOUNTER — NURSE TRIAGE (OUTPATIENT)
Dept: ADMINISTRATIVE | Facility: CLINIC | Age: 79
End: 2019-08-09

## 2019-08-09 ENCOUNTER — HOSPITAL ENCOUNTER (INPATIENT)
Facility: HOSPITAL | Age: 79
LOS: 4 days | Discharge: HOSPICE | DRG: 056 | End: 2019-08-13
Attending: EMERGENCY MEDICINE | Admitting: INTERNAL MEDICINE
Payer: MEDICARE

## 2019-08-09 DIAGNOSIS — I95.1 ORTHOSTATIC HYPOTENSION: ICD-10-CM

## 2019-08-09 DIAGNOSIS — I25.10 CORONARY ARTERY DISEASE, ANGINA PRESENCE UNSPECIFIED, UNSPECIFIED VESSEL OR LESION TYPE, UNSPECIFIED WHETHER NATIVE OR TRANSPLANTED HEART: ICD-10-CM

## 2019-08-09 DIAGNOSIS — I95.9 HYPOTENSION, UNSPECIFIED HYPOTENSION TYPE: Primary | ICD-10-CM

## 2019-08-09 DIAGNOSIS — R79.89 ELEVATED TROPONIN: ICD-10-CM

## 2019-08-09 DIAGNOSIS — E83.42 HYPOMAGNESEMIA: ICD-10-CM

## 2019-08-09 DIAGNOSIS — R79.89 ELEVATED LACTIC ACID LEVEL: ICD-10-CM

## 2019-08-09 DIAGNOSIS — I95.9 HYPOTENSION: ICD-10-CM

## 2019-08-09 DIAGNOSIS — I48.91 ATRIAL FIBRILLATION: ICD-10-CM

## 2019-08-09 LAB
ALBUMIN SERPL BCP-MCNC: 2.7 G/DL (ref 3.5–5.2)
ALP SERPL-CCNC: 65 U/L (ref 55–135)
ALT SERPL W/O P-5'-P-CCNC: 22 U/L (ref 10–44)
ANION GAP SERPL CALC-SCNC: 12 MMOL/L (ref 8–16)
APTT BLDCRRT: 28.9 SEC (ref 21–32)
AST SERPL-CCNC: 34 U/L (ref 10–40)
BACTERIA #/AREA URNS HPF: ABNORMAL /HPF
BASOPHILS # BLD AUTO: 0.01 K/UL (ref 0–0.2)
BASOPHILS NFR BLD: 0.1 % (ref 0–1.9)
BILIRUB SERPL-MCNC: 0.2 MG/DL (ref 0.1–1)
BILIRUB UR QL STRIP: NEGATIVE
BNP SERPL-MCNC: 183 PG/ML (ref 0–99)
BUN SERPL-MCNC: 24 MG/DL (ref 8–23)
CALCIUM SERPL-MCNC: 9.1 MG/DL (ref 8.7–10.5)
CHLORIDE SERPL-SCNC: 105 MMOL/L (ref 95–110)
CLARITY UR: CLEAR
CO2 SERPL-SCNC: 22 MMOL/L (ref 23–29)
COLOR UR: YELLOW
CREAT SERPL-MCNC: 1.3 MG/DL (ref 0.5–1.4)
DIFFERENTIAL METHOD: ABNORMAL
EOSINOPHIL # BLD AUTO: 0.1 K/UL (ref 0–0.5)
EOSINOPHIL NFR BLD: 1.5 % (ref 0–8)
ERYTHROCYTE [DISTWIDTH] IN BLOOD BY AUTOMATED COUNT: 14.8 % (ref 11.5–14.5)
EST. GFR  (AFRICAN AMERICAN): >60 ML/MIN/1.73 M^2
EST. GFR  (NON AFRICAN AMERICAN): 52 ML/MIN/1.73 M^2
GLUCOSE SERPL-MCNC: 154 MG/DL (ref 70–110)
GLUCOSE UR QL STRIP: NEGATIVE
HCT VFR BLD AUTO: 37.1 % (ref 40–54)
HGB BLD-MCNC: 11.5 G/DL (ref 14–18)
HGB UR QL STRIP: NEGATIVE
HYALINE CASTS #/AREA URNS LPF: 0 /LPF
INR PPP: 1.1 (ref 0.8–1.2)
KETONES UR QL STRIP: NEGATIVE
LACTATE SERPL-SCNC: 3.1 MMOL/L (ref 0.5–2.2)
LEUKOCYTE ESTERASE UR QL STRIP: NEGATIVE
LIPASE SERPL-CCNC: 54 U/L (ref 4–60)
LYMPHOCYTES # BLD AUTO: 0.6 K/UL (ref 1–4.8)
LYMPHOCYTES NFR BLD: 8 % (ref 18–48)
MAGNESIUM SERPL-MCNC: 1.4 MG/DL (ref 1.6–2.6)
MCH RBC QN AUTO: 31 PG (ref 27–31)
MCHC RBC AUTO-ENTMCNC: 31 G/DL (ref 32–36)
MCV RBC AUTO: 100 FL (ref 82–98)
MICROSCOPIC COMMENT: ABNORMAL
MONOCYTES # BLD AUTO: 0.6 K/UL (ref 0.3–1)
MONOCYTES NFR BLD: 7.6 % (ref 4–15)
NEUTROPHILS # BLD AUTO: 6.6 K/UL (ref 1.8–7.7)
NEUTROPHILS NFR BLD: 83.1 % (ref 38–73)
NITRITE UR QL STRIP: NEGATIVE
NON-SQ EPI CELLS #/AREA URNS HPF: 1 /HPF
PH UR STRIP: 5 [PH] (ref 5–8)
PLATELET # BLD AUTO: 255 K/UL (ref 150–350)
PMV BLD AUTO: 10 FL (ref 9.2–12.9)
POTASSIUM SERPL-SCNC: 4.6 MMOL/L (ref 3.5–5.1)
PROT SERPL-MCNC: 6.4 G/DL (ref 6–8.4)
PROT UR QL STRIP: ABNORMAL
PROTHROMBIN TIME: 11.2 SEC (ref 9–12.5)
RBC # BLD AUTO: 3.71 M/UL (ref 4.6–6.2)
RBC #/AREA URNS HPF: 1 /HPF (ref 0–4)
SODIUM SERPL-SCNC: 139 MMOL/L (ref 136–145)
SP GR UR STRIP: 1.02 (ref 1–1.03)
TROPONIN I SERPL DL<=0.01 NG/ML-MCNC: 0.03 NG/ML (ref 0–0.03)
TROPONIN I SERPL DL<=0.01 NG/ML-MCNC: 0.03 NG/ML (ref 0–0.03)
URN SPEC COLLECT METH UR: ABNORMAL
UROBILINOGEN UR STRIP-ACNC: NEGATIVE EU/DL
WBC # BLD AUTO: 7.91 K/UL (ref 3.9–12.7)
WBC #/AREA URNS HPF: 1 /HPF (ref 0–5)

## 2019-08-09 PROCEDURE — 83605 ASSAY OF LACTIC ACID: CPT

## 2019-08-09 PROCEDURE — 85730 THROMBOPLASTIN TIME PARTIAL: CPT

## 2019-08-09 PROCEDURE — 83690 ASSAY OF LIPASE: CPT

## 2019-08-09 PROCEDURE — 99291 CRITICAL CARE FIRST HOUR: CPT | Mod: 25

## 2019-08-09 PROCEDURE — 63600175 PHARM REV CODE 636 W HCPCS: Performed by: EMERGENCY MEDICINE

## 2019-08-09 PROCEDURE — 93010 EKG 12-LEAD: ICD-10-PCS | Mod: ,,, | Performed by: INTERNAL MEDICINE

## 2019-08-09 PROCEDURE — 87040 BLOOD CULTURE FOR BACTERIA: CPT

## 2019-08-09 PROCEDURE — 96360 HYDRATION IV INFUSION INIT: CPT

## 2019-08-09 PROCEDURE — 93005 ELECTROCARDIOGRAM TRACING: CPT

## 2019-08-09 PROCEDURE — 84484 ASSAY OF TROPONIN QUANT: CPT

## 2019-08-09 PROCEDURE — 85610 PROTHROMBIN TIME: CPT

## 2019-08-09 PROCEDURE — 21400001 HC TELEMETRY ROOM

## 2019-08-09 PROCEDURE — 93010 ELECTROCARDIOGRAM REPORT: CPT | Mod: ,,, | Performed by: INTERNAL MEDICINE

## 2019-08-09 PROCEDURE — 36415 COLL VENOUS BLD VENIPUNCTURE: CPT

## 2019-08-09 PROCEDURE — 81000 URINALYSIS NONAUTO W/SCOPE: CPT

## 2019-08-09 PROCEDURE — 80053 COMPREHEN METABOLIC PANEL: CPT

## 2019-08-09 PROCEDURE — 83735 ASSAY OF MAGNESIUM: CPT

## 2019-08-09 PROCEDURE — 85025 COMPLETE CBC W/AUTO DIFF WBC: CPT

## 2019-08-09 PROCEDURE — 25000003 PHARM REV CODE 250: Performed by: EMERGENCY MEDICINE

## 2019-08-09 PROCEDURE — 83880 ASSAY OF NATRIURETIC PEPTIDE: CPT

## 2019-08-09 RX ORDER — GABAPENTIN 300 MG/1
300 CAPSULE ORAL 3 TIMES DAILY
Status: DISCONTINUED | OUTPATIENT
Start: 2019-08-09 | End: 2019-08-13 | Stop reason: HOSPADM

## 2019-08-09 RX ORDER — SERTRALINE HYDROCHLORIDE 50 MG/1
100 TABLET, FILM COATED ORAL DAILY
Status: DISCONTINUED | OUTPATIENT
Start: 2019-08-10 | End: 2019-08-13 | Stop reason: HOSPADM

## 2019-08-09 RX ORDER — TAMSULOSIN HYDROCHLORIDE 0.4 MG/1
1 CAPSULE ORAL NIGHTLY
Status: DISCONTINUED | OUTPATIENT
Start: 2019-08-09 | End: 2019-08-12

## 2019-08-09 RX ORDER — FLUTICASONE PROPIONATE 50 MCG
1 SPRAY, SUSPENSION (ML) NASAL DAILY
Status: DISCONTINUED | OUTPATIENT
Start: 2019-08-10 | End: 2019-08-10

## 2019-08-09 RX ORDER — MAGNESIUM SULFATE HEPTAHYDRATE 40 MG/ML
2 INJECTION, SOLUTION INTRAVENOUS
Status: COMPLETED | OUTPATIENT
Start: 2019-08-09 | End: 2019-08-09

## 2019-08-09 RX ORDER — CLOPIDOGREL BISULFATE 75 MG/1
75 TABLET ORAL DAILY
Status: DISCONTINUED | OUTPATIENT
Start: 2019-08-10 | End: 2019-08-12

## 2019-08-09 RX ORDER — SODIUM CHLORIDE 0.9 % (FLUSH) 0.9 %
10 SYRINGE (ML) INJECTION
Status: DISCONTINUED | OUTPATIENT
Start: 2019-08-09 | End: 2019-08-10

## 2019-08-09 RX ORDER — SODIUM CHLORIDE 9 MG/ML
INJECTION, SOLUTION INTRAVENOUS CONTINUOUS
Status: DISCONTINUED | OUTPATIENT
Start: 2019-08-09 | End: 2019-08-10

## 2019-08-09 RX ORDER — ASPIRIN 81 MG/1
81 TABLET ORAL DAILY
Status: DISCONTINUED | OUTPATIENT
Start: 2019-08-10 | End: 2019-08-13 | Stop reason: HOSPADM

## 2019-08-09 RX ORDER — NITROGLYCERIN 0.4 MG/1
0.4 TABLET SUBLINGUAL EVERY 5 MIN PRN
Status: DISCONTINUED | OUTPATIENT
Start: 2019-08-09 | End: 2019-08-10

## 2019-08-09 RX ORDER — IBUPROFEN 200 MG
24 TABLET ORAL
Status: DISCONTINUED | OUTPATIENT
Start: 2019-08-09 | End: 2019-08-13 | Stop reason: HOSPADM

## 2019-08-09 RX ORDER — GLUCAGON 1 MG
1 KIT INJECTION
Status: DISCONTINUED | OUTPATIENT
Start: 2019-08-09 | End: 2019-08-13 | Stop reason: HOSPADM

## 2019-08-09 RX ORDER — FAMOTIDINE 20 MG/1
20 TABLET, FILM COATED ORAL DAILY
Status: DISCONTINUED | OUTPATIENT
Start: 2019-08-10 | End: 2019-08-13 | Stop reason: HOSPADM

## 2019-08-09 RX ORDER — INSULIN ASPART 100 [IU]/ML
1-10 INJECTION, SOLUTION INTRAVENOUS; SUBCUTANEOUS
Status: DISCONTINUED | OUTPATIENT
Start: 2019-08-09 | End: 2019-08-10

## 2019-08-09 RX ORDER — IBUPROFEN 200 MG
16 TABLET ORAL
Status: DISCONTINUED | OUTPATIENT
Start: 2019-08-09 | End: 2019-08-13 | Stop reason: HOSPADM

## 2019-08-09 RX ADMIN — TAMSULOSIN HYDROCHLORIDE 0.4 MG: 0.4 CAPSULE ORAL at 09:08

## 2019-08-09 RX ADMIN — GABAPENTIN 300 MG: 300 CAPSULE ORAL at 09:08

## 2019-08-09 RX ADMIN — SODIUM CHLORIDE 500 ML: 0.9 INJECTION, SOLUTION INTRAVENOUS at 07:08

## 2019-08-09 RX ADMIN — SODIUM CHLORIDE: 0.9 INJECTION, SOLUTION INTRAVENOUS at 09:08

## 2019-08-09 RX ADMIN — MAGNESIUM SULFATE 2 G: 2 INJECTION INTRAVENOUS at 09:08

## 2019-08-09 RX ADMIN — SODIUM CHLORIDE 500 ML: 0.9 INJECTION, SOLUTION INTRAVENOUS at 04:08

## 2019-08-09 NOTE — TELEPHONE ENCOUNTER
Reason for Disposition   Systolic BP < 90 and feeling dizzy, lightheaded, or weak    Protocols used: LOW BLOOD PRESSURE-A-OH    Received a call from the pts physical therapist Edwardo who stated pt BP is 80/48 sitting 110/52 lying. Pt is c/o dizziness and weakness. Instructed not to give lisinopril because he had not taken it yet only had one dose of Toprol 25 mg. Based on care advice informed Edwardo to call 911 for ems.

## 2019-08-09 NOTE — ED PROVIDER NOTES
Encounter Date: 8/9/2019    SCRIBE #1 NOTE: I, Jagdish Holbrook, am scribing for, and in the presence of,  Gary Ya MD. I have scribed the following portions of the note - Other sections scribed: HPI, ROS.       History     Chief Complaint   Patient presents with    Hypotension     Per EMS, pt initial BP was 72/44 after 500c NS bolus BP= 80/50. Pt reports feeling weak, recent change to medications.      CC: Hypotension    HPI: This 78 y.o M with a hx of A-fib, Anticoagulant long-term use, Arthritis, CAD, DM, HTN, Pacemaker, Sleep apnea, Stroke and Thyroid disease presents to the ED via EMS for emergent evaluation of hypotension with associated generalized weakness. Pt's wife reports the pt's BP was as low as 65/46mmHg after he woke up this AM. The pt's daughter reports the pt fell from standing with while he was shaving 8/4/19. The pt's daughter reports he hit his head on the bathtub, and suspects that he may have had a syncopal episode. He did require assistance after the fall. He also c/o left side chest pain, LUQ and LLQ abdominal pain, BLE weakness and BLE pain. Additionally, the pt c/o a generalized headache, sore throat, decreased appetite, rhinorrhea with green mucus x2 days and cough x2 weeks. The pt was instructed to come to the ED by his cardiologist. He was last seen in this ED and admitted to [facility] 8/1/19 for symptomatic bradycardia. He was instructed to discontinue his amiodarone. The pt's daughter reports the pt was last cardioverted 7/18 while admitted in the ICU. He did take Midodrine 10mg today. The pt denies fever, chills, diaphoresis, eye pain, otalgia, chest pain, SOB and rash. EMS did administers 50cc NS bolus which improved drake BP from 72/44 to 80/50.     Cardiologist- Nic Miguel MD        Review of patient's allergies indicates:   Allergen Reactions    Penicillins Swelling     Past Medical History:   Diagnosis Date    A-fib 7/18/2019    Anticoagulant long-term use     Plavix (on  hold for angiogram)    Arthritis     Coronary artery disease     pacemaker, stentsx2    Diabetes mellitus     Hypertension     Pacemaker     Sleep apnea     Stroke     Rt side affected / mild    Thyroid disease     hypothyroidism     Past Surgical History:   Procedure Laterality Date    ANGIOGRAM N/A 2013    [facility]formed [facility] OR    ANKLE SURGERY      APPENDECTOMY      CARDIAC PACEMAKER PLACEMENT      Cardioversion or Defibrillation  2019    [facility] CATH LAB    CHOLECYSTECTOMY      CORONARY ANGIOPLASTY WITH STENT PLACEMENT      stents x 2 placed     EYE SURGERY      HEART CATH-LEFT Left 10/23/2017    [facility]    HERNIA REPAIR      ventral hernia repair    REPAIR, AAA, ENDOVASCULAR N/A 2013    Performed [facility] at [facility] OR    sciatica      sciatica procedure    Transesophageal echo (VERITO) intra-procedure log documentation N/A 2019    Performed by [facility] at [facility] CATH LAB     Family History   Problem Relation Age of Onset    Cancer Mother     Heart disease Father     Cancer Sister      Social History     Tobacco Use    Smoking status: Former Smoker     Last attempt to quit: 1998     Years since quittin.1    Smokeless tobacco: Never Used   Substance Use Topics    Alcohol use: No     Alcohol/week: 0.0 oz    Drug use: No     Review of Systems   Constitutional: Positive for appetite change. Negative for chills and fever.   HENT: Positive for rhinorrhea and sore throat. Negative for congestion and ear pain.    Eyes: Negative for pain and visual disturbance.   Respiratory: Positive for cough. Negative for shortness of breath.    Cardiovascular: Positive for chest pain.   Gastrointestinal: Positive for abdominal pain. Negative for diarrhea, nausea and vomiting.   Genitourinary: Negative for dysuria.   Musculoskeletal: Negative  for back pain and neck pain.   Skin: Negative for rash.   Neurological: Positive for weakness (generalized). Negative for headaches.       Physical Exam     Initial Vitals [08/09/19 1622]   BP Pulse Resp Temp SpO2   (!) 77/48 84 18 98.8 °F (37.1 °C) 95 %      MAP       --         Physical Exam  The patient was examined specifically for the following:   General:No significant distress, Good color, Warm and dry. Head and neck:Scalp atraumatic, Neck supple. Neurological:Appropriate conversation, Gross motor deficits. Eyes:Conjugate gaze, Clear corneas. ENT: No epistaxis. Cardiac: Regular rate and rhythm, Grossly normal heart tones. Pulmonary: Wheezing, Rales. Gastrointestinal: Abdominal tenderness, Abdominal distention. Musculoskeletal: Extremity deformity, Apparent pain with range of motion of the joints. Skin: Rash.   The findings on examination were normal except for the following:  Patient's blood pressure is 77/48.  The patient is pink.  His heart rate is 84.  He has an irregularly irregular rhythm. There is no evidence of respiratory distress.  The patient is afebrile.  The patient has minimal tenderness of the left chest minimal tenderness left upper quadrant.  The lungs are clear and free of wheezing rales or rhonchi.  There is no extremity tenderness or pain with range of motion of any joints.  ED Course   Critical Care  Date/Time: 8/9/2019 7:42 PM  Performed by: Gary Ya MD  Authorized by: Gary Ya MD   Direct patient critical care time: 22 minutes  Additional history critical care time: 11 minutes  Ordering / reviewing critical care time: 22 minutes  Documentation critical care time: 11 minutes  Consulting other physicians critical care time: 5 minutes  Consult with family critical care time: 3 minutes  Total critical care time (exclusive of procedural time) : 74 minutes  Critical care time was exclusive of separately billable procedures and treating other patients and teaching  time.  Critical care was necessary to treat or prevent imminent or life-threatening deterioration of the following conditions: shock.  Critical care was time spent personally by me on the following activities: development of treatment plan with patient or surrogate, discussions with primary provider and evaluation of patient's response to treatment.        Labs Reviewed   COMPREHENSIVE METABOLIC PANEL - Abnormal; Notable for the following components:       Result Value    CO2 22 (*)     Glucose 154 (*)     BUN, Bld 24 (*)     Albumin 2.7 (*)     eGFR if non  52 (*)     All other components within normal limits   B-TYPE NATRIURETIC PEPTIDE - Abnormal; Notable for the following components:     (*)     All other components within normal limits   TROPONIN I - Abnormal; Notable for the following components:    Troponin I 0.033 (*)     All other components within normal limits   MAGNESIUM - Abnormal; Notable for the following components:    Magnesium 1.4 (*)     All other components within normal limits   LACTIC ACID, PLASMA - Abnormal; Notable for the following components:    Lactate (Lactic Acid) 3.1 (*)     All other components within normal limits   CBC W/ AUTO DIFFERENTIAL - Abnormal; Notable for the following components:    RBC 3.71 (*)     Hemoglobin 11.5 (*)     Hematocrit 37.1 (*)     Mean Corpuscular Volume 100 (*)     Mean Corpuscular Hemoglobin Conc 31.0 (*)     RDW 14.8 (*)     Lymph # 0.6 (*)     Gran% 83.1 (*)     Lymph% 8.0 (*)     All other components within normal limits   CULTURE, BLOOD   CULTURE, BLOOD   APTT   PROTIME-INR   LIPASE   URINALYSIS, REFLEX TO URINE CULTURE          Imaging Results          X-Ray Chest AP Portable (Final result)  Result time 08/09/19 17:18:25    Final result by Marta Denton MD (08/09/19 17:18:25)                 Impression:      No significant change.  Findings may suggest congestive heart failure.  A pneumonic process may have a similar  appearance.  Recommend clinical correlation.      Electronically signed by: Marta Bertin  Date:    08/09/2019  Time:    17:18             Narrative:    EXAMINATION:  AP PORTABLE CHEST    CLINICAL HISTORY:  chest pain;    TECHNIQUE:  AP portable chest radiograph was submitted.    COMPARISON:  08/01/2019    FINDINGS:  AP portable chest radiograph demonstrates a relatively stable cardiac silhouette.  There are diffuse bilateral arm interstitial and alveolar opacities.  This is not significantly changed.  There is no pneumothorax or significant pleural fluid.  There is a left subclavian pacer.  Spondylitic changes and mild dextroscoliosis are noted.                                  Medical decision making:  Given the above, this patient presents to the emergency room with low blood pressure.  Patient has an elevated lactate but he is on metformin.  There is no other evidence of infection.  A urinalysis is pending.  The patient's blood pressure is low.  He responded nicely to fluids.  The patient does have a history of congestive heart failure.  His ejection fraction is 40-45%.  He was not short of breath in the emergency room.  Had a had good oxygen saturations.  I will admit him for rehydration.  We will repeat his lactate.  I will trend his cardiac markers there elevated he has a history of coronary artery disease with stents.  He was cardioverted in his last admission.  He is in atrial fibrillation today after being in sinus rhythm previously.  I will consult Cardiology.  The patient is on Xarelto.  There is no history of rectal bleeding.  Laboratory value chin is otherwise unremarkable except for a magnesium of 1.4.  I will replace this.             Scribe Attestation:   Scribe #1: I performed the above scribed service and the documentation accurately describes the services I performed. I attest to the accuracy of the note.    I, Felton HILARIO, personally performed the services described in this documentation. All  medical record entries made by the scribe were at my direction and in my presence         Clinical Impression:       ICD-10-CM ICD-9-CM   1. Hypotension, unspecified hypotension type I95.9 458.9   2. Atrial fibrillation I48.91 427.31   3. Elevated lactic acid level R79.89 276.2   4. Hypomagnesemia E83.42 275.2   5. Elevated troponin R74.8 790.6   6. Coronary artery disease, angina presence unspecified, unspecified vessel or lesion type, unspecified whether native or transplanted heart I25.10 414.00                                Gary Ya MD  08/09/19 2010

## 2019-08-10 PROBLEM — R04.0 ACUTE POSTERIOR EPISTAXIS: Status: RESOLVED | Noted: 2017-06-04 | Resolved: 2019-08-10

## 2019-08-10 PROBLEM — R29.898 LEG WEAKNESS, BILATERAL: Status: RESOLVED | Noted: 2019-07-19 | Resolved: 2019-08-10

## 2019-08-10 PROBLEM — I10 ESSENTIAL HYPERTENSION: Chronic | Status: ACTIVE | Noted: 2019-07-19

## 2019-08-10 PROBLEM — G47.33 OSA ON CPAP: Chronic | Status: ACTIVE | Noted: 2019-08-10

## 2019-08-10 PROBLEM — E11.9 TYPE 2 DIABETES MELLITUS, CONTROLLED: Chronic | Status: ACTIVE | Noted: 2019-07-19

## 2019-08-10 PROBLEM — I25.10 CORONARY ARTERY DISEASE INVOLVING NATIVE CORONARY ARTERY OF NATIVE HEART WITHOUT ANGINA PECTORIS: Chronic | Status: ACTIVE | Noted: 2017-06-04

## 2019-08-10 PROBLEM — I50.42 CHRONIC COMBINED SYSTOLIC AND DIASTOLIC CONGESTIVE HEART FAILURE: Status: RESOLVED | Noted: 2019-07-19 | Resolved: 2019-08-10

## 2019-08-10 PROBLEM — Z79.01 CHRONIC ANTICOAGULATION: Chronic | Status: ACTIVE | Noted: 2019-07-19

## 2019-08-10 PROBLEM — I48.0 PAF (PAROXYSMAL ATRIAL FIBRILLATION): Status: RESOLVED | Noted: 2019-07-19 | Resolved: 2019-08-10

## 2019-08-10 PROBLEM — I48.19 PERSISTENT ATRIAL FIBRILLATION: Chronic | Status: ACTIVE | Noted: 2017-06-04

## 2019-08-10 PROBLEM — R00.1 SYMPTOMATIC BRADYCARDIA: Status: RESOLVED | Noted: 2019-08-01 | Resolved: 2019-08-10

## 2019-08-10 PROBLEM — I95.1 ORTHOSTATIC HYPOTENSION: Status: RESOLVED | Noted: 2017-06-08 | Resolved: 2019-08-10

## 2019-08-10 PROBLEM — N40.0 BPH (BENIGN PROSTATIC HYPERPLASIA): Chronic | Status: ACTIVE | Noted: 2019-08-10

## 2019-08-10 PROBLEM — I25.10 CORONARY ARTERY DISEASE: Status: RESOLVED | Noted: 2017-10-23 | Resolved: 2019-08-10

## 2019-08-10 PROBLEM — T46.2X5A ADVERSE EFFECT OF AMIODARONE: Status: RESOLVED | Noted: 2019-08-02 | Resolved: 2019-08-10

## 2019-08-10 PROBLEM — R55 SYNCOPE AND COLLAPSE: Status: RESOLVED | Noted: 2017-06-06 | Resolved: 2019-08-10

## 2019-08-10 PROBLEM — Z95.0 PRESENCE OF PERMANENT CARDIAC PACEMAKER: Chronic | Status: ACTIVE | Noted: 2017-09-21

## 2019-08-10 PROBLEM — Z86.73 HISTORY OF CVA (CEREBROVASCULAR ACCIDENT): Chronic | Status: ACTIVE | Noted: 2017-09-21

## 2019-08-10 PROBLEM — D63.8 ANEMIA OF CHRONIC DISEASE: Chronic | Status: ACTIVE | Noted: 2017-09-21

## 2019-08-10 PROBLEM — E03.9 HYPOTHYROIDISM: Chronic | Status: ACTIVE | Noted: 2019-08-10

## 2019-08-10 PROBLEM — E89.0 POSTABLATIVE HYPOTHYROIDISM: Chronic | Status: ACTIVE | Noted: 2019-08-10

## 2019-08-10 PROBLEM — R26.81 UNSTEADY GAIT: Status: RESOLVED | Noted: 2019-07-20 | Resolved: 2019-08-10

## 2019-08-10 LAB
ALBUMIN SERPL BCP-MCNC: 2.5 G/DL (ref 3.5–5.2)
ALP SERPL-CCNC: 64 U/L (ref 55–135)
ALT SERPL W/O P-5'-P-CCNC: 20 U/L (ref 10–44)
ANION GAP SERPL CALC-SCNC: 6 MMOL/L (ref 8–16)
ANION GAP SERPL CALC-SCNC: 7 MMOL/L (ref 8–16)
AST SERPL-CCNC: 29 U/L (ref 10–40)
BASOPHILS # BLD AUTO: 0.01 K/UL (ref 0–0.2)
BASOPHILS NFR BLD: 0.1 % (ref 0–1.9)
BILIRUB SERPL-MCNC: 0.2 MG/DL (ref 0.1–1)
BUN SERPL-MCNC: 20 MG/DL (ref 8–23)
BUN SERPL-MCNC: 20 MG/DL (ref 8–23)
CALCIUM SERPL-MCNC: 8.5 MG/DL (ref 8.7–10.5)
CALCIUM SERPL-MCNC: 8.7 MG/DL (ref 8.7–10.5)
CHLORIDE SERPL-SCNC: 107 MMOL/L (ref 95–110)
CHLORIDE SERPL-SCNC: 107 MMOL/L (ref 95–110)
CO2 SERPL-SCNC: 24 MMOL/L (ref 23–29)
CO2 SERPL-SCNC: 26 MMOL/L (ref 23–29)
CREAT SERPL-MCNC: 0.9 MG/DL (ref 0.5–1.4)
CREAT SERPL-MCNC: 1 MG/DL (ref 0.5–1.4)
DIFFERENTIAL METHOD: ABNORMAL
EOSINOPHIL # BLD AUTO: 0.2 K/UL (ref 0–0.5)
EOSINOPHIL NFR BLD: 2.4 % (ref 0–8)
ERYTHROCYTE [DISTWIDTH] IN BLOOD BY AUTOMATED COUNT: 14.9 % (ref 11.5–14.5)
EST. GFR  (AFRICAN AMERICAN): >60 ML/MIN/1.73 M^2
EST. GFR  (AFRICAN AMERICAN): >60 ML/MIN/1.73 M^2
EST. GFR  (NON AFRICAN AMERICAN): >60 ML/MIN/1.73 M^2
EST. GFR  (NON AFRICAN AMERICAN): >60 ML/MIN/1.73 M^2
GLUCOSE SERPL-MCNC: 113 MG/DL (ref 70–110)
GLUCOSE SERPL-MCNC: 117 MG/DL (ref 70–110)
HCT VFR BLD AUTO: 34.3 % (ref 40–54)
HGB BLD-MCNC: 10.7 G/DL (ref 14–18)
LACTATE SERPL-SCNC: 1 MMOL/L (ref 0.5–2.2)
LYMPHOCYTES # BLD AUTO: 0.6 K/UL (ref 1–4.8)
LYMPHOCYTES NFR BLD: 8.3 % (ref 18–48)
MAGNESIUM SERPL-MCNC: 1.6 MG/DL (ref 1.6–2.6)
MCH RBC QN AUTO: 31.1 PG (ref 27–31)
MCHC RBC AUTO-ENTMCNC: 31.2 G/DL (ref 32–36)
MCV RBC AUTO: 100 FL (ref 82–98)
MONOCYTES # BLD AUTO: 0.7 K/UL (ref 0.3–1)
MONOCYTES NFR BLD: 9.4 % (ref 4–15)
NEUTROPHILS # BLD AUTO: 5.7 K/UL (ref 1.8–7.7)
NEUTROPHILS NFR BLD: 80.2 % (ref 38–73)
PHOSPHATE SERPL-MCNC: 2.4 MG/DL (ref 2.7–4.5)
PLATELET # BLD AUTO: 228 K/UL (ref 150–350)
PMV BLD AUTO: 9.6 FL (ref 9.2–12.9)
POCT GLUCOSE: 107 MG/DL (ref 70–110)
POCT GLUCOSE: 125 MG/DL (ref 70–110)
POCT GLUCOSE: 140 MG/DL (ref 70–110)
POCT GLUCOSE: 142 MG/DL (ref 70–110)
POCT GLUCOSE: 84 MG/DL (ref 70–110)
POTASSIUM SERPL-SCNC: 4.1 MMOL/L (ref 3.5–5.1)
POTASSIUM SERPL-SCNC: 4.1 MMOL/L (ref 3.5–5.1)
PROT SERPL-MCNC: 5.9 G/DL (ref 6–8.4)
RBC # BLD AUTO: 3.44 M/UL (ref 4.6–6.2)
SODIUM SERPL-SCNC: 138 MMOL/L (ref 136–145)
SODIUM SERPL-SCNC: 139 MMOL/L (ref 136–145)
TROPONIN I SERPL DL<=0.01 NG/ML-MCNC: 0.03 NG/ML (ref 0–0.03)
WBC # BLD AUTO: 7.1 K/UL (ref 3.9–12.7)

## 2019-08-10 PROCEDURE — S5571 INSULIN DISPOS PEN 3 ML: HCPCS | Performed by: INTERNAL MEDICINE

## 2019-08-10 PROCEDURE — 21400001 HC TELEMETRY ROOM

## 2019-08-10 PROCEDURE — 27000190 HC CPAP FULL FACE MASK W/VALVE

## 2019-08-10 PROCEDURE — 36415 COLL VENOUS BLD VENIPUNCTURE: CPT

## 2019-08-10 PROCEDURE — 84100 ASSAY OF PHOSPHORUS: CPT

## 2019-08-10 PROCEDURE — 85025 COMPLETE CBC W/AUTO DIFF WBC: CPT

## 2019-08-10 PROCEDURE — 99900035 HC TECH TIME PER 15 MIN (STAT)

## 2019-08-10 PROCEDURE — 84484 ASSAY OF TROPONIN QUANT: CPT

## 2019-08-10 PROCEDURE — 80053 COMPREHEN METABOLIC PANEL: CPT

## 2019-08-10 PROCEDURE — 63600175 PHARM REV CODE 636 W HCPCS: Performed by: INTERNAL MEDICINE

## 2019-08-10 PROCEDURE — 94761 N-INVAS EAR/PLS OXIMETRY MLT: CPT

## 2019-08-10 PROCEDURE — 83605 ASSAY OF LACTIC ACID: CPT

## 2019-08-10 PROCEDURE — 99223 1ST HOSP IP/OBS HIGH 75: CPT | Mod: ,,, | Performed by: INTERNAL MEDICINE

## 2019-08-10 PROCEDURE — 25000003 PHARM REV CODE 250: Performed by: INTERNAL MEDICINE

## 2019-08-10 PROCEDURE — 80048 BASIC METABOLIC PNL TOTAL CA: CPT

## 2019-08-10 PROCEDURE — 83735 ASSAY OF MAGNESIUM: CPT

## 2019-08-10 PROCEDURE — 94660 CPAP INITIATION&MGMT: CPT

## 2019-08-10 PROCEDURE — 99223 PR INITIAL HOSPITAL CARE,LEVL III: ICD-10-PCS | Mod: ,,, | Performed by: INTERNAL MEDICINE

## 2019-08-10 RX ORDER — INSULIN ASPART 100 [IU]/ML
1-10 INJECTION, SOLUTION INTRAVENOUS; SUBCUTANEOUS
Status: DISCONTINUED | OUTPATIENT
Start: 2019-08-10 | End: 2019-08-13 | Stop reason: HOSPADM

## 2019-08-10 RX ORDER — INSULIN ASPART 100 [IU]/ML
3 INJECTION, SOLUTION INTRAVENOUS; SUBCUTANEOUS
Status: DISCONTINUED | OUTPATIENT
Start: 2019-08-10 | End: 2019-08-13 | Stop reason: HOSPADM

## 2019-08-10 RX ORDER — RAMELTEON 8 MG/1
8 TABLET ORAL NIGHTLY PRN
Status: DISCONTINUED | OUTPATIENT
Start: 2019-08-10 | End: 2019-08-13 | Stop reason: HOSPADM

## 2019-08-10 RX ORDER — ONDANSETRON 2 MG/ML
8 INJECTION INTRAMUSCULAR; INTRAVENOUS EVERY 8 HOURS PRN
Status: DISCONTINUED | OUTPATIENT
Start: 2019-08-10 | End: 2019-08-13 | Stop reason: HOSPADM

## 2019-08-10 RX ORDER — AMOXICILLIN 250 MG
1 CAPSULE ORAL 2 TIMES DAILY PRN
Status: DISCONTINUED | OUTPATIENT
Start: 2019-08-10 | End: 2019-08-13 | Stop reason: HOSPADM

## 2019-08-10 RX ORDER — SODIUM CHLORIDE 9 MG/ML
INJECTION, SOLUTION INTRAVENOUS CONTINUOUS
Status: ACTIVE | OUTPATIENT
Start: 2019-08-10 | End: 2019-08-11

## 2019-08-10 RX ORDER — ACETAMINOPHEN 500 MG
500 TABLET ORAL EVERY 6 HOURS PRN
Status: DISCONTINUED | OUTPATIENT
Start: 2019-08-10 | End: 2019-08-13 | Stop reason: HOSPADM

## 2019-08-10 RX ORDER — SODIUM CHLORIDE 9 MG/ML
INJECTION, SOLUTION INTRAVENOUS CONTINUOUS
Status: DISCONTINUED | OUTPATIENT
Start: 2019-08-10 | End: 2019-08-10

## 2019-08-10 RX ORDER — LEVOCETIRIZINE DIHYDROCHLORIDE 5 MG/1
5 TABLET, FILM COATED ORAL NIGHTLY
COMMUNITY

## 2019-08-10 RX ORDER — MIDODRINE HYDROCHLORIDE 5 MG/1
5 TABLET ORAL 2 TIMES DAILY WITH MEALS
Status: DISCONTINUED | OUTPATIENT
Start: 2019-08-10 | End: 2019-08-11

## 2019-08-10 RX ORDER — NITROGLYCERIN 0.4 MG/1
0.4 TABLET SUBLINGUAL EVERY 5 MIN PRN
Status: DISCONTINUED | OUTPATIENT
Start: 2019-08-10 | End: 2019-08-13 | Stop reason: HOSPADM

## 2019-08-10 RX ORDER — METFORMIN HYDROCHLORIDE 1000 MG/1
1000 TABLET ORAL 2 TIMES DAILY WITH MEALS
COMMUNITY

## 2019-08-10 RX ADMIN — MIDODRINE HYDROCHLORIDE 5 MG: 5 TABLET ORAL at 05:08

## 2019-08-10 RX ADMIN — GABAPENTIN 300 MG: 300 CAPSULE ORAL at 08:08

## 2019-08-10 RX ADMIN — LEVOTHYROXINE SODIUM 175 MCG: 150 TABLET ORAL at 06:08

## 2019-08-10 RX ADMIN — INSULIN DETEMIR 10 UNITS: 100 INJECTION, SOLUTION SUBCUTANEOUS at 08:08

## 2019-08-10 RX ADMIN — MIDODRINE HYDROCHLORIDE 5 MG: 5 TABLET ORAL at 08:08

## 2019-08-10 RX ADMIN — SODIUM CHLORIDE: 0.9 INJECTION, SOLUTION INTRAVENOUS at 05:08

## 2019-08-10 RX ADMIN — INSULIN ASPART 3 UNITS: 100 INJECTION, SOLUTION INTRAVENOUS; SUBCUTANEOUS at 05:08

## 2019-08-10 RX ADMIN — INSULIN ASPART 3 UNITS: 100 INJECTION, SOLUTION INTRAVENOUS; SUBCUTANEOUS at 11:08

## 2019-08-10 RX ADMIN — TAMSULOSIN HYDROCHLORIDE 0.4 MG: 0.4 CAPSULE ORAL at 08:08

## 2019-08-10 RX ADMIN — GABAPENTIN 300 MG: 300 CAPSULE ORAL at 05:08

## 2019-08-10 RX ADMIN — ASPIRIN 81 MG: 81 TABLET, COATED ORAL at 08:08

## 2019-08-10 RX ADMIN — FAMOTIDINE 20 MG: 20 TABLET ORAL at 08:08

## 2019-08-10 RX ADMIN — SERTRALINE HYDROCHLORIDE 100 MG: 50 TABLET ORAL at 08:08

## 2019-08-10 RX ADMIN — RIVAROXABAN 20 MG: 20 TABLET, FILM COATED ORAL at 05:08

## 2019-08-10 RX ADMIN — CLOPIDOGREL BISULFATE 75 MG: 75 TABLET ORAL at 08:08

## 2019-08-10 RX ADMIN — SODIUM CHLORIDE: 0.9 INJECTION, SOLUTION INTRAVENOUS at 11:08

## 2019-08-10 RX ADMIN — INSULIN ASPART 3 UNITS: 100 INJECTION, SOLUTION INTRAVENOUS; SUBCUTANEOUS at 08:08

## 2019-08-10 NOTE — ASSESSMENT & PLAN NOTE
He is currently in atrial fibrillation but with a controlled ventricular response.  Will continue his home regimen apixaban but hold metoprolol for now due to hypotension.

## 2019-08-10 NOTE — ASSESSMENT & PLAN NOTE
Well controlled on home regimen a sulfonylurea; will provide basal-prandial insulin therapy along with insulin sliding scale.

## 2019-08-10 NOTE — ASSESSMENT & PLAN NOTE
Patient's blood pressure was noted to be low at home and has been borderline low ever since.  Will hold his home regimen lisinopril, metoprolol, and tamsulosin for now and continue his home regimen of midodrine.

## 2019-08-10 NOTE — SUBJECTIVE & OBJECTIVE
Past Medical History:   Diagnosis Date    A-fib 7/18/2019    Anticoagulant long-term use     Plavix (on hold for angiogram)    Arthritis     Coronary artery disease     pacemaker, stentsx2    Diabetes mellitus     Hypertension     Pacemaker     Sleep apnea     Stroke     Rt side affected / mild    Thyroid disease     hypothyroidism       Past Surgical History:   Procedure Laterality Date    ANGIOGRAM N/A 5/31/2013    [facility] OR    ANKLE SURGERY      APPENDECTOMY      CARDIAC PACEMAKER PLACEMENT      Cardioversion or Defibrillation  7/19/2019    Performed by Nic Miguel MD at [facility] CATH LAB    CHOLECYSTECTOMY      CORONARY ANGIOPLASTY WITH STENT PLACEMENT  6/06    stents x 2 placed     EYE SURGERY      HEART CATH-LEFT Left 10/23/2017    Per[facility]ATH [facility]    HERNIA REPAIR      ventral hernia repair    REPAIR, AAA, ENDOVASCULAR N/A 6/18/2013    Performed by Radu Melgar [facility] at [facility] OR    sciatica      sciatica procedure    Transesophageal echo (VERITO) intra-procedure log documentation N/A 7/19/2019    Performed by Nic Miguel MD at [facility] CATH LAB       Review of patient's allergies indicates:   Allergen Reactions    Penicillins Swelling       No current facility-administered medications on file prior to encounter.      Current Outpatient Medications on File Prior to Encounter   Medication Sig    aspirin (ECOTRIN) 81 MG EC tablet Take 81 mg by mouth once daily.    clopidogrel (PLAVIX) 75 mg tablet Take 75 mg by mouth once daily.    cyanocobalamin 2000 MCG tablet Take 2,500 mcg by mouth once daily.    fluticasone (FLONASE) 50 mcg/actuation nasal spray 1 spray by Each Nare route nightly as needed.     gabapentin (NEURONTIN) 300 MG capsule Take 300 mg by mouth 3 (three) times daily.    glimepiride (AMARYL) 4 MG tablet Take 4 mg by mouth before breakfast.    lisinopril 10 MG tablet Take 1 tablet (10 mg  total) by mouth once daily.    metoprolol succinate (TOPROL-XL) 25 MG 24 hr tablet Take 2 tablets (50 mg total) by mouth once daily.    midodrine (PROAMATINE) 5 MG Tab     nitroGLYCERIN (NITROSTAT) 0.4 MG SL tablet Place 0.4 mg under the tongue every 5 (five) minutes as needed for Chest pain.    ranitidine (ZANTAC) 300 MG tablet Take 300 mg by mouth every evening.    sertraline (ZOLOFT) 100 MG tablet Take 100 mg by mouth 2 (two) times daily.    SYNTHROID 175 mcg tablet     tamsulosin (FLOMAX) 0.4 mg Cp24 TAKE 1 CAPSULE AT BEDTIME.    traMADol (ULTRAM) 50 mg tablet Take 0.5 tablets (25 mg total) by mouth every 8 (eight) hours as needed (Sever pain not controlled by tylenol or tizanidine).    vitamin D 185 MG Tab Take 2,000 mg by mouth once daily.     XARELTO 20 mg Tab TAKE 1 TABLET BY MOUTH ONCE A DAY WITH EVENING MEAL     Family History     Problem Relation (Age of Onset)    Cancer Mother, Sister    Heart disease Father        Tobacco Use    Smoking status: Former Smoker     Last attempt to quit: 1998     Years since quittin.1    Smokeless tobacco: Never Used   Substance and Sexual Activity    Alcohol use: No     Alcohol/week: 0.0 oz    Drug use: No    Sexual activity: Never     Review of Systems   Constitution: Negative for decreased appetite.   HENT: Negative for ear discharge.    Eyes: Negative for blurred vision.   Endocrine: Negative for polyphagia.   Skin: Negative for nail changes.   Genitourinary: Negative for bladder incontinence.   Neurological: Negative for aphonia.   Psychiatric/Behavioral: Negative for hallucinations.   Allergic/Immunologic: Negative for hives.     Objective:     Vital Signs (Most Recent):  Temp: 98.5 °F (36.9 °C) (08/10/19 0732)  Pulse: 89 (08/10/19 0732)  Resp: 19 (08/10/19 0732)  BP: 128/88 (08/10/19 0732)  SpO2: (!) 94 % (08/10/19 0827) Vital Signs (24h Range):  Temp:  [97.9 °F (36.6 °C)-98.8 °F (37.1 °C)] 98.5 °F (36.9 °C)  Pulse:  [] 89  Resp:   [16-32] 19  SpO2:  [90 %-98 %] 94 %  BP: ()/(48-88) 128/88     Weight: 79.9 kg (176 lb 2.4 oz)  Body mass index is 28.43 kg/m².    SpO2: (!) 94 %  O2 Device (Oxygen Therapy): room air      Intake/Output Summary (Last 24 hours) at 8/10/2019 1048  Last data filed at 8/10/2019 0600  Gross per 24 hour   Intake --   Output 775 ml   Net -775 ml       Lines/Drains/Airways     Peripheral Intravenous Line                 Peripheral IV - Single Lumen 08/01/19 20 G Left Wrist 9 days         Peripheral IV - Single Lumen 08/09/19 20 G Left Forearm 1 day                Physical Exam   Constitutional: He is oriented to person, place, and time. He appears well-developed and well-nourished.   HENT:   Head: Normocephalic and atraumatic.   Eyes: Pupils are equal, round, and reactive to light. Conjunctivae are normal.   Neck: Normal range of motion. Neck supple.   Cardiovascular: Normal rate, normal heart sounds and intact distal pulses. An irregularly irregular rhythm present.   Pulmonary/Chest: Effort normal and breath sounds normal.   Abdominal: Soft. Bowel sounds are normal.   Musculoskeletal: Normal range of motion.   Neurological: He is alert and oriented to person, place, and time.   Skin: Skin is warm and dry.       Significant Labs: All pertinent lab results from the last 24 hours have been reviewed.    Significant Imaging: Echocardiogram:   2D echo with color flow doppler:   Results for orders placed or performed during the hospital encounter of 06/03/17   2D echo with color flow doppler   Result Value Ref Range    QEF 60 55 - 65    Mitral Valve Regurgitation TRIVIAL     Est. PA Systolic Pressure 21.34     Tricuspid Valve Regurgitation TRIVIAL     Narrative    Date of Procedure: 06/05/2017        TEST DESCRIPTION       Aorta: The aortic root is normal in size, measuring 3.3 cm at sinotubular junction and 3.8 cm at Sinuses of Valsalva. The proximal ascending aorta is normal in size, measuring 3.9 cm across.     Left  Atrium: The left atrial volume index is mildly enlarged, measuring 35.70 cc/m2.     Left Ventricle: The left ventricle is normal in size, with an end-diastolic diameter of 4.7 cm, and an end-systolic diameter of 3.2 cm. LV wall thickness is normal, with the septum measuring 0.9 cm and the posterior wall measuring 0.8 cm across. Relative   wall thickness was normal at 0.34, and the LV mass index was 68.4 g/m2 consistent with normal left ventricular mass. There are no regional wall motion abnormalities. Left ventricular systolic function appears normal. Visually estimated ejection fraction   is 60-65%. The LV Doppler derived stroke volume equals 71.0 ccs.     Diastolic indices: E wave velocity 0.6 m/s, E/A ratio 1.0,  msec., E/e' ratio(avg) 9. Diastolic function is indeterminate.     Right Atrium: The right atrium is normal in size, measuring 5.9 cm in length and 3.0 cm in width in the apical view.     Right Ventricle: The right ventricle is normal in size measuring 2.4 cm at the base in the apical right ventricle-focused view. Global right ventricular systolic function appears normal. Tricuspid annular plane systolic excursion (TAPSE) is 1.8 cm.   Tissue Doppler-derived tricuspid annular peak systolic velocity (S prime) is 12.5 cm/s. The estimated PA systolic pressure is greater than 21 mmHg.     Aortic Valve:  The aortic valve is mildly sclerotic. The aortic valve is tri-leaflet in structure. There is marked aortic annular calcification.     Mitral Valve:  The mitral valve is mildly sclerotic. There is trivial mitral regurgitation.     Tricuspid Valve:  There is trivial tricuspid regurgitation. Tricuspid valve is normal in structure with normal leaflet mobility.     Pulmonary Valve:  Pulmonary valve is normal in structure with normal leaflet mobility.     Intracavitary: There is no evidence of pericardial effusion, intracavity mass, thrombi, or vegetation.     Calicification of the aorta and ST junction..          CONCLUSIONS     1 - Normal left ventricular systolic function (EF 60-65%).     2 - Indeterminate LV diastolic function.     3 - Normal right ventricular systolic function .     4 - The estimated PA systolic pressure is greater than 21 mmHg.     5 - Trivial mitral regurgitation.     6 - Trivial tricuspid regurgitation.     7 - Calicification of the aorta and ST junction..     8 - Mild left atrial enlargement.             This document has been electronically    SIGNED BY: Joanna Chin MD On: 06/05/2017 10:43

## 2019-08-10 NOTE — H&P
[facility] Medicine  History & Physical    Patient Name: Doni Theodore  MRN: [id number]  Admission Date: 8/9/2019  Attending Physician: Medardo Raymond MD   Primary Care Provider: Nic Mauro MD         Patient information was obtained from patient.     Subjective:     Principal Problem:Hypotension    Chief Complaint: Weakness for one month.    HPI: Mr. Doni Theodore is a 78 y.o. male with essential hypertension, type 2 diabetes mellitus (HbA1c 5.7% Jul 2019), CAD s/p PCI, paroxysmal atrial fibrillation ([id number] score 7) on chronic anticoagulation, permanent pacemaker in place, hypothyroidism (TSH 12.396 Aug 2019), anemia of chronic disease, FEMI on CPAP, BPH, and history of CVA who presents to [facility] ED with complaints of weakness for the past month.  He reports that his weakness has been gradual and progressive, and has been associated with worsening dyspnea on exertion.  He also had some non-productive coughing but denies any fevers, chills, nausea, vomiting, chest pain, palpitations, diaphoresis, hemoptysis, nor any lower extremity pain or swelling.  He denies any recent travel nor sick contacts and denies any PND or orthopnea.  Today, the patient's wife that that he had blood pressures as low as 65/45 at home.  He reports that he is compliant with all of his medications and although he did not fall today, he did have a fall about a week ago after standing up.      Chart Review:  Previous Hospitalizations  Date Hospital Diagnosis   Aug 1, 2019 [facility]- Symptomatic bradycardia   Jul 2019 [facility] AFib with RVR s/p VERITO/DCCV   Oct 2017 [facility] LHC with PCI to mLAD and D1    Jun 2017 [facility] Epistaxis   Jun 2013 [facility] Repair of AAA     Outpatient Follow-Up  Date of Visit Physician Service   Dec 2018 Luis Alfredo Queen NP Urgent Care   Jun 2015 Ed Beauchamp MD Urology     Past Medical History:   Diagnosis Date    A-fib 7/18/2019    Anticoagulant long-term use     Plavix (on  hold for angiogram)    Arthritis     Coronary artery disease     pacemaker, stentsx2    Diabetes mellitus     Hypertension     Pacemaker     Sleep apnea     Stroke     Rt side affected / mild    Thyroid disease     hypothyroidism       Past Surgical History:   Procedure Laterality Date    ANGIOGRAM N/A 5/31/2013    [facility] OR    ANKLE SURGERY      APPENDECTOMY      CARDIAC PACEMAKER PLACEMENT      Cardioversion or Defibrillation  7/19/2019    Performed by Nic Miguel [facility] at [facility] CATH LAB    CHOLECYSTECTOMY      CORONARY ANGIOPLASTY WITH STENT PLACEMENT  6/06    stents x 2 placed     EYE SURGERY      HEART CATH-LEFT Left 10/23/2017    [facility] CATH LAB    HERNIA REPAIR      ventral hernia repair    REPAIR, AAA, ENDOVASCULAR N/A 6/18/2013    Performed by Radu [facility]gar [facility] at [facility] OR    sciatica      sciatica procedure    Transesophageal echo (VERITO) intra-procedure log documentation N/A 7/19/2019    Performed by Nic Miguel MD at [facility] CATH LAB       Review of patient's allergies indicates:   Allergen Reactions    Penicillins Swelling       No current facility-administered medications on file prior to encounter.      Current Outpatient Medications on File Prior to Encounter   Medication Sig    aspirin (ECOTRIN) 81 MG EC tablet Take 81 mg by mouth once daily.    clopidogrel (PLAVIX) 75 mg tablet Take 75 mg by mouth once daily.    cyanocobalamin 2000 MCG tablet Take 2,500 mcg by mouth once daily.    fluticasone (FLONASE) 50 mcg/actuation nasal spray 1 spray by Each Nare route nightly as needed.     gabapentin (NEURONTIN) 300 MG capsule Take 300 mg by mouth 3 (three) times daily.    glimepiride (AMARYL) 4 MG tablet Take 4 mg by mouth before breakfast.    lisinopril 10 MG tablet Take 1 tablet (10 mg total) by mouth once daily.    metoprolol succinate (TOPROL-XL) 25 MG 24 hr tablet Take 2 tablets (50 mg  total) by mouth once daily.    midodrine (PROAMATINE) 5 MG Tab     nitroGLYCERIN (NITROSTAT) 0.4 MG SL tablet Place 0.4 mg under the tongue every 5 (five) minutes as needed for Chest pain.    ranitidine (ZANTAC) 300 MG tablet Take 300 mg by mouth every evening.    sertraline (ZOLOFT) 100 MG tablet Take 100 mg by mouth 2 (two) times daily.    SYNTHROID 175 mcg tablet     tamsulosin (FLOMAX) 0.4 mg Cp24 TAKE 1 CAPSULE AT BEDTIME.    traMADol (ULTRAM) 50 mg tablet Take 0.5 tablets (25 mg total) by mouth every 8 (eight) hours as needed (Sever pain not controlled by tylenol or tizanidine).    vitamin D 185 MG Tab Take 2,000 mg by mouth once daily.     XARELTO 20 mg Tab TAKE 1 TABLET BY MOUTH ONCE A DAY WITH EVENING MEAL     Family History     Problem Relation (Age of Onset)    Cancer Mother, Sister    Heart disease Father        Tobacco Use    Smoking status: Former Smoker     Last attempt to quit: 1998     Years since quittin.1    Smokeless tobacco: Never Used   Substance and Sexual Activity    Alcohol use: No     Alcohol/week: 0.0 oz    Drug use: No    Sexual activity: Never     Review of Systems   Constitutional: Positive for fatigue. Negative for activity change, appetite change, chills, diaphoresis, fever and unexpected weight change.   HENT: Negative.    Eyes: Negative.    Respiratory: Positive for cough and shortness of breath. Negative for chest tightness and wheezing.    Cardiovascular: Negative for chest pain, palpitations and leg swelling.   Gastrointestinal: Negative for abdominal distention, abdominal pain, blood in stool, constipation, diarrhea, nausea and vomiting.   Genitourinary: Negative for dysuria and hematuria.   Musculoskeletal: Negative.    Skin: Negative.    Neurological: Positive for weakness. Negative for dizziness, seizures, syncope and light-headedness.   Psychiatric/Behavioral: Negative.      Objective:     Vital Signs (Most Recent):  Temp: 98.3 °F (36.8 °C) (08/10/19  0019)  Pulse: 74 (08/10/19 0019)  Resp: 18 (08/10/19 0019)  BP: 128/68 (08/10/19 0019)  SpO2: (!) 94 % (08/10/19 0335) Vital Signs (24h Range):  Temp:  [98 °F (36.7 °C)-98.8 °F (37.1 °C)] 98.3 °F (36.8 °C)  Pulse:  [] 74  Resp:  [16-32] 18  SpO2:  [90 %-98 %] 94 %  BP: ()/(48-82) 128/68     Weight: 79.6 kg (175 lb 7.8 oz)  Body mass index is 28.32 kg/m².    Physical Exam   Constitutional: He is oriented to person, place, and time. He appears well-developed and well-nourished. No distress.   HENT:   Head: Normocephalic and atraumatic.   Right Ear: External ear normal.   Left Ear: External ear normal.   Nose: Nose normal.   Eyes: Right eye exhibits no discharge. Left eye exhibits no discharge.   Neck: Normal range of motion.   Cardiovascular:   Irregularly irregular but normal rate, no murmurs or gallops   Pulmonary/Chest: Effort normal and breath sounds normal. No stridor. No respiratory distress. He has no wheezes. He has no rales. He exhibits tenderness.   Abdominal: Soft. Bowel sounds are normal. He exhibits no distension. There is no tenderness. There is no rebound and no guarding.   Musculoskeletal: Normal range of motion. He exhibits no edema.   Neurological: He is alert and oriented to person, place, and time.   Skin: Skin is warm and dry. He is not diaphoretic.   Psychiatric: He has a normal mood and affect. His behavior is normal. Judgment and thought content normal.   Nursing note and vitals reviewed.          Significant Labs: All pertinent labs within the past 24 hours have been reviewed.    Significant Imaging: I have reviewed and interpreted all pertinent imaging results/findings within the past 24 hours.    Assessment/Plan:     * Hypotension  Patient was noted to be hypertensive at home and had an initial blood pressure of 77/48 upon arriving to this facility.  Etiology of his hypotension is unclear at this time but could be related to his medications.  He is taking midodrine at home.  His  blood pressure responded nicely with IV fluids and he also does appear hypovolemic.  He has a lactic acidosis of 3.1.  Will start IV fluids and recheck his lactic acid in the morning.    Essential hypertension  Patient's blood pressure was noted to be low at home and has been borderline low ever since.  Will hold his home regimen lisinopril, metoprolol, and tamsulosin for now and continue his home regimen of midodrine.    Type 2 diabetes mellitus, controlled  Well controlled on home regimen a sulfonylurea; will provide basal-prandial insulin therapy along with insulin sliding scale.    Coronary artery disease involving native coronary artery of native heart without angina pectoris  Stable; will continue his home regimen of aspirin and clopidogrel, and hold his home regimen of lisinopril metoprolol due to low blood pressure.    Persistent atrial fibrillation  He is currently in atrial fibrillation but with a controlled ventricular response.  Will continue his home regimen apixaban but hold metoprolol for now due to hypotension.    Chronic anticoagulation  As addressed above.    Presence of permanent cardiac pacemaker  Stable; there are no acute issues.    Hypothyroidism  Well controlled; will continue his home regimen of levothyroxine.    Anemia of chronic disease  The patient's H/H is stable and consistent with previous laboratory measurements, and the patient exhibits no signs or symptoms of acute bleeding; there is no indication for transfusion.  Will continue to monitor.    FEMI on CPAP  Will provide CPAP nightly.    BPH (benign prostatic hyperplasia)  Stable; will continue his home regimen of tamsulosin.    History of CVA (cerebrovascular accident)  Stable; will continue his home regimen of aspirin and clopidogrel.    VTE Risk Mitigation (From admission, onward)        Ordered     rivaroxaban tablet 20 mg  With dinner      08/09/19 2043     IP VTE HIGH RISK PATIENT  Once      08/09/19 2043     Reason for No  Pharmacological VTE Prophylaxis  Once      08/09/19 2043             Olga Dillon M.D.  Staff [facility]  Department of [facility] Medicine  [facility]  Pager: [phone]          N.B.: Portions of this note was dictated using M*Modal Fluency Direct--there may be voice recognition errors occasionally missed on review.

## 2019-08-10 NOTE — ASSESSMENT & PLAN NOTE
Back in A-fib after recent CV 7/19/19. On xarelto. Amiodarone stopped due to elevated TSH. Will continue with rate control for now. Ok for d/c and f/u with Dr Miguel 8/12/19 to discuss EP referral versus alternative AAD and repeat CV

## 2019-08-10 NOTE — HPI
Mr. Doni Theodore is a 78 y.o. male with essential hypertension, type 2 diabetes mellitus (HbA1c 5.7% Jul 2019), CAD s/p PCI, paroxysmal atrial fibrillation ([id number] score 7) on chronic anticoagulation, permanent pacemaker in place, hypothyroidism (TSH 12.396 Aug 2019), anemia of chronic disease, FMEI on CPAP, BPH, and history of CVA who presents to [facility] ED with complaints of weakness for the past month.  He reports that his weakness has been gradual and progressive, and has been associated with worsening dyspnea on exertion.  He also had some non-productive coughing but denies any fevers, chills, nausea, vomiting, chest pain, palpitations, diaphoresis, hemoptysis, nor any lower extremity pain or swelling.  He denies any recent travel nor sick contacts and denies any PND or orthopnea.  Today, the patient's wife that that he had blood pressures as low as 65/45 at home.  He reports that he is compliant with all of his medications and although he did not fall today, he did have a fall about a week ago after standing up.

## 2019-08-10 NOTE — CONSULTS
[facility]  Cardiology  Consult Note    Patient Name: Doni Theodore  MRN: [id number]  Admission Date: 8/9/2019  Hospital Length of Stay: 1 days  Code Status: Full Code   Attending Provider: Medardo Raymond MD   Consulting Provider: Abdoul Max MD  Primary Care Physician: Nic Mauro MD  Principal Problem:Hypotension    Patient information was obtained from patient and ER records.     Consults  Subjective:     Chief Complaint:  Hypotension, A-fib     HPI: Mr. Doni Theodore is a 78 y.o. male with essential hypertension, type 2 diabetes mellitus (HbA1c 5.7% Jul 2019), CAD s/p PCI, paroxysmal atrial fibrillation ([id number] score 7) on chronic anticoagulation, permanent pacemaker in place, hypothyroidism (TSH 12.396 Aug 2019), anemia of chronic disease, FEMI on CPAP, BPH, and history of CVA who presents to [facility] ED with complaints of weakness for the past month.  He reports that his weakness has been gradual and progressive, and has been associated with worsening dyspnea on exertion.  He also had some non-productive coughing but denies any fevers, chills, nausea, vomiting, chest pain, palpitations, diaphoresis, hemoptysis, nor any lower extremity pain or swelling.  He denies any recent travel nor sick contacts and denies any PND or orthopnea.  Today, the patient's wife that that he had blood pressures as low as 65/45 at home.  He reports that he is compliant with all of his medications and although he did not fall today, he did have a fall about a week ago after standing up.      Denies CP or SOB  EKG A-fib 79 NSSTT changes - was in NSR 8/1/19    Troponin 0.03 flat pattern  BP improved shortly after admission - feels better - wants to go home    Followed by Dr Miguel  Follows with me, recently seen for VERITO/DCCV as inpat 7/2019.     The patient is well known to me from his recent admission for atrial fibrillation underwent VERITO guided cardioversion.  He presents with complaints  predominantly weakness.  He denies any angina or dyspnea.  He has had no palpitations or syncope.  There has been no PND, orthopnea, or lower extremity edema.  He denies melena, hematuria, or claudicant symptoms.  He was found to be bradycardic in the 50s.  Note is also made of an elevated TSH with normal T4 concerning for possible amiodarone toxicity.  There is no evidence of heart block or pacemaker malfunction.  At present, the patient is feeling back to his usual common wishes to be discharged.  He has a follow-up visit planned with me in 1 week.           [facility] Inpat Progress Note 7/20/19:  ECG (personally reviewed and interpreted tracing(s)):  7/18/19: , IRBBB (AF old).  Was last documented in SR 10/23/17.  7/19/19 1505 SR 59, IRBBB, ant ST abnl     Chest X-Ray (personally reviewed and interpreted image(s)): 7/18/19 mild CHF, L PPM     VERITO/DCCV 7/19/19 (images personally reviewed and interpreted)  LVEF 40-45%, mild global HK  Normal valves  Mild-mod MR  Mild TR  Mild AI  No LA/MAURICIO thrombus  Successful DCCV AF->NSR 360J x1  Pt tolerated procedure well without complications  Plan:  Observe in ICU  Cont Xarelto 20mg qd  Start amio load  Dispo planning appropriate  Pt to follow up with me in 1-2 weeks.     Echo: 3/13/19 (care everywhere; VERITO ordered)    * Mildly increased left ventricular cavity size. Increased left ventricular wall thickness. Wall motion TDS. Mildly decreased left ventricular systolic function. Left ventricular ejection fraction is estimated at 45 %. Rhythm precludes evaluation of diastolic function.      * Normal right ventricular size. Right ventricular systolic pressure 26 mmHg. Catheter/pacemaker wire visualized in the right ventricle. Mild to Moderately increased right atrial size. Mild to Moderately increased left atrial size.      * Mild mitral annular calcification. Mild mitral valve regurgitation. Mild aortic valve calcification. Mild tricuspid valve regurgitation. Trace pulmonary  valve regurgitation.      Other findings as noted above.       Cath: 10/23/17 (images prev personally reviewed and interpreted)  B. Summary/Post-Operative Diagnosis    [id number].    70% mid LAD ISR; FFR=0.79.     of D1.    Successful PCI of mid LAD with 4X15 MARY.    Successful PCI of D1 with 2.25X30 and 2.5X18 MARY.    IVUS utilized for stent sizing.  D. Hemodynamic Results  LVEDP (Pre): 22 mmHg  E. Angiographic Results       Patient has a right dominant coronary artery.      - Left Main Coronary Artery:             The LM has luminal irregularities. There is DONNA 3 flow.     - Left Anterior Descending Artery:             The proximal LAD has a 10% stenosis. There is DONNA 3 flow. The remaining portion of the vessel has luminal irregularities (10).             The mid LAD has a 70% stenosis. There is DONNA 3 flow. FFR was 0.79. The remaining portion of the vessel has luminal irregularities (10).                     Lesion Details:   The length is 10-20 mm, eccentric shape, moderate proximal tortuosity, segment angulation of <45 degrees, irregular contour, mild to moderate calcification. No bifurcation is present. The minimum luminal diameter is   0.5 millimeters.             The distal LAD has a 50% stenosis. There is DONNA 3 flow. The remaining portion of the vessel is of small caliber.     - D1:             The D1 has chronic total occlusion. There is DONNA 0 flow. There is collateral flow from the PLB (faint). The remaining portion of the vessel has luminal irregularities (10).                     Lesion Details:   This is a Type C lesion.  The length is 40mm, eccentric shape, moderate proximal tortuosity, segment angulation of 45-90 degrees, irregular contour, mild to moderate calcification. Bifurcation is present. The minimum   luminal diameter is 0 millimeters.     - Left Circumflex Artery:             The proximal LCX has a 20% stenosis. There is DONNA 3 flow. The remaining portion of the vessel has luminal  irregularities (10).             The distal LCX has luminal irregularities. There is DONNA 3 flow.     - OM1:             The OM1 has luminal irregularities has a 30% stenosis. There is DONNA 3 flow. The remaining portion of the vessel has luminal irregularities (10).     - Right Coronary Artery:             The proximal RCA has luminal irregularities. There is DONNA 3 flow.             The mid RCA has a 20% stenosis. There is DONNA 3 flow. The remaining portion of the vessel has luminal irregularities (10).             The distal RCA has a 50% stenosis. There is DONNA 3 flow. The remaining portion of the vessel has luminal irregularities (10).     - Posterior Lateral Branch:             The PLB has a 40% stenosis. There is DONNA 3 flow. The remaining portion of the vessel has luminal irregularities (10).  Intervention       Mid LAD:              The lesion was successfully intervened. Post-stenosis of 0%, post-DONNA 3 flow and TMP grade 3. The vessel was accessed natively.  The following items were used: Blln Trek Rx 3.0 X 12, Stent Resolute Rx 4.00x15 (MARY) and Cath [facility]   [place].       D1:              The lesion was successfully intervened. Post-stenosis of 0%, post-DONNA 3 flow and TMP grade 3. The vessel was accessed natively.  The following items were used: Cath Mini Trek 1.5 X 20 Rx, Blln Sc E[place]hora 2.0 X 10, Stent Resolute Rx 2.25x30   (MARY), Stent Resolute Rx 2.50x18 (MARY) and Cath [facility] [place].        Assessment and Plan:      * Atrial fibrillation with rapid ventricular response  Presenting sxs of FTT/fatigue/weakness, now resolved.  AF/RVR on arrival, persistent despite amio gtt, asymptomatic at rest.  Successful VERITO/DCCV 7/19/19  Cont Xarelto 20mg qd  Cont amio po reload as ordered        Persistent atrial fibrillation  As above     Acute on chronic combined systolic and diastolic congestive heart failure  Appears euvolemic  EF 40% by VERITO 7/2019, ?component of tachymyopathy  Cont Toprol  XL 50mg qd  Inc lisinopril 10mg qd  Stop imdur        Coronary artery disease involving native coronary artery of native heart without angina pectoris  Known MV CAD s/p LAD/Diag PCI 10/2017  Mild LV dysfxn  Cont Plavix (with ongoing Xarelto rx)  Cont BBl/Statin/ACEi  Stop imdur  Will consider outpatient stress testing     Diabetes mellitus, type 2  Per IM     Pacemaker  Medtronic  ?hx SSS  Will interrogate as outpatient     Essential hypertension  Cont Toprol  Stop imdur  Inc lisinopril 10mg qd     Leg weakness, bilateral  Will plan outpatient PAD eval      Past Medical History:   Diagnosis Date    A-fib 7/18/2019    Anticoagulant long-term use     Plavix (on hold for angiogram)    Arthritis     Coronary artery disease     pacemaker, stentsx2    Diabetes mellitus     Hypertension     Pacemaker     Sleep apnea     Stroke     Rt side affected / mild    Thyroid disease     hypothyroidism       Past Surgical History:   Procedure Laterality Date    ANGIOGRAM N/A 5/31/2013    [facility]formed [facility] OR    ANKLE SURGERY      APPENDECTOMY      CARDIAC PACEMAKER PLACEMENT      Cardioversion or Defibrillation  7/19/2019    Performed by [facility] CATH LAB    CHOLECYSTECTOMY      CORONARY ANGIOPLASTY WITH STENT PLACEMENT  6/06    stents x 2 placed     EYE SURGERY      HEART CATH-LEFT Left 10/23/2017    [facility]    HERNIA REPAIR      ventral hernia repair    REPAIR, AAA, ENDOVASCULAR N/A 6/18/2013    [facility]gar [facility] OR    sciatica      sciatica procedure    Transesophageal echo (VERITO) intra-procedure log documentation N/A 7/19/2019    Performed by [facility] at [facility] CATH LAB       Review of patient's allergies indicates:   Allergen Reactions    Penicillins Swelling       No current facility-administered medications on file prior to encounter.      Current Outpatient Medications on  File Prior to Encounter   Medication Sig    aspirin (ECOTRIN) 81 MG EC tablet Take 81 mg by mouth once daily.    clopidogrel (PLAVIX) 75 mg tablet Take 75 mg by mouth once daily.    cyanocobalamin 2000 MCG tablet Take 2,500 mcg by mouth once daily.    fluticasone (FLONASE) 50 mcg/actuation nasal spray 1 spray by Each Nare route nightly as needed.     gabapentin (NEURONTIN) 300 MG capsule Take 300 mg by mouth 3 (three) times daily.    glimepiride (AMARYL) 4 MG tablet Take 4 mg by mouth before breakfast.    lisinopril 10 MG tablet Take 1 tablet (10 mg total) by mouth once daily.    metoprolol succinate (TOPROL-XL) 25 MG 24 hr tablet Take 2 tablets (50 mg total) by mouth once daily.    midodrine (PROAMATINE) 5 MG Tab     nitroGLYCERIN (NITROSTAT) 0.4 MG SL tablet Place 0.4 mg under the tongue every 5 (five) minutes as needed for Chest pain.    ranitidine (ZANTAC) 300 MG tablet Take 300 mg by mouth every evening.    sertraline (ZOLOFT) 100 MG tablet Take 100 mg by mouth 2 (two) times daily.    SYNTHROID 175 mcg tablet     tamsulosin (FLOMAX) 0.4 mg Cp24 TAKE 1 CAPSULE AT BEDTIME.    traMADol (ULTRAM) 50 mg tablet Take 0.5 tablets (25 mg total) by mouth every 8 (eight) hours as needed (Sever pain not controlled by tylenol or tizanidine).    vitamin D 185 MG Tab Take 2,000 mg by mouth once daily.     XARELTO 20 mg Tab TAKE 1 TABLET BY MOUTH ONCE A DAY WITH EVENING MEAL     Family History     Problem Relation (Age of Onset)    Cancer Mother, Sister    Heart disease Father        Tobacco Use    Smoking status: Former Smoker     Last attempt to quit: 1998     Years since quittin.1    Smokeless tobacco: Never Used   Substance and Sexual Activity    Alcohol use: No     Alcohol/week: 0.0 oz    Drug use: No    Sexual activity: Never     Review of Systems   Constitution: Negative for decreased appetite.   HENT: Negative for ear discharge.    Eyes: Negative for blurred vision.   Endocrine: Negative  for polyphagia.   Skin: Negative for nail changes.   Genitourinary: Negative for bladder incontinence.   Neurological: Negative for aphonia.   Psychiatric/Behavioral: Negative for hallucinations.   Allergic/Immunologic: Negative for hives.     Objective:     Vital Signs (Most Recent):  Temp: 98.5 °F (36.9 °C) (08/10/19 0732)  Pulse: 89 (08/10/19 0732)  Resp: 19 (08/10/19 0732)  BP: 128/88 (08/10/19 0732)  SpO2: (!) 94 % (08/10/19 0827) Vital Signs (24h Range):  Temp:  [97.9 °F (36.6 °C)-98.8 °F (37.1 °C)] 98.5 °F (36.9 °C)  Pulse:  [] 89  Resp:  [16-32] 19  SpO2:  [90 %-98 %] 94 %  BP: ()/(48-88) 128/88     Weight: 79.9 kg (176 lb 2.4 oz)  Body mass index is 28.43 kg/m².    SpO2: (!) 94 %  O2 Device (Oxygen Therapy): room air      Intake/Output Summary (Last 24 hours) at 8/10/2019 1048  Last data filed at 8/10/2019 0600  Gross per 24 hour   Intake --   Output 775 ml   Net -775 ml       Lines/Drains/Airways     Peripheral Intravenous Line                 Peripheral IV - Single Lumen 08/01/19 20 G Left Wrist 9 days         Peripheral IV - Single Lumen 08/09/19 20 G Left Forearm 1 day                Physical Exam   Constitutional: He is oriented to person, place, and time. He appears well-developed and well-nourished.   HENT:   Head: Normocephalic and atraumatic.   Eyes: Pupils are equal, round, and reactive to light. Conjunctivae are normal.   Neck: Normal range of motion. Neck supple.   Cardiovascular: Normal rate, normal heart sounds and intact distal pulses. An irregularly irregular rhythm present.   Pulmonary/Chest: Effort normal and breath sounds normal.   Abdominal: Soft. Bowel sounds are normal.   Musculoskeletal: Normal range of motion.   Neurological: He is alert and oriented to person, place, and time.   Skin: Skin is warm and dry.       Significant Labs: All pertinent lab results from the last 24 hours have been reviewed.    Significant Imaging: Echocardiogram:   2D echo with color flow doppler:    Results for orders placed or performed during the hospital encounter of 06/03/17   2D echo with color flow doppler   Result Value Ref Range    QEF 60 55 - 65    Mitral Valve Regurgitation TRIVIAL     Est. PA Systolic Pressure 21.34     Tricuspid Valve Regurgitation TRIVIAL     Narrative    Date of Procedure: 06/05/2017        TEST DESCRIPTION       Aorta: The aortic root is normal in size, measuring 3.3 cm at sinotubular junction and 3.8 cm at Sinuses of Valsalva. The proximal ascending aorta is normal in size, measuring 3.9 cm across.     Left Atrium: The left atrial volume index is mildly enlarged, measuring 35.70 cc/m2.     Left Ventricle: The left ventricle is normal in size, with an end-diastolic diameter of 4.7 cm, and an end-systolic diameter of 3.2 cm. LV wall thickness is normal, with the septum measuring 0.9 cm and the posterior wall measuring 0.8 cm across. Relative   wall thickness was normal at 0.34, and the LV mass index was 68.4 g/m2 consistent with normal left ventricular mass. There are no regional wall motion abnormalities. Left ventricular systolic function appears normal. Visually estimated ejection fraction   is 60-65%. The LV Doppler derived stroke volume equals 71.0 ccs.     Diastolic indices: E wave velocity 0.6 m/s, E/A ratio 1.0,  msec., E/e' ratio(avg) 9. Diastolic function is indeterminate.     Right Atrium: The right atrium is normal in size, measuring 5.9 cm in length and 3.0 cm in width in the apical view.     Right Ventricle: The right ventricle is normal in size measuring 2.4 cm at the base in the apical right ventricle-focused view. Global right ventricular systolic function appears normal. Tricuspid annular plane systolic excursion (TAPSE) is 1.8 cm.   Tissue Doppler-derived tricuspid annular peak systolic velocity (S prime) is 12.5 cm/s. The estimated PA systolic pressure is greater than 21 mmHg.     Aortic Valve:  The aortic valve is mildly sclerotic. The aortic valve is  tri-leaflet in structure. There is marked aortic annular calcification.     Mitral Valve:  The mitral valve is mildly sclerotic. There is trivial mitral regurgitation.     Tricuspid Valve:  There is trivial tricuspid regurgitation. Tricuspid valve is normal in structure with normal leaflet mobility.     Pulmonary Valve:  Pulmonary valve is normal in structure with normal leaflet mobility.     Intracavitary: There is no evidence of pericardial effusion, intracavity mass, thrombi, or vegetation.     Calicification of the aorta and ST junction..         CONCLUSIONS     1 - Normal left ventricular systolic function (EF 60-65%).     2 - Indeterminate LV diastolic function.     3 - Normal right ventricular systolic function .     4 - The estimated PA systolic pressure is greater than 21 mmHg.     5 - Trivial mitral regurgitation.     6 - Trivial tricuspid regurgitation.     7 - Calicification of the aorta and ST junction..     8 - Mild left atrial enlargement.             This document has been electronically    SIGNED BY: Joanna Chin MD On: 06/05/2017 10:43     Assessment and Plan:     * Hypotension  Improved now. Continue to hold lisinopril and metoprolol. Could re-initiate out patient if BP remains stable    Presence of permanent cardiac pacemaker  Medtronic. Recent check with good device function    Coronary artery disease involving native coronary artery of native heart without angina pectoris  CP free. Ruled out for MI on ASA/plavix. Ok for out patient stress test    Persistent atrial fibrillation  Back in A-fib after recent CV 7/19/19. On xarelto. Amiodarone stopped due to elevated TSH. Will continue with rate control for now. Ok for d/c and f/u with Dr Miguel 8/12/19 to discuss EP referral versus alternative AAD and repeat CV        VTE Risk Mitigation (From admission, onward)        Ordered     rivaroxaban tablet 20 mg  With dinner      08/09/19 2043     IP VTE HIGH RISK PATIENT  Once      08/09/19 2043      Reason for No Pharmacological VTE Prophylaxis  Once      08/09/19 2043          Thank you for your consult. I will sign off. Please contact us if you have any additional questions.    Abdoul Max MD  Cardiology   [facility]

## 2019-08-10 NOTE — ASSESSMENT & PLAN NOTE
Patient was noted to be hypertensive at home and had an initial blood pressure of 77/48 upon arriving to this facility.  Etiology of his hypotension is unclear at this time but could be related to his medications.  He is taking midodrine at home.  His blood pressure responded nicely with IV fluids and he also does appear hypovolemic.  He has a lactic acidosis of 3.1.  Will start IV fluids and recheck his lactic acid in the morning.

## 2019-08-10 NOTE — PLAN OF CARE
"To patient's room to discuss patient managing her care at home.      TN Role Explained.  Patient identified by using 2 identifiers:  Name and date of birth    Wife stated that since they are both w/c bound, their daughter-in-law  WILL HELP AT HOME WITH his RECOVERY.       TN reviewed with patient contents of "[employer] Packet".      TN name and contact info placed on the communication board    Preferred Pharmacy:  [id number] - [place] - [address] AT [facility] [address]  [address]  [place] [address]  Phone: [phone] Fax: [phone]    [id number] - [place] - [address]  [address]  [place] [address]  Phone: [phone] Fax: [phone]       08/10/19 1642   Discharge Assessment   Assessment Type Discharge Planning Assessment   Confirmed/corrected address and phone number on facesheet? Yes   Assessment information obtained from? Patient;Other  (wife at bedside)   Expected Length of Stay (days) 3   Communicated expected length of stay with patient/caregiver yes   Prior to hospitilization cognitive status: Alert/Oriented   Prior to hospitalization functional status: Assistive Equipment;Needs Assistance   Current cognitive status: Alert/Oriented   Current Functional Status: Assistive Equipment;Needs Assistance   Lives With spouse   Able to Return to Prior Arrangements yes   Is patient able to care for self after discharge? No   Who are your caregiver(s) and their phone number(s)? Daughter-in-law helps at home   Patient's perception of discharge disposition other (comments)  (HH vs SNF)   Readmission Within the Last 30 Days previous discharge plan unsuccessful   Patient currently being followed by outpatient case management? No   Patient currently receives any other outside agency services? Yes   Name and contact number of agency or person providing outside services [facility]   Is it the patient/care giver preference to resume " "care with the current outside agency? Yes   Equipment Currently Used at Home CPAP;walker, jose;shower chair   Do you have any problems affording any of your prescribed medications? No   Is the patient taking medications as prescribed? yes   Does the patient have transportation home? Yes   Transportation Anticipated family or friend will provide   Does the patient receive services at the [facility]? No   Discharge Plan A Skilled Nursing Facility   Discharge Plan B Home Health   DME Needed Upon Discharge    (tbd)   Patient/Family in Agreement with Plan yes   Readmission Questionnaire   At the time of your discharge, did someone talk to you about what your health problems were? Yes   At the time of discharge, did someone talk to you about what to watch out for regarding worsening of your health problem? Yes   At the time of discharge, did someone talk to you about what to do if you experienced worsening of your health problem? Yes   At the time of discharge, did someone talk to you about which medication to take when you left the hospital and which ones to stop taking? Yes   At the time of discharge, did someone talk to you about when and where to follow up with a doctor after you left the hospital? Yes   What do you believe caused you to be sick enough to be re-admitted? "He just wasn't getting better", stated wife.   How often do you need to have someone help you when you read instructions, pamphlets, or other written material from your doctor or pharmacy? Often   Do you have problems taking your medications as prescribed? Yes   Do you have any problems affording any of  your prescribed medications? No   Do you have problems obtaining/receiving your medications? No   Does the patient have transportation to healthcare appointments? No   Living Arrangements mobile home   Does your caregiver provide all the help you need? Yes   Are you currently feeling confused? No   Are you currently having problems thinking? No "   In the last 7 days, my sleep quality was: good

## 2019-08-10 NOTE — PLAN OF CARE
Problem: Balance Impairment (Functional Deficit)  Goal: Improved Balance and Postural Control    Intervention: Optimize Balance and Safe Activity     08/10/19 0600 08/10/19 0752   Optimize Functional Ability and Branch   Adaptive Equipment Use  --  shower chair   Optimize Balance and Safe Activity   Safety Promotion/Fall Prevention assistive device/personal item within reach;bed alarm set;commode/urinal/bedpan at bedside;Fall Risk reviewed with patient/family;high risk medications identified;lighting adjusted;medications reviewed;room near unit station;side rails raised x 3;instructed to call staff for mobility  --          Problem: Cognitive Impairment  Goal: Optimal Functional Branch    Intervention: Optimize Cognitive Function     08/10/19 0752   Manage Environment and Stimulation   Environment Familiarity/Consistency daily routine followed;familiar objects from home provided;personal clothing/items utilized   Optimize Cognitive and Communication Skills   Reorientation Measures clock in view;reorientation provided   Identify and Manage Contributors to Fall Injury Risk   Self-Care Promotion independence encouraged;BADL personal objects within reach;BADL personal routines maintained   Prevent or Manage Pain   Sensory Stimulation Regulation care clustered;lighting decreased;quiet environment promoted         Problem: Coordination Impairment (Functional Deficit)  Goal: Optimal Coordination    Intervention: Optimize Motor Coordination and Functional Ability     08/10/19 0752   Identify and Manage Contributors to Fall Injury Risk   Self-Care Promotion independence encouraged;BADL personal objects within reach;BADL personal routines maintained         Problem: Muscle Strength Impairment  Goal: Improved Muscle Strength    Intervention: Optimize Muscle Strength     08/10/19 0752   Optimize Functional Ability and Branch   Adaptive Equipment Use shower chair   Identify and Manage Contributors to Fall  Injury Risk   Self-Care Promotion independence encouraged;BADL personal objects within reach;BADL personal routines maintained         Problem: Muscle Tone Impairment  Goal: Improved Muscle Tone    Intervention: Optimize Muscle Tone     08/10/19 0752   Optimize Muscle Tone   Spasticity Management positioned with supportive device         Problem: Range of Motion Impairment (Functional Deficit)  Goal: Optimal Range of Motion    Intervention: Maintain Functional Joint Range and Position     08/10/19 0600   Optimize Functional Ability   Positioning/Transfer Devices pillows;in use         Problem: Sensory Impairment (Functional Deficit)  Goal: Compensation for Sensory Deficit    Intervention: Prevent Injury Related to Sensory Impairment     08/10/19 0752   Prevent Additional Skin Injury   Pressure Reduction Devices positioning supports utilized;pressure-redistributing mattress utilized   Monitor and Manage Hypervolemia   Skin Protection incontinence pads utilized

## 2019-08-10 NOTE — ASSESSMENT & PLAN NOTE
Improved now. Continue to hold lisinopril and metoprolol. Could re-initiate out patient if BP remains stable

## 2019-08-10 NOTE — PROGRESS NOTES
Patient arrived via stretcher escorted by transport accompanied by family. Patient transferred to bed mod assist, tele in place, no distress noted. Patient/family oriented to room & plan of care reviewed. Safety maintained, call light in reach.

## 2019-08-10 NOTE — NURSING
While sitting up on side of bed with wife at besides, pt lost balance and fell backwards onto the bed but his head made contact with the bedside table. No trauma noted to scalp at this time, pt denies pain or distress. New orders for fall precautions. MD will discuss fall risk issues with wife regarding SNF placement. Home meds verified on the navigator as wife brought in bottles from home.

## 2019-08-10 NOTE — HPI
HPI: Mr. Doni Theodore is a 78 y.o. male with essential hypertension, type 2 diabetes mellitus (HbA1c 5.7% Jul 2019), CAD s/p PCI, paroxysmal atrial fibrillation ([id number] score 7) on chronic anticoagulation, permanent pacemaker in place, hypothyroidism (TSH 12.396 Aug 2019), anemia of chronic disease, FEMI on CPAP, BPH, and history of CVA who presents to [facility] ED with complaints of weakness for the past month.  He reports that his weakness has been gradual and progressive, and has been associated with worsening dyspnea on exertion.  He also had some non-productive coughing but denies any fevers, chills, nausea, vomiting, chest pain, palpitations, diaphoresis, hemoptysis, nor any lower extremity pain or swelling.  He denies any recent travel nor sick contacts and denies any PND or orthopnea.  Today, the patient's wife that that he had blood pressures as low as 65/45 at home.  He reports that he is compliant with all of his medications and although he did not fall today, he did have a fall about a week ago after standing up.      Denies CP or SOB  EKG A-fib 79 NSSTT changes - was in NSR 8/1/19    Troponin 0.03 flat pattern  BP improved shortly after admission - feels better - wants to go home    Followed by Dr Miguel  Follows with me, recently seen for VERITO/DCCV as inpat 7/2019.     The patient is well known to me from his recent admission for atrial fibrillation underwent VERITO guided cardioversion.  He presents with complaints predominantly weakness.  He denies any angina or dyspnea.  He has had no palpitations or syncope.  There has been no PND, orthopnea, or lower extremity edema.  He denies melena, hematuria, or claudicant symptoms.  He was found to be bradycardic in the 50s.  Note is also made of an elevated TSH with normal T4 concerning for possible amiodarone toxicity.  There is no evidence of heart block or pacemaker malfunction.  At present, the patient is feeling back to his usual common wishes  to be discharged.  He has a follow-up visit planned with me in 1 week.           [facility] Inpat Progress Note 7/20/19:  ECG (personally reviewed and interpreted tracing(s)):  7/18/19: , IRBBB (AF old).  Was last documented in SR 10/23/17.  7/19/19 1505 SR 59, IRBBB, ant ST abnl     Chest X-Ray (personally reviewed and interpreted image(s)): 7/18/19 mild CHF, L PPM     VERITO/DCCV 7/19/19 (images personally reviewed and interpreted)  LVEF 40-45%, mild global HK  Normal valves  Mild-mod MR  Mild TR  Mild AI  No LA/MAURICIO thrombus  Successful DCCV AF->NSR 360J x1  Pt tolerated procedure well without complications  Plan:  Observe in ICU  Cont Xarelto 20mg qd  Start amio load  Dispo planning appropriate  Pt to follow up with me in 1-2 weeks.     Echo: 3/13/19 (care everywhere; VERITO ordered)    * Mildly increased left ventricular cavity size. Increased left ventricular wall thickness. Wall motion TDS. Mildly decreased left ventricular systolic function. Left ventricular ejection fraction is estimated at 45 %. Rhythm precludes evaluation of diastolic function.      * Normal right ventricular size. Right ventricular systolic pressure 26 mmHg. Catheter/pacemaker wire visualized in the right ventricle. Mild to Moderately increased right atrial size. Mild to Moderately increased left atrial size.      * Mild mitral annular calcification. Mild mitral valve regurgitation. Mild aortic valve calcification. Mild tricuspid valve regurgitation. Trace pulmonary valve regurgitation.      Other findings as noted above.       Cath: 10/23/17 (images prev personally reviewed and interpreted)  B. Summary/Post-Operative Diagnosis    [id number].    70% mid LAD ISR; FFR=0.79.     of D1.    Successful PCI of mid LAD with 4X15 MARY.    Successful PCI of D1 with 2.25X30 and 2.5X18 MARY.    IVUS utilized for stent sizing.  D. Hemodynamic Results  LVEDP (Pre): 22 mmHg  E. Angiographic Results       Patient has a right dominant coronary artery.      -  Left Main Coronary Artery:             The LM has luminal irregularities. There is DONNA 3 flow.     - Left Anterior Descending Artery:             The proximal LAD has a 10% stenosis. There is DONNA 3 flow. The remaining portion of the vessel has luminal irregularities (10).             The mid LAD has a 70% stenosis. There is DONNA 3 flow. FFR was 0.79. The remaining portion of the vessel has luminal irregularities (10).                     Lesion Details:   The length is 10-20 mm, eccentric shape, moderate proximal tortuosity, segment angulation of <45 degrees, irregular contour, mild to moderate calcification. No bifurcation is present. The minimum luminal diameter is   0.5 millimeters.             The distal LAD has a 50% stenosis. There is DONNA 3 flow. The remaining portion of the vessel is of small caliber.     - D1:             The D1 has chronic total occlusion. There is DONNA 0 flow. There is collateral flow from the PLB (faint). The remaining portion of the vessel has luminal irregularities (10).                     Lesion Details:   This is a Type C lesion.  The length is 40mm, eccentric shape, moderate proximal tortuosity, segment angulation of 45-90 degrees, irregular contour, mild to moderate calcification. Bifurcation is present. The minimum   luminal diameter is 0 millimeters.     - Left Circumflex Artery:             The proximal LCX has a 20% stenosis. There is DONNA 3 flow. The remaining portion of the vessel has luminal irregularities (10).             The distal LCX has luminal irregularities. There is DONNA 3 flow.     - OM1:             The OM1 has luminal irregularities has a 30% stenosis. There is DONNA 3 flow. The remaining portion of the vessel has luminal irregularities (10).     - Right Coronary Artery:             The proximal RCA has luminal irregularities. There is DONNA 3 flow.             The mid RCA has a 20% stenosis. There is DONNA 3 flow. The remaining portion of the vessel has luminal  irregularities (10).             The distal RCA has a 50% stenosis. There is DONNA 3 flow. The remaining portion of the vessel has luminal irregularities (10).     - Posterior Lateral Branch:             The PLB has a 40% stenosis. There is DONNA 3 flow. The remaining portion of the vessel has luminal irregularities (10).  Intervention       Mid LAD:              The lesion was successfully intervened. Post-stenosis of 0%, post-DONNA 3 flow and TMP grade 3. The vessel was accessed natively.  The following items were used: Blln Trek Rx 3.0 X 12, Stent Resolute Rx 4.00x15 (MARY) and Cath [facility]   [place].       D1:              The lesion was successfully intervened. Post-stenosis of 0%, post-DONNA 3 flow and TMP grade 3. The vessel was accessed natively.  The following items were used: Cath Mini Trek 1.5 X 20 Rx, Blln Sc Euphora 2.0 X 10, Stent Resolute Rx 2.25x30   (MARY), Stent Resolute Rx 2.50x18 (MARY) and Cath [facility] [place].        Assessment and Plan:      * Atrial fibrillation with rapid ventricular response  Presenting sxs of FTT/fatigue/weakness, now resolved.  AF/RVR on arrival, persistent despite amio gtt, asymptomatic at rest.  Successful VERITO/DCCV 7/19/19  Cont Xarelto 20mg qd  Cont amio po reload as ordered        Persistent atrial fibrillation  As above     Acute on chronic combined systolic and diastolic congestive heart failure  Appears euvolemic  EF 40% by VERITO 7/2019, ?component of tachymyopathy  Cont Toprol XL 50mg qd  Inc lisinopril 10mg qd  Stop imdur        Coronary artery disease involving native coronary artery of native heart without angina pectoris  Known MV CAD s/p LAD/Diag PCI 10/2017  Mild LV dysfxn  Cont Plavix (with ongoing Xarelto rx)  Cont BBl/Statin/ACEi  Stop imdur  Will consider outpatient stress testing     Diabetes mellitus, type 2  Per IM     Pacemaker  [employer]  ?hx SSS  Will interrogate as outpatient     Essential hypertension  Cont Toprol  Stop  imdur  Inc lisinopril 10mg qd     Leg weakness, bilateral  Will plan outpatient PAD eval

## 2019-08-10 NOTE — ASSESSMENT & PLAN NOTE
Stable; will continue his home regimen of aspirin and clopidogrel, and hold his home regimen of lisinopril metoprolol due to low blood pressure.

## 2019-08-10 NOTE — SUBJECTIVE & OBJECTIVE
Past Medical History:   Diagnosis Date    A-fib 7/18/2019    Anticoagulant long-term use     Plavix (on hold for angiogram)    Arthritis     Coronary artery disease     pacemaker, stentsx2    Diabetes mellitus     Hypertension     Pacemaker     Sleep apnea     Stroke     Rt side affected / mild    Thyroid disease     hypothyroidism       Past Surgical History:   Procedure Laterality Date    ANGIOGRAM N/A 5/31/2013    [facility]formed [facility] OR    ANKLE SURGERY      APPENDECTOMY      CARDIAC PACEMAKER PLACEMENT      Cardioversion or Defibrillation  7/19/2019    Performed by Nic Miguel MD at [facility] CATH LAB    CHOLECYSTECTOMY      CORONARY ANGIOPLASTY WITH STENT PLACEMENT  6/06    stents x 2 placed     EYE SURGERY      HEART CATH-LEFT Left 10/23/2017    [facility]    HERNIA REPAIR      ventral hernia repair    REPAIR, AAA, ENDOVASCULAR N/A 6/18/2013    Performed by [facility] at [facility] OR    sciatica      sciatica procedure    Transesophageal echo (VERITO) intra-procedure log documentation N/A 7/19/2019    Performed by Nic Miguel MD at [facility] CATH LAB       Review of patient's allergies indicates:   Allergen Reactions    Penicillins Swelling       No current facility-administered medications on file prior to encounter.      Current Outpatient Medications on File Prior to Encounter   Medication Sig    aspirin (ECOTRIN) 81 MG EC tablet Take 81 mg by mouth once daily.    clopidogrel (PLAVIX) 75 mg tablet Take 75 mg by mouth once daily.    cyanocobalamin 2000 MCG tablet Take 2,500 mcg by mouth once daily.    fluticasone (FLONASE) 50 mcg/actuation nasal spray 1 spray by Each Nare route nightly as needed.     gabapentin (NEURONTIN) 300 MG capsule Take 300 mg by mouth 3 (three) times daily.    glimepiride (AMARYL) 4 MG tablet Take 4 mg by mouth before breakfast.    lisinopril 10 MG tablet Take 1 tablet (10 mg  total) by mouth once daily.    metoprolol succinate (TOPROL-XL) 25 MG 24 hr tablet Take 2 tablets (50 mg total) by mouth once daily.    midodrine (PROAMATINE) 5 MG Tab     nitroGLYCERIN (NITROSTAT) 0.4 MG SL tablet Place 0.4 mg under the tongue every 5 (five) minutes as needed for Chest pain.    ranitidine (ZANTAC) 300 MG tablet Take 300 mg by mouth every evening.    sertraline (ZOLOFT) 100 MG tablet Take 100 mg by mouth 2 (two) times daily.    SYNTHROID 175 mcg tablet     tamsulosin (FLOMAX) 0.4 mg Cp24 TAKE 1 CAPSULE AT BEDTIME.    traMADol (ULTRAM) 50 mg tablet Take 0.5 tablets (25 mg total) by mouth every 8 (eight) hours as needed (Sever pain not controlled by tylenol or tizanidine).    vitamin D 185 MG Tab Take 2,000 mg by mouth once daily.     XARELTO 20 mg Tab TAKE 1 TABLET BY MOUTH ONCE A DAY WITH EVENING MEAL     Family History     Problem Relation (Age of Onset)    Cancer Mother, Sister    Heart disease Father        Tobacco Use    Smoking status: Former Smoker     Last attempt to quit: 1998     Years since quittin.1    Smokeless tobacco: Never Used   Substance and Sexual Activity    Alcohol use: No     Alcohol/week: 0.0 oz    Drug use: No    Sexual activity: Never     Review of Systems   Constitutional: Positive for fatigue. Negative for activity change, appetite change, chills, diaphoresis, fever and unexpected weight change.   HENT: Negative.    Eyes: Negative.    Respiratory: Positive for cough and shortness of breath. Negative for chest tightness and wheezing.    Cardiovascular: Negative for chest pain, palpitations and leg swelling.   Gastrointestinal: Negative for abdominal distention, abdominal pain, blood in stool, constipation, diarrhea, nausea and vomiting.   Genitourinary: Negative for dysuria and hematuria.   Musculoskeletal: Negative.    Skin: Negative.    Neurological: Positive for weakness. Negative for dizziness, seizures, syncope and light-headedness.    Psychiatric/Behavioral: Negative.      Objective:     Vital Signs (Most Recent):  Temp: 98.3 °F (36.8 °C) (08/10/19 0019)  Pulse: 74 (08/10/19 0019)  Resp: 18 (08/10/19 0019)  BP: 128/68 (08/10/19 0019)  SpO2: (!) 94 % (08/10/19 0335) Vital Signs (24h Range):  Temp:  [98 °F (36.7 °C)-98.8 °F (37.1 °C)] 98.3 °F (36.8 °C)  Pulse:  [] 74  Resp:  [16-32] 18  SpO2:  [90 %-98 %] 94 %  BP: ()/(48-82) 128/68     Weight: 79.6 kg (175 lb 7.8 oz)  Body mass index is 28.32 kg/m².    Physical Exam   Constitutional: He is oriented to person, place, and time. He appears well-developed and well-nourished. No distress.   HENT:   Head: Normocephalic and atraumatic.   Right Ear: External ear normal.   Left Ear: External ear normal.   Nose: Nose normal.   Eyes: Right eye exhibits no discharge. Left eye exhibits no discharge.   Neck: Normal range of motion.   Cardiovascular:   Irregularly irregular but normal rate, no murmurs or gallops   Pulmonary/Chest: Effort normal and breath sounds normal. No stridor. No respiratory distress. He has no wheezes. He has no rales. He exhibits tenderness.   Abdominal: Soft. Bowel sounds are normal. He exhibits no distension. There is no tenderness. There is no rebound and no guarding.   Musculoskeletal: Normal range of motion. He exhibits no edema.   Neurological: He is alert and oriented to person, place, and time.   Skin: Skin is warm and dry. He is not diaphoretic.   Psychiatric: He has a normal mood and affect. His behavior is normal. Judgment and thought content normal.   Nursing note and vitals reviewed.          Significant Labs: All pertinent labs within the past 24 hours have been reviewed.    Significant Imaging: I have reviewed and interpreted all pertinent imaging results/findings within the past 24 hours.

## 2019-08-10 NOTE — NURSING
Pt called for assistance to restroom to urinate. SOB sitting on edge of bed. Unable to stand even with 1 person assist. After a rest period he was able to pull himself up in bed and use urinal with 1 person full assist. Wheezing with exertion. Lungs are clear. /77 P 85 POX 95% on RA. Plan is to stay overnight and discuss SNF placement with wife tomorrow.

## 2019-08-11 LAB
ANION GAP SERPL CALC-SCNC: 9 MMOL/L (ref 8–16)
BASOPHILS # BLD AUTO: 0.02 K/UL (ref 0–0.2)
BASOPHILS NFR BLD: 0.3 % (ref 0–1.9)
BUN SERPL-MCNC: 10 MG/DL (ref 8–23)
CALCIUM SERPL-MCNC: 8.9 MG/DL (ref 8.7–10.5)
CHLORIDE SERPL-SCNC: 102 MMOL/L (ref 95–110)
CO2 SERPL-SCNC: 25 MMOL/L (ref 23–29)
CREAT SERPL-MCNC: 0.8 MG/DL (ref 0.5–1.4)
DIFFERENTIAL METHOD: ABNORMAL
EOSINOPHIL # BLD AUTO: 0.1 K/UL (ref 0–0.5)
EOSINOPHIL NFR BLD: 1.8 % (ref 0–8)
ERYTHROCYTE [DISTWIDTH] IN BLOOD BY AUTOMATED COUNT: 14.6 % (ref 11.5–14.5)
EST. GFR  (AFRICAN AMERICAN): >60 ML/MIN/1.73 M^2
EST. GFR  (NON AFRICAN AMERICAN): >60 ML/MIN/1.73 M^2
GLUCOSE SERPL-MCNC: 162 MG/DL (ref 70–110)
HCT VFR BLD AUTO: 35.7 % (ref 40–54)
HGB BLD-MCNC: 11.1 G/DL (ref 14–18)
LYMPHOCYTES # BLD AUTO: 0.7 K/UL (ref 1–4.8)
LYMPHOCYTES NFR BLD: 8.6 % (ref 18–48)
MCH RBC QN AUTO: 30.2 PG (ref 27–31)
MCHC RBC AUTO-ENTMCNC: 31.1 G/DL (ref 32–36)
MCV RBC AUTO: 97 FL (ref 82–98)
MONOCYTES # BLD AUTO: 0.7 K/UL (ref 0.3–1)
MONOCYTES NFR BLD: 8.4 % (ref 4–15)
NEUTROPHILS # BLD AUTO: 6.4 K/UL (ref 1.8–7.7)
NEUTROPHILS NFR BLD: 80.9 % (ref 38–73)
PHOSPHATE SERPL-MCNC: 2.4 MG/DL (ref 2.7–4.5)
PLATELET # BLD AUTO: 230 K/UL (ref 150–350)
PMV BLD AUTO: 9.7 FL (ref 9.2–12.9)
POCT GLUCOSE: 111 MG/DL (ref 70–110)
POCT GLUCOSE: 115 MG/DL (ref 70–110)
POTASSIUM SERPL-SCNC: 3.8 MMOL/L (ref 3.5–5.1)
RBC # BLD AUTO: 3.67 M/UL (ref 4.6–6.2)
SODIUM SERPL-SCNC: 136 MMOL/L (ref 136–145)
WBC # BLD AUTO: 7.9 K/UL (ref 3.9–12.7)

## 2019-08-11 PROCEDURE — 25000003 PHARM REV CODE 250: Performed by: INTERNAL MEDICINE

## 2019-08-11 PROCEDURE — 99900035 HC TECH TIME PER 15 MIN (STAT)

## 2019-08-11 PROCEDURE — 21400001 HC TELEMETRY ROOM

## 2019-08-11 PROCEDURE — 97535 SELF CARE MNGMENT TRAINING: CPT

## 2019-08-11 PROCEDURE — 36415 COLL VENOUS BLD VENIPUNCTURE: CPT

## 2019-08-11 PROCEDURE — 97165 OT EVAL LOW COMPLEX 30 MIN: CPT

## 2019-08-11 PROCEDURE — 94761 N-INVAS EAR/PLS OXIMETRY MLT: CPT

## 2019-08-11 PROCEDURE — 80048 BASIC METABOLIC PNL TOTAL CA: CPT

## 2019-08-11 PROCEDURE — 84100 ASSAY OF PHOSPHORUS: CPT

## 2019-08-11 PROCEDURE — 85025 COMPLETE CBC W/AUTO DIFF WBC: CPT

## 2019-08-11 PROCEDURE — 63600175 PHARM REV CODE 636 W HCPCS: Performed by: INTERNAL MEDICINE

## 2019-08-11 PROCEDURE — 97162 PT EVAL MOD COMPLEX 30 MIN: CPT | Performed by: PHYSICAL THERAPIST

## 2019-08-11 RX ORDER — POTASSIUM CHLORIDE 20 MEQ/1
40 TABLET, EXTENDED RELEASE ORAL ONCE
Status: COMPLETED | OUTPATIENT
Start: 2019-08-12 | End: 2019-08-12

## 2019-08-11 RX ORDER — MIDODRINE HYDROCHLORIDE 2.5 MG/1
2.5 TABLET ORAL 3 TIMES DAILY
Status: DISCONTINUED | OUTPATIENT
Start: 2019-08-11 | End: 2019-08-12

## 2019-08-11 RX ORDER — GUAIFENESIN 100 MG/5ML
200 SOLUTION ORAL EVERY 6 HOURS PRN
Status: DISCONTINUED | OUTPATIENT
Start: 2019-08-11 | End: 2019-08-13 | Stop reason: HOSPADM

## 2019-08-11 RX ORDER — SODIUM CHLORIDE 9 MG/ML
INJECTION, SOLUTION INTRAVENOUS CONTINUOUS
Status: DISCONTINUED | OUTPATIENT
Start: 2019-08-11 | End: 2019-08-12

## 2019-08-11 RX ADMIN — GUAIFENESIN 200 MG: 200 SOLUTION ORAL at 04:08

## 2019-08-11 RX ADMIN — TAMSULOSIN HYDROCHLORIDE 0.4 MG: 0.4 CAPSULE ORAL at 09:08

## 2019-08-11 RX ADMIN — GABAPENTIN 300 MG: 300 CAPSULE ORAL at 10:08

## 2019-08-11 RX ADMIN — ASPIRIN 81 MG: 81 TABLET, COATED ORAL at 10:08

## 2019-08-11 RX ADMIN — MIDODRINE HYDROCHLORIDE 2.5 MG: 2.5 TABLET ORAL at 09:08

## 2019-08-11 RX ADMIN — INSULIN DETEMIR 10 UNITS: 100 INJECTION, SOLUTION SUBCUTANEOUS at 09:08

## 2019-08-11 RX ADMIN — RAMELTEON 8 MG: 8 TABLET, FILM COATED ORAL at 09:08

## 2019-08-11 RX ADMIN — LEVOTHYROXINE SODIUM 175 MCG: 150 TABLET ORAL at 06:08

## 2019-08-11 RX ADMIN — INSULIN ASPART 3 UNITS: 100 INJECTION, SOLUTION INTRAVENOUS; SUBCUTANEOUS at 10:08

## 2019-08-11 RX ADMIN — SERTRALINE HYDROCHLORIDE 100 MG: 50 TABLET ORAL at 10:08

## 2019-08-11 RX ADMIN — SODIUM CHLORIDE: 0.9 INJECTION, SOLUTION INTRAVENOUS at 01:08

## 2019-08-11 RX ADMIN — MIDODRINE HYDROCHLORIDE 5 MG: 5 TABLET ORAL at 10:08

## 2019-08-11 RX ADMIN — GABAPENTIN 300 MG: 300 CAPSULE ORAL at 04:08

## 2019-08-11 RX ADMIN — INSULIN ASPART 3 UNITS: 100 INJECTION, SOLUTION INTRAVENOUS; SUBCUTANEOUS at 12:08

## 2019-08-11 RX ADMIN — RIVAROXABAN 20 MG: 20 TABLET, FILM COATED ORAL at 04:08

## 2019-08-11 RX ADMIN — GABAPENTIN 300 MG: 300 CAPSULE ORAL at 09:08

## 2019-08-11 RX ADMIN — SODIUM CHLORIDE: 0.9 INJECTION, SOLUTION INTRAVENOUS at 04:08

## 2019-08-11 RX ADMIN — CLOPIDOGREL BISULFATE 75 MG: 75 TABLET ORAL at 10:08

## 2019-08-11 RX ADMIN — FAMOTIDINE 20 MG: 20 TABLET ORAL at 10:08

## 2019-08-11 NOTE — PLAN OF CARE
Problem: Diabetes Comorbidity  Goal: Blood Glucose Level Within Desired Range    Intervention: Maintain Glycemic Control     08/11/19 0619   Monitor and Manage Ketoacidosis   Glycemic Management blood glucose monitoring

## 2019-08-11 NOTE — ASSESSMENT & PLAN NOTE
He is currently in atrial fibrillation but with a controlled ventricular response.  Will continue his home regimen apixaban but hold metoprolol for now due to hypotension and orthostatic bp. He is high risk for fall.

## 2019-08-11 NOTE — PROGRESS NOTES
[facility] Hospital Medicine  Progress Note    Patient Name: Doni Theodore  MRN: [id number]  Patient Class: IP- Inpatient   Admission Date: 8/9/2019  Length of Stay: 2 days  Attending Physician: Medardo Raymond MD  Primary Care Provider: Nic Mauro MD        Subjective:     Principal Problem:Hypotension      HPI:  Mr. Doni Theodore is a 78 y.o. male with essential hypertension, type 2 diabetes mellitus (HbA1c 5.7% Jul 2019), CAD s/p PCI, paroxysmal atrial fibrillation ([id number] score 7) on chronic anticoagulation, permanent pacemaker in place, hypothyroidism (TSH 12.396 Aug 2019), anemia of chronic disease, FEMI on CPAP, BPH, and history of CVA who presents to [facility] ED with complaints of weakness for the past month.  He reports that his weakness has been gradual and progressive, and has been associated with worsening dyspnea on exertion.  He also had some non-productive coughing but denies any fevers, chills, nausea, vomiting, chest pain, palpitations, diaphoresis, hemoptysis, nor any lower extremity pain or swelling.  He denies any recent travel nor sick contacts and denies any PND or orthopnea.  Today, the patient's wife that that he had blood pressures as low as 65/45 at home.  He reports that he is compliant with all of his medications and although he did not fall today, he did have a fall about a week ago after standing up.      Overview/Hospital Course:  79 yo with hypotension in the past on midodrine (has been off of it now for 3 years), AF on apixaban and CAD s/p PCI with pacemaker presenting with hypotension. Held home antihypertensives. Improved with fluids but remains orthostatic. Started back on midodrine. No signs of infection. Patient has dementia, pleasantly confused Aox1-2 intermittently, unsure if he takes his meds correctly at home. Very weak, PT OT ordered. Continue fluids for now and midodrine.    Interval Hx: No acute events overnight. Orthostatic this  AM.    Review of Systems   Constitutional: Positive for fatigue. Negative for activity change, appetite change, chills, diaphoresis, fever and unexpected weight change.   HENT: Negative.    Eyes: Negative.    Respiratory: Positive for cough and shortness of breath. Negative for chest tightness and wheezing.    Cardiovascular: Negative for chest pain, palpitations and leg swelling.   Gastrointestinal: Negative for abdominal distention, abdominal pain, blood in stool, constipation, diarrhea, nausea and vomiting.   Genitourinary: Negative for dysuria and hematuria.   Musculoskeletal: Negative.    Skin: Negative.    Neurological: Positive for weakness. Negative for dizziness, seizures, syncope and light-headedness.   Psychiatric/Behavioral: Negative.      Objective:     Vital Signs (Most Recent):  Temp: 97.9 °F (36.6 °C) (08/11/19 1158)  Pulse: 104 (08/11/19 1201)  Resp: 18 (08/11/19 1158)  BP: 132/81 (08/11/19 1158)  SpO2: 95 % (08/11/19 1416) Vital Signs (24h Range):  Temp:  [97.6 °F (36.4 °C)-99.4 °F (37.4 °C)] 97.9 °F (36.6 °C)  Pulse:  [] 104  Resp:  [18-19] 18  SpO2:  [90 %-95 %] 95 %  BP: (132-165)/(67-96) 132/81     Weight: 78.9 kg (173 lb 15.1 oz)  Body mass index is 28.08 kg/m².    Orthostatics Positive this AM    Physical Exam   Constitutional: He appears well-developed and well-nourished. No distress.   HENT:   Head: Normocephalic and atraumatic.   Nose: Nose normal.   Eyes: Right eye exhibits no discharge. Left eye exhibits no discharge.   Neck: Normal range of motion.   Cardiovascular:   Irregularly irregular but normal rate, no murmurs or gallops   Pulmonary/Chest: Effort normal and breath sounds normal. No stridor. No respiratory distress. He has no wheezes. He has no rales. He exhibits tenderness.   Abdominal: Soft. Bowel sounds are normal. He exhibits no distension. There is no tenderness. There is no rebound and no guarding.   Musculoskeletal: Normal range of motion. He exhibits no edema.    Neurological: He is alert.   Aox1-2 intermittently   Skin: Skin is warm and dry. Capillary refill takes less than 2 seconds. He is not diaphoretic.   Psychiatric: He has a normal mood and affect. His behavior is normal. Judgment and thought content normal.   Nursing note and vitals reviewed.       Significant Labs: All pertinent labs within the past 24 hours have been reviewed.    Significant Imaging: I have reviewed and interpreted all pertinent imaging results/findings within the past 24 hours.      Assessment/Plan:      * Hypotension  Patient was noted to be hypotensive at home and had an initial blood pressure of 77/48 upon arriving to this facility.  Etiology of his hypotension is unclear at this time but likely med induced in the setting of underlying dysautonomia.  He was taking midodrine at home several years ago, which was gradually taken off and antihypertensive were reinstituted.  His blood pressure responded well with IV fluids. Lactic acidosis resolved.  Continue IVF (decreased rate) and midodrine.    BPH (benign prostatic hyperplasia)  Stable; will continue his home regimen of tamsulosin.    FEMI on CPAP  Will provide CPAP nightly.    Hypothyroidism  Well controlled; will continue his home regimen of levothyroxine.    Chronic anticoagulation  As addressed above.    Essential hypertension  Patient's blood pressure was noted to be low at home and has been borderline low ever since.  Will hold his home regimen lisinopril, metoprolol for now and continue his home regimen of midodrine.    Type 2 diabetes mellitus, controlled  Well controlled on home regimen a sulfonylurea; will provide basal-prandial insulin therapy along with insulin sliding scale.    History of CVA (cerebrovascular accident)  Stable; will continue his home regimen of aspirin and clopidogrel.    Presence of permanent cardiac pacemaker  Stable; there are no acute issues.    Anemia of chronic disease  The patient's H/H is stable and  consistent with previous laboratory measurements, and the patient exhibits no signs or symptoms of acute bleeding; there is no indication for transfusion.  Will continue to monitor.    Coronary artery disease involving native coronary artery of native heart without angina pectoris  Stable; will continue his home regimen of aspirin and clopidogrel, and hold his home regimen of lisinopril metoprolol due to orthostatic blood pressure.    Persistent atrial fibrillation  He is currently in atrial fibrillation but with a controlled ventricular response.  Will continue his home regimen apixaban but hold metoprolol for now due to hypotension and orthostatic bp. He is high risk for fall.      VTE Risk Mitigation (From admission, onward)        Ordered     rivaroxaban tablet 20 mg  With dinner      08/09/19 2043     IP VTE HIGH RISK PATIENT  Once      08/09/19 2043     Reason for No Pharmacological VTE Prophylaxis  Once      08/09/19 2043            Medardo Raymond MD  Department of Hospital Medicine   [facility]

## 2019-08-11 NOTE — SUBJECTIVE & OBJECTIVE
Interval Hx: No acute events overnight. Orthostatic this AM.    Review of Systems   Constitutional: Positive for fatigue. Negative for activity change, appetite change, chills, diaphoresis, fever and unexpected weight change.   HENT: Negative.    Eyes: Negative.    Respiratory: Positive for cough and shortness of breath. Negative for chest tightness and wheezing.    Cardiovascular: Negative for chest pain, palpitations and leg swelling.   Gastrointestinal: Negative for abdominal distention, abdominal pain, blood in stool, constipation, diarrhea, nausea and vomiting.   Genitourinary: Negative for dysuria and hematuria.   Musculoskeletal: Negative.    Skin: Negative.    Neurological: Positive for weakness. Negative for dizziness, seizures, syncope and light-headedness.   Psychiatric/Behavioral: Negative.      Objective:     Vital Signs (Most Recent):  Temp: 97.9 °F (36.6 °C) (08/11/19 1158)  Pulse: 104 (08/11/19 1201)  Resp: 18 (08/11/19 1158)  BP: 132/81 (08/11/19 1158)  SpO2: 95 % (08/11/19 1416) Vital Signs (24h Range):  Temp:  [97.6 °F (36.4 °C)-99.4 °F (37.4 °C)] 97.9 °F (36.6 °C)  Pulse:  [] 104  Resp:  [18-19] 18  SpO2:  [90 %-95 %] 95 %  BP: (132-165)/(67-96) 132/81     Weight: 78.9 kg (173 lb 15.1 oz)  Body mass index is 28.08 kg/m².    Orthostatics Positive this AM    Physical Exam   Constitutional: He appears well-developed and well-nourished. No distress.   HENT:   Head: Normocephalic and atraumatic.   Nose: Nose normal.   Eyes: Right eye exhibits no discharge. Left eye exhibits no discharge.   Neck: Normal range of motion.   Cardiovascular:   Irregularly irregular but normal rate, no murmurs or gallops   Pulmonary/Chest: Effort normal and breath sounds normal. No stridor. No respiratory distress. He has no wheezes. He has no rales. He exhibits tenderness.   Abdominal: Soft. Bowel sounds are normal. He exhibits no distension. There is no tenderness. There is no rebound and no guarding.    Musculoskeletal: Normal range of motion. He exhibits no edema.   Neurological: He is alert.   Aox1-2 intermittently   Skin: Skin is warm and dry. Capillary refill takes less than 2 seconds. He is not diaphoretic.   Psychiatric: He has a normal mood and affect. His behavior is normal. Judgment and thought content normal.   Nursing note and vitals reviewed.       Significant Labs: All pertinent labs within the past 24 hours have been reviewed.    Significant Imaging: I have reviewed and interpreted all pertinent imaging results/findings within the past 24 hours.

## 2019-08-11 NOTE — PLAN OF CARE
Problem: Fall Injury Risk  Goal: Absence of Fall and Fall-Related Injury    Intervention: Identify and Manage Contributors to Fall Injury Risk     08/10/19 2040   Identify and Manage Contributors to Fall Injury Risk   Self-Care Promotion independence encouraged;BADL personal objects within reach     Intervention: Promote Injury-Free Environment     08/11/19 1700   Optimize Balance and Safe Activity   Safety Promotion/Fall Prevention assistive device/personal item within reach;bed alarm set;Fall Risk reviewed with patient/family;instructed to call staff for mobility;side rails raised x 2;lighting adjusted;medications reviewed;nonskid shoes/socks when out of bed

## 2019-08-11 NOTE — NURSING
Received bedside report from Edie. Patient denies pain or SOB-CPAP in place. Patients family at bedside. Discussed rounding processes , safety measures and plan of care with patient and his daughter. Bed alarm on and patient and family notified, for patients safety, door would have to be left open when family left to go home. Patient and family agreeable.

## 2019-08-11 NOTE — ASSESSMENT & PLAN NOTE
Stable; will continue his home regimen of aspirin and clopidogrel, and hold his home regimen of lisinopril metoprolol due to orthostatic blood pressure.

## 2019-08-11 NOTE — HOSPITAL COURSE
77 yo with hypotension in the past on midodrine, AF on apixaban and CAD s/p PCI with pacemaker presenting with hypotension. Held home antihypertensives. Improved with fluids but remains orthostatic. Started back on midodrine, bp improved. No signs of infection. Patient has dementia, pleasantly confused Aox1-2 intermittently, unsure if he takes his meds correctly at home. Very weak, PT OT ordered, rec HH. Now on fluids for 2 days, but continues to have significant orthostatic hypotension this AM. Increased midodrine. 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 Patient is a high risk for fall even with midodrine on board, and he's on plavix and rivaroxiban. Dicussed with family, and family would like to discontinue plavix for now, and continue xarelto. Spoke with family today, and discussed GOC. Full code changed to DNR. Family amenable to home hospice. D/c with home hospice.

## 2019-08-11 NOTE — PT/OT/SLP EVAL
Occupational Therapy   Evaluation and Treatment    Name: Doni Theodore  MRN: [id number]  Admitting Diagnosis:  Hypotension      Recommendations:     Discharge Recommendations: home health OT(with family assistance)  Discharge Equipment Recommendations:  none  Barriers to discharge:  None    Assessment:     Doni Theodore is a 78 y.o. male with a medical diagnosis of Hypotension.  He presents with good motivation and participation with therapy session. Performance deficits affecting function: weakness, impaired endurance, impaired self care skills, impaired functional mobilty, decreased safety awareness, impaired cardiopulmonary response to activity, gait instability, impaired balance, decreased upper extremity function, decreased lower extremity function. Pt will benefit from HHOT with family assistance/supervision at d/c in order to increase independence and safety with ADLs and all aspects of functional mobility in order to reduce risk of falls.     Rehab Prognosis: Good; patient would benefit from acute skilled OT services to address these deficits and reach maximum level of function.       Plan:     Patient to be seen (3-5x/week) to address the above listed problems via self-care/home management, therapeutic activities, therapeutic exercises  · Plan of Care Expires: 08/25/19  · Plan of Care Reviewed with: patient    Subjective     Chief Complaint: needing to use the restroom   Patient/Family Comments/goals: pt reports that he is moving better than he thought he would be     Occupational Profile:  Living Environment: Pt lives with his wife in a large trailer with a ramp at entry with B/L HR.   Previous level of function: Pt primarily uses wheelchair as type of mobility, requiring assistance with the ramp at entry, but able to propel self other than that. Pt was mod I with ADLs, able to transfer himself to BSC and shower chair independently. Pt fell 1 week ago after standing up quickly. He will occasionally use RW  in the home.   Roles and Routines: Pt 's wife cooks, but he cleans dishes/sweeps/laundry from wheelchair; dtr-in-law drives him where needed   Equipment Used at Home:  CPAP, wheelchair, walker, rolling, rollator, bedside commode, shower chair, nebulizer, glucometer  Assistance upon Discharge: wife, daughter-in-law    Pain/Comfort:  · Pain Rating 1: 0/10    Patients cultural, spiritual, Latter day conflicts given the current situation: no    Objective:     Communicated with: nurseAlejandra, prior to session.  Patient found HOB elevated with bed alarm, peripheral IV, telemetry upon OT entry to room.    General Precautions: Standard, fall, diabetic, pacemaker   Orthopedic Precautions:N/A   Braces: N/A     Occupational Performance:    Bed Mobility:    · Patient completed Rolling/Turning to Left with  contact guard assistance and with side rail  · Patient completed Rolling/Turning to Right with contact guard assistance and with side rail  · Patient completed Scooting/Bridging with contact guard assistance  · Patient completed Supine to Sit with minimum assistance with HOB elevated  · Patient completed Sit to Supine with stand by assistance    Functional Mobility/Transfers:  · Patient completed Sit <> Stand Transfer with minimum assistance  with  rolling walker   · Patient completed Toilet Transfer Step Transfer technique with moderate assistance with  rolling walker and grab bars  · Functional Mobility: Pt ambulated within room and bathroom with MIN A using RW. Pt required cueing for body mechanics within RW, navigating around obstacles safely, and safe hand placement.     Activities of Daily Living:  · Grooming: minimum assistance with direction of RW standing at the sink to wash hands; CGA once in position to complete task  · Upper Body Dressing: minimum assistance to paul back hospital gown  · Lower Body Dressing: contact guard assistance to paul/doff sock seated EOB  · Toileting: minimum assistance  pericare    Cognitive/Visual Perceptual:  Cognitive/Psychosocial Skills:     -       Oriented to: Person, Place and Time   -       Follows Commands/attention:Follows multistep  commands  -       Communication: clear/fluent  -       Memory: No Deficits noted  -       Safety awareness/insight to disability: impaired   -       Mood/Affect/Coping skills/emotional control: Pleasant  Visual/Perceptual:      -Intact  R/L discrimination and visual field      Physical Exam:  Balance:    -       sitting: SBA; static standing: CGA with RW; dynamic standing: MIN A with RW  Postural examination/scapula alignment:    -       Rounded shoulders  -       Forward head  Skin integrity: Visible skin intact  Edema:  no BUE edema noted  Sensation:    -       Intact  light/touch BUE  Dominant hand:    -       Right  Upper Extremity Range of Motion:     -       Right Upper Extremity: WFL  -       Left Upper Extremity: WFL  Upper Extremity Strength:    -       Right Upper Extremity: WFL  -       Left Upper Extremity: WFL   Strength:    -       Right Upper Extremity: WFL  -       Left Upper Extremity: WFL  Fine Motor Coordination:    -       Intact  Left hand, manipulation of objects and Right hand, manipulation of objects  Gross motor coordination:   slightly impaired 2* weakness    AMPAC 6 Click ADL:  AMPAC Total Score: 19    Treatment & Education:  Pt educated on OT role/POC.   Importance of OOB activity with staff assistance.  Safety during functional t/f and mobility   Safe hand placement with transfers from bed and toilet. Pt required increased time from toilet hygiene and sit to stand transfer d/t fatigue. Pt implemented hand placement edu well, using grab bar and MOD A.   Pt required verbal and tactile cueing for RW management within bathroom and standing in front of the sink to ensure safe base of support in order to reduce risk of falls. Pt demo'd poor safety awareness with navigating RW by obstacles near the sink, requiring  assistance for steadiness and safe, clear environment.   Pt demo'd SOB upon sitting up at EOB, requiring increased time to implement pursed lip breathing with OT demo. Pt , again, required a rest break upon sitting EOB at end of session d/t fatigue prior to laying back down.   White board updated   Multiple self-care tasks/functional mobility completed- assistance level noted above   All questions/concerns answered within OT scope of practice     Education:    Patient left HOB elevated with all lines intact, call button in reach, bed alarm on and nurseAlejandra, notified    GOALS:   Multidisciplinary Problems     Occupational Therapy Goals        Problem: Occupational Therapy Goal    Goal Priority Disciplines Outcome Interventions   Occupational Therapy Goal     OT, PT/OT Ongoing (interventions implemented as appropriate)    Description:  Goals to be met by: 08/25/19    Patient will increase functional independence with ADLs by performing:    LE Dressing with Stand-by Assistance.  Grooming while standing at sink with Stand-by Assistance.  Toileting from toilet with Stand-by Assistance for hygiene and clothing management.   Rolling to Bilateral with Supervision.   Supine to sit with Supervision.  Step transfer with Stand-by Assistance  Toilet transfer to toilet with Stand-by Assistance.  Upper extremity exercise program x15 reps per handout, with independence.                      History:     Past Medical History:   Diagnosis Date    A-fib 7/18/2019    Anticoagulant long-term use     Plavix (on hold for angiogram)    Arthritis     Coronary artery disease     pacemaker, stentsx2    Diabetes mellitus     Hypertension     Pacemaker     Sleep apnea     Stroke     Rt side affected / mild    Thyroid disease     hypothyroidism       Past Surgical History:   Procedure Laterality Date    ANGIOGRAM N/A 5/31/2013    Performed by [facility] OR    ANKLE SURGERY      APPENDECTOMY      CARDIAC  PACEMAKER PLACEMENT      Cardioversion or Defibrillation  7/19/2019    Per[facility] at [facility] CATH LAB    CHOLECYSTECTOMY      CORONARY ANGIOPLASTY WITH STENT PLACEMENT  6/06    stents x 2 placed     EYE SURGERY      HEART CATH-LEFT Left 10/23/2017    Per[facility]    HERNIA REPAIR      ventral hernia repair    REPAIR, AAA, ENDOVASCULAR N/A 6/18/2013    Performed by Radu Melgar [facility] at [facility] OR    sciatica      sciatica procedure    Transesophageal echo (VERITO) intra-procedure log documentation N/A 7/19/2019    Performed by Nic Miguel [facility] at [facility] CATH LAB       Time Tracking:     OT Date of Treatment: 08/11/19  OT Start Time: 0930  OT Stop Time: 0953  OT Total Time (min): 23 min    Billable Minutes:Evaluation 15 min  Self Care/Home Management 8 min  Total Time 23 min (co-eval with PT)    Abril Veras, OT  8/11/2019

## 2019-08-11 NOTE — PT/OT/SLP EVAL
Physical Therapy Evaluation    Patient Name:  Doni Theodore   MRN:  [id number]    Recommendations:     Discharge Recommendations:  home health PT   Discharge Equipment Recommendations: none   Barriers to discharge: None    Assessment:     Doni Theodore is a 78 y.o. male admitted with a medical diagnosis of Hypotension.  He presents with the following impairments/functional limitations:  impaired endurance, impaired functional mobilty, gait instability, impaired balance, decreased coordination, decreased safety awareness, decreased lower extremity function.    Rehab Prognosis: Good; patient would benefit from acute skilled PT services to address these deficits and reach maximum level of function.    Recent Surgery: * No surgery found *      Plan:     During this hospitalization, patient to be seen 5 x/week to address the identified rehab impairments via gait training, therapeutic activities, therapeutic exercises and progress toward the following goals:    · Plan of Care Expires:  08/24/19    Subjective     Chief Complaint: I have to go to the bathroom  Patient/Family Comments/goals: to return to PLOF  Pain/Comfort:  · Pain Rating 1: 0/10    Patients cultural, spiritual, Yarsani conflicts given the current situation:      Living Environment:  Pt lives with spouse in a mobile home with a ramp and Bilateral Hand Rails.  Patient reports that Daughter-in-law will provide assistance as needed to to patient and spouse.   Prior to admission, patients level of function was Mod I.  Equipment used at home: nebulizer, walker, rolling, rollator, shower chair, CPAP, glucometer, wheelchair.  DME owned (not currently used): none.  Upon discharge, patient will have assistance from Spouse, Daughter-in-law, and Self.    Objective:     Communicated with Nurse Roberts prior to session.  Patient found supine with peripheral IV, telemetry  upon PT entry to room.    General Precautions: Standard, fall   Orthopedic Precautions:Full  weight bearing   Braces: N/A     Exams:  · Cognitive Exam:  Patient is oriented to Person, Place and Situation  · Gross Motor Coordination:  WFL  · Postural Exam:  Patient presented with the following abnormalities:    · -       Rounded shoulders  · -       Forward head  · -       Abnormal trunk flexion  · Skin Integrity/Edema:      · -       Skin integrity: Visible skin intact  · -       Edema: Mild B LE  · RLE ROM: WFL  · RLE Strength: WFL  · LLE ROM: WFL  · LLE Strength: WFL    Functional Mobility:  · Bed Mobility:     · Rolling Left:  stand by assistance  · Rolling Right: stand by assistance  · Supine to Sit: contact guard assistance  · Sit to Supine: contact guard assistance  · Transfers:     · Sit to Stand:  minimum assistance with rolling walker  · Gait: 18' x 2 with RW and Min A for directing around obstacles on way to bathroom.       Therapeutic Activities and Exercises:   Pt required Mod A for toileting transfers and Min A for toileting.     AM-PAC 6 CLICK MOBILITY  Total Score:13     Patient left supine with all lines intact and call button in reach.    GOALS:   Multidisciplinary Problems     Physical Therapy Goals        Problem: Physical Therapy Goal    Goal Priority Disciplines Outcome Goal Variances Interventions   Physical Therapy Goal     PT, PT/OT Ongoing (interventions implemented as appropriate)     Description:  Goals to be met by: 08/24/2019    Patient will increase functional independence with mobility by performing:    `1. Supine to sit with Modified [place]  2. Sit to supine with Modified [place]  3. Sit to stand transfer with Modified [place]  4. Gait  x 150 feet with Modified [place] using Rolling Walker.    Recommend: HHPT at time of discharge with family assistance.                          History:     Past Medical History:   Diagnosis Date    A-fib 7/18/2019    Anticoagulant long-term use     Plavix (on hold for angiogram)    Arthritis     Coronary artery  disease     pacemaker, stentsx2    Diabetes mellitus     Hypertension     Pacemaker     Sleep apnea     Stroke     Rt side affected / mild    Thyroid disease     hypothyroidism       Past Surgical History:   Procedure Laterality Date    ANGIOGRAM N/A 5/31/2013    Performed [facility] OR    ANKLE SURGERY      APPENDECTOMY      CARDIAC PACEMAKER PLACEMENT      Cardioversion or Defibrillation  7/19/2019    Performed by Nic Miguel [facility] at [facility] CATH LAB    CHOLECYSTECTOMY      CORONARY ANGIOPLASTY WITH STENT PLACEMENT  6/06    stents x 2 placed     EYE SURGERY      HEART CATH-LEFT Left 10/23/2017    [facility]    HERNIA REPAIR      ventral hernia repair    REPAIR, AAA, ENDOVASCULAR N/A 6/18/2013    Performed by Radu [facility]gar [facility] at [facility] OR    sciatica      sciatica procedure    Transesophageal echo (VERITO) intra-procedure log documentation N/A 7/19/2019    Performed by Nic Miguel [facility] at [facility] CATH LAB       Time Tracking:     PT Received On: 08/11/19(- co treated with OT. )  PT Start Time: 0925     PT Stop Time: 0950  PT Total Time (min): 25 min     Billable Minutes: Evaluation 25 min      Dylan Ca, PT  08/11/2019

## 2019-08-11 NOTE — PLAN OF CARE
Problem: Physical Therapy Goal  Goal: Physical Therapy Goal  Goals to be met by: 08/24/2019    Patient will increase functional independence with mobility by performing:    `1. Supine to sit with Modified [place]  2. Sit to supine with Modified [place]  3. Sit to stand transfer with Modified [place]  4. Gait  x 150 feet with Modified [place] using Rolling Walker.    Recommend: HHPT at time of discharge with family assistance.        Outcome: Ongoing (interventions implemented as appropriate)  Dylan Ca, PT  08/11/2019

## 2019-08-11 NOTE — NURSING
Orthostatic BP readings: Per Dr. Raymond request.    Lying: /81  Sitting: /69  Standing: /60  Lyin/93    Dr. Raymond notified.

## 2019-08-11 NOTE — PLAN OF CARE
Problem: Occupational Therapy Goal  Goal: Occupational Therapy Goal  Goals to be met by: 08/25/19    Patient will increase functional independence with ADLs by performing:    LE Dressing with Stand-by Assistance.  Grooming while standing at sink with Stand-by Assistance.  Toileting from toilet with Stand-by Assistance for hygiene and clothing management.   Rolling to Bilateral with Supervision.   Supine to sit with Supervision.  Step transfer with Stand-by Assistance  Toilet transfer to toilet with Stand-by Assistance.  Upper extremity exercise program x15 reps per handout, with independence.    Outcome: Ongoing (interventions implemented as appropriate)  Pt will continue to benefit from acute OT in order to increase safety awareness and independence with ADLs and all aspects of functional mobility. OT rec. HHOT with 24 hr SUP/assist at d/c.

## 2019-08-11 NOTE — ASSESSMENT & PLAN NOTE
Patient was noted to be hypotensive at home and had an initial blood pressure of 77/48 upon arriving to this facility.  Etiology of his hypotension is unclear at this time but likely med induced in the setting of underlying dysautonomia.  He was taking midodrine at home several years ago, which was gradually taken off and antihypertensive were reinstituted.  His blood pressure responded well with IV fluids. Lactic acidosis resolved.  Continue IVF (decreased rate) and midodrine.

## 2019-08-11 NOTE — NURSING
Patients wife request something for cough for patient.   Dr. Raymond notified, Jeb ABARCAN ordered.

## 2019-08-12 LAB
POCT GLUCOSE: 144 MG/DL (ref 70–110)
POCT GLUCOSE: 149 MG/DL (ref 70–110)
POCT GLUCOSE: 163 MG/DL (ref 70–110)
POCT GLUCOSE: 166 MG/DL (ref 70–110)
POCT GLUCOSE: 171 MG/DL (ref 70–110)
POCT GLUCOSE: 87 MG/DL (ref 70–110)

## 2019-08-12 PROCEDURE — 97110 THERAPEUTIC EXERCISES: CPT

## 2019-08-12 PROCEDURE — 25000003 PHARM REV CODE 250: Performed by: INTERNAL MEDICINE

## 2019-08-12 PROCEDURE — 63600175 PHARM REV CODE 636 W HCPCS: Performed by: INTERNAL MEDICINE

## 2019-08-12 PROCEDURE — 94761 N-INVAS EAR/PLS OXIMETRY MLT: CPT

## 2019-08-12 PROCEDURE — 97530 THERAPEUTIC ACTIVITIES: CPT

## 2019-08-12 PROCEDURE — 97802 MEDICAL NUTRITION INDIV IN: CPT

## 2019-08-12 PROCEDURE — 99900035 HC TECH TIME PER 15 MIN (STAT)

## 2019-08-12 PROCEDURE — 97535 SELF CARE MNGMENT TRAINING: CPT

## 2019-08-12 PROCEDURE — 21400001 HC TELEMETRY ROOM

## 2019-08-12 PROCEDURE — 92610 EVALUATE SWALLOWING FUNCTION: CPT

## 2019-08-12 PROCEDURE — 97116 GAIT TRAINING THERAPY: CPT

## 2019-08-12 RX ORDER — MIDODRINE HYDROCHLORIDE 5 MG/1
5 TABLET ORAL 3 TIMES DAILY
Status: DISCONTINUED | OUTPATIENT
Start: 2019-08-12 | End: 2019-08-13 | Stop reason: HOSPADM

## 2019-08-12 RX ORDER — FINASTERIDE 5 MG/1
5 TABLET, FILM COATED ORAL DAILY
Status: DISCONTINUED | OUTPATIENT
Start: 2019-08-13 | End: 2019-08-13 | Stop reason: HOSPADM

## 2019-08-12 RX ADMIN — GABAPENTIN 300 MG: 300 CAPSULE ORAL at 05:08

## 2019-08-12 RX ADMIN — GABAPENTIN 300 MG: 300 CAPSULE ORAL at 09:08

## 2019-08-12 RX ADMIN — TAMSULOSIN HYDROCHLORIDE 0.4 MG: 0.4 CAPSULE ORAL at 09:08

## 2019-08-12 RX ADMIN — INSULIN ASPART 3 UNITS: 100 INJECTION, SOLUTION INTRAVENOUS; SUBCUTANEOUS at 11:08

## 2019-08-12 RX ADMIN — INSULIN ASPART 3 UNITS: 100 INJECTION, SOLUTION INTRAVENOUS; SUBCUTANEOUS at 05:08

## 2019-08-12 RX ADMIN — CLOPIDOGREL BISULFATE 75 MG: 75 TABLET ORAL at 09:08

## 2019-08-12 RX ADMIN — FAMOTIDINE 20 MG: 20 TABLET ORAL at 09:08

## 2019-08-12 RX ADMIN — INSULIN DETEMIR 10 UNITS: 100 INJECTION, SOLUTION SUBCUTANEOUS at 09:08

## 2019-08-12 RX ADMIN — RIVAROXABAN 20 MG: 20 TABLET, FILM COATED ORAL at 05:08

## 2019-08-12 RX ADMIN — SERTRALINE HYDROCHLORIDE 100 MG: 50 TABLET ORAL at 09:08

## 2019-08-12 RX ADMIN — MIDODRINE HYDROCHLORIDE 5 MG: 5 TABLET ORAL at 09:08

## 2019-08-12 RX ADMIN — SODIUM CHLORIDE: 0.9 INJECTION, SOLUTION INTRAVENOUS at 07:08

## 2019-08-12 RX ADMIN — POTASSIUM CHLORIDE 40 MEQ: 1500 TABLET, EXTENDED RELEASE ORAL at 09:08

## 2019-08-12 RX ADMIN — MIDODRINE HYDROCHLORIDE 5 MG: 5 TABLET ORAL at 05:08

## 2019-08-12 RX ADMIN — POTASSIUM PHOSPHATE, MONOBASIC 1000 MG: 500 TABLET, SOLUBLE ORAL at 09:08

## 2019-08-12 RX ADMIN — ASPIRIN 81 MG: 81 TABLET, COATED ORAL at 09:08

## 2019-08-12 RX ADMIN — INSULIN ASPART 3 UNITS: 100 INJECTION, SOLUTION INTRAVENOUS; SUBCUTANEOUS at 09:08

## 2019-08-12 RX ADMIN — LEVOTHYROXINE SODIUM 175 MCG: 150 TABLET ORAL at 06:08

## 2019-08-12 RX ADMIN — MIDODRINE HYDROCHLORIDE 2.5 MG: 2.5 TABLET ORAL at 09:08

## 2019-08-12 NOTE — PLAN OF CARE
Problem: Diabetes Comorbidity  Goal: Blood Glucose Level Within Desired Range    Intervention: Maintain Glycemic Control     08/12/19 0300   Monitor and Manage Ketoacidosis   Glycemic Management blood glucose monitoring

## 2019-08-12 NOTE — PT/OT/SLP PROGRESS
Physical Therapy Treatment    Patient Name:  Doni Theodore   MRN:  [id number]    Recommendations:     Discharge Recommendations:  home health PT   Discharge Equipment Recommendations: none   Barriers to discharge: None    Assessment:     Doni Theodore is a 78 y.o. male admitted with a medical diagnosis of Hypotension.  He presents with the following impairments/functional limitations:  weakness, impaired endurance, impaired self care skills, impaired balance, decreased coordination, decreased safety awareness, impaired functional mobilty, gait instability, decreased lower extremity function, impaired cardiopulmonary response to activity, impaired coordination, decreased upper extremity function, decreased ROM.  Pt progressing towards goals.  Requires assistance for all OOB mobility.    Rehab Prognosis: Fair; patient would benefit from acute skilled PT services to address these deficits and reach maximum level of function.    Recent Surgery: * No surgery found *      Plan:     During this hospitalization, patient to be seen 5 x/week to address the identified rehab impairments via gait training, therapeutic activities, therapeutic exercises and progress toward the following goals:    · Plan of Care Expires:  08/24/19    Subjective     Chief Complaint: needing to have a BM  Patient/Family Comments/goals: Pt reports he has to use the bathroom bad.  Pt's call light on 2/2 needing to use the rest room.  Therapist present to assist pt.  Pain/Comfort:  · Pain Rating 1: 0/10      Objective:     Communicated with pt and spouse due to call light being on prior to session.  Patient found HOB elevated with peripheral IV, bed alarm, telemetry upon PT entry to room.     General Precautions: Standard, fall   Orthopedic Precautions:N/A   Braces: N/A     Functional Mobility:  · Bed Mobility: Requires extra time to perform    · Rolling Right: minimum assistance  · Scooting: contact guard assistance  · Supine to Sit: minimum  assistance  · Transfers:from EOB and toilet     · Sit to Stand:  minimum assistance with rolling walker  · Toilet Transfer: minimum assistance with  rolling walker    · Gait: Pt ambulated ~12ft with RW, CGA, ~4ft from toilet>sink, ~10ft from sink to B[place]air with RW, CGA.  Pt with decreased edward, coordination, step length, velocity of limb and postural control.  · Balance: good sitting and fair standing      AM-PAC 6 CLICK MOBILITY  Turning over in bed (including adjusting bedclothes, sheets and blankets)?: 3  Sitting down on and standing up from a chair with arms (e.g., wheelchair, bedside commode, etc.): 3  Moving from lying on back to sitting on the side of the bed?: 3  Moving to and from a bed to a chair (including a wheelchair)?: 3  Need to walk in hospital room?: 3  Climbing 3-5 steps with a railing?: 1  Basic Mobility Total Score: 16       Therapeutic Activities and Exercises:   Pt performed BM, transfers from EOB and toilet, toileting while seated, gait.  Pt tolerated standing at sink with RW, SBA for washing of hands.   Treatment limited due to MD(Dr. Raymond) present to talk to pt and spouse about changes in code status and hospice.    Patient left reclined in BSchair with B heels elevated with all lines intact, call button in reach, pt's nurse, Alejandra, notified and spouse and Dr. Raymond, present..    GOALS:   Multidisciplinary Problems     Physical Therapy Goals        Problem: Physical Therapy Goal    Goal Priority Disciplines Outcome Goal Variances Interventions   Physical Therapy Goal     PT, PT/OT Ongoing (interventions implemented as appropriate)     Description:  Goals to be met by: 08/24/2019    Patient will increase functional independence with mobility by performing:    `1. Supine to sit with Modified [place]  2. Sit to supine with Modified [place]  3. Sit to stand transfer with Modified [place]  4. Gait  x 150 feet with Modified [place] using Rolling  Walker.    Recommend: HHPT at time of discharge with family assistance.                          Time Tracking:     PT Received On: 08/12/19  PT Start Time: 1431     PT Stop Time: 1455  PT Total Time (min): 24 min     Billable Minutes: Gait Training 12 and Therapeutic Activity 12    Treatment Type: Treatment  PT/PTA: PTA     PTA Visit Number: 1     Dee Dee Wade, PTA  08/12/2019

## 2019-08-12 NOTE — PROGRESS NOTES
"TN met with patient and patient's daughter Candie at bedside to introduce self as . TN explained duties of case management to Candie and reviewed "[facility]", "Discharge Planning Begins on Admission"  and discussed "Help at Home".  Candie stated patient lives at home with his wife. Candie also stated patient receives PT/OT with [facility] in which she would like to resume. Candie also stated she would like palliative care visits arranged with [facility] when patient is ready for discharge.  Contact information added to white board.      1124- Call received from Candie stating she would only like hospice services with [facility] when patient is ready for discharge.  "

## 2019-08-12 NOTE — PT/OT/SLP PROGRESS
Occupational Therapy   Treatment    Name: Doni Theodore  MRN: [id number]  Admitting Diagnosis:  Hypotension       Recommendations:     Discharge Recommendations: home health OT(with family assistance)  Discharge Equipment Recommendations:  none  Barriers to discharge:       Assessment:     Doni Theodore is a 78 y.o. male with a medical diagnosis of Hypotension.  He presents with the following performance deficits affecting function: weakness, impaired endurance, impaired self care skills, impaired functional mobilty, gait instability, impaired balance, decreased coordination, decreased upper extremity function, decreased lower extremity function, decreased safety awareness, decreased ROM, impaired cardiopulmonary response to activity. Pt progressing toward OT goals. Pt very deconditioned and will benefit from continue OT to address functional deficits and maximize level of safety and functional independence.     Rehab Prognosis:  Fair; patient would benefit from acute skilled OT services to address these deficits and reach maximum level of function.       Plan:     Patient to be seen (3-5x/week) to address the above listed problems via self-care/home management, therapeutic activities, therapeutic exercises  · Plan of Care Expires: 08/25/19  · Plan of Care Reviewed with: patient    Subjective   Pt agreeable to therapy.  Pain/Comfort:  · Pain Rating 1: 0/10    Objective:     Communicated with: nurse Roberts prior to session.  Patient found up in chair with telemetry, peripheral IV and family present upon OT entry to room.    General Precautions: Standard, fall   Orthopedic Precautions:N/A   Braces: N/A     Occupational Performance:     Bed Mobility:    · Patient completed Sit to Supine with contact guard assistance     Functional Mobility/Transfers: Pt did c/o dizziness with transfer chair>bed, BP assessed 102/72.  · Patient completed Sit <> Stand Transfer with minimum assistance  with  rolling walker   · Patient  completed Chair > Bed Transfer using Stand Pivot technique with contact guard assistance with rolling walker  · Functional Mobility: Pt able to take steps from chair>bed with CGA/RW and VC/TC for safety and walker management. Pt with decreased coordination.    Activities of Daily Living:  · Pt deferred ADL's. Pt reports he was just up to bathroom/sink with PT.  He denied need for any ADL tasks at this time.      [facility] 6 Click ADL: 19    Treatment & Education:  -Pt educated on BUE AROM therex HEP via handout, demo, and verbal instruction. Pt able to perform x 10-15 reps BUE shoulder flex/ext, shoulder horizontal abd/add, elbow flex, elbow ext, forearm pronation/supination, wrist flex/ext, and finger flex ext between rest. Pt able to perform above exercises x 10 reps AROM and required AAROM for remaining x 5 reps. Encouraged patient to start with 10 reps and increase to 15 reps as endurance improves.  Instructed patient to perform therex as tolerated in pain free ROM, 3x a day, 10-15 reps each. Pt deconditioned and fatigues easily with activity- required increased time to perform and cues for pursed lip breathing.   -Pt and pt spouse and daughter-in-law verbalize understanding of HEP.  -Pt performed bed mobility and transfers as noted above, education on safety awareness with OOB mobility.      Patient left HOB elevated with all lines intact, bed alarm, call button in reach, nurse notified and family presentEducation:      GOALS:   Multidisciplinary Problems     Occupational Therapy Goals        Problem: Occupational Therapy Goal    Goal Priority Disciplines Outcome Interventions   Occupational Therapy Goal     OT, PT/OT Ongoing (interventions implemented as appropriate)    Description:  Goals to be met by: 08/25/19    Patient will increase functional independence with ADLs by performing:    LE Dressing with Stand-by Assistance.  Grooming while standing at sink with Stand-by Assistance.  Toileting from toilet with  Stand-by Assistance for hygiene and clothing management.   Rolling to Bilateral with Supervision.   Supine to sit with Supervision.  Step transfer with Stand-by Assistance  Toilet transfer to toilet with Stand-by Assistance.  Upper extremity exercise program x15 reps per handout, with independence.                      Time Tracking:     OT Date of Treatment: 08/12/19  OT Start Time: 1503  OT Stop Time: 1530  OT Total Time (min): 27 min    Billable Minutes:Therapeutic Activity 8  Therapeutic Exercise 19    JOSE Encarnacion  8/12/2019

## 2019-08-12 NOTE — PLAN OF CARE
Problem: Physical Therapy Goal  Goal: Physical Therapy Goal  Goals to be met by: 08/24/2019    Patient will increase functional independence with mobility by performing:    `1. Supine to sit with Modified [place]  2. Sit to supine with Modified [place]  3. Sit to stand transfer with Modified [place]  4. Gait  x 150 feet with Modified [place] using Rolling Walker.    Recommend: HHPT at time of discharge with family assistance.         Outcome: Ongoing (interventions implemented as appropriate)  Pt progressing towards goals.  Requires assistance for all OOB mobility.

## 2019-08-12 NOTE — PROGRESS NOTES
[facility] Hospital Medicine  Progress Note    Patient Name: Doni Theodore  MRN: [id number]  Patient Class: IP- Inpatient   Admission Date: 8/9/2019  Length of Stay: 3 days  Attending Physician: Medardo Raymond MD  Primary Care Provider: Nic Mauro MD        Subjective:     Principal Problem:Hypotension      HPI:  Mr. Doni Theodore is a 78 y.o. male with essential hypertension, type 2 diabetes mellitus (HbA1c 5.7% Jul 2019), CAD s/p PCI, paroxysmal atrial fibrillation ([id number] score 7) on chronic anticoagulation, permanent pacemaker in place, hypothyroidism (TSH 12.396 Aug 2019), anemia of chronic disease, FEMI on CPAP, BPH, and history of CVA who presents to [facility] ED with complaints of weakness for the past month.  He reports that his weakness has been gradual and progressive, and has been associated with worsening dyspnea on exertion.  He also had some non-productive coughing but denies any fevers, chills, nausea, vomiting, chest pain, palpitations, diaphoresis, hemoptysis, nor any lower extremity pain or swelling.  He denies any recent travel nor sick contacts and denies any PND or orthopnea.  Today, the patient's wife that that he had blood pressures as low as 65/45 at home.  He reports that he is compliant with all of his medications and although he did not fall today, he did have a fall about a week ago after standing up.      Overview/Hospital Course:  79 yo with hx of hypotension in the past on midodrine (which was titrate off and placed on antihypertensives), AF on apixaban and CAD s/p PCI with pacemaker presenting with hypotension. Held home antihypertensives. Improved with fluids but remains orthostatic. Started back on midodrine, bp improved. No signs of infection. Patient has dementia, pleasantly confused Aox1-2 intermittently, unsure if he takes his meds correctly at home. Very weak, PT OT ordered, rec HH.  Now on fluids for 2 days, but continues to have significant  orthostatic hypotension this AM. Increased midodrine. 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 Patient is a high risk for fall even with midodrine on board, and he's on plavix and rivaroxiban. Dicussed with family, and family would like to discontinue plavix for now, and continue xarelto. Spoke with family today, and discussed GOC. Full code changed to DNR. Family amenable to home hospice. D/c tomorrow with home hospice.    Interval Hx: No acute events overnight. Significant orthostatic vitals this AM. Patient was dizzy, but improved from admission. Increased midodrine. Full code changed to DNR. Family agreeable to hospice.    Review of Systems   Constitutional: Positive for fatigue. Negative for activity change, appetite change, chills, diaphoresis, fever and unexpected weight change.   HENT: Negative.    Eyes: Negative.    Respiratory: Negative for cough, chest tightness, shortness of breath and wheezing.    Cardiovascular: Negative for chest pain, palpitations and leg swelling.   Gastrointestinal: Negative for abdominal distention, abdominal pain, blood in stool, constipation, diarrhea, nausea and vomiting.   Genitourinary: Negative for dysuria and hematuria.   Musculoskeletal: Negative.    Skin: Negative.    Neurological: Positive for weakness. Negative for dizziness, seizures, syncope and light-headedness.   Psychiatric/Behavioral: Negative.      Objective:     Vital Signs (Most Recent):  Temp: 97.9 °F (36.6 °C) (08/12/19 1107)  Pulse: 96 (08/12/19 1335)  Resp: 18 (08/12/19 1107)  BP: 124/83 (08/12/19 1107)  SpO2: (!) 93 % (08/12/19 1335) Vital Signs (24h Range):  Temp:  [97.3 °F (36.3 °C)-98.4 °F (36.9 °C)] 97.9 °F (36.6 °C)  Pulse:  [] 96  Resp:  [17-18] 18  SpO2:  [92 %-98 %] 93 %  BP: (117-151)/(66-95) 124/83     Weight: 78.8 kg (173 lb 11.6 oz)  Body mass index is 28.04 kg/m².    Orthostatics Positive this AM    Physical Exam   Constitutional: He appears well-developed and well-nourished. No  distress.   HENT:   Head: Normocephalic and atraumatic.   Nose: Nose normal.   Eyes: Right eye exhibits no discharge. Left eye exhibits no discharge.   Neck: Normal range of motion.   Cardiovascular:   Irregularly irregular but normal rate, no murmurs or gallops   Pulmonary/Chest: Effort normal and breath sounds normal. No stridor. No respiratory distress. He has no wheezes. He has no rales. He exhibits no tenderness.   Abdominal: Soft. Bowel sounds are normal. He exhibits no distension. There is no tenderness. There is no rebound and no guarding.   Musculoskeletal: Normal range of motion. He exhibits no edema.   Neurological: He is alert.   Aox1-2 intermittently   Skin: Skin is warm and dry. Capillary refill takes less than 2 seconds. He is not diaphoretic.   Psychiatric: He has a normal mood and affect. His behavior is normal. Judgment and thought content normal.   Nursing note and vitals reviewed.       Significant Labs: All pertinent labs within the past 24 hours have been reviewed.    Significant Imaging: I have reviewed and interpreted all pertinent imaging results/findings within the past 24 hours.      Assessment/Plan:      * Hypotension  Patient was noted to be hypotensive at home and had an initial blood pressure of 77/48 upon arriving to this facility.  Etiology of his hypotension likely med induced in the setting of underlying dysautonomia.  He was taking midodrine in the past, which was gradually taken off and antihypertensive were reinstituted.  His blood pressure responded well with IV fluids. Lactic acidosis resolved.  D/c fluids today and increased midodrine. D/c tomorrow with home hospice.    BPH (benign prostatic hyperplasia)  Stable; will continue his home regimen of tamsulosin.    FEMI on CPAP  Will provide CPAP nightly.    Hypothyroidism  Well controlled; will continue his home regimen of levothyroxine.    Chronic anticoagulation  As addressed above.    Essential hypertension  Patient's blood  pressure was noted to be low at home and has been borderline low ever since.  Will hold his home regimen lisinopril, metoprolol, and tamsulosin for now and continue midodrine. Titrate midodrine as needed.    Type 2 diabetes mellitus, controlled  Well controlled on home regimen a sulfonylurea; will provide basal-prandial insulin therapy along with insulin sliding scale.    History of CVA (cerebrovascular accident)  Stable; will continue his home regimen of aspirin and dc clopidogrel.    Presence of permanent cardiac pacemaker  Stable; there are no acute issues.    Anemia of chronic disease  The patient's H/H is stable and consistent with previous laboratory measurements, and the patient exhibits no signs or symptoms of acute bleeding; there is no indication for transfusion.  Will continue to monitor.    Coronary artery disease involving native coronary artery of native heart without angina pectoris  Stable; will continue his home regimen of aspirin and d/c clopidogrel due to fall risk. hold his home regimen of lisinopril metoprolol due to orthostatic blood pressure.    Persistent atrial fibrillation  He is currently in atrial fibrillation but with a controlled ventricular response.  Will continue his home regimen apixaban but hold metoprolol for now due to hypotension and orthostatic bp. He is high risk for fall. Family wants to continue apixaban and discontinue plavix for now due to his fall risk.      VTE Risk Mitigation (From admission, onward)        Ordered     rivaroxaban tablet 20 mg  With dinner      08/09/19 2043     IP VTE HIGH RISK PATIENT  Once      08/09/19 2043     Reason for No Pharmacological VTE Prophylaxis  Once      08/09/19 2043            Medardo Raymond MD  Department of Hospital Medicine   [facility]

## 2019-08-12 NOTE — PROGRESS NOTES
"[facility]  Adult Nutrition  Progress Note    SUMMARY       Recommendations     1. Rec SLP eval; pt c/o coughing with liquids, solids, taking pills.    2. Rec increase to  ADA   3. Boost glucose 1 can/day w/ Lunch  (strawberry)   4. RD to monitor    Goals: Meet > 85% EEN daily  Nutrition Goal Status: new  Communication of RD Recs: (sign and hold)    Reason for Assessment    Reason For Assessment: identified at risk by screening criteria  Diagnosis: (hypotension)  Relevant Medical History: DM-II, HTN, Stroke, CAD, A-fib, Dementia, pacemaker  Interdisciplinary Rounds: did not attend    General Information Comments: Pt reports wt loss over past several months of ~ 10#. Prev adm records indicate prev wt of 81.6 kg (2019). Pt reports decreased appetite. Denies n/v. Reports swallowing difficulties with liquids and solid po intake, as well as when he takes his pills (coughing). Pt does not utilize supplements at home. Tolerating 75% of diet order. Discussed home diet with pt and caregiver and encouraged oral supplements at [facility] (1-2/day) to prevent further wt loss. Encouraged intake of nutrient dense foods. NFPE : WDL for fat and muscle mass.     Nutrition Discharge Planning: ADA diet, oral supplements as needed; texture per SLP    Nutrition Risk Screen    Nutrition Risk Screen: no indicators present    Nutrition/Diet History    Spiritual, Cultural Beliefs, Buddhist Practices, Values that Affect Care: no  Factors Affecting Nutritional Intake: difficulty/impaired swallowing    Anthropometrics    Temp: 97.3 °F (36.3 °C)  Height Method: Stated  Height: 5' 6" (167.6 cm)  Height (inches): 66 in  Weight Method: Bed Scale  Weight: 78.8 kg (173 lb 11.6 oz)  Weight (lb): 173.72 lb  Ideal Body Weight (IBW), Male: 142 lb  % Ideal Body Weight, Male (lb): 122.34 lb  BMI (Calculated): 28.1  BMI Grade: 25 - 29.9 - overweight  Weight Loss: unintentional  Usual Body Weight (UBW), k.6 kg(2019)  % Usual " Body Weight: 96.77  % Weight Change From Usual Weight: -3.43 %       Lab/Procedures/Meds    Pertinent Labs Reviewed: reviewed  Pertinent Labs Comments: A1c 5.7%  Pertinent Medications Reviewed: reviewed  Pertinent Medications Comments: insulin, IVF    Estimated/Assessed Needs    Weight Used For Calorie Calculations: 78.8 kg (173 lb 11.6 oz)  Energy Calorie Requirements (kcal): 1800 kcal (x 1.25)  Energy Need Method: [place]-St Jeor  Protein Requirements: 80g (1g/kg)  Weight Used For Protein Calculations: 78.8 kg (173 lb 11.6 oz)     Estimated Fluid Requirement Method: RDA Method  RDA Method (mL): 1800  CHO Requirement: 225g      Nutrition Prescription Ordered    Current Diet Order: 1500 ADA    Evaluation of Received Nutrient/Fluid Intake    IV Fluid (mL): 75(ml/hr)  I/O: reviewed  Energy Calories Required: not meeting needs  Protein Required: not meeting needs  Fluid Required: meeting needs  Comments: LBM: 8/11  Tolerance: tolerating  % Intake of Estimated Energy Needs: 50 - 75 %  % Meal Intake: 75 - 100 %    Nutrition Risk    Level of Risk/Frequency of Follow-up: (1 x week)     Assessment and Plan    Nutrition Problem  Inadequate energy intake    Related to (etiology):   Decreased appetite    Signs and Symptoms (as evidenced by):   Wt loss PTA    Interventions  Collaboration with providers    Nutrition Diagnosis Status:   New       Monitor and Evaluation    Food and Nutrient Intake: energy intake, food and beverage intake  Food and Nutrient Adminstration: diet order  Anthropometric Measurements: weight, weight change  Biochemical Data, Medical Tests and Procedures: electrolyte and renal panel  Nutrition-Focused Physical Findings: overall appearance     Malnutrition Assessment       Subcutaneous Fat Loss (Final Summary): well nourished  Muscle Loss Evaluation (Final Summary): well nourished    Moderate Weight Loss (Malnutrition): (3% over 4 months)    Nutrition Follow-Up    RD Follow-up?: Yes

## 2019-08-12 NOTE — NURSING
Bedside report received from MEAGHAN Rivera. Patient is awake and alert. Patient appears to be in no apparent distress. Safety maintained. Will continue to monitor.

## 2019-08-12 NOTE — PLAN OF CARE
Problem: SLP Goal  Goal: SLP Goal  1. Pt will tolerate mech soft with thin liquids without overt s/s of aspiration. (goal met 8/12)  2. Pt's family will demonstrate knowledge of the aging swallow and dysphagia secondary to dementia (goal met 8/12)   Outcome: Outcome(s) achieved Date Met: 08/12/19 8/12/19 ST RECS: mech soft with thin liquids, no further ST is warranted. Danyelle Villaseñor, NATALIE-SLP

## 2019-08-12 NOTE — PLAN OF CARE
Recommendations      1. Rec SLP eval; pt c/o coughing with liquids, solids, taking pills.    2. Rec increase to 2000 ADA   3. Boost glucose 1 can/day w/ Lunch  (strawberry)   4. RD to monitor     Goals: Meet > 85% EEN daily  Nutrition Goal Status: new  Communication of RD Recs: (sign and hold)

## 2019-08-12 NOTE — ASSESSMENT & PLAN NOTE
Stable; will continue his home regimen of aspirin and d/c clopidogrel due to fall risk. hold his home regimen of lisinopril metoprolol due to orthostatic blood pressure.

## 2019-08-12 NOTE — NURSING
End of shift bedside report given to MEAGHAN Rivera. Patient in no apparent distress.     12 hour chart check complete.

## 2019-08-12 NOTE — ASSESSMENT & PLAN NOTE
He is currently in atrial fibrillation but with a controlled ventricular response.  Will continue his home regimen apixaban but hold metoprolol for now due to hypotension and orthostatic bp. He is high risk for fall. Family wants to continue apixaban and discontinue plavix for now due to his fall risk.

## 2019-08-12 NOTE — SUBJECTIVE & OBJECTIVE
Interval Hx: No acute events overnight. Significant orthostatic vitals this AM. Patient was dizzy, but improved from admission. Increased midodrine. Full code changed to DNR. Family agreeable to hospice.    Review of Systems   Constitutional: Positive for fatigue. Negative for activity change, appetite change, chills, diaphoresis, fever and unexpected weight change.   HENT: Negative.    Eyes: Negative.    Respiratory: Negative for cough, chest tightness, shortness of breath and wheezing.    Cardiovascular: Negative for chest pain, palpitations and leg swelling.   Gastrointestinal: Negative for abdominal distention, abdominal pain, blood in stool, constipation, diarrhea, nausea and vomiting.   Genitourinary: Negative for dysuria and hematuria.   Musculoskeletal: Negative.    Skin: Negative.    Neurological: Positive for weakness. Negative for dizziness, seizures, syncope and light-headedness.   Psychiatric/Behavioral: Negative.      Objective:     Vital Signs (Most Recent):  Temp: 97.9 °F (36.6 °C) (08/12/19 1107)  Pulse: 96 (08/12/19 1335)  Resp: 18 (08/12/19 1107)  BP: 124/83 (08/12/19 1107)  SpO2: (!) 93 % (08/12/19 1335) Vital Signs (24h Range):  Temp:  [97.3 °F (36.3 °C)-98.4 °F (36.9 °C)] 97.9 °F (36.6 °C)  Pulse:  [] 96  Resp:  [17-18] 18  SpO2:  [92 %-98 %] 93 %  BP: (117-151)/(66-95) 124/83     Weight: 78.8 kg (173 lb 11.6 oz)  Body mass index is 28.04 kg/m².    Orthostatics Positive this AM    Physical Exam   Constitutional: He appears well-developed and well-nourished. No distress.   HENT:   Head: Normocephalic and atraumatic.   Nose: Nose normal.   Eyes: Right eye exhibits no discharge. Left eye exhibits no discharge.   Neck: Normal range of motion.   Cardiovascular:   Irregularly irregular but normal rate, no murmurs or gallops   Pulmonary/Chest: Effort normal and breath sounds normal. No stridor. No respiratory distress. He has no wheezes. He has no rales. He exhibits no tenderness.   Abdominal:  Soft. Bowel sounds are normal. He exhibits no distension. There is no tenderness. There is no rebound and no guarding.   Musculoskeletal: Normal range of motion. He exhibits no edema.   Neurological: He is alert.   Aox1-2 intermittently   Skin: Skin is warm and dry. Capillary refill takes less than 2 seconds. He is not diaphoretic.   Psychiatric: He has a normal mood and affect. His behavior is normal. Judgment and thought content normal.   Nursing note and vitals reviewed.       Significant Labs: All pertinent labs within the past 24 hours have been reviewed.    Significant Imaging: I have reviewed and interpreted all pertinent imaging results/findings within the past 24 hours.

## 2019-08-12 NOTE — ASSESSMENT & PLAN NOTE
Patient's blood pressure was noted to be low at home and has been borderline low ever since.  Will hold his home regimen lisinopril, metoprolol, and tamsulosin for now and continue midodrine. Titrate midodrine as needed.

## 2019-08-12 NOTE — ASSESSMENT & PLAN NOTE
Patient was noted to be hypotensive at home and had an initial blood pressure of 77/48 upon arriving to this facility.  Etiology of his hypotension likely med induced in the setting of underlying dysautonomia.  He was taking midodrine in the past, which was gradually taken off and antihypertensive were reinstituted.  His blood pressure responded well with IV fluids. Lactic acidosis resolved.  D/c fluids today and increased midodrine. D/c tomorrow with home hospice.

## 2019-08-12 NOTE — PT/OT/SLP EVAL
Speech Language Pathology Evaluation  Bedside Swallow    Patient Name:  Doni Theodore   MRN:  [id number]  Admitting Diagnosis: Hypotension    Recommendations:                 General Recommendations:  Follow-up not indicated  Diet recommendations:  Mechanical soft, Thin   Aspiration Precautions: 1 bite/sip at a time   General Precautions: Standard, fall  Communication strategies:  provide increased time to answer    History:   HPI: Mr. Doni Theodore is a 78 y.o. male with essential hypertension, type 2 diabetes mellitus (HbA1c 5.7% Jul 2019), CAD s/p PCI, paroxysmal atrial fibrillation ([id number] score 7) on chronic anticoagulation, permanent pacemaker in place, hypothyroidism (TSH 12.396 Aug 2019), anemia of chronic disease, FEMI on CPAP, BPH, and history of CVA who presents to [facility] ED with complaints of weakness for the past month.  He reports that his weakness has been gradual and progressive, and has been associated with worsening dyspnea on exertion.  He also had some non-productive coughing but denies any fevers, chills, nausea, vomiting, chest pain, palpitations, diaphoresis, hemoptysis, nor any lower extremity pain or swelling.  He denies any recent travel nor sick contacts and denies any PND or orthopnea.  Today, the patient's wife that that he had blood pressures as low as 65/45 at home.  He reports that he is compliant with all of his medications and although he did not fall today, he did have a fall about a week ago after standing up.      Past Medical History:   Diagnosis Date    A-fib 7/18/2019    Anticoagulant long-term use     Plavix (on hold for angiogram)    Arthritis     Coronary artery disease     pacemaker, stentsx2    Diabetes mellitus     Hypertension     Pacemaker     Sleep apnea     Stroke     Rt side affected / mild    Thyroid disease     hypothyroidism       Past Surgical History:   Procedure Laterality Date    ANGIOGRAM N/A 5/31/2013    Performed by [facility] Diagnostic  Provider at [facility] OR    ANKLE SURGERY      APPENDECTOMY      CARDIAC PACEMAKER PLACEMENT      Cardioversion or Defibrillation  7/19/2019    Performed by Nic Miguel MD at [facility] CATH LAB    CHOLECYSTECTOMY      CORONARY ANGIOPLASTY WITH STENT PLACEMENT  6/06    stents x 2 placed     EYE SURGERY      HEART CATH-LEFT Left 10/23/2017    [facility] at [facility]    HERNIA REPAIR      ventral hernia repair    REPAIR, AAA, ENDOVASCULAR N/A 6/18/2013    Performed by Radu Melgar MD at [facility] OR    sciatica      sciatica procedure    Transesophageal echo (VERITO) intra-procedure log documentation N/A 7/19/2019    Performed by Nic Miguel MD at [facility] CATH LAB     Social History: Patient lives with family    Modified Barium Swallow: n/a    Chest X-Rays: 8/12/19 No significant change.  Findings may suggest congestive heart failure.  A pneumonic process may have a similar appearance.  Recommend clinical correlation.    Prior diet: unrestricted    Subjective   Per nursing Pt has been tolerating current diet without overt s/s of aspiration. Family reporting daily episodes of coughing and choking with meals and describing episodes of decreased attention to bolus prep.    Patient goals: continue po    Pain/Comfort:  · Pain Rating 1: 0/10    Objective:     Oral Musculature Evaluation  · Oral Musculature: WFL  · Dentition: upper dentures  · Secretion Management: adequate  · Mucosal Quality: good  · Mandibular Strength and Mobility: WFL  · Oral Labial Strength and Mobility: WFL  · Lingual Strength and Mobility: WFL  · Velar Elevation: WFL  · Buccal Strength and Mobility: WFL  · Voice Prior to PO Intake: wfl  · Oral Musculature Comments: grossly symmetircal    Bedside Swallow Eval:   Consistencies Assessed:  · Thin liquids 3oz via straw multiple self presented trials   · Soft solids X3    Oral Phase:   · Prolonged mastication   · Periodic holding of thin liquids (periodic  swishing)  · Swallow delay  · Decreased attention to A-P transport and swallow initiation    Pharyngeal Phase:   · no overt clinical signs/symptoms of aspiration   · Swallow delay    Compensatory Strategies  · None    Treatment: please note silent aspiration cannot be r/o at bedside.  Family educated regarding the aging swallow and frequent oral motor prep patterns for Pt with dementia including holding and swallow delay.     Assessment:     Doni Theodore is a 78 y.o. male with dx of Hypotension he presents with mild oral dysphagia c/b decreased attention to bolus and swallow delay.     Goals:   Multidisciplinary Problems     SLP Goals     Not on file          Multidisciplinary Problems (Resolved)        Problem: SLP Goal    Goal Priority Disciplines Outcome   SLP Goal   (Resolved)    Low SLP Outcome(s) achieved   Description:  1. Pt will tolerate mech soft with thin liquids without overt s/s of aspiration. (goal met 8/12)  2. Pt's family will demonstrate knowledge of the aging swallow and dysphagia secondary to dementia (goal met 8/12)                    Plan:     · Patient to be seen:    8/12/19  · Plan of Care expires:  08/12/19  · Plan of Care reviewed with:  family   · SLP Follow-Up:  No       Discharge recommendations:    no further ST is warranted  Barriers to Discharge:  None    Time Tracking:     SLP Treatment Date:   08/12/19  Speech Start Time:  1220  Speech Stop Time:  1240     Speech Total Time (min):  20 min    Billable Minutes: Treatment Swallowing Dysfunction 10 and Seld Care/Home Management Training 10    Danyelle Villaseñor CCC-SLP  08/12/2019

## 2019-08-13 VITALS
OXYGEN SATURATION: 94 % | SYSTOLIC BLOOD PRESSURE: 135 MMHG | HEART RATE: 106 BPM | BODY MASS INDEX: 28.03 KG/M2 | DIASTOLIC BLOOD PRESSURE: 73 MMHG | HEIGHT: 66 IN | RESPIRATION RATE: 16 BRPM | WEIGHT: 174.38 LBS | TEMPERATURE: 99 F

## 2019-08-13 LAB
POCT GLUCOSE: 122 MG/DL (ref 70–110)
POCT GLUCOSE: 161 MG/DL (ref 70–110)

## 2019-08-13 PROCEDURE — 25000003 PHARM REV CODE 250: Performed by: INTERNAL MEDICINE

## 2019-08-13 RX ORDER — FINASTERIDE 5 MG/1
5 TABLET, FILM COATED ORAL DAILY
Qty: 30 TABLET | Refills: 11 | Status: SHIPPED | OUTPATIENT
Start: 2019-08-13 | End: 2020-08-12

## 2019-08-13 RX ORDER — MIDODRINE HYDROCHLORIDE 5 MG/1
5 TABLET ORAL 3 TIMES DAILY
Qty: 90 TABLET | Refills: 11 | Status: SHIPPED | OUTPATIENT
Start: 2019-08-13 | End: 2020-08-12

## 2019-08-13 RX ADMIN — SERTRALINE HYDROCHLORIDE 100 MG: 50 TABLET ORAL at 09:08

## 2019-08-13 RX ADMIN — GABAPENTIN 300 MG: 300 CAPSULE ORAL at 09:08

## 2019-08-13 RX ADMIN — INSULIN ASPART 3 UNITS: 100 INJECTION, SOLUTION INTRAVENOUS; SUBCUTANEOUS at 09:08

## 2019-08-13 RX ADMIN — LEVOTHYROXINE SODIUM 175 MCG: 150 TABLET ORAL at 05:08

## 2019-08-13 RX ADMIN — FAMOTIDINE 20 MG: 20 TABLET ORAL at 09:08

## 2019-08-13 RX ADMIN — MIDODRINE HYDROCHLORIDE 5 MG: 5 TABLET ORAL at 09:08

## 2019-08-13 RX ADMIN — FINASTERIDE 5 MG: 5 TABLET, FILM COATED ORAL at 09:08

## 2019-08-13 RX ADMIN — INSULIN ASPART 3 UNITS: 100 INJECTION, SOLUTION INTRAVENOUS; SUBCUTANEOUS at 12:08

## 2019-08-13 RX ADMIN — ASPIRIN 81 MG: 81 TABLET, COATED ORAL at 09:08

## 2019-08-13 NOTE — PLAN OF CARE
[facility]  HOME  HOSPICE ORDERS     08/13/2019    Admit to Home Hospice    Diagnoses:  Active Hospital Problems    Diagnosis  POA    *Hypotension [I95.9]  Yes    Hypothyroidism [E03.9]  Yes     Chronic    FEMI on CPAP [G47.33, Z99.89]  Not Applicable     Chronic    BPH (benign prostatic hyperplasia) [N40.0]  Yes     Chronic    Essential hypertension [I10]  Yes     Chronic    Type 2 diabetes mellitus, controlled [E11.9]  Yes     Chronic    Chronic anticoagulation [Z79.01]  Not Applicable     Chronic     Plavix (on hold for angiogram)      Presence of permanent cardiac pacemaker [Z95.0]  Yes     Chronic    Anemia of chronic disease [D63.8]  Yes     Chronic    History of CVA (cerebrovascular accident) [Z86.73]  Not Applicable     Chronic    Coronary artery disease involving native coronary artery of native heart without angina pectoris [I25.10]  Yes     Chronic    Persistent atrial fibrillation [I48.1]  Yes     Chronic      Resolved Hospital Problems   No resolved problems to display.       Patient is homebound due to:  Hypotension    Allergies:  Review of patient's allergies indicates:   Allergen Reactions    Penicillins Swelling       Diet: ADA 1800, cardiac    Acitivities: As tolerated    Nursing:   SN to complete comprehensive assessment including routine vital signs. Instruct on disease process and s/s of complications to report to MD. Review/verify medication list sent home with the patient at time of discharge  and instruct patient/caregiver as needed. Frequency may be adjusted depending on start of care date.    Notify MD if SBP > 160 or < 90; DBP > 90 or < 50; HR > 120 or < 50; Temp > 101        Medications: Review discharge medications with patient and family and provide education.     Ewelina August   Home Medication Instructions LINDSEY:[id number]    Printed on:08/13/19 1314   Medication Information                      aspirin (ECOTRIN) 81 MG EC tablet  Take 81 mg by mouth  once daily.             cyanocobalamin 2000 MCG tablet  Take 2,500 mcg by mouth once daily.             finasteride (PROSCAR) 5 mg tablet  Take 1 tablet (5 mg total) by mouth once daily.             fluticasone (FLONASE) 50 mcg/actuation nasal spray  1 spray by Each Nare route nightly as needed.              gabapentin (NEURONTIN) 300 MG capsule  Take 300 mg by mouth 3 (three) times daily.             glimepiride (AMARYL) 4 MG tablet  Take 8 mg by mouth before breakfast.              levocetirizine (XYZAL) 5 MG tablet  Take 5 mg by mouth every evening.             metFORMIN (GLUCOPHAGE) 1000 MG tablet  Take 1,000 mg by mouth 2 (two) times daily with meals.             midodrine (PROAMATINE) 5 MG Tab  One tab TID             midodrine (PROAMATINE) 5 MG Tab  Take 1 tablet (5 mg total) by mouth 3 (three) times daily.             nitroGLYCERIN (NITROSTAT) 0.4 MG SL tablet  Place 0.4 mg under the tongue every 5 (five) minutes as needed for Chest pain.             ranitidine (ZANTAC) 300 MG tablet  Take 300 mg by mouth 2 (two) times daily.              sertraline (ZOLOFT) 100 MG tablet  Take 100 mg by mouth 2 (two) times daily.             SYNTHROID 175 mcg tablet  175 mcg once daily.              tamsulosin (FLOMAX) 0.4 mg Cp24  TAKE 1 CAPSULE AT BEDTIME.             traMADol (ULTRAM) 50 mg tablet  Take 0.5 tablets (25 mg total) by mouth every 8 (eight) hours as needed (Sever pain not controlled by tylenol or tizanidine).             vitamin D 185 MG Tab  Take 2,000 Units by mouth once daily.              XARELTO 20 mg Tab  TAKE 1 TABLET BY MOUTH ONCE A DAY WITH EVENING MEAL                     _________________________________  Destiny Bernardo MD  08/13/2019

## 2019-08-13 NOTE — PLAN OF CARE
Problem: Occupational Therapy Goal  Goal: Occupational Therapy Goal  Goals to be met by: 08/25/19    Patient will increase functional independence with ADLs by performing:    LE Dressing with Stand-by Assistance.  Grooming while standing at sink with Stand-by Assistance.  Toileting from toilet with Stand-by Assistance for hygiene and clothing management.   Rolling to Bilateral with Supervision.   Supine to sit with Supervision.  Step transfer with Stand-by Assistance  Toilet transfer to toilet with Stand-by Assistance.  Upper extremity exercise program x15 reps per handout, with independence.     Outcome: Ongoing (interventions implemented as appropriate)  Pt progressing and goals remain appropriate. Continue with OT POC as indicated.

## 2019-08-13 NOTE — PROGRESS NOTES
ADT 30 order placed for Transportation.     ETA: 12:30PM, Patient will travel via wheelchair   If transportation does not arrive at ETA time nurse will be instructed to follow protocol for transportation below:  How can I get in touch directly with dispatch, if needed?                 Non-emergent dispatch: [phone]                                    Emergent dispatch: [phone]  Non-emergent (stretcher): [phone]  Escalation Needs (PFC Lead): [phone]

## 2019-08-13 NOTE — PLAN OF CARE
08/13/19 1050   Medicare Message   Important Message from Medicare regarding Discharge Appeal Rights Given to patient/caregiver;Explained to patient/caregiver;Signed/date by patient/caregiver   Date IMM was signed 08/13/19   Time IMM was signed 1051   Copy provided to patient.

## 2019-08-13 NOTE — PLAN OF CARE
Discharge packet and call report information provided to Nurse Roberts.     Nurse Roberts informed that patient can discharge from  standpoint.    Agnes at [facility] informed via [employer] of patient's schedule transportation at 12:30pm.        08/13/19 1115   Final Note   Assessment Type Final Discharge Note   Anticipated Discharge Disposition [facility]osp[facility]   What phone number can be called within the next 1-3 days to see how you are doing after discharge?   ([phone])   Hospital Follow Up  Appt(s) scheduled? Yes   Discharge plans and expectations educations in teach back method with documentation complete? Yes   [employer] Referral Info   Post Acute Recommendation Other   Facility Name   ([facility] )

## 2019-08-13 NOTE — PROGRESS NOTES
Agnes at [facility] informed that [facility]

## 2019-08-13 NOTE — NURSING
End of shift bedside report given to LINDA Rushing. Patient in no apparent distress.     12 hour chart check complete.

## 2019-08-13 NOTE — DISCHARGE SUMMARY
[facility] Hospital Medicine  Discharge Summary      Patient Name: Doni Theodore  MRN: [id number]  Admission Date: 8/9/2019  Hospital Length of Stay: 4 days  Discharge Date and Time:  08/13/2019 1:44 PM  Attending Physician: Destiny Bernardo MD   Discharging Provider: Destiny Bernardo MD  Primary Care Provider: Nic Mauro MD      HPI:   Mr. Doni Theodore is a 78 y.o. male with essential hypertension, type 2 diabetes mellitus (HbA1c 5.7% Jul 2019), CAD s/p PCI, paroxysmal atrial fibrillation ([id number]-BLDu score 7) on chronic anticoagulation, permanent pacemaker in place, hypothyroidism (TSH 12.396 Aug 2019), anemia of chronic disease, FEMI on CPAP, BPH, and history of CVA who presents to [facility] ED with complaints of weakness for the past month.  He reports that his weakness has been gradual and progressive, and has been associated with worsening dyspnea on exertion.  He also had some non-productive coughing but denies any fevers, chills, nausea, vomiting, chest pain, palpitations, diaphoresis, hemoptysis, nor any lower extremity pain or swelling.  He denies any recent travel nor sick contacts and denies any PND or orthopnea.  Today, the patient's wife that that he had blood pressures as low as 65/45 at home.  He reports that he is compliant with all of his medications and although he did not fall today, he did have a fall about a week ago after standing up.      * No surgery found *      Hospital Course:   77 yo with hypotension in the past on midodrine, AF on apixaban and CAD s/p PCI with pacemaker presenting with hypotension. Held home antihypertensives. Improved with fluids but remains orthostatic. Started back on midodrine, bp improved. No signs of infection. Patient has dementia, pleasantly confused Aox1-2 intermittently, unsure if he takes his meds correctly at home. Very weak, PT OT ordered, rec HH.  Now on fluids for 2 days, but continues to have significant orthostatic  hypotension this AM. Increased midodrine. 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 Patient is a high risk for fall even with midodrine on board, and he's on plavix and rivaroxiban. Dicussed with family, and family would like to discontinue plavix for now, and continue xarelto. Spoke with family today, and discussed GOC. Full code changed to DNR. Family amenable to home hospice. D/c with home hospice.     Consults:   Consults (From admission, onward)        Status Ordering Provider     Inpatient consult to Cardiology  Once     Provider:  Nic Miguel MD    Acknowledged CHIDI TREJO          No new Assessment & Plan notes have been filed under this hospital service since the last note was generated.  Service: Hospital Medicine    Final Active Diagnoses:    Diagnosis Date Noted POA    PRINCIPAL PROBLEM:  Hypotension [I95.9] 08/09/2019 Yes    Hypothyroidism [E03.9] 08/10/2019 Yes     Chronic    FEMI on CPAP [G47.33, Z99.89] 08/10/2019 Not Applicable     Chronic    BPH (benign prostatic hyperplasia) [N40.0] 08/10/2019 Yes     Chronic    Essential hypertension [I10] 07/19/2019 Yes     Chronic    Type 2 diabetes mellitus, controlled [E11.9] 07/19/2019 Yes     Chronic    Chronic anticoagulation [Z79.01] 07/19/2019 Not Applicable     Chronic    Presence of permanent cardiac pacemaker [Z95.0] 09/21/2017 Yes     Chronic    Anemia of chronic disease [D63.8] 09/21/2017 Yes     Chronic    History of CVA (cerebrovascular accident) [Z86.73] 09/21/2017 Not Applicable     Chronic    Coronary artery disease involving native coronary artery of native heart without angina pectoris [I25.10] 06/04/2017 Yes     Chronic    Persistent atrial fibrillation [I48.1] 06/04/2017 Yes     Chronic      Problems Resolved During this Admission:       Discharged Condition: to hospice    Disposition: Hospice/Home    Follow Up:  Follow-up Information     Nic Mauro MD.    Specialty:  Family Medicine  Contact  information:  [address]  [address]  [place] [address] [phone]             [facility].    Specialty:  Hospice Services  Contact information:  [address]   [place] [address] [phone]                 Patient Instructions:      Diet Cardiac     Diet diabetic     Notify your health care provider if you experience any of the following:  increased confusion or weakness     Notify your health care provider if you experience any of the following:  persistent dizziness, light-headedness, or visual disturbances     Notify your health care provider if you experience any of the following:  worsening rash     Notify your health care provider if you experience any of the following:  severe persistent headache     Notify your health care provider if you experience any of the following:  difficulty breathing or increased cough     Notify your health care provider if you experience any of the following:  redness, tenderness, or signs of infection (pain, swelling, redness, odor or green/yellow discharge around incision site)     Notify your health care provider if you experience any of the following:  severe uncontrolled pain     Notify your health care provider if you experience any of the following:  persistent nausea and vomiting or diarrhea     Notify your health care provider if you experience any of the following:  temperature >100.4     Activity as tolerated        Medications:  Reconciled Home Medications:      Medication List      START taking these medications    finasteride 5 mg tablet  Commonly known as:  PROSCAR  Take 1 tablet (5 mg total) by mouth once daily.        CHANGE how you take these medications    * midodrine 5 MG Tab  Commonly known as:  PROAMATINE  What changed:  Another medication with the same name was added. Make sure you understand how and when to take each.     * midodrine 5 MG Tab  Commonly known as:  PROAMATINE  Take 1 tablet (5 mg total) by mouth 3  (three) times daily.  What changed:  You were already taking a medication with the same name, and this prescription was added. Make sure you understand how and when to take each.         * This list has 2 medication(s) that are the same as other medications prescribed for you. Read the directions carefully, and ask your doctor or other care provider to review them with you.            CONTINUE taking these medications    aspirin 81 MG EC tablet  Commonly known as:  ECOTRIN  Take 81 mg by mouth once daily.     cyanocobalamin 2000 MCG tablet  Take 2,500 mcg by mouth once daily.     fluticasone propionate 50 mcg/actuation nasal spray  Commonly known as:  FLONASE  1 spray by Each Nare route nightly as needed.     gabapentin 300 MG capsule  Commonly known as:  NEURONTIN  Take 300 mg by mouth 3 (three) times daily.     glimepiride 4 MG tablet  Commonly known as:  AMARYL  Take 8 mg by mouth before breakfast.     levocetirizine 5 MG tablet  Commonly known as:  XYZAL  Take 5 mg by mouth every evening.     metFORMIN 1000 MG tablet  Commonly known as:  GLUCOPHAGE  Take 1,000 mg by mouth 2 (two) times daily with meals.     nitroGLYCERIN 0.4 MG SL tablet  Commonly known as:  NITROSTAT  Place 0.4 mg under the tongue every 5 (five) minutes as needed for Chest pain.     ranitidine 300 MG tablet  Commonly known as:  ZANTAC  Take 300 mg by mouth 2 (two) times daily.     sertraline 100 MG tablet  Commonly known as:  ZOLOFT  Take 100 mg by mouth 2 (two) times daily.     SYNTHROID 175 MCG tablet  Generic drug:  levothyroxine  175 mcg once daily.     tamsulosin 0.4 mg Cap  Commonly known as:  FLOMAX  TAKE 1 CAPSULE AT BEDTIME.     traMADol 50 mg tablet  Commonly known as:  ULTRAM  Take 0.5 tablets (25 mg total) by mouth every 8 (eight) hours as needed (Sever pain not controlled by tylenol or tizanidine).     vitamin D 1000 units Tab  Commonly known as:  VITAMIN D3  Take 2,000 Units by mouth once daily.     XARELTO 20 mg Tab  Generic  drug:  rivaroxaban  TAKE 1 TABLET BY MOUTH ONCE A DAY WITH EVENING MEAL        STOP taking these medications    clopidogrel 75 mg tablet  Commonly known as:  PLAVIX     lisinopril 10 MG tablet     metoprolol succinate 25 MG 24 hr tablet  Commonly known as:  TOPROL-XL            Indwelling Lines/Drains at time of discharge:   Lines/Drains/Airways          None          Time spent on the discharge of patient: 35 minutes  Patient was seen and examined on the date of discharge and determined to be suitable for discharge.         Destiny Bernardo MD  Department of Hospital Medicine  [facility]

## 2019-08-13 NOTE — PROGRESS NOTES
TN sent patient's clinicals (Packet, POC, and MAR) to [facility] and [facility].   Awaiting feedback.      1027- Call received from Agnes at [facility]. Agnes stated they can accept patient. Agnes/[facility]   will follow up with [facility] for auth. Agnes provided TN with call report info.     1042- Patient's daughter informed that patient will discharge today and that transportation has been arranged at 12:30pm.

## 2019-08-13 NOTE — NURSING
Bedside report received from LINDA Rushing. Patient is resting comfortably, cpap in place, visible rise and fall of chest. Patient appears to be in no apparent distress. Safety maintained. Will continue to monitor.

## 2019-08-14 LAB
BACTERIA BLD CULT: NORMAL
BACTERIA BLD CULT: NORMAL

## 2019-08-14 NOTE — PT/OT/SLP DISCHARGE
Occupational Therapy Discharge Summary    August Ewelina  MRN: [id number]   Principal Problem: Hypotension      Patient Discharged from acute Occupational Therapy on 08/13/19.  Please refer to prior OT notes for functional status.    Assessment:      Patient appropriate for care in another setting.    Objective:     GOALS:   Multidisciplinary Problems     Occupational Therapy Goals        Problem: Occupational Therapy Goal    Goal Priority Disciplines Outcome Interventions   Occupational Therapy Goal     OT, PT/OT Ongoing (interventions implemented as appropriate)    Description:  Goals to be met by: 08/25/19    Patient will increase functional independence with ADLs by performing:    LE Dressing with Stand-by Assistance.  Grooming while standing at sink with Stand-by Assistance.  Toileting from toilet with Stand-by Assistance for hygiene and clothing management.   Rolling to Bilateral with Supervision.   Supine to sit with Supervision.  Step transfer with Stand-by Assistance  Toilet transfer to toilet with Stand-by Assistance.  Upper extremity exercise program x15 reps per handout, with independence.                      Reasons for Discontinuation of Therapy Services  Transfer to alternate level of care.      Plan:     Patient Discharged to: Palliative Care/Hospice    Abril Veras OT  8/14/2019

## 2020-10-09 ENCOUNTER — HOSPITAL ENCOUNTER (INPATIENT)
Facility: HOSPITAL | Age: 80
LOS: 3 days | Discharge: HOME | DRG: 864 | End: 2020-10-12
Attending: EMERGENCY MEDICINE | Admitting: HOSPITALIST
Payer: MEDICARE

## 2020-10-09 DIAGNOSIS — R42 DIZZINESS: ICD-10-CM

## 2020-10-09 DIAGNOSIS — A41.9 SEPSIS, DUE TO UNSPECIFIED ORGANISM, UNSPECIFIED WHETHER ACUTE ORGAN DYSFUNCTION PRESENT: Primary | ICD-10-CM

## 2020-10-09 DIAGNOSIS — I48.91 ATRIAL FIBRILLATION WITH RAPID VENTRICULAR RESPONSE: ICD-10-CM

## 2020-10-09 DIAGNOSIS — R07.9 CHEST PAIN: ICD-10-CM

## 2020-10-09 DIAGNOSIS — R53.83 FATIGUE, UNSPECIFIED TYPE: ICD-10-CM

## 2020-10-09 DIAGNOSIS — R82.71 BACTERIURIA: ICD-10-CM

## 2020-10-09 DIAGNOSIS — I48.91 ATRIAL FIBRILLATION WITH RVR: ICD-10-CM

## 2020-10-09 DIAGNOSIS — R79.89 ELEVATED LACTIC ACID LEVEL: ICD-10-CM

## 2020-10-09 DIAGNOSIS — R53.1 WEAKNESS: ICD-10-CM

## 2020-10-09 DIAGNOSIS — R50.9 FEVER: ICD-10-CM

## 2020-10-09 LAB
ALBUMIN SERPL BCP-MCNC: 3.4 G/DL (ref 3.5–5.2)
ALP SERPL-CCNC: 103 U/L (ref 55–135)
ALT SERPL W/O P-5'-P-CCNC: 17 U/L (ref 10–44)
ANION GAP SERPL CALC-SCNC: 12 MMOL/L (ref 8–16)
APTT BLDCRRT: 33.1 SEC (ref 21–32)
AST SERPL-CCNC: 20 U/L (ref 10–40)
BACTERIA #/AREA URNS HPF: ABNORMAL /HPF
BASOPHILS # BLD AUTO: 0.04 K/UL (ref 0–0.2)
BASOPHILS NFR BLD: 0.4 % (ref 0–1.9)
BILIRUB SERPL-MCNC: 0.9 MG/DL (ref 0.1–1)
BILIRUB UR QL STRIP: NEGATIVE
BNP SERPL-MCNC: 69 PG/ML (ref 0–99)
BUN SERPL-MCNC: 17 MG/DL (ref 8–23)
CALCIUM SERPL-MCNC: 9.6 MG/DL (ref 8.7–10.5)
CHLORIDE SERPL-SCNC: 96 MMOL/L (ref 95–110)
CK SERPL-CCNC: 276 U/L (ref 20–200)
CLARITY UR: ABNORMAL
CO2 SERPL-SCNC: 24 MMOL/L (ref 23–29)
COLOR UR: YELLOW
CREAT SERPL-MCNC: 1 MG/DL (ref 0.5–1.4)
CRP SERPL-MCNC: 197.1 MG/L (ref 0–8.2)
CTP QC/QA: YES
CTP QC/QA: YES
D DIMER PPP IA.FEU-MCNC: 2.21 MG/L FEU
DIFFERENTIAL METHOD: ABNORMAL
DIGOXIN SERPL-MCNC: 0.5 NG/ML (ref 0.8–2)
EOSINOPHIL # BLD AUTO: 0.1 K/UL (ref 0–0.5)
EOSINOPHIL NFR BLD: 0.6 % (ref 0–8)
ERYTHROCYTE [DISTWIDTH] IN BLOOD BY AUTOMATED COUNT: 14.7 % (ref 11.5–14.5)
EST. GFR  (AFRICAN AMERICAN): >60 ML/MIN/1.73 M^2
EST. GFR  (NON AFRICAN AMERICAN): >60 ML/MIN/1.73 M^2
FERRITIN SERPL-MCNC: 201 NG/ML (ref 20–300)
GLUCOSE SERPL-MCNC: 177 MG/DL (ref 70–110)
GLUCOSE UR QL STRIP: ABNORMAL
GROUP A STREP, MOLECULAR: NEGATIVE
HCT VFR BLD AUTO: 42.8 % (ref 40–54)
HGB BLD-MCNC: 13.8 G/DL (ref 14–18)
HGB UR QL STRIP: ABNORMAL
HYALINE CASTS #/AREA URNS LPF: 0 /LPF
IMM GRANULOCYTES # BLD AUTO: 0.08 K/UL (ref 0–0.04)
IMM GRANULOCYTES NFR BLD AUTO: 0.7 % (ref 0–0.5)
INR PPP: 1.1 (ref 0.8–1.2)
KETONES UR QL STRIP: NEGATIVE
LACTATE SERPL-SCNC: 2.1 MMOL/L (ref 0.5–2.2)
LACTATE SERPL-SCNC: 2.6 MMOL/L (ref 0.5–2.2)
LDH SERPL L TO P-CCNC: 187 U/L (ref 110–260)
LEUKOCYTE ESTERASE UR QL STRIP: NEGATIVE
LYMPHOCYTES # BLD AUTO: 0.7 K/UL (ref 1–4.8)
LYMPHOCYTES NFR BLD: 6.4 % (ref 18–48)
MCH RBC QN AUTO: 30.4 PG (ref 27–31)
MCHC RBC AUTO-ENTMCNC: 32.2 G/DL (ref 32–36)
MCV RBC AUTO: 94 FL (ref 82–98)
MICROSCOPIC COMMENT: ABNORMAL
MONOCYTES # BLD AUTO: 0.9 K/UL (ref 0.3–1)
MONOCYTES NFR BLD: 8.2 % (ref 4–15)
NEUTROPHILS # BLD AUTO: 9.5 K/UL (ref 1.8–7.7)
NEUTROPHILS NFR BLD: 83.7 % (ref 38–73)
NITRITE UR QL STRIP: NEGATIVE
NRBC BLD-RTO: 0 /100 WBC
PH UR STRIP: 5 [PH] (ref 5–8)
PLATELET # BLD AUTO: 201 K/UL (ref 150–350)
PMV BLD AUTO: 10 FL (ref 9.2–12.9)
POC MOLECULAR INFLUENZA A AGN: NEGATIVE
POC MOLECULAR INFLUENZA B AGN: NEGATIVE
POTASSIUM SERPL-SCNC: 4.5 MMOL/L (ref 3.5–5.1)
PROCALCITONIN SERPL IA-MCNC: 0.1 NG/ML
PROT SERPL-MCNC: 7.8 G/DL (ref 6–8.4)
PROT UR QL STRIP: ABNORMAL
PROTHROMBIN TIME: 12 SEC (ref 9–12.5)
RBC # BLD AUTO: 4.54 M/UL (ref 4.6–6.2)
RBC #/AREA URNS HPF: 5 /HPF (ref 0–4)
SARS-COV-2 RDRP RESP QL NAA+PROBE: NEGATIVE
SODIUM SERPL-SCNC: 132 MMOL/L (ref 136–145)
SP GR UR STRIP: 1.02 (ref 1–1.03)
SQUAMOUS #/AREA URNS HPF: 3 /HPF
TROPONIN I SERPL DL<=0.01 NG/ML-MCNC: 0.02 NG/ML (ref 0–0.03)
TSH SERPL DL<=0.005 MIU/L-ACNC: 1.4 UIU/ML (ref 0.4–4)
URN SPEC COLLECT METH UR: ABNORMAL
UROBILINOGEN UR STRIP-ACNC: ABNORMAL EU/DL
WBC # BLD AUTO: 11.33 K/UL (ref 3.9–12.7)
WBC #/AREA URNS HPF: 2 /HPF (ref 0–5)
YEAST URNS QL MICRO: ABNORMAL

## 2020-10-09 PROCEDURE — 63600175 PHARM REV CODE 636 W HCPCS: Performed by: EMERGENCY MEDICINE

## 2020-10-09 PROCEDURE — 83615 LACTATE (LD) (LDH) ENZYME: CPT

## 2020-10-09 PROCEDURE — 99285 EMERGENCY DEPT VISIT HI MDM: CPT | Mod: 25

## 2020-10-09 PROCEDURE — 84443 ASSAY THYROID STIM HORMONE: CPT

## 2020-10-09 PROCEDURE — 80162 ASSAY OF DIGOXIN TOTAL: CPT

## 2020-10-09 PROCEDURE — 93005 ELECTROCARDIOGRAM TRACING: CPT

## 2020-10-09 PROCEDURE — 87040 BLOOD CULTURE FOR BACTERIA: CPT

## 2020-10-09 PROCEDURE — 82728 ASSAY OF FERRITIN: CPT

## 2020-10-09 PROCEDURE — 87086 URINE CULTURE/COLONY COUNT: CPT

## 2020-10-09 PROCEDURE — 85610 PROTHROMBIN TIME: CPT

## 2020-10-09 PROCEDURE — 87088 URINE BACTERIA CULTURE: CPT

## 2020-10-09 PROCEDURE — 93010 ELECTROCARDIOGRAM REPORT: CPT | Mod: ,,, | Performed by: INTERNAL MEDICINE

## 2020-10-09 PROCEDURE — 87186 SC STD MICRODIL/AGAR DIL: CPT

## 2020-10-09 PROCEDURE — 96361 HYDRATE IV INFUSION ADD-ON: CPT

## 2020-10-09 PROCEDURE — 96375 TX/PRO/DX INJ NEW DRUG ADDON: CPT

## 2020-10-09 PROCEDURE — U0002 COVID-19 LAB TEST NON-CDC: HCPCS | Performed by: PHYSICIAN ASSISTANT

## 2020-10-09 PROCEDURE — 81000 URINALYSIS NONAUTO W/SCOPE: CPT

## 2020-10-09 PROCEDURE — 82550 ASSAY OF CK (CPK): CPT

## 2020-10-09 PROCEDURE — 83880 ASSAY OF NATRIURETIC PEPTIDE: CPT

## 2020-10-09 PROCEDURE — 25000003 PHARM REV CODE 250: Performed by: EMERGENCY MEDICINE

## 2020-10-09 PROCEDURE — 85730 THROMBOPLASTIN TIME PARTIAL: CPT

## 2020-10-09 PROCEDURE — 84484 ASSAY OF TROPONIN QUANT: CPT

## 2020-10-09 PROCEDURE — 87651 STREP A DNA AMP PROBE: CPT

## 2020-10-09 PROCEDURE — 83605 ASSAY OF LACTIC ACID: CPT | Mod: 91

## 2020-10-09 PROCEDURE — 93010 EKG 12-LEAD: ICD-10-PCS | Mod: ,,, | Performed by: INTERNAL MEDICINE

## 2020-10-09 PROCEDURE — 25000003 PHARM REV CODE 250: Performed by: PHYSICIAN ASSISTANT

## 2020-10-09 PROCEDURE — 84145 PROCALCITONIN (PCT): CPT

## 2020-10-09 PROCEDURE — 85025 COMPLETE CBC W/AUTO DIFF WBC: CPT

## 2020-10-09 PROCEDURE — 96365 THER/PROPH/DIAG IV INF INIT: CPT

## 2020-10-09 PROCEDURE — 12000002 HC ACUTE/MED SURGE SEMI-PRIVATE ROOM

## 2020-10-09 PROCEDURE — 80053 COMPREHEN METABOLIC PANEL: CPT

## 2020-10-09 PROCEDURE — 86140 C-REACTIVE PROTEIN: CPT

## 2020-10-09 PROCEDURE — 87077 CULTURE AEROBIC IDENTIFY: CPT

## 2020-10-09 PROCEDURE — 85379 FIBRIN DEGRADATION QUANT: CPT

## 2020-10-09 PROCEDURE — 25500020 PHARM REV CODE 255: Performed by: EMERGENCY MEDICINE

## 2020-10-09 RX ORDER — LEVOFLOXACIN 5 MG/ML
750 INJECTION, SOLUTION INTRAVENOUS
Status: COMPLETED | OUTPATIENT
Start: 2020-10-09 | End: 2020-10-09

## 2020-10-09 RX ORDER — ACETAMINOPHEN 325 MG/1
650 TABLET ORAL
Status: COMPLETED | OUTPATIENT
Start: 2020-10-09 | End: 2020-10-09

## 2020-10-09 RX ORDER — FUROSEMIDE 10 MG/ML
40 INJECTION INTRAMUSCULAR; INTRAVENOUS
Status: COMPLETED | OUTPATIENT
Start: 2020-10-09 | End: 2020-10-09

## 2020-10-09 RX ORDER — METOPROLOL TARTRATE 1 MG/ML
5 INJECTION, SOLUTION INTRAVENOUS
Status: COMPLETED | OUTPATIENT
Start: 2020-10-09 | End: 2020-10-09

## 2020-10-09 RX ORDER — FENTANYL 25 UG/1
1 PATCH TRANSDERMAL
COMMUNITY

## 2020-10-09 RX ORDER — KETOROLAC TROMETHAMINE 30 MG/ML
15 INJECTION, SOLUTION INTRAMUSCULAR; INTRAVENOUS
Status: COMPLETED | OUTPATIENT
Start: 2020-10-09 | End: 2020-10-09

## 2020-10-09 RX ADMIN — ACETAMINOPHEN 650 MG: 325 TABLET ORAL at 04:10

## 2020-10-09 RX ADMIN — FUROSEMIDE 40 MG: 10 INJECTION, SOLUTION INTRAVENOUS at 10:10

## 2020-10-09 RX ADMIN — IOHEXOL 80 ML: 350 INJECTION, SOLUTION INTRAVENOUS at 08:10

## 2020-10-09 RX ADMIN — VANCOMYCIN HYDROCHLORIDE 1750 MG: 10 INJECTION, POWDER, LYOPHILIZED, FOR SOLUTION INTRAVENOUS at 05:10

## 2020-10-09 RX ADMIN — KETOROLAC TROMETHAMINE 15 MG: 30 INJECTION, SOLUTION INTRAMUSCULAR; INTRAVENOUS at 06:10

## 2020-10-09 RX ADMIN — SODIUM CHLORIDE 1000 ML: 0.9 INJECTION, SOLUTION INTRAVENOUS at 06:10

## 2020-10-09 RX ADMIN — METOROPROLOL TARTRATE 5 MG: 5 INJECTION, SOLUTION INTRAVENOUS at 10:10

## 2020-10-09 RX ADMIN — LEVOFLOXACIN 750 MG: 750 INJECTION, SOLUTION INTRAVENOUS at 05:10

## 2020-10-09 RX ADMIN — SODIUM CHLORIDE 1000 ML: 0.9 INJECTION, SOLUTION INTRAVENOUS at 04:10

## 2020-10-09 NOTE — ED TRIAGE NOTES
Patient was recently discharged last week from the hospital secondary to dizziness, and A fib. Came in today due to pain to left midline to the left shoulder, and reports hurts when he takes a deep breath, low grade temp noted in Triage.

## 2020-10-09 NOTE — FIRST PROVIDER EVALUATION
Emergency Department TeleTriage Encounter Note      CHIEF COMPLAINT    Chief Complaint   Patient presents with    Chest Pain     Pt c/o LEFT-sided chest pain that radiates to L shoulder, weakness, dizziness x1 week. Pt reports hx of a-fib and seen at [facility] ED last week. Pain is 10/10. Pt has a pacemaker       VITAL SIGNS   Initial Vitals [10/09/20 1546]   BP Pulse Resp Temp SpO2   (!) 124/53 (!) 118 20 (!) 100.6 °F (38.1 °C) 97 %      MAP       --            ALLERGIES    Review of patient's allergies indicates:   Allergen Reactions    Penicillins Swelling       PROVIDER TRIAGE NOTE  80-year-old male presents to the ED for evaluation of chest discomfort and shortness of breath.  Patient reports ongoing symptoms for the last 5 days.  Patient also complains of sore throat, cough.  He reports pain from his shoulder radiating to his chest.  He has also been having increasing weakness over the last week.  He is unable to walk due to the weakness.  Patient found to have a fever today.  Sepsis protocol initiated    Initial orders will be placed and care will be transferred to an alternate provider when patient is roomed for a full evaluation. Any additional orders and the final disposition will be determined by that provider.      ORDERS  Labs Reviewed   CULTURE, BLOOD   CBC W/ AUTO DIFFERENTIAL   COMPREHENSIVE METABOLIC PANEL   LACTIC ACID, PLASMA   URINALYSIS, REFLEX TO URINE CULTURE   TROPONIN I   PROCALCITONIN   C-REACTIVE PROTEIN   FERRITIN   LACTATE DEHYDROGENASE   CK   SARS-COV-2 RDRP GENE       ED Orders (720h ago, onward)    Start Ordered     Status Ordering Provider    10/09/20 1953 10/09/20 1555  Lactic acid, plasma #2  In 4 hours      Ordered ARMIN AHUJA    10/09/20 1600 10/09/20 1555  sodium chloride 0.9% bolus 1,000 mL  ED 1 Time      Ordered ARMIN AHUJA    10/09/20 1555 10/09/20 1555  C-Reactive Protein  STAT  Collect    Ordered ARMIN AHUJA    10/09/20 1555 10/09/20 1555  Ferritin   STAT  Collect    Ordered JOHNYKUTTY, ARMIN    10/09/20 1555 10/09/20 1555  Lactate Dehydrogenase  STAT  Collect    Ordered JOHNYKUTTY, ARMIN    10/09/20 1555 10/09/20 1555  CK  STAT  Collect    Ordered JOHNYKUTTY, ARMIN    10/09/20 1555 10/09/20 1555  POCT COVID-19 Rapid Screening  Once      Ordered JOHNYKUTTY, ARMIN    10/09/20 1555 10/09/20 1555  Airborne and Contact and Droplet Isolation Status  Continuous      Ordered JOHNYKUTTY, ARMIN    10/09/20 1553 10/09/20 1555  ED Preference List Used to Initiate Sepsis Orders  Until discontinued      Ordered JOHNYKUTTY, ARMIN    10/09/20 1553 10/09/20 1555  Blood culture x two cultures. Draw prior to antibiotics.  Every 15 min     Comments: Aerobic and anaerobic     Order ID Start Status Ordering Provider     [id number] 10/09/20 1553 Ordered JOHNYKUTTY, ARMIN Collect    10/09/20 1608 Scheduled JOHNYKUTTY, ARMIN        Ordered JOHNYKUTTY, ARMIN    10/09/20 1553 10/09/20 1555  CBC auto differential  STAT  Collect    Ordered JOHNYKUTTY, ARMIN    10/09/20 1553 10/09/20 1555  Comprehensive metabolic panel  STAT  Collect    Ordered JOHNYKUTTY, ARMIN    10/09/20 1553 10/09/20 1555  Lactic acid, plasma #1  STAT  Collect    Ordered JOHNYKUTTY, ARMIN    10/09/20 1553 10/09/20 1555  Vital signs  Every 15 min     Comments: Every 15 minutes until SBP greater than 90 or MAP greater than 65, then every 30 minutes times one hour, then every one hour.    Ordered JOHNYKUTTY, ARMIN    10/09/20 1553 10/09/20 1555  Bed rest  Until discontinued      Ordered JOHNYKUTTY, ARMIN    10/09/20 1553 10/09/20 1555  Cardiac Monitoring - Adult  Continuous     Comments: Notify Physician If:    Ordered JOHNYKUTTY, ARMIN    10/09/20 1553 10/09/20 1555  Pulse Oximetry Continuous  Continuous      Ordered JOHNYKUTTY, ARMIN    10/09/20 1553 10/09/20 1555  Strict intake and output  Until discontinued      Ordered JOHNYKUTTY, ARMIN    10/09/20 1553 10/09/20 1555  Urinalysis, Reflex to Urine Culture Urine, Clean  Catch  STAT      Ordered JOHNYKUTTY, ARMIN    10/09/20 1553 10/09/20 1555  Saline lock IV  Once      Ordered JOHNYKUTTY, ARMIN    10/09/20 1553 10/09/20 1555  EKG 12-lead  Once      Ordered JOHNYKUTTY, ARMIN    10/09/20 1553 10/09/20 1555  X-Ray Chest AP Portable  1 time imaging      Ordered JOHNYKUTTY, ARMIN    10/09/20 1553 10/09/20 1555  Troponin I  STAT  Collect    Ordered JOHNYKUTTY, ARMIN    10/09/20 1553 10/09/20 1555  Procalcitonin  STAT  Collect    Ordered JOHNYKUTTY, ARMIN            Virtual Visit Note: The provider triage portion of this emergency department evaluation and documentation was performed via [employer], a HIPAA-compliant telemedicine application, in concert with a tele-presenter in the room. A face to face patient evaluation with one of my colleagues will occur once the patient is placed in an emergency department room.      DISCLAIMER: This note was prepared with [employer] voice recognition transcription software. Garbled syntax, mangled pronouns, and other bizarre constructions may be attributed to that software system.

## 2020-10-09 NOTE — ED PROVIDER NOTES
Encounter Date: 10/9/2020    SCRIBE #1 NOTE: I, Phu Ballesteros, am scribing for, and in the presence of,  Adriana Poe MD. I have scribed the following portions of the note - Other sections scribed: HPI, ROS, PE.       History     Chief Complaint   Patient presents with    Chest Pain     Pt c/o LEFT-sided chest pain that radiates to L shoulder, weakness, dizziness x1 week. Pt reports hx of a-fib and seen at [facility] ED last week. Pain is 10/10. Pt has a pacemaker     CC: Chest Pain    HPI: This is a 81 y/o male with PMHx HTN, DM, CVA, AAA, CAD w/ stent placement, afib, (on ASA and Xarelto) pacemaker, hypothyroidism presenting to the ED c/o constant left-sided CP x several days (constant), non-productive coughs, green rhinorrhea, fatigue, and wooziness for the past 1 week. Pt was recently d/c from hospitalization on 9/21/20-9/25/20 for a-fib with RVR. He lives at home alone however he is often checked on by his daughter and grandson who live next door. He took percocet around 0400 this morning but has not had any further antipyretics since then. He rates his current chest pain severity 10/10 and his pain is worse whenever he takes deep breaths. Pain has been constant and does not come and go. He denies any fever at home however he presents a fever of Tmax:100.6F at triage. Denies abd pain, or N/V/D. No dysuria. He reports compliance with daily meds and last took them this morning. He also has a Fentanyl patch on for chronic back pain.    The history is provided by the patient, medical records and a relative. No  was used.     Review of patient's allergies indicates:   Allergen Reactions    Penicillins Swelling     Past Medical History:   Diagnosis Date    A-fib 7/18/2019    Anticoagulant long-term use     Plavix (on hold for angiogram)    Arthritis     Coronary artery disease     pacemaker, stentsx2    Diabetes mellitus     Hypertension     Pacemaker     Sleep apnea     Stroke      Rt side affected / mild    Thyroid disease     hypothyroidism     Past Surgical History:   Procedure Laterality Date    ANKLE SURGERY      APPENDECTOMY      CARDIAC PACEMAKER PLACEMENT      CHOLECYSTECTOMY      CORONARY ANGIOPLASTY WITH STENT PLACEMENT      stents x 2 placed     EYE SURGERY      HERNIA REPAIR      ventral hernia repair    sciatica      sciatica procedure    TREATMENT OF CARDIAC ARRHYTHMIA  2019    Procedure: Cardioversion or Defibrillation;  Surgeon: Nic Miguel MD;  Location: [facility] CATH LAB;  Service: Cardiology;;     Family History   Problem Relation Age of Onset    Cancer Mother     Heart disease Father     Cancer Sister      Social History     Tobacco Use    Smoking status: Heavy Tobacco Smoker     Packs/day: 0.50     Last attempt to quit: 1998     Years since quittin.3    Smokeless tobacco: Never Used   Substance Use Topics    Alcohol use: No     Alcohol/week: 0.0 standard drinks    Drug use: No     Review of Systems   Constitutional: Positive for fatigue and fever. Negative for chills.   HENT: Positive for rhinorrhea. Negative for congestion and sore throat.    Eyes: Negative for visual disturbance.   Respiratory: Positive for cough. Negative for shortness of breath.    Cardiovascular: Positive for chest pain.   Gastrointestinal: Negative for abdominal pain, diarrhea, nausea and vomiting.   Genitourinary: Negative for dysuria.   Musculoskeletal: Positive for back pain (chronic, unchanged).   Skin: Negative for rash.   Neurological: Negative for headaches.   Hematological: Bruises/bleeds easily (on blood thinners).   Psychiatric/Behavioral: Negative for confusion.       Physical Exam     Initial Vitals [10/09/20 1546]   BP Pulse Resp Temp SpO2   (!) 124/53 (!) 118 20 (!) 100.6 °F (38.1 °C) 97 %      MAP       --         Physical Exam    Nursing note and vitals reviewed.  Constitutional: He appears well-developed and well-nourished. He is not  diaphoretic. No distress.   HENT:   Head: Normocephalic and atraumatic.   Uvula is midline. Exudates to the left posterior oropharynx.   Eyes: EOM are normal. Pupils are equal, round, and reactive to light.   Neck: Neck supple.   Left cervical adenopathy.   Cardiovascular: An irregularly irregular rhythm present.  Tachycardia present.    Pulmonary/Chest: Breath sounds normal. No respiratory distress. He exhibits tenderness.   Reproducible chest tenderness over the left anterior chest wall.   Abdominal: Soft. Bowel sounds are normal.   Musculoskeletal: No edema.   Neurological: He is alert and oriented to person, place, and time.   Skin: Skin is warm and dry.   Psychiatric: He has a normal mood and affect.         ED Course   Procedures  Labs Reviewed   CBC W/ AUTO DIFFERENTIAL - Abnormal; Notable for the following components:       Result Value    RBC 4.54 (*)     Hemoglobin 13.8 (*)     RDW 14.7 (*)     Immature Granulocytes 0.7 (*)     Gran # (ANC) 9.5 (*)     Immature Grans (Abs) 0.08 (*)     Lymph # 0.7 (*)     Gran% 83.7 (*)     Lymph% 6.4 (*)     All other components within normal limits   COMPREHENSIVE METABOLIC PANEL - Abnormal; Notable for the following components:    Sodium 132 (*)     Glucose 177 (*)     Albumin 3.4 (*)     All other components within normal limits   LACTIC ACID, PLASMA - Abnormal; Notable for the following components:    Lactate (Lactic Acid) 2.6 (*)     All other components within normal limits   URINALYSIS, REFLEX TO URINE CULTURE - Abnormal; Notable for the following components:    Appearance, UA Hazy (*)     Protein, UA 2+ (*)     Glucose, UA 3+ (*)     Occult Blood UA 2+ (*)     Urobilinogen, UA 2.0-3.0 (*)     All other components within normal limits    Narrative:     Specimen Source->Urine   C-REACTIVE PROTEIN - Abnormal; Notable for the following components:    .1 (*)     All other components within normal limits   CK - Abnormal; Notable for the following components:      (*)     All other components within normal limits   APTT - Abnormal; Notable for the following components:    aPTT 33.1 (*)     All other components within normal limits   D DIMER, QUANTITATIVE - Abnormal; Notable for the following components:    D-Dimer 2.21 (*)     All other components within normal limits   URINALYSIS MICROSCOPIC - Abnormal; Notable for the following components:    RBC, UA 5 (*)     Bacteria Many (*)     Yeast, UA Occasional (*)     All other components within normal limits    Narrative:     Specimen Source->Urine   GROUP A STREP, MOLECULAR   CULTURE, BLOOD   CULTURE, BLOOD   TROPONIN I   PROCALCITONIN   FERRITIN   LACTATE DEHYDROGENASE   PROTIME-INR   D DIMER, QUANTITATIVE   LACTIC ACID, PLASMA   B-TYPE NATRIURETIC PEPTIDE   TSH   DIGOXIN LEVEL   SARS-COV-2 RDRP GENE   POCT INFLUENZA A/B MOLECULAR        ECG Results          EKG 12-lead (Preliminary result)  Result time 10/09/20 18:32:17    ED Interpretation by Adriana Poe MD (10/09/20 18:32:17)    Atrial fibrillation, ventricular rate 112 beats per minute,  milliseconds.  No STEMI.                            Imaging Results          CTA Chest Non-Coronary (PE Study) (Final result)  Result time 10/09/20 21:04:45    Final result by Rosalio Zazueta MD (10/09/20 21:04:45)                 Impression:      1. No evidence of pulmonary embolism.  2. Mild cardiomegaly, coronary atherosclerosis and moderate pericardial effusion.  Cardiology follow-up recommended.  3. Small bibasilar pleural effusions with mild associated atelectasis, right greater than left.      Electronically signed by: Rosalio Zazueta  Date:    10/09/2020  Time:    21:04             Narrative:    EXAMINATION:  CTA CHEST NON CORONARY    CLINICAL HISTORY:  Chest pain, acute, nonspecific;    TECHNIQUE:  Low dose axial images, sagittal and coronal reformations were obtained from the thoracic inlet to the lung bases following the IV administration of 80 mL of Omnipaque  350.  Contrast timing was optimized to evaluate the pulmonary arteries.  MIP images were performed.    COMPARISON:  None    FINDINGS:  Pulmonary arteries:Pulmonary arteries are adequately enhanced.No filling defects to indicate pulmonary embolism.    Thoracic soft tissues: Unremarkable.    Aorta: Left-sided aortic arch.  No aneurysm or dissection.    Heart: The heart is mildly enlarged there is a moderate diffuse pericardial effusion present.  Cardiology follow-up recommended.    Coronary atherosclerosis.    Patti/Mediastinum: No pathologic zak enlargement.    Airways: Patent.    Lungs/Pleura: Small dependent bibasilar pleural effusions with mild associated atelectasis, right greater than left.    Esophagus: Unremarkable.    Upper Abdomen: No abnormality of the partially imaged upper abdomen.    Bones: No acute fracture. No suspicious lytic or sclerotic lesions.  Severe disc space narrowing in the lower thoracic spine.  Prominent anterior osteophytic spurring also.                               X-Ray Chest AP Portable (Final result)  Result time 10/09/20 16:59:37    Final result by Stanislav Gary MD (10/09/20 16:59:37)                 Impression:      Resolution of perihilar pulmonary opacities with no convincing evidence of acute pulmonic process radiographically.      Electronically signed by: Stanislav Gary  Date:    10/09/2020  Time:    16:59             Narrative:    EXAMINATION:  XR CHEST AP PORTABLE    CLINICAL HISTORY:  Sepsis;    TECHNIQUE:  Single frontal view of the chest was performed.    COMPARISON:  08/09/2019    FINDINGS:  Previous perihilar opacities in the lung parenchyma have cleared.  There is no new ground-glass or consolidation opacification.  The cardiac silhouette remains stable with mild by ventricular configuration.  Single right ventricular pacemaker lead is stable.    The bony structures remain intact with high-riding humeral heads suggesting bilateral rotator cuff disease.                                  Medical Decision Making:   Initial Assessment:   80-year-old male with multiple chronic medical issues presents with left-sided chest pain, fever, cough.  On exam, he has an irregularly irregular rhythm and is tachycardic, he has reproducible left anterior chest wall tenderness, there is some exam in left posterior oropharynx with some left-sided cervical lymphadenopathy.  There is no respiratory distress.  Differential includes but not limited to pneumonia, COVID-19, strep pharyngitis, influenza, UTI.  Workup initiated with labs including blood cultures, urine culture.  Will cover with vancomycin and Levaquin as patient recently hospitalized.  Independently Interpreted Test(s):   I have ordered and independently interpreted X-rays - see prior notes.  I have ordered and independently interpreted EKG Reading(s) - see prior notes  Clinical Tests:   Lab Tests: Ordered and Reviewed  Radiological Study: Ordered and Reviewed  Medical Tests: Ordered and Reviewed            Scribe Attestation:   Scribe #1: I performed the above scribed service and the documentation accurately describes the services I performed. I attest to the accuracy of the note.    Physician Update    Patient received as signout from Dr. Adriana Poe pending CTA chest.    81yo male with fatigue, weakness, shakiness, left-sided chest and shoulder pain radiating into neck. Patient previously was able to stand up from wheelchair but has not been able to do so in the last month. He was admitted to [facility] 9/21/20-9/25/20 but weakness was present prior to that.     Medical records from [facility] including cardiology notes have both AMIODARONE and METOPROLOL but patient's medication list from home and medication bag from home contain neither of these. Daughter-in-law thinks these meds might have been stopped by home hospice ?a year ago.     TTE 7/24/20 [facility]    1. Mildly decreased left ventricular systolic function. Left  ventricular ejection fraction is estimated at 40-45%.      2. Rhythm precludes evaluation of diastolic function.     3. Normal right ventricular size. Normal right ventricular systolic function. Pacemaker wire visualized in the right ventricle.      4. Mildly increased left atrial size.     5. Mild mitral valve regurgitation.     6. Trivial pericardial effusion.     7. Dilated  ascending aorta measuring 4.2cm.     PMH: AAA, afib, pacemaker, CAD, DM2, CVA causing R weakness, hypothyroidism, HTN, sleep apnea    On exam, this is an elderly male who is awake and alert.   NC/AT EOMI neck supple  Irregularly irregular tachycardia, +murmur  +rales bibasilar bilat  Abdom s/nt  Trace bilat LE edema    Labs: CBC reassuring. Ddimer 2.21. INR 1.1. Renal function normal. Troponin normal. Lactate 2.6, elevated. UA (cath) bacteria but no nitrites/leukocytes.    Patient had APAP, toradol for fever, pain. He had NS 1L bolus x 2 prior to my arrival. He had vancomycin and levofloxacin for broad-spectrum coverage.    Patient continues to be in afib with RVR. As noted, he has not been on metoprolol or amiodarone at home, only digoxin, despite [facility] records.     I have tx'ed patient here with metoprolol and furosemide. HR improved.     Due to afib with RVR, elevated lactate, fever, HR, I have admitted patient for sepsis, monitoring, cardiology eval.     Jennifer Christian                    Clinical Impression:       ICD-10-CM ICD-9-CM   1. Sepsis, due to unspecified organism, unspecified whether acute organ dysfunction present  A41.9 038.9     995.91   2. Fever  R50.9 780.60   3. Fatigue, unspecified type  R53.83 780.79   4. Weakness  R53.1 780.79   5. Atrial fibrillation with rapid ventricular response  I48.91 427.31   6. Elevated lactic acid level  R79.89 276.2                 I, Adriana Poe MD, personally performed the services described in this documentation. All medical record entries made by the scribroe were at my direction  and in my presence. I have reviewed the chart and agree that the record reflects my personal performance and is accurate and complete.         ED Disposition Condition    Admit                             Jennifer Christian MD  10/10/20 [id number]

## 2020-10-09 NOTE — PROGRESS NOTES
Pharmacokinetic Initial Assessment: IV Vancomycin    Assessment/Plan:    Initiate intravenous vancomycin with loading dose of 1750 mg once followed by a maintenance dose of vancomycin 1500 mg IV every 24 hours  Desired empiric serum trough concentration is 10 to 20 mcg/mL  Draw vancomycin trough level 60 min prior to third dose on 10- at approximately 1700  Pharmacy will continue to follow and monitor vancomycin.      Please contact pharmacy at extension [phone] with any questions regarding this assessment.     Thank you for the consult,   Luiza Rockwell       Patient brief summary:  Doni Theodore is a 80 y.o. male initiated on antimicrobial therapy with IV Vancomycin for treatment of suspected  pneumonia    Drug Allergies:   Review of patient's allergies indicates:   Allergen Reactions    Penicillins Swelling       Actual Body Weight:   71.7 kg    Renal Function:   Estimated Creatinine Clearance: 55.1 mL/min (based on SCr of 1 mg/dL).,     Dialysis Method (if applicable):  N/A    CBC (last 72 hours):  Recent Labs   Lab Result Units 10/09/20  1617   WBC K/uL 11.33   Hemoglobin g/dL 13.8*   Hematocrit % 42.8   Platelets K/uL 201   Gran% % 83.7*   Lymph% % 6.4*   Mono% % 8.2   Eosinophil% % 0.6   Basophil% % 0.4   Differential Method  Automated       Metabolic Panel (last 72 hours):  Recent Labs   Lab Result Units 10/09/20  1617   Sodium mmol/L 132*   Potassium mmol/L 4.5   Chloride mmol/L 96   CO2 mmol/L 24   Glucose mg/dL 177*   BUN, Bld mg/dL 17   Creatinine mg/dL 1.0   Albumin g/dL 3.4*   Total Bilirubin mg/dL 0.9   Alkaline Phosphatase U/L 103   AST U/L 20   ALT U/L 17       Drug levels (last 3 results):  No results for input(s): VANCOMYCINRA, VANCOMYCINPE, VANCOMYCINTR in the last 72 hours.    Microbiologic Results:  Microbiology Results (last 7 days)       Procedure Component Value Units Date/Time    Group A Strep, Molecular [[id number]] Collected: 10/09/20 1720    Order Status: Completed Specimen:  Throat Updated: 10/09/20 1747     Group A Strep, Molecular Negative    Blood culture x two cultures. Draw prior to antibiotics. [[id number]] Collected: 10/09/20 1617    Order Status: Sent Specimen: Blood from Peripheral, Antecubital, Right Updated: 10/09/20 1643    Blood culture x two cultures. Draw prior to antibiotics. [[id number]] Collected: 10/09/20 1626    Order Status: Sent Specimen: Blood from Peripheral, Forearm, Left Updated: 10/09/20 1640

## 2020-10-10 PROBLEM — R65.10 SIRS (SYSTEMIC INFLAMMATORY RESPONSE SYNDROME): Status: ACTIVE | Noted: 2020-10-09

## 2020-10-10 PROBLEM — I48.91 ATRIAL FIBRILLATION WITH RVR: Status: ACTIVE | Noted: 2017-06-04

## 2020-10-10 PROBLEM — R42 DIZZINESS: Status: ACTIVE | Noted: 2020-10-10

## 2020-10-10 LAB
ANION GAP SERPL CALC-SCNC: 12 MMOL/L (ref 8–16)
AORTIC ROOT ANNULUS: 3.9 CM
AORTIC VALVE CUSP SEPERATION: 1.84 CM
ASCENDING AORTA: 3.1 CM
AV INDEX (PROSTH): 0.64
AV MEAN GRADIENT: 7 MMHG
AV PEAK GRADIENT: 11 MMHG
AV VALVE AREA: 2.3 CM2
AV VELOCITY RATIO: 0.62
BSA FOR ECHO PROCEDURE: 1.84 M2
BUN SERPL-MCNC: 17 MG/DL (ref 8–23)
CALCIUM SERPL-MCNC: 8.4 MG/DL (ref 8.7–10.5)
CHLORIDE SERPL-SCNC: 97 MMOL/L (ref 95–110)
CO2 SERPL-SCNC: 23 MMOL/L (ref 23–29)
CREAT SERPL-MCNC: 0.9 MG/DL (ref 0.5–1.4)
CV ECHO LV RWT: 0.73 CM
DOP CALC AO PEAK VEL: 1.64 M/S
DOP CALC AO VTI: 26.37 CM
DOP CALC LVOT AREA: 3.6 CM2
DOP CALC LVOT DIAMETER: 2.14 CM
DOP CALC LVOT PEAK VEL: 1.01 M/S
DOP CALC LVOT STROKE VOLUME: 60.72 CM3
DOP CALCLVOT PEAK VEL VTI: 16.89 CM
ECHO LV POSTERIOR WALL: 1.6 CM (ref 0.6–1.1)
ERYTHROCYTE [DISTWIDTH] IN BLOOD BY AUTOMATED COUNT: 14.9 % (ref 11.5–14.5)
ERYTHROCYTE [SEDIMENTATION RATE] IN BLOOD BY WESTERGREN METHOD: 54 MM/HR (ref 0–10)
EST. GFR  (AFRICAN AMERICAN): >60 ML/MIN/1.73 M^2
EST. GFR  (NON AFRICAN AMERICAN): >60 ML/MIN/1.73 M^2
ESTIMATED AVG GLUCOSE: 148 MG/DL (ref 68–131)
FOLATE SERPL-MCNC: 4.1 NG/ML (ref 4–24)
FRACTIONAL SHORTENING: 29 % (ref 28–44)
GLUCOSE SERPL-MCNC: 195 MG/DL (ref 70–110)
HBA1C MFR BLD HPLC: 6.8 % (ref 4–5.6)
HCT VFR BLD AUTO: 36.6 % (ref 40–54)
HGB BLD-MCNC: 11.7 G/DL (ref 14–18)
INTERVENTRICULAR SEPTUM: 1.57 CM (ref 0.6–1.1)
IRON SERPL-MCNC: 18 UG/DL (ref 45–160)
IVRT: 103.81 MSEC
LA MAJOR: 5.64 CM
LA MINOR: 5.58 CM
LA WIDTH: 4.39 CM
LEFT ATRIUM SIZE: 4.28 CM
LEFT ATRIUM VOLUME INDEX: 47.7 ML/M2
LEFT ATRIUM VOLUME: 89.59 CM3
LEFT INTERNAL DIMENSION IN SYSTOLE: 3.12 CM (ref 2.1–4)
LEFT VENTRICLE DIASTOLIC VOLUME INDEX: 46.09 ML/M2
LEFT VENTRICLE DIASTOLIC VOLUME: 86.64 ML
LEFT VENTRICLE MASS INDEX: 154 G/M2
LEFT VENTRICLE SYSTOLIC VOLUME INDEX: 20.5 ML/M2
LEFT VENTRICLE SYSTOLIC VOLUME: 38.47 ML
LEFT VENTRICULAR INTERNAL DIMENSION IN DIASTOLE: 4.38 CM (ref 3.5–6)
LEFT VENTRICULAR MASS: 288.76 G
MCH RBC QN AUTO: 30.4 PG (ref 27–31)
MCHC RBC AUTO-ENTMCNC: 32 G/DL (ref 32–36)
MCV RBC AUTO: 95 FL (ref 82–98)
MV PEAK E VEL: 1.13 M/S
PISA TR MAX VEL: 2.74 M/S
PLATELET # BLD AUTO: 132 K/UL (ref 150–350)
PMV BLD AUTO: 10.3 FL (ref 9.2–12.9)
POCT GLUCOSE: 158 MG/DL (ref 70–110)
POCT GLUCOSE: 177 MG/DL (ref 70–110)
POCT GLUCOSE: 180 MG/DL (ref 70–110)
POTASSIUM SERPL-SCNC: 4.1 MMOL/L (ref 3.5–5.1)
PROCALCITONIN SERPL IA-MCNC: 0.11 NG/ML
PULM VEIN S/D RATIO: 1.87
PV PEAK D VEL: 0.3 M/S
PV PEAK S VEL: 0.56 M/S
PV PEAK VELOCITY: 0.97 CM/S
RA MAJOR: 5.56 CM
RA PRESSURE: 15 MMHG
RA WIDTH: 4.36 CM
RBC # BLD AUTO: 3.85 M/UL (ref 4.6–6.2)
RIGHT VENTRICULAR END-DIASTOLIC DIMENSION: 3.96 CM
RV TISSUE DOPPLER FREE WALL SYSTOLIC VELOCITY 1 (APICAL 4 CHAMBER VIEW): 13.35 CM/S
SATURATED IRON: 7 % (ref 20–50)
SINUS: 3.74 CM
SODIUM SERPL-SCNC: 132 MMOL/L (ref 136–145)
STJ: 3.03 CM
T3FREE SERPL-MCNC: 1.6 PG/ML (ref 2.3–4.2)
T4 FREE SERPL-MCNC: 1.18 NG/DL (ref 0.71–1.51)
TOTAL IRON BINDING CAPACITY: 256 UG/DL (ref 250–450)
TR MAX PG: 30 MMHG
TRANSFERRIN SERPL-MCNC: 173 MG/DL (ref 200–375)
TRICUSPID ANNULAR PLANE SYSTOLIC EXCURSION: 1.43 CM
TROPONIN I SERPL DL<=0.01 NG/ML-MCNC: 0.01 NG/ML (ref 0–0.03)
TV REST PULMONARY ARTERY PRESSURE: 45 MMHG
VIT B12 SERPL-MCNC: 353 PG/ML (ref 210–950)
WBC # BLD AUTO: 9.5 K/UL (ref 3.9–12.7)

## 2020-10-10 PROCEDURE — 25000003 PHARM REV CODE 250: Performed by: HOSPITALIST

## 2020-10-10 PROCEDURE — 84481 FREE ASSAY (FT-3): CPT

## 2020-10-10 PROCEDURE — 99291 PR CRITICAL CARE, E/M 30-74 MINUTES: ICD-10-PCS | Mod: ,,, | Performed by: INTERNAL MEDICINE

## 2020-10-10 PROCEDURE — 83036 HEMOGLOBIN GLYCOSYLATED A1C: CPT

## 2020-10-10 PROCEDURE — U0003 INFECTIOUS AGENT DETECTION BY NUCLEIC ACID (DNA OR RNA); SEVERE ACUTE RESPIRATORY SYNDROME CORONAVIRUS 2 (SARS-COV-2) (CORONAVIRUS DISEASE [COVID-19]), AMPLIFIED PROBE TECHNIQUE, MAKING USE OF HIGH THROUGHPUT TECHNOLOGIES AS DESCRIBED BY CMS-2020-01-R: HCPCS

## 2020-10-10 PROCEDURE — 63600175 PHARM REV CODE 636 W HCPCS: Performed by: HOSPITALIST

## 2020-10-10 PROCEDURE — 84484 ASSAY OF TROPONIN QUANT: CPT

## 2020-10-10 PROCEDURE — 99291 CRITICAL CARE FIRST HOUR: CPT | Mod: ,,, | Performed by: INTERNAL MEDICINE

## 2020-10-10 PROCEDURE — 85027 COMPLETE CBC AUTOMATED: CPT

## 2020-10-10 PROCEDURE — 25000003 PHARM REV CODE 250: Performed by: EMERGENCY MEDICINE

## 2020-10-10 PROCEDURE — 63600175 PHARM REV CODE 636 W HCPCS: Performed by: EMERGENCY MEDICINE

## 2020-10-10 PROCEDURE — 94799 UNLISTED PULMONARY SVC/PX: CPT

## 2020-10-10 PROCEDURE — 84439 ASSAY OF FREE THYROXINE: CPT

## 2020-10-10 PROCEDURE — 83540 ASSAY OF IRON: CPT

## 2020-10-10 PROCEDURE — 87040 BLOOD CULTURE FOR BACTERIA: CPT

## 2020-10-10 PROCEDURE — 11000001 HC ACUTE MED/SURG PRIVATE ROOM

## 2020-10-10 PROCEDURE — 85652 RBC SED RATE AUTOMATED: CPT

## 2020-10-10 PROCEDURE — 80048 BASIC METABOLIC PNL TOTAL CA: CPT

## 2020-10-10 PROCEDURE — 82746 ASSAY OF FOLIC ACID SERUM: CPT

## 2020-10-10 PROCEDURE — 94770 HC EXHALED C02 TEST: CPT

## 2020-10-10 PROCEDURE — 99900035 HC TECH TIME PER 15 MIN (STAT)

## 2020-10-10 PROCEDURE — 94761 N-INVAS EAR/PLS OXIMETRY MLT: CPT

## 2020-10-10 PROCEDURE — 82607 VITAMIN B-12: CPT

## 2020-10-10 PROCEDURE — 25000003 PHARM REV CODE 250: Performed by: INTERNAL MEDICINE

## 2020-10-10 PROCEDURE — 84145 PROCALCITONIN (PCT): CPT

## 2020-10-10 PROCEDURE — 97161 PT EVAL LOW COMPLEX 20 MIN: CPT

## 2020-10-10 PROCEDURE — 36415 COLL VENOUS BLD VENIPUNCTURE: CPT

## 2020-10-10 PROCEDURE — 97165 OT EVAL LOW COMPLEX 30 MIN: CPT

## 2020-10-10 RX ORDER — GLUCAGON 1 MG
1 KIT INJECTION
Status: DISCONTINUED | OUTPATIENT
Start: 2020-10-10 | End: 2020-10-12 | Stop reason: HOSPADM

## 2020-10-10 RX ORDER — FAMOTIDINE 20 MG/1
20 TABLET, FILM COATED ORAL 2 TIMES DAILY
Status: DISCONTINUED | OUTPATIENT
Start: 2020-10-10 | End: 2020-10-12 | Stop reason: HOSPADM

## 2020-10-10 RX ORDER — ASPIRIN 81 MG/1
81 TABLET ORAL DAILY
Status: DISCONTINUED | OUTPATIENT
Start: 2020-10-10 | End: 2020-10-10

## 2020-10-10 RX ORDER — IBUPROFEN 200 MG
16 TABLET ORAL
Status: DISCONTINUED | OUTPATIENT
Start: 2020-10-10 | End: 2020-10-12 | Stop reason: HOSPADM

## 2020-10-10 RX ORDER — METOPROLOL TARTRATE 25 MG/1
25 TABLET, FILM COATED ORAL 2 TIMES DAILY
Status: DISCONTINUED | OUTPATIENT
Start: 2020-10-10 | End: 2020-10-12 | Stop reason: HOSPADM

## 2020-10-10 RX ORDER — FINASTERIDE 5 MG/1
5 TABLET, FILM COATED ORAL DAILY
Status: DISCONTINUED | OUTPATIENT
Start: 2020-10-10 | End: 2020-10-12 | Stop reason: HOSPADM

## 2020-10-10 RX ORDER — CLOPIDOGREL BISULFATE 75 MG/1
75 TABLET ORAL DAILY
Status: DISCONTINUED | OUTPATIENT
Start: 2020-10-10 | End: 2020-10-12 | Stop reason: HOSPADM

## 2020-10-10 RX ORDER — KETOROLAC TROMETHAMINE 30 MG/ML
15 INJECTION, SOLUTION INTRAMUSCULAR; INTRAVENOUS EVERY 6 HOURS PRN
Status: DISCONTINUED | OUTPATIENT
Start: 2020-10-10 | End: 2020-10-12 | Stop reason: HOSPADM

## 2020-10-10 RX ORDER — INSULIN ASPART 100 [IU]/ML
0-5 INJECTION, SOLUTION INTRAVENOUS; SUBCUTANEOUS
Status: DISCONTINUED | OUTPATIENT
Start: 2020-10-10 | End: 2020-10-12 | Stop reason: HOSPADM

## 2020-10-10 RX ORDER — AMIODARONE HYDROCHLORIDE 200 MG/1
200 TABLET ORAL DAILY
Status: DISCONTINUED | OUTPATIENT
Start: 2020-10-10 | End: 2020-10-10

## 2020-10-10 RX ORDER — NITROGLYCERIN 0.4 MG/1
0.4 TABLET SUBLINGUAL EVERY 5 MIN PRN
Status: DISCONTINUED | OUTPATIENT
Start: 2020-10-10 | End: 2020-10-12 | Stop reason: HOSPADM

## 2020-10-10 RX ORDER — SERTRALINE HYDROCHLORIDE 50 MG/1
100 TABLET, FILM COATED ORAL 2 TIMES DAILY
Status: DISCONTINUED | OUTPATIENT
Start: 2020-10-10 | End: 2020-10-12 | Stop reason: HOSPADM

## 2020-10-10 RX ORDER — ACETAMINOPHEN 325 MG/1
650 TABLET ORAL EVERY 6 HOURS PRN
Status: DISCONTINUED | OUTPATIENT
Start: 2020-10-10 | End: 2020-10-12 | Stop reason: HOSPADM

## 2020-10-10 RX ORDER — GABAPENTIN 300 MG/1
300 CAPSULE ORAL 3 TIMES DAILY
Status: DISCONTINUED | OUTPATIENT
Start: 2020-10-10 | End: 2020-10-12 | Stop reason: HOSPADM

## 2020-10-10 RX ORDER — TAMSULOSIN HYDROCHLORIDE 0.4 MG/1
1 CAPSULE ORAL NIGHTLY
Status: DISCONTINUED | OUTPATIENT
Start: 2020-10-10 | End: 2020-10-12 | Stop reason: HOSPADM

## 2020-10-10 RX ORDER — ONDANSETRON 2 MG/ML
4 INJECTION INTRAMUSCULAR; INTRAVENOUS EVERY 4 HOURS PRN
Status: DISCONTINUED | OUTPATIENT
Start: 2020-10-10 | End: 2020-10-12 | Stop reason: HOSPADM

## 2020-10-10 RX ORDER — POLYETHYLENE GLYCOL 3350 17 G/17G
17 POWDER, FOR SOLUTION ORAL DAILY
Status: DISCONTINUED | OUTPATIENT
Start: 2020-10-10 | End: 2020-10-12 | Stop reason: HOSPADM

## 2020-10-10 RX ORDER — SODIUM CHLORIDE 0.9 % (FLUSH) 0.9 %
10 SYRINGE (ML) INJECTION
Status: DISCONTINUED | OUTPATIENT
Start: 2020-10-10 | End: 2020-10-12 | Stop reason: HOSPADM

## 2020-10-10 RX ORDER — CHOLECALCIFEROL (VITAMIN D3) 25 MCG
2000 TABLET ORAL DAILY
Status: DISCONTINUED | OUTPATIENT
Start: 2020-10-10 | End: 2020-10-12 | Stop reason: HOSPADM

## 2020-10-10 RX ORDER — IBUPROFEN 200 MG
24 TABLET ORAL
Status: DISCONTINUED | OUTPATIENT
Start: 2020-10-10 | End: 2020-10-12 | Stop reason: HOSPADM

## 2020-10-10 RX ADMIN — FAMOTIDINE 20 MG: 20 TABLET ORAL at 07:10

## 2020-10-10 RX ADMIN — TAMSULOSIN HYDROCHLORIDE 0.4 MG: 0.4 CAPSULE ORAL at 09:10

## 2020-10-10 RX ADMIN — GABAPENTIN 300 MG: 300 CAPSULE ORAL at 08:10

## 2020-10-10 RX ADMIN — CEFTRIAXONE 1 G: 1 INJECTION, SOLUTION INTRAVENOUS at 12:10

## 2020-10-10 RX ADMIN — CHOLECALCIFEROL (VITAMIN D3) 25 MCG (1,000 UNIT) TABLET 2000 UNITS: TABLET at 07:10

## 2020-10-10 RX ADMIN — SERTRALINE HYDROCHLORIDE 100 MG: 50 TABLET ORAL at 07:10

## 2020-10-10 RX ADMIN — GABAPENTIN 300 MG: 300 CAPSULE ORAL at 02:10

## 2020-10-10 RX ADMIN — FAMOTIDINE 20 MG: 20 TABLET ORAL at 09:10

## 2020-10-10 RX ADMIN — TAMSULOSIN HYDROCHLORIDE 0.4 MG: 0.4 CAPSULE ORAL at 12:10

## 2020-10-10 RX ADMIN — ACETAMINOPHEN 650 MG: 325 TABLET ORAL at 05:10

## 2020-10-10 RX ADMIN — VANCOMYCIN HYDROCHLORIDE 1500 MG: 1.5 INJECTION, POWDER, LYOPHILIZED, FOR SOLUTION INTRAVENOUS at 06:10

## 2020-10-10 RX ADMIN — METOPROLOL TARTRATE 25 MG: 25 TABLET, FILM COATED ORAL at 11:10

## 2020-10-10 RX ADMIN — ASPIRIN 81 MG: 81 TABLET, COATED ORAL at 07:10

## 2020-10-10 RX ADMIN — FINASTERIDE 5 MG: 5 TABLET, FILM COATED ORAL at 07:10

## 2020-10-10 RX ADMIN — CLOPIDOGREL 75 MG: 75 TABLET, FILM COATED ORAL at 12:10

## 2020-10-10 RX ADMIN — SERTRALINE HYDROCHLORIDE 100 MG: 50 TABLET ORAL at 09:10

## 2020-10-10 RX ADMIN — LEVOTHYROXINE SODIUM 175 MCG: 150 TABLET ORAL at 06:10

## 2020-10-10 RX ADMIN — GABAPENTIN 300 MG: 300 CAPSULE ORAL at 09:10

## 2020-10-10 RX ADMIN — METOPROLOL TARTRATE 25 MG: 25 TABLET, FILM COATED ORAL at 09:10

## 2020-10-10 RX ADMIN — RIVAROXABAN 20 MG: 20 TABLET, FILM COATED ORAL at 04:10

## 2020-10-10 RX ADMIN — AMIODARONE HYDROCHLORIDE 200 MG: 200 TABLET ORAL at 07:10

## 2020-10-10 NOTE — ASSESSMENT & PLAN NOTE
S/p PCI, last in 2017  Trop/EKG neg  Cont Plavix, stop ASA with concurrent Xarelto  Cont metoprolol  Pt on praluent as outpatient

## 2020-10-10 NOTE — ASSESSMENT & PLAN NOTE
"-Discharged med rec from [facility] 9/25 shows Praluent 75mg im q2wk, amiodarone  200qd, asa 81qd, plavix 75qd, digoxin 125qd, imdur 60qd, toprol 25qd, midodrine 5mg tid.  Unclear if patient has been taking these.  -Noted history of afib and in ER was in RVR.  -Better rate control with achieved with iv metoprolol in ER  -Patient notes recent hospitalization at [facility] and states "they got my medicines all messed up".  Continues on amiodarone.  Was previously on metoprolol and digoxin it seems.  -We have resumed home amiodarone 200mg po daily.  HR is generally well controlled, but with minimal activity it increases transiently into 120s.  -Will start a low dose metoprolol today  -Will discuss digoxin with cardiology.  -Await input from Dr. Miguel with cardiology.  -Continue xarelto  -Monitor on telemetry.    "

## 2020-10-10 NOTE — PT/OT/SLP EVAL
"Occupational Therapy   Evaluation    Name: Doni Theodore  MRN: [id number]  Admitting Diagnosis:  SIRS (systemic inflammatory response syndrome)      Recommendations:     Discharge Recommendations: home health OT  Discharge Equipment Recommendations:  none  Barriers to discharge:  None    Assessment:     Doni Theodore is a 80 y.o. male with a medical diagnosis of SIRS (systemic inflammatory response syndrome). Performance deficits affecting function: weakness, impaired endurance, impaired balance, impaired self care skills, impaired functional mobilty, decreased safety awareness, decreased lower extremity function, decreased upper extremity function.      CGA supine>sit; minimal in-room functional mobility with B/L hand-held assist/CGA. OT rec HHOT with family assist/sup    Rehab Prognosis: Good; patient would benefit from acute skilled OT services to address these deficits and reach maximum level of function.       Plan:     Patient to be seen (3-5x/week) to address the above listed problems via self-care/home management, therapeutic activities, therapeutic exercises  · Plan of Care Expires: 10/24/20  · Plan of Care Reviewed with: patient    Subjective     Chief Complaint: N/A  Patient/Family Comments/goals: "they told me last time I had Alzheimer's 5 years ago.... but I beat it!"    Occupational Profile:  Living Environment: Pt lives with his grandson in a [facility] with 0 ANNA. Bathroom set-up: walk-in shower with grab bars and shower chair; grab bars by toilet. His 2 sons lives in "the backrd"- they live on a large piece of property.   Previous level of function: MOD I with ADLs and functional mobility- pt primarily uses his rollator. Pt reports no recent falls.   Roles and Routines: last time he drove was 3 weeks ago   Equipment Used at Home:  CPAP, wheelchair, walker, rolling, rollator, shower chair, grab bar  Assistance upon Discharge: grandson, 2 adult sons     Pain/Comfort:  · Pain Rating 1: 0/10    Patients " cultural, spiritual, Evangelical conflicts given the current situation: no    Objective:     Communicated with: nurseRenata, prior to session.  Patient found HOB elevated with bed alarm, peripheral IV, pulse ox (continuous), telemetry upon OT entry to room.    General Precautions: Standard, airborne, droplet, fall, special contact, diabetic, pacemaker   Orthopedic Precautions:N/A   Braces: N/A     Occupational Performance:    Bed Mobility:    · Patient completed Scooting/Bridging with contact guard assistance  · Patient completed Supine to Sit with contact guard assistance and HOB elevated    Functional Mobility/Transfers:  · Patient completed Sit <> Stand Transfer with contact guard assistance  with  no assistive device   · Patient completed Bed <> Chair Transfer using Step Transfer technique with contact guard assistance with no assistive device  · Functional Mobility: Pt completed short, in-room functional mobility with B/L HHA with CGA. No c/o dizziness nor SOB while ambulating. Minimal dizziness shortly upon sitting EOB.     Activities of Daily Living:  · Upper Body Dressing: set-up with donning back gown seated EOB    · Offered to assist pt to the bathroom, but pt had no needs    Cognitive/Visual Perceptual:  Cognitive/Psychosocial Skills:     -       Oriented to: Person, Place, Time and Situation   -       Follows Commands/attention:Follows multistep  commands  -       Communication: clear/fluent  -       Memory: No Deficits noted  -       Safety awareness/insight to disability: intact   -       Mood/Affect/Coping skills/emotional control: Pleasant  Visual/Perceptual:      -Intact  R/L discrimination      Physical Exam:  Balance:    -       seated: SUP; standing: CGA  Postural examination/scapula alignment:    -       Rounded shoulders  -       Forward head  Skin integrity: Visible skin intact  Edema:  no BUE edema noted  Sensation:    -       Intact  light/touch BUE  Dominant hand:    -       Right  Upper  Extremity Range of Motion:     -       Right Upper Extremity: WFL  -       Left Upper Extremity: WFL  Upper Extremity Strength:    -       Right Upper Extremity: WFL  -       Left Upper Extremity: WFL   Strength: -       Right Upper Extremity: WFL  -       Left Upper Extremity: WFL  Fine Motor Coordination:    -       Intact  Left hand, manipulation of objects and Right hand, manipulation of objects  Gross motor coordination:   WFL    AMPAC 6 Click ADL:  AMPAC Total Score: 22    Treatment & Education:  · Pt educated on OT role/POC.   · Importance of OOB activity with staff assistance.  · Encouraged pt to sit up in the chair throughout the day   · Edu and demo on shoulder flexion/extension 3x10 daily; pt demo'd back 10 in the chair well   · Safety during functional t/f and mobility   · Multiple self-care tasks/functional mobility completed- assistance level noted above   · All questions/concerns answered within OT scope of practice     Education:    Patient left up in chair with all lines intact, call button in reach, chair alarm on, nurse, Renata, notified and all needs met    GOALS:   Multidisciplinary Problems     Occupational Therapy Goals        Problem: Occupational Therapy Goal    Goal Priority Disciplines Outcome Interventions   Occupational Therapy Goal     OT, PT/OT Ongoing, Progressing    Description: Goals to be met by: 10/24/20     Patient will increase functional independence with ADLs by performing:    LE Dressing with Supervision.  Grooming while standing at sink with Supervision.  Toileting from toilet with Supervision for hygiene and clothing management.   Supine to sit with Supervision.  Step transfer with Supervision  Toilet transfer to toilet with Supervision.  Upper extremity exercise program x15 reps per handout, with independence.                     History:     Past Medical History:   Diagnosis Date    A-fib 7/18/2019    Anticoagulant long-term use     Plavix (on hold for angiogram)     Arthritis     Coronary artery disease     pacemaker, stentsx2    Diabetes mellitus     Hypertension     Pacemaker     Sleep apnea     Stroke     Rt side affected / mild    Thyroid disease     hypothyroidism       Past Surgical History:   Procedure Laterality Date    ANKLE SURGERY      APPENDECTOMY      CARDIAC PACEMAKER PLACEMENT      CHOLECYSTECTOMY      CORONARY ANGIOPLASTY WITH STENT PLACEMENT  6/06    stents x 2 placed     EYE SURGERY      HERNIA REPAIR      ventral hernia repair    sciatica      sciatica procedure    TREATMENT OF CARDIAC ARRHYTHMIA  7/19/2019    Procedure: Cardioversion or Defibrillation;  Surgeon: Nic Miguel MD;  Location: [facility] CATH LAB;  Service: Cardiology;;       Time Tracking:     OT Date of Treatment: 10/10/20  OT Start Time: 1514  OT Stop Time: 1530  OT Total Time (min): 16 min    Billable Minutes:Evaluation 16 min (co-eval with PT)    Abril Veras OT  10/10/2020

## 2020-10-10 NOTE — NURSING TRANSFER
Nursing Transfer Note      10/10/2020     Transfer To: [id number]    Transfer via wheelchair    Transfer with cardiac monitoring    Transported by LINDA Hodge   And MENDOZA Fenton sent:n///a    Chart send with patient: Yes    Notified: son    Patient reassessed at: 1200 10/10/20     Upon arrival to floor: cardiac monitor applied prior to leaving ICU, patient oriented to room, call bell in reach and bed in lowest position

## 2020-10-10 NOTE — SUBJECTIVE & OBJECTIVE
Past Medical History:   Diagnosis Date    A-fib 7/18/2019    Anticoagulant long-term use     Plavix (on hold for angiogram)    Arthritis     Coronary artery disease     pacemaker, stentsx2    Diabetes mellitus     Hypertension     Pacemaker     Sleep apnea     Stroke     Rt side affected / mild    Thyroid disease     hypothyroidism       Past Surgical History:   Procedure Laterality Date    ANKLE SURGERY      APPENDECTOMY      CARDIAC PACEMAKER PLACEMENT      CHOLECYSTECTOMY      CORONARY ANGIOPLASTY WITH STENT PLACEMENT  6/06    stents x 2 placed     EYE SURGERY      HERNIA REPAIR      ventral hernia repair    sciatica      sciatica procedure    TREATMENT OF CARDIAC ARRHYTHMIA  7/19/2019    Procedure: Cardioversion or Defibrillation;  Surgeon: Nic Miguel MD;  Location: [facility] CATH LAB;  Service: Cardiology;;       Review of patient's allergies indicates:   Allergen Reactions    Penicillins Swelling       No current facility-administered medications on file prior to encounter.      Current Outpatient Medications on File Prior to Encounter   Medication Sig    aspirin (ECOTRIN) 81 MG EC tablet Take 81 mg by mouth once daily.    fentaNYL (DURAGESIC) 25 mcg/hr Place 1 patch onto the skin every 72 hours.    fluticasone (FLONASE) 50 mcg/actuation nasal spray 1 spray by Each Nare route nightly as needed.     gabapentin (NEURONTIN) 300 MG capsule Take 300 mg by mouth 3 (three) times daily.    glimepiride (AMARYL) 4 MG tablet Take 8 mg by mouth before breakfast.     levocetirizine (XYZAL) 5 MG tablet Take 5 mg by mouth every evening.    metFORMIN (GLUCOPHAGE) 1000 MG tablet Take 1,000 mg by mouth 2 (two) times daily with meals.    ranitidine (ZANTAC) 300 MG tablet Take 300 mg by mouth 2 (two) times daily.     sertraline (ZOLOFT) 100 MG tablet Take 100 mg by mouth 2 (two) times daily.    SYNTHROID 175 mcg tablet 175 mcg once daily.     tamsulosin (FLOMAX) 0.4 mg Cp24 TAKE 1 CAPSULE AT  BEDTIME.    vitamin D 185 MG Tab Take 2,000 Units by mouth once daily.     XARELTO 20 mg Tab TAKE 1 TABLET BY MOUTH ONCE A DAY WITH EVENING MEAL    cyanocobalamin 2000 MCG tablet Take 2,500 mcg by mouth once daily.    finasteride (PROSCAR) 5 mg tablet Take 1 tablet (5 mg total) by mouth once daily.    midodrine (PROAMATINE) 5 MG Tab     nitroGLYCERIN (NITROSTAT) 0.4 MG SL tablet Place 0.4 mg under the tongue every 5 (five) minutes as needed for Chest pain.    traMADol (ULTRAM) 50 mg tablet Take 0.5 tablets (25 mg total) by mouth every 8 (eight) hours as needed (Sever pain not controlled by tylenol or tizanidine).     Family History     Problem Relation (Age of Onset)    Cancer Mother, Sister    Heart disease Father        Tobacco Use    Smoking status: Heavy Tobacco Smoker     Packs/day: 0.50     Last attempt to quit: 1998     Years since quittin.3    Smokeless tobacco: Never Used   Substance and Sexual Activity    Alcohol use: No     Alcohol/week: 0.0 standard drinks    Drug use: No    Sexual activity: Never     Review of Systems   Unable to perform ROS: other (pending COVID 19 r/o)     Objective:     Vital Signs (Most Recent):  Temp: 98.4 °F (36.9 °C) (10/10/20 0800)  Pulse: (!) 126 (10/10/20 1103)  Resp: (!) 22 (10/10/20 1030)  BP: (!) 142/75 (10/10/20 1103)  SpO2: (!) 92 % (10/10/20 1030) Vital Signs (24h Range):  Temp:  [98.2 °F (36.8 °C)-100.6 °F (38.1 °C)] 98.4 °F (36.9 °C)  Pulse:  [] 126  Resp:  [16-25] 22  SpO2:  [87 %-97 %] 92 %  BP: ()/(52-93) 142/75     Weight: 76.4 kg (168 lb 6.9 oz)  Body mass index is 26.38 kg/m².    SpO2: (!) 92 %  O2 Device (Oxygen Therapy): nasal cannula      Intake/Output Summary (Last 24 hours) at 10/10/2020 1151  Last data filed at 10/10/2020 0800  Gross per 24 hour   Intake 2200 ml   Output 900 ml   Net 1300 ml       Lines/Drains/Airways     Peripheral Intravenous Line                 Peripheral IV - Single Lumen 10/09/20 2219 20 G Right  Antecubital less than 1 day         Peripheral IV - Single Lumen 10/10/20 22 G Left Antecubital less than 1 day                Physical Exam Exam deferred today pending COVID19 r/o, pls see Dr. Eaton's exam for details    Current Medications:   amiodarone  200 mg Oral Daily    aspirin  81 mg Oral Daily    cefTRIAXone (ROCEPHIN) IVPB  1 g Intravenous Q12H    famotidine  20 mg Oral BID    finasteride  5 mg Oral Daily    gabapentin  300 mg Oral TID    levothyroxine  175 mcg Oral Before breakfast    metoprolol tartrate  25 mg Oral BID    polyethylene glycol  17 g Oral Daily    rivaroxaban  20 mg Oral with dinner    sertraline  100 mg Oral BID    tamsulosin  1 capsule Oral Nightly    vitamin D  2,000 Units Oral Daily       acetaminophen, acetaminophen, dextrose 50%, dextrose 50%, glucagon (human recombinant), glucose, glucose, insulin aspart U-100, ketorolac, nitroGLYCERIN, ondansetron, promethazine (PHENERGAN) IVPB, sodium chloride 0.9%    Laboratory (all labs reviewed):  CBC:  Recent Labs   Lab 08/09/19  1655 08/10/19  0408 08/11/19  0912 10/09/20  1617 10/10/20  0713   WBC 7.91 7.10 7.90 11.33 9.50   Hemoglobin 11.5 L 10.7 L 11.1 L 13.8 L 11.7 L   Hematocrit 37.1 L 34.3 L 35.7 L 42.8 36.6 L   Platelets 255 228 230 201 132 L       CHEMISTRIES:  Recent Labs   Lab 04/22/19  2206  07/20/19  0350 08/01/19  1717 08/09/19  1655 08/10/19  0408 08/11/19  0912 10/09/20  1617 10/10/20  0348   Glucose 193 H   < > 101 57 L 154 H 117 H  113 H 162 H 177 H 195 H   Sodium 139   < > 141 140 139 138  139 136 132 L 132 L   Potassium 4.5   < > 3.3 L 3.2 L 4.6 4.1  4.1 3.8 4.5 4.1   BUN, Bld 22   < > 12 14 24 H 20  20 10 17 17   Creatinine 1.0   < > 0.9 0.8 1.3 1.0  0.9 0.8 1.0 0.9   eGFR if  >60   < > >60 >60 >60 >60  >60 >60 >60 >60   eGFR if non African American >60   < > >60 >60 52 A >60  >60 >60 >60 >60   Calcium 9.2   < > 9.3 8.0 L 9.1 8.7  8.5 L 8.9 9.6 8.4 L   Magnesium 1.3 L  --  1.5 L 1.3 L  1.4 L 1.6  --   --   --     < > = values in this interval not displayed.       CARDIAC BIOMARKERS:  Recent Labs   Lab 08/09/19 1655 08/09/19  2252 08/10/19  0408 10/09/20  1617 10/10/20  0348   CPK  --   --   --  276 H  --    Troponin I 0.033 H 0.032 H 0.026 0.018 0.014       COAGS:  Recent Labs   Lab 10/23/17  0705 10/24/17  0853 07/18/19  2047 08/09/19  1654 10/09/20  1617   INR 1.2 1.2 1.1 1.1 1.1       LIPIDS/LFTS:  Recent Labs   Lab 07/19/19 0758 07/20/19  0350 08/01/19  1717 08/09/19  1655 08/10/19  0408 10/09/20  1617   Cholesterol 85 L  --   --   --   --   --    Triglycerides 61  --   --   --   --   --    HDL 46  --   --   --   --   --    LDL Cholesterol 26.8 L  --   --   --   --   --    Non-HDL Cholesterol 39  --   --   --   --   --    AST  --  15 19 34 29 20   ALT  --  11 13 22 20 17       BNP:  Recent Labs   Lab 04/22/19  2206 07/18/19  2047 08/01/19  1717 08/09/19  1654 10/09/20  2032    H 366 H 240 H 183 H 69       TSH:  Recent Labs   Lab 07/18/19  2047 08/01/19  1717 10/09/20  2032   TSH 13.841 H 12.396 H 1.395       Free T4:  Recent Labs   Lab 07/18/19  2047 08/01/19  1717 10/10/20  0348   Free T4 1.05 0.88 1.18       Diagnostic Results:  ECG (personally reviewed and interpreted tracing(s)):  10/9/20 1544 , RBBB/LAD, similar to 8/9/19    Chest X-Ray (personally reviewed and interpreted image(s)): 10/9/20 NAD, mild CMeg, L PPM 1 lead    CTA chest 10/9/20  1. No evidence of pulmonary embolism.  2. Mild cardiomegaly, coronary atherosclerosis and moderate pericardial effusion.  Cardiology follow-up recommended.  3. Small bibasilar pleural effusions with mild associated atelectasis, right greater than left.    Echo: 9/24/20 ([facility]/care everywhere)    1. Mildly decreased left ventricular systolic function. Left ventricular ejection fraction is estimated at 40-45%.      2. Rhythm precludes evaluation of diastolic function.     3. Normal right ventricular size. Normal right ventricular systolic  function. Pacemaker wire visualized in the right ventricle.      4. Mildly increased left atrial size.     5. Mild mitral valve regurgitation.     6. Trivial pericardial effusion.     7. Dilated  ascending aorta measuring 4.2cm.     Carotid US 3/2020 (Care everywhere, per [facility] note 7/30/20)  CAROTIDS: Right Carotid - Doppler peak velocities and ratio are consistent with a right internal carotid stenosis of 16-39% Left Carotid - Doppler peak velocities and ratio are consistent with a left internal carotid stenosis of 16-39%     VERITO/DCCV 7/19/19  LVEF 40-45%, mild global HK  Normal valves  Mild-mod MR  Mild TR  Mild AI  No LA/MAURICIO thrombus  Successful DCCV AF->NSR 360J x1  Pt tolerated procedure well without complications  Plan:  Observe in ICU  Cont Xarelto 20mg qd  Start amio load  Dispo planning appropriate  Pt to follow up with me in 1-2 weeks.     Cath: 10/23/17   B. Summary/Post-Operative Diagnosis    [id number].    70% mid LAD ISR; FFR=0.79.     of D1.    Successful PCI of mid LAD with 4X15 MARY.    Successful PCI of D1 with 2.25X30 and 2.5X18 MARY.    IVUS utilized for stent sizing.  D. Hemodynamic Results  LVEDP (Pre): 22 mmHg  E. Angiographic Results       Patient has a right dominant coronary artery.      - Left Main Coronary Artery:             The LM has luminal irregularities. There is DONNA 3 flow.     - Left Anterior Descending Artery:             The proximal LAD has a 10% stenosis. There is DONNA 3 flow. The remaining portion of the vessel has luminal irregularities (10).             The mid LAD has a 70% stenosis. There is DONNA 3 flow. FFR was 0.79. The remaining portion of the vessel has luminal irregularities (10).                     Lesion Details:   The length is 10-20 mm, eccentric shape, moderate proximal tortuosity, segment angulation of <45 degrees, irregular contour, mild to moderate calcification. No bifurcation is present. The minimum luminal diameter is   0.5 millimeters.              The distal LAD has a 50% stenosis. There is DONNA 3 flow. The remaining portion of the vessel is of small caliber.     - D1:             The D1 has chronic total occlusion. There is DONNA 0 flow. There is collateral flow from the PLB (faint). The remaining portion of the vessel has luminal irregularities (10).                     Lesion Details:   This is a Type C lesion.  The length is 40mm, eccentric shape, moderate proximal tortuosity, segment angulation of 45-90 degrees, irregular contour, mild to moderate calcification. Bifurcation is present. The minimum   luminal diameter is 0 millimeters.     - Left Circumflex Artery:             The proximal LCX has a 20% stenosis. There is DONNA 3 flow. The remaining portion of the vessel has luminal irregularities (10).             The distal LCX has luminal irregularities. There is DONNA 3 flow.     - OM1:             The OM1 has luminal irregularities has a 30% stenosis. There is DONNA 3 flow. The remaining portion of the vessel has luminal irregularities (10).     - Right Coronary Artery:             The proximal RCA has luminal irregularities. There is DONNA 3 flow.             The mid RCA has a 20% stenosis. There is DONNA 3 flow. The remaining portion of the vessel has luminal irregularities (10).             The distal RCA has a 50% stenosis. There is DONNA 3 flow. The remaining portion of the vessel has luminal irregularities (10).     - Posterior Lateral Branch:             The PLB has a 40% stenosis. There is DONNA 3 flow. The remaining portion of the vessel has luminal irregularities (10).  Intervention       Mid LAD:              The lesion was successfully intervened. Post-stenosis of 0%, post-DONNA 3 flow and TMP grade 3. The vessel was accessed natively.  The following items were used: Blln Trek Rx 3.0 X 12, Stent Resolute Rx 4.00x15 (MARY) and Cath Ivus [facility]   [place].       D1:              The lesion was successfully intervened. Post-stenosis of 0%,  post-DONNA 3 flow and TMP grade 3. The vessel was accessed natively.  The following items were used: Cath Mini Trek 1.5 X 20 Rx, Blln Sc Euphora 2.0 X 10, Stent Resolute Rx 2.25x30   (MARY), Stent Resolute Rx 2.50x18 (MARY) and Cath Iv[facility] [place].

## 2020-10-10 NOTE — PROGRESS NOTES
Pharmacokinetic Initial Assessment: IV Vancomycin    Assessment/Plan:    Initiate intravenous vancomycin with loading dose of 1750 mg once followed by a maintenance dose of vancomycin 1500 mg IV every 24 hours  Desired empiric serum trough concentration is 10 to 20 mcg/mL  Draw vancomycin trough level 60 min prior to third dose on 10- at approximately 1700  Pharmacy will continue to follow and monitor vancomycin.      Please contact pharmacy at extension [phone] with any questions regarding this assessment.     Thank you for the consult,   Luiza Rockwell       Patient brief summary:  Doni Theodore is a 80 y.o. male initiated on antimicrobial therapy with IV Vancomycin for treatment of suspected  pneumonia    Drug Allergies:   Review of patient's allergies indicates:   Allergen Reactions    Penicillins Swelling       Actual Body Weight:   76.4 kg    Renal Function:   Estimated Creatinine Clearance: 61.2 mL/min (based on SCr of 0.9 mg/dL).,     Dialysis Method (if applicable):  N/A    CBC (last 72 hours):  Recent Labs   Lab Result Units 10/09/20  1617 10/10/20  0348 10/10/20  0713   WBC K/uL 11.33  --  9.50   Hemoglobin g/dL 13.8*  --  11.7*   Hemoglobin A1C %  --  6.8*  --    Hematocrit % 42.8  --  36.6*   Platelets K/uL 201  --  132*   Gran% % 83.7*  --   --    Lymph% % 6.4*  --   --    Mono% % 8.2  --   --    Eosinophil% % 0.6  --   --    Basophil% % 0.4  --   --    Differential Method  Automated  --   --        Metabolic Panel (last 72 hours):  Recent Labs   Lab Result Units 10/09/20  1617 10/09/20  1825 10/10/20  0348   Sodium mmol/L 132*  --  132*   Potassium mmol/L 4.5  --  4.1   Chloride mmol/L 96  --  97   CO2 mmol/L 24  --  23   Glucose mg/dL 177*  --  195*   Glucose, UA   --  3+*  --    BUN, Bld mg/dL 17  --  17   Creatinine mg/dL 1.0  --  0.9   Albumin g/dL 3.4*  --   --    Total Bilirubin mg/dL 0.9  --   --    Alkaline Phosphatase U/L 103  --   --    AST U/L 20  --   --    ALT U/L 17  --   --         Drug levels (last 3 results):  No results for input(s): VANCOMYCINRA, VANCOMYCINPE, VANCOMYCINTR in the last 72 hours.    Microbiologic Results:  Microbiology Results (last 7 days)       Procedure Component Value Units Date/Time    Blood culture x two cultures. Draw prior to antibiotics. [[id number]] Collected: 10/09/20 1617    Order Status: Completed Specimen: Blood from Peripheral, Antecubital, Right Updated: 10/10/20 1739     Blood Culture, Routine Gram stain aer bottle: Gram positive cocci in clusters resembling Staph       10/10/2020  17:36        Results called to and read back by: ASHLEY CHANG/JOHN    Narrative:      Aerobic and anaerobic    Urine Culture - High Risk **CANNOT BE ORDERED STAT** [[id number]] Collected: 10/09/20 1825    Order Status: Sent Specimen: Urine Updated: 10/10/20 1101    Blood culture x two cultures. Draw prior to antibiotics. [[id number]] Collected: 10/09/20 1626    Order Status: Completed Specimen: Blood from Peripheral, Forearm, Left Updated: 10/10/20 0312     Blood Culture, Routine No Growth to date    Narrative:      Aerobic and anaerobic    Group A Strep, Molecular [[id number]] Collected: 10/09/20 1720    Order Status: Completed Specimen: Throat Updated: 10/09/20 1747     Group A Strep, Molecular Negative

## 2020-10-10 NOTE — ASSESSMENT & PLAN NOTE
-A1c 2019 was 5.7  -Await repeat A1c  -Hold home glimepiride and metformin  -Treat with insulin sliding scale ac/hs for now  -Hypoglycemia protocol ordered

## 2020-10-10 NOTE — SUBJECTIVE & OBJECTIVE
Interval History: No acute events overnight.  Patient's primary complaint is dizziness.  Denies vertigo.  Denies obvious triggering factors except turning head towards the left - at which point he has diplopia.  Very minimal dry cough over last week.  No dysuria, skin redness, nasal congestion, diarrhea or headache.  Feels well at this time.  Denies cp and sob.  Appears weak.  All questions answered and patient had no further complaints.      Review of Systems   Constitutional: Negative for activity change, appetite change and fever.   HENT: Negative for congestion and dental problem.    Eyes: Negative for discharge and itching.   Respiratory: Positive for cough. Negative for apnea, chest tightness and shortness of breath.    Cardiovascular: Negative for chest pain and leg swelling.   Gastrointestinal: Negative for abdominal distention and abdominal pain.   Endocrine: Negative for cold intolerance and heat intolerance.   Genitourinary: Negative for difficulty urinating and dysuria.   Musculoskeletal: Negative for arthralgias and back pain.   Allergic/Immunologic: Negative for environmental allergies and food allergies.   Neurological: Positive for dizziness and weakness. Negative for facial asymmetry and light-headedness.   Hematological: Negative for adenopathy. Does not bruise/bleed easily.   Psychiatric/Behavioral: Negative for agitation and behavioral problems.     Objective:     Vital Signs (Most Recent):  Temp: 98.6 °F (37 °C) (10/10/20 0400)  Pulse: 102 (10/10/20 0930)  Resp: 19 (10/10/20 0930)  BP: (!) 143/75 (10/10/20 0930)  SpO2: (!) 92 % (10/10/20 0930) Vital Signs (24h Range):  Temp:  [98.2 °F (36.8 °C)-100.6 °F (38.1 °C)] 98.6 °F (37 °C)  Pulse:  [] 102  Resp:  [16-25] 19  SpO2:  [87 %-97 %] 92 %  BP: ()/(52-93) 143/75     Weight: 76.4 kg (168 lb 6.9 oz)  Body mass index is 26.38 kg/m².    Intake/Output Summary (Last 24 hours) at 10/10/2020 1024  Last data filed at 10/10/2020 0800  Gross per  24 hour   Intake 2200 ml   Output 900 ml   Net 1300 ml      Physical Exam  Vitals signs reviewed.   Constitutional:       General: He is not in acute distress.     Appearance: He is well-developed. He is not ill-appearing, toxic-appearing or diaphoretic.   HENT:      Head: Normocephalic and atraumatic.      Mouth/Throat:      Mouth: Mucous membranes are dry.   Eyes:      Extraocular Movements: Extraocular movements intact.      Pupils: Pupils are equal, round, and reactive to light.   Neck:      Musculoskeletal: Normal range of motion and neck supple.   Cardiovascular:      Rate and Rhythm: Normal rate and regular rhythm.      Heart sounds: Normal heart sounds.      Comments: Irregularly irregular - rate jumps easily from mind 90s to 120s.  Pulmonary:      Effort: Pulmonary effort is normal. No respiratory distress.      Comments: Faint bibasilar crackles.  No wheezing.  Abdominal:      General: Bowel sounds are normal. There is no distension.      Palpations: Abdomen is soft.      Tenderness: There is no abdominal tenderness.   Musculoskeletal: Normal range of motion.      Right lower leg: No edema.      Left lower leg: No edema.   Skin:     General: Skin is warm and dry.   Neurological:      Mental Status: He is alert and oriented to person, place, and time.      Cranial Nerves: No cranial nerve deficit.      Coordination: Coordination normal.      Comments: Diffusely weak - but patient states feels normal for him.   Psychiatric:         Behavior: Behavior normal.         Significant Labs: All pertinent labs within the past 24 hours have been reviewed.    Significant Imaging: I have reviewed and interpreted all pertinent imaging results/findings within the past 24 hours.

## 2020-10-10 NOTE — EICU
New Patient Evaluation    80 M history of hypertension, AAA, CAD s/p stent, afib on xarelto, permanent pacemaker in place, CVA, DM (HbA1C 5.7), hypothyroidism (TSH 1.3), FEMI on CPAP. Admitted 9/21-9/25 fro afib RVR.    He presented with left sided chest pain for several days, pleuritic in nature but relatively constant. He also has been having dry cough, sore throat with nasal discharge and generalized weakness for 1 week. Temp was 100.6 in the ED.    Ddimer elevated. CTPA negative for PE with small dependent pleural effusion, mild atelectasis, moderate pericardial effusion. WBC 11.33, 83 % segs and 6 % lymphs. Lactate 2.6. Patient has been started on broad spectrum antibiotics    · No clear source of infection but prudent to start antibiotics for now and deescalate once appropriate. Procal 0.1 making viral etiology a possibility.  · Consider to evaluate pericardial effusion if not done recently.

## 2020-10-10 NOTE — PROGRESS NOTES
VANCOMYCIN DOSING BY PHARMACY DISCONTINUATION NOTE    Doni Theodore is a 80 y.o. male who had been consulted for vancomycin dosing.    The pharmacy consult for vancomycin dosing has been discontinued.     Vancomycin Dosing by Pharmacy Consult will sign-off. Please reconsult if necessary. Thank you for allowing us to participate in this patient's care.       Sahara Szymanski  [phone]

## 2020-10-10 NOTE — PROGRESS NOTES
[facility] Hospital Medicine  Progress Note    Patient Name: Doni Theodore  MRN: [id number]  Patient Class: IP- Inpatient   Admission Date: 10/9/2020  Length of Stay: 1 days  Attending Physician: Gary Eaton MD  Primary Care Provider: Nic Mauro MD        Subjective:     Principal Problem:SIRS (systemic inflammatory response syndrome)        HPI:  Doni Theodore is a 80 y.o. male that (in part)  has a past medical history of A-fib, Anticoagulant long-term use, Arthritis, Coronary artery disease, Diabetes mellitus, Hypertension, Pacemaker, Sleep apnea, Stroke, and Thyroid disease.  has a past surgical history that includes Coronary angioplasty with stent (6/06); Cholecystectomy; Appendectomy; Hernia repair; Eye surgery; Cardiac pacemaker placement; sciatica; Ankle surgery; and Treatment of cardiac arrhythmia (7/19/2019). Presents to [facility] Emergency Department complaining of left-sided chest pain.  Worsened with deep inspiration.  Recently evaluated for atrial fibrillation at [facility]..  Associated symptoms include Mild dyspnea with exertion.  Denies syncope, edema, cyanosis fever, chills, or cough, however, he was febrile to 100.6° F in the emergency department.  No joint swelling or unilateral weakness.  No slurred speech, paresthesias, ataxia, vertigo, or tremors.  Acute onset today.  Frequency of multiple episodes of pain.  Reports compliance with his home medication regimen although some of his medications including digoxin were discontinued by home health.  History of persistent atrial fibrillation.  On chronic anticoagulation.  Was previously taking amiodarone, digoxin, and beta-blocker.    In the emergency department his heart rate was found to be febrile with a heart rate into the 120s and mild tachypnea.  EKG revealed atrial fibrillation with rapid ventricular response.  He was given a dose of metoprolol IV.  He was rate controlled  with this.  Routine laboratory studies, urinalysis, chest x-ray, and CT angiogram of the chest was performed.  No evidence of pneumonia by bilateral pleural effusions and some atelectasis were noted.  CRP markedly elevated.  Procalcitonin normal.  Lactic acid 2.1.  Digoxin level of 0.5.  Given recent hospitalization, levofloxacin was given due to the possibility of hospital community-acquired pneumonia.  Cultures were obtained prior to this.  Cardiology was consulted.    Hospital medicine has been asked to admit for further evaluation and treatment.     Overview/Hospital Course:  No notes on file    Interval History: No acute events overnight.  Patient's primary complaint is dizziness.  Denies vertigo.  Denies obvious triggering factors except turning head towards the left - at which point he has diplopia.  Very minimal dry cough over last week.  No dysuria, skin redness, nasal congestion, diarrhea or headache.  Feels well at this time.  Denies cp and sob.  Appears weak.  All questions answered and patient had no further complaints.      Review of Systems   Constitutional: Negative for activity change, appetite change and fever.   HENT: Negative for congestion and dental problem.    Eyes: Negative for discharge and itching.   Respiratory: Positive for cough. Negative for apnea, chest tightness and shortness of breath.    Cardiovascular: Negative for chest pain and leg swelling.   Gastrointestinal: Negative for abdominal distention and abdominal pain.   Endocrine: Negative for cold intolerance and heat intolerance.   Genitourinary: Negative for difficulty urinating and dysuria.   Musculoskeletal: Negative for arthralgias and back pain.   Allergic/Immunologic: Negative for environmental allergies and food allergies.   Neurological: Positive for dizziness and weakness. Negative for facial asymmetry and light-headedness.   Hematological: Negative for adenopathy. Does not bruise/bleed easily.   Psychiatric/Behavioral:  Negative for agitation and behavioral problems.     Objective:     Vital Signs (Most Recent):  Temp: 98.6 °F (37 °C) (10/10/20 0400)  Pulse: 102 (10/10/20 0930)  Resp: 19 (10/10/20 0930)  BP: (!) 143/75 (10/10/20 0930)  SpO2: (!) 92 % (10/10/20 0930) Vital Signs (24h Range):  Temp:  [98.2 °F (36.8 °C)-100.6 °F (38.1 °C)] 98.6 °F (37 °C)  Pulse:  [] 102  Resp:  [16-25] 19  SpO2:  [87 %-97 %] 92 %  BP: ()/(52-93) 143/75     Weight: 76.4 kg (168 lb 6.9 oz)  Body mass index is 26.38 kg/m².    Intake/Output Summary (Last 24 hours) at 10/10/2020 1024  Last data filed at 10/10/2020 0800  Gross per 24 hour   Intake 2200 ml   Output 900 ml   Net 1300 ml      Physical Exam  Vitals signs reviewed.   Constitutional:       General: He is not in acute distress.     Appearance: He is well-developed. He is not ill-appearing, toxic-appearing or diaphoretic.   HENT:      Head: Normocephalic and atraumatic.      Mouth/Throat:      Mouth: Mucous membranes are dry.   Eyes:      Extraocular Movements: Extraocular movements intact.      Pupils: Pupils are equal, round, and reactive to light.   Neck:      Musculoskeletal: Normal range of motion and neck supple.   Cardiovascular:      Rate and Rhythm: Normal rate and regular rhythm.      Heart sounds: Normal heart sounds.      Comments: Irregularly irregular - rate jumps easily from mind 90s to 120s.  Pulmonary:      Effort: Pulmonary effort is normal. No respiratory distress.      Comments: Faint bibasilar crackles.  No wheezing.  Abdominal:      General: Bowel sounds are normal. There is no distension.      Palpations: Abdomen is soft.      Tenderness: There is no abdominal tenderness.   Musculoskeletal: Normal range of motion.      Right lower leg: No edema.      Left lower leg: No edema.   Skin:     General: Skin is warm and dry.   Neurological:      Mental Status: He is alert and oriented to person, place, and time.      Cranial Nerves: No cranial nerve deficit.       "Coordination: Coordination normal.      Comments: Diffusely weak - but patient states feels normal for him.   Psychiatric:         Behavior: Behavior normal.         Significant Labs: All pertinent labs within the past 24 hours have been reviewed.    Significant Imaging: I have reviewed and interpreted all pertinent imaging results/findings within the past 24 hours.      Assessment/Plan:      * SIRS (systemic inflammatory response syndrome)  -Mr. Theodore was admitted to inpatient status  -On admit had fever, tachycardia and tachypnea but no obvious source of infection.  -Lactic acid was elevated but normal now.  -WBC normal  ESR minimally elevated.  D-dimer moderately elevated  Ferritin normal.  PCT normal.  -Flu negative.  Rapid covid negative.    -Doubt UTI as no symptoms.  UA showed many bacteria but few wbc and squamous cells.  Await culture.  -No cellulitis.  No headache or neck pain/stiffness.  No diarrhea.  -Possible early covid with false negative rapid test.  Continue covid isolation and repeat Covid PCR.  -Will continue rocephin for now pending urine culture.  (Noted PCN allergy but tolerating rocephin without difficulty, rash, redness or swelling).  Will stop vancomycin for now.  -OK to step down to the floor as no ICU needs at this time.      Atrial fibrillation with RVR  -Discharged med rec from [facility] 9/25 shows Praluent 75mg im q2wk, amiodarone  200qd, asa 81qd, plavix 75qd, digoxin 125qd, imdur 60qd, toprol 25qd, midodrine 5mg tid.  Unclear if patient has been taking these.  -Noted history of afib and in ER was in RVR.  -Better rate control with achieved with iv metoprolol in ER  -Patient notes recent hospitalization at [facility] and states "they got my medicines all messed up".  Continues on amiodarone.  Was previously on metoprolol and digoxin it seems.  -We have resumed home amiodarone 200mg po daily.  HR is generally well controlled, but with minimal activity it increases transiently into 120s.  -Will " start a low dose metoprolol today  -Will discuss digoxin with cardiology.  -Await input from Dr. Miguel with cardiology.  -Continue xarelto  -Monitor on telemetry.      Chronic anticoagulation  -Treatment as above.      Presence of permanent cardiac pacemaker  -History noted  -Should be ok to use metoprolol.      Coronary artery disease involving native coronary artery of native heart without angina pectoris  -History noted  -No chest pain.  Troponins negative  -Discharged med rec from [facility] 9/25 shows Praluent 75mg im q2wk, amiodarone  200qd, asa 81qd, plavix 75qd, digoxin 125qd, imdur 60qd, toprol 25qd, midodrine 5mg tid.  Unclear if patient has been taking these.  -Continue aspirin and plavix.  -Starting metoprolol for better rate control  -Will discuss praluent and imdur with cardiology.  -Check lipid panel in am as not on statin at home.      Dizziness  -Noted recent [facility] admit 9/21 and discharge 9/25.  Was admitted after fall getting up from bed feeling dizzy/lightheaded.  -Now notes dizziness x 1 week  -Patient thinks may be secondary to recent med changes after discharge from [facility]  -Could be secondary to poor rate control.  -Await cardiology input.  -Consult PT/OT.      Hypothyroidism  -TSH normal  -Continue home synthroid.      BPH (benign prostatic hyperplasia)  -Continue finasteride and tamsulosin      FEMI on CPAP  -History noted.      Essential hypertension  -BP normal to mildly elevated  -Start metoprolol  -Monitor closely.      Type 2 diabetes mellitus, controlled  -A1c 2019 was 5.7  -Await repeat A1c  -Hold home glimepiride and metformin  -Treat with insulin sliding scale ac/hs for now  -Hypoglycemia protocol ordered      History of CVA (cerebrovascular accident)  -History noted  -Continue aspirin and statin  -Check lipid panel in AM as not on statin at home.      Anemia of chronic disease  -Mild anemia Hb near baseline  -Hb has dropped since admit likely secondary to IV fluids and phlebotomy  -No  evidence of bleeding  -Ferritin normal.  Check Iron, b12 and folate  -Repeat cbc in AM.      VTE Risk Mitigation (From admission, onward)         Ordered     rivaroxaban tablet 20 mg  with dinner      10/10/20 0019     IP VTE HIGH RISK PATIENT  Once      10/10/20 0019     Place sequential compression device  Until discontinued      10/10/20 0019     Place KATIE hose  Until discontinued      10/10/20 0019     Reason for No Pharmacological VTE Prophylaxis  Once     Question:  Reasons:  Answer:  Already adequately anticoagulated on oral Anticoagulants    10/10/20 0019                Discharge Planning   ERICK:      Code Status: Full Code   Is the patient medically ready for discharge?:     Reason for patient still in hospital (select all that apply): Treatment             Gary Eaton MD  Department of Hospital Medicine   [facility]

## 2020-10-10 NOTE — PT/OT/SLP EVAL
Physical Therapy Evaluation    Patient Name:  Doni Theodore   MRN:  [id number]    Recommendations:     Discharge Recommendations:  home health PT(with family supervision)   Discharge Equipment Recommendations: none   Barriers to discharge: None    Assessment:     Doni Theodore is a 80 y.o. male admitted with a medical diagnosis of SIRS (systemic inflammatory response syndrome).  He presents with the following impairments/functional limitations:  weakness, impaired endurance, impaired functional mobilty, gait instability, impaired balance, decreased safety awareness.    Rehab Prognosis: Good; patient would benefit from acute skilled PT services to address these deficits and reach maximum level of function.    Recent Surgery: * No surgery found *      Plan:     During this hospitalization, patient to be seen 3 x/week to address the identified rehab impairments via gait training, therapeutic activities, therapeutic exercises and progress toward the following goals:    · Plan of Care Expires:  10/24/20    Subjective     Chief Complaint: Pt reported slight dizziness upon sitting EOB.  Patient/Family Comments/goals: Pt reported that he was dx with Alzheimer's ~5 years ago and that he beat it.  Pt agreeable to bedside chair.   Pain/Comfort:  · Pain Rating 1: 0/10      Living Environment:  Pt lives with grandson in a [facility] with no concerns.  Pt reported 2 sons live in the backyard.   Prior to admission, patients level of function was mod I with ambulation using rollator.  Pt reported that he was driving ~3 weeks ago.  Equipment used at home: rollator, wheelchair, grab bar, shower chair, CPAP.  Upon discharge, patient will have assistance from family.    Objective:     Communicated with nurse Yanez prior to session.  Patient found HOB elevated with peripheral IV, pulse ox (continuous), telemetry, bed alarm upon PT entry to room.    General Precautions: Standard, fall, diabetic, airborne, contact, droplet(r/o COVID)    Orthopedic Precautions:N/A   Braces: N/A     Exams:  · Cognitive Exam:  Patient was able to follow 2 step commands.   · Gross Motor Coordination:  WFL  · Postural Exam:  Patient presented with the following abnormalities:    · -       No postural abnormalities identified  · Sensation:    · -       Intact  light/touch BLE; Pt reported numbness to plantar aspect of B feet.  · Skin Integrity/Edema:      · -       Skin integrity: Visible skin intact  · -       Edema: None noted BLE  · BLE ROM: WFL  · BLE Strength: 4/5     Functional Mobility: Pt very pleasant and cooperative.  Pt eager to go home.    · Bed Mobility:     · Scooting: stand by assistance  · Supine to Sit: stand by assistance with HOB elevated   · Transfers:     · Sit to Stand:  contact guard assistance and minimum assistance with no AD and hand-held assist; Pt with minimal posterior LOB, required extra time to self-correct.    · Bed to Chair: contact guard assistance and minimum assistance with  no AD and hand-held assist  using  Step Transfer  · Gait: Pt ambulated ~40 ft with min A-CGA using no AD.  Pt with slight increased L hip ER, decreased step length, and decreased edward.  Pt reported no SOB.  Gait limited within room 2* COVID isolation.    · Balance: Pt with fair dynamic standing balance.    Therapeutic Activities and Exercises:  BLE supine therex x10 reps: AP, hip abd/add, and SLR    Pt encouraged to perform these supine LE therex throughout the day.  Pt able to recall and demonstrate again all 3 therex.      Pt educated on acute skilled PT services and goals.  Pt encouraged to call for nursing assistance with OOB activities.  Pt verbalized good understanding.     AM-PAC 6 CLICK MOBILITY  Total Score:20     Patient left up in chair reclined with all lines intact, call button in reach, chair alarm on and nurse Renata notified.    GOALS:   Multidisciplinary Problems     Physical Therapy Goals        Problem: Physical Therapy Goal    Goal Priority  Disciplines Outcome Goal Variances Interventions   Physical Therapy Goal     PT, PT/OT Ongoing, Progressing     Description: Goals to be met by: 10/24/20     Patient will increase functional independence with mobility by performin. Supine to sit with Supervision  2. Rolling to Left and Right with Supervision  3. Sit to stand transfer with Supervision   4. Bed to chair transfer with Supervision   5. Gait >50 feet with Supervision using Rolling Walker  6. Lower extremity exercise program 3 sets x10 reps per handout, with supervision                     History:     Past Medical History:   Diagnosis Date    A-fib 2019    Anticoagulant long-term use     Plavix (on hold for angiogram)    Arthritis     Coronary artery disease     pacemaker, stentsx2    Diabetes mellitus     Hypertension     Pacemaker     Sleep apnea     Stroke     Rt side affected / mild    Thyroid disease     hypothyroidism       Past Surgical History:   Procedure Laterality Date    ANKLE SURGERY      APPENDECTOMY      CARDIAC PACEMAKER PLACEMENT      CHOLECYSTECTOMY      CORONARY ANGIOPLASTY WITH STENT PLACEMENT      stents x 2 placed     EYE SURGERY      HERNIA REPAIR      ventral hernia repair    sciatica      sciatica procedure    TREATMENT OF CARDIAC ARRHYTHMIA  2019    Procedure: Cardioversion or Defibrillation;  Surgeon: Nic Miguel MD;  Location: [facility] CATH LAB;  Service: Cardiology;;       Time Tracking:     PT Received On: 10/10/20  PT Start Time: 1514     PT Stop Time: 1530  PT Total Time (min): 16 min     Billable Minutes: Evaluation 16 min co-eval with OT      Daniela Taylor, PT  10/10/2020

## 2020-10-10 NOTE — CARE UPDATE
Notified at 5:42PM that blood cultures are positive with [facility].  Had planned to discontinue vancomycin, but will resume pending speciation.  ?Contaminant?

## 2020-10-10 NOTE — PLAN OF CARE
Pt admitted as COVID rule out. Stable over night. No complaints of chest pain since arrival to ICU. Will  continue to monitor.     Problem: Fall Injury Risk  Goal: Absence of Fall and Fall-Related Injury  Outcome: Ongoing, Progressing     Problem: Adult Inpatient Plan of Care  Goal: Plan of Care Review  Outcome: Ongoing, Progressing  Goal: Patient-Specific Goal (Individualization)  Outcome: Ongoing, Progressing  Goal: Absence of Hospital-Acquired Illness or Injury  Outcome: Ongoing, Progressing  Goal: Optimal Comfort and Wellbeing  Outcome: Ongoing, Progressing  Goal: Readiness for Transition of Care  Outcome: Ongoing, Progressing  Goal: Rounds/Family Conference  Outcome: Ongoing, Progressing     Problem: Diabetes Comorbidity  Goal: Blood Glucose Level Within Desired Range  Outcome: Ongoing, Progressing     Problem: Adjustment to Illness (Sepsis/Septic Shock)  Goal: Optimal Coping  Outcome: Ongoing, Progressing     Problem: Bleeding (Sepsis/Septic Shock)  Goal: Absence of Bleeding  Outcome: Ongoing, Progressing     Problem: Glycemic Control Impaired (Sepsis/Septic Shock)  Goal: Blood Glucose Level Within Desired Range  Outcome: Ongoing, Progressing     Problem: Hemodynamic Instability (Sepsis/Septic Shock)  Goal: Effective Tissue Perfusion  Outcome: Ongoing, Progressing     Problem: Infection (Sepsis/Septic Shock)  Goal: Absence of Infection Signs/Symptoms  Outcome: Ongoing, Progressing     Problem: Nutrition Impaired (Sepsis/Septic Shock)  Goal: Optimal Nutrition Intake  Outcome: Ongoing, Progressing     Problem: Respiratory Compromise (Sepsis/Septic Shock)  Goal: Effective Oxygenation and Ventilation  Outcome: Ongoing, Progressing

## 2020-10-10 NOTE — HPI
Doni Theodore is a 80 y.o. male that (in part)  has a past medical history of A-fib, Anticoagulant long-term use, Arthritis, Coronary artery disease, Diabetes mellitus, Hypertension, Pacemaker, Sleep apnea, Stroke, and Thyroid disease.  has a past surgical history that includes Coronary angioplasty with stent (6/06); Cholecystectomy; Appendectomy; Hernia repair; Eye surgery; Cardiac pacemaker placement; sciatica; Ankle surgery; and Treatment of cardiac arrhythmia (7/19/2019). Presents to [facility] complaining of left-sided chest pain.  Worsened with deep inspiration.  Recently evaluated for atrial fibrillation at [facility]..  Associated symptoms include Mild dyspnea with exertion.  Denies syncope, edema, cyanosis fever, chills, or cough, however, he was febrile to 100.6° F in the emergency department.  No joint swelling or unilateral weakness.  No slurred speech, paresthesias, ataxia, vertigo, or tremors.  Acute onset today.  Frequency of multiple episodes of pain.  Reports compliance with his home medication regimen although some of his medications including digoxin were discontinued by home health.  History of persistent atrial fibrillation.  On chronic anticoagulation.  Was previously taking amiodarone, digoxin, and beta-blocker.    In the emergency department his heart rate was found to be febrile with a heart rate into the 120s and mild tachypnea.  EKG revealed atrial fibrillation with rapid ventricular response.  He was given a dose of metoprolol IV.  He was rate controlled with this.  Routine laboratory studies, urinalysis, chest x-ray, and CT angiogram of the chest was performed.  No evidence of pneumonia by bilateral pleural effusions and some atelectasis were noted.  CRP markedly elevated.  Procalcitonin normal.  Lactic acid 2.1.  Digoxin level of 0.5.  Given recent hospitalization, levofloxacin was given due to the possibility of hospital  community-acquired pneumonia.  Cultures were obtained prior to this.  Cardiology was consulted.    Hospital medicine has been asked to admit for further evaluation and treatment.

## 2020-10-10 NOTE — ASSESSMENT & PLAN NOTE
-History noted  -Continue aspirin and statin  -Check lipid panel in AM as not on statin at home.

## 2020-10-10 NOTE — ASSESSMENT & PLAN NOTE
-Mr. Theodore was admitted to inpatient status  -On admit had fever, tachycardia and tachypnea but no obvious source of infection.  -Lactic acid was elevated but normal now.  -WBC normal  ESR minimally elevated.  D-dimer moderately elevated  Ferritin normal.  PCT normal.  -Flu negative.  Rapid covid negative.    -Doubt UTI as no symptoms.  UA showed many bacteria but few wbc and squamous cells.  Await culture.  -No cellulitis.  No headache or neck pain/stiffness.  No diarrhea.  -Possible early covid with false negative rapid test.  Continue covid isolation and repeat Covid PCR.  -Will continue rocephin for now pending urine culture.  (Noted PCN allergy but tolerating rocephin without difficulty, rash, redness or swelling).  Will stop vancomycin for now.  -OK to step down to the floor as no ICU needs at this time.

## 2020-10-10 NOTE — H&P
[facility] Medicine  History & Physical    Patient Name: Doni Theodore  MRN: [id number]  Admission Date: 10/10/2020  Attending Physician: Nic Cadet MD, MPH      PCP:     Nic Mauro MD    CC:     Chief Complaint   Patient presents with    Chest Pain     Pt c/o LEFT-sided chest pain that radiates to L shoulder, weakness, dizziness x1 week. Pt reports hx of a-fib and [place] last week. Pain is 10/10. Pt has a pacemaker       HISTORY OF PRESENT ILLNESS:     Doni Theodore is a 80 y.o. male that (in part)  has a past medical history of A-fib, Anticoagulant long-term use, Arthritis, Coronary artery disease, Diabetes mellitus, Hypertension, Pacemaker, Sleep apnea, Stroke, and Thyroid disease.  has a past surgical history that includes Coronary angioplasty with stent (6/06); Cholecystectomy; Appendectomy; Hernia repair; Eye surgery; Cardiac pacemaker placement; sciatica; Ankle surgery; and Treatment of cardiac arrhythmia (7/19/2019). Presents to [facility] Emergency Department complaining of left-sided chest pain.  Worsened with deep inspiration.  Recently evaluated for atrial fibrillation at [facility]..  Associated symptoms include Mild dyspnea with exertion.  Denies syncope, edema, cyanosis fever, chills, or cough, however, he was febrile to 100.6° F in the emergency department.  No joint swelling or unilateral weakness.  No slurred speech, paresthesias, ataxia, vertigo, or tremors.  Acute onset today.  Frequency of multiple episodes of pain.  Reports compliance with his home medication regimen although some of his medications including digoxin were discontinued by home health.  History of persistent atrial fibrillation.  On chronic anticoagulation.  Was previously taking amiodarone, digoxin, and beta-blocker.    In the emergency department his heart rate was found to be febrile with a heart rate into the 120s and mild  tachypnea.  EKG revealed atrial fibrillation with rapid ventricular response.  He was given a dose of metoprolol IV.  He was rate controlled with this.  Routine laboratory studies, urinalysis, chest x-ray, and CT angiogram of the chest was performed.  No evidence of pneumonia by bilateral pleural effusions and some atelectasis were noted.  CRP markedly elevated.  Procalcitonin normal.  Lactic acid 2.1.  Digoxin level of 0.5.  Given recent hospitalization, levofloxacin was given due to the possibility of hospital community-acquired pneumonia.  Cultures were obtained prior to this.  Cardiology was consulted.    Hospital medicine has been asked to admit for further evaluation and treatment.       REVIEW OF SYSTEMS:     -- Constitutional: No fever or chills, however febrile in the ED  -- Eyes: No visual changes, diplopia, pain, tearing, blind spots, or discharge.   -- Ears, nose, mouth, throat, and face: No congestion, sore throat, epistaxis, d/c, bleeding gums, neck stiffness masses, or dental issues.  -- Respiratory:  As above in HPI.  -- Cardiovascular:  As above in HPI.   -- Gastrointestinal: No vomiting, abdominal pain, hematemesis, melena, dyspepsia, or change in bowel habits.  -- Genitourinary: No hematuria, dysuria, frequency, urgency, nocturia, polyuria, stones, or incontinence.  -- Integument/breast: No rash, pruritis, pigmentation changes, dryness, or changes in hair  -- Hematologic/lymphatic:  Positive for easy bruising.  Denies  lymphadenopathy.   -- Musculoskeletal:  Chronic arthralgias.  No focal deficits or weakness.  -- Neurological: No seizures, headaches, incoordination, paraesthesias, ataxia, vertigo, or tremors.  -- Behavioral/Psych: No auditory or visual hallucinations, depression, or suicidal/homicidal ideations.  -- Endocrine: No heat or cold intolerance, polydipsia, or unintentional weight gain / loss.  -- Allergy/Immunologic: No recurrent infections or adverse reaction to food, insects, or  difficulty breathing.        PAST MEDICAL / SURGICAL HISTORY:     Past Medical History:   Diagnosis Date    A-fib 2019    Anticoagulant long-term use     Plavix (on hold for angiogram)    Arthritis     Coronary artery disease     pacemaker, stentsx2    Diabetes mellitus     Hypertension     Pacemaker     Sleep apnea     Stroke     Rt side affected / mild    Thyroid disease     hypothyroidism     Past Surgical History:   Procedure Laterality Date    ANKLE SURGERY      APPENDECTOMY      CARDIAC PACEMAKER PLACEMENT      CHOLECYSTECTOMY      CORONARY ANGIOPLASTY WITH STENT PLACEMENT      stents x 2 placed     EYE SURGERY      HERNIA REPAIR      ventral hernia repair    sciatica      sciatica procedure    TREATMENT OF CARDIAC ARRHYTHMIA  2019    Procedure: Cardioversion or Defibrillation;  Surgeon: Nic Miguel MD;  Location: [facility] CATH LAB;  Service: Cardiology;;         FAMILY HISTORY:     Family History   Problem Relation Age of Onset    Cancer Mother     Heart disease Father     Cancer Sister          SOCIAL HISTORY:     Social History     Socioeconomic History    Marital status:      Spouse name: Not on file    Number of children: Not on file    Years of education: Not on file    Highest education level: Not on file   Occupational History    Not on file   Social Needs    Financial resource strain: Not on file    Food insecurity     Worry: Not on file     Inability: Not on file    Transportation needs     Medical: Not on file     Non-medical: Not on file   Tobacco Use    Smoking status: Heavy Tobacco Smoker     Packs/day: 0.50     Last attempt to quit: 1998     Years since quittin.3    Smokeless tobacco: Never Used   Substance and Sexual Activity    Alcohol use: No     Alcohol/week: 0.0 standard drinks    Drug use: No    Sexual activity: Never   Lifestyle    Physical activity     Days per week: Not on file     Minutes per session: Not on file     Stress: Not on file   Relationships    Social connections     Talks on phone: Not on file     Gets together: Not on file     Attends Hindu service: Not on file     Active member of club or organization: Not on file     Attends meetings of clubs or organizations: Not on file     Relationship status: Not on file   Other Topics Concern    Not on file   Social History Narrative    Not on file         ALLERGIES:       Review of patient's allergies indicates:   Allergen Reactions    Penicillins Swelling           HOME MEDICATIONS:     Prior to Admission medications    Medication Sig Start Date End Date Taking? Authorizing Provider   amiodarone (PACERONE) 200 MG Tab Take 200 mg by mouth every evening.     Historical Provider, MD   aspirin (ECOTRIN) 81 MG EC tablet Take 81 mg by mouth once daily.    Historical Provider, MD   atorvastatin (LIPITOR) 40 MG tablet  12/3/18   Historical Provider, MD   clopidogrel (PLAVIX) 75 mg tablet Take 75 mg by mouth once daily. 10/11/18   Historical Provider, MD   cyanocobalamin 2000 MCG tablet Take 2,500 mcg by mouth once daily.    Historical Provider, MD   fluticasone (FLONASE) 50 mcg/actuation nasal spray 1 spray by Each Nare route nightly as needed.     Historical Provider, MD   gabapentin (NEURONTIN) 300 MG capsule Take 300 mg by mouth 3 (three) times daily. 11/7/18   Historical Provider, MD   glimepiride (AMARYL) 4 MG tablet Take 4 mg by mouth before breakfast.    Historical Provider, MD   isosorbide mononitrate (IMDUR) 60 MG 24 hr tablet Take 60 mg by mouth once daily. 9/26/18   Historical Provider, MD   metFORMIN (GLUCOPHAGE) 1000 MG tablet Take 1,000 mg by mouth 2 (two) times daily with meals. 11/7/18   Historical Provider, MD   metoprolol succinate (TOPROL-XL) 25 MG 24 hr tablet  12/3/18   Historical Provider, MD   midodrine (PROAMATINE) 5 MG Tab  12/4/18   Historical Provider, MD   nitroGLYCERIN (NITROSTAT) 0.4 MG SL tablet Place 0.4 mg under the tongue every 5 (five)  minutes as needed for Chest pain.    Historical Provider, MD   ranitidine (ZANTAC) 300 MG tablet Take 300 mg by mouth every evening.    Historical Provider, MD   sertraline (ZOLOFT) 100 MG tablet Take 100 mg by mouth 2 (two) times daily. 10/19/18   Historical Provider, MD   SYNTHROID 175 mcg tablet  12/2/18   Historical Provider, MD   tamsulosin (FLOMAX) 0.4 mg Cp24 TAKE 1 CAPSULE AT BEDTIME. 8/18/16   JUSTIN Beauchamp MD   tiZANidine (ZANAFLEX) 4 MG tablet TAKE 1 TABLET AS NEEDED EVERY 8 HOURS AS NEEDED FOR MUSCLE RELAXER ORALLY 30 DAYS 11/28/18   Historical Provider, MD   traMADol (ULTRAM) 50 mg tablet Take 0.5 tablets (25 mg total) by mouth every 8 (eight) hours as needed (Sever pain not controlled by tylenol or tizanidine). 4/23/19   Trung Varma MD   vitamin D 185 MG Tab Take 2,000 mg by mouth once daily.     Historical Provider, MD   XARELTO 20 mg Tab TAKE 1 TABLET BY MOUTH ONCE A DAY WITH EVENING MEAL 11/16/18   Historical Provider, MD          HOSPITAL MEDICATIONS:     Scheduled Meds:    amiodarone  200 mg Oral Daily    aspirin  81 mg Oral Daily    cefTRIAXone (ROCEPHIN) IVPB  1 g Intravenous Q12H    famotidine  20 mg Oral BID    finasteride  5 mg Oral Daily    gabapentin  300 mg Oral TID    levothyroxine  175 mcg Oral Before breakfast    polyethylene glycol  17 g Oral Daily    rivaroxaban  20 mg Oral with dinner    sertraline  100 mg Oral BID    tamsulosin  1 capsule Oral Nightly    vancomycin (VANCOCIN) IVPB  1,500 mg Intravenous Q24H    vitamin D  2,000 Units Oral Daily     Continuous Infusions:     PRN Meds: acetaminophen, acetaminophen, dextrose 50%, dextrose 50%, glucagon (human recombinant), glucose, glucose, insulin aspart U-100, ketorolac, ondansetron, promethazine (PHENERGAN) IVPB, sodium chloride 0.9%, Pharmacy to dose Vancomycin consult **AND** vancomycin - pharmacy to dose      PHYSICAL EXAM:     Wt Readings from Last 1 Encounters:   10/10/20 0212 76.4 kg (168 lb 6.9 oz)    10/09/20 1546 71.7 kg (158 lb)     Body mass index is 26.38 kg/m².  Vitals:    10/10/20 0212 10/10/20 0230 10/10/20 0300 10/10/20 0330   BP:  132/76 (!) 140/80 (!) 130/90   Pulse:  103 94 108   Resp:  (!) 21 19 (!) 23   Temp:       TempSrc:       SpO2:  97% 96% 96%   Weight: 76.4 kg (168 lb 6.9 oz)      Height:              -- General appearance:  Chronically ill-appearing elderly male who is lying in bed.  No apparent distress.  well developed. appears stated age   -- Head: normocephalic, atraumatic   -- Eyes: conjunctivae clear. Extraocular muscles intact  -- Nose: Nares normal. Septum midline.   -- Mouth/Throat: lips, mucosa, and tongue normal. no throat erythema.   -- Neck: supple, symmetrical, trachea midline, no JVD and thyroid not grossly enlarged, appears symmetric  -- Lungs:  Faint bibasilar crackles upon inspiration.  Slight increased respiratory rate with normal respiratory effort. No use of accessory muscles.   -- Chest wall: no tenderness. equal bilateral chest rise   -- Heart:  Rapid irregularly irregular heart rate and rhythm. S1, S2 normal.  no click, rub or gallop   -- Abdomen: soft, non-tender, non-distended, non-tympanic; bowel sounds normal; no masses  -- Extremities: no cyanosis, clubbing or edema.   -- Pulses: 2+ and symmetric   -- Skin: color normal, texture normal, turgor normal. No rashes or lesions.   -- Neurologic: Normal strength and tone. No focal numbness or weakness. CNII-XII intact. [place] coma scale: eyes open spontaneously-4, oriented & converses-5, obeys commands-6.      LABORATORY STUDIES:     Recent Results (from the past 36 hour(s))   Blood culture x two cultures. Draw prior to antibiotics.    Collection Time: 10/09/20  4:17 PM    Specimen: Peripheral, Antecubital, Right; Blood   Result Value Ref Range    Blood Culture, Routine No Growth to date    CBC auto differential    Collection Time: 10/09/20  4:17 PM   Result Value Ref Range    WBC 11.33 3.90 - 12.70 K/uL    RBC 4.54  (L) 4.60 - 6.20 M/uL    Hemoglobin 13.8 (L) 14.0 - 18.0 g/dL    Hematocrit 42.8 40.0 - 54.0 %    Mean Corpuscular Volume 94 82 - 98 fL    Mean Corpuscular Hemoglobin 30.4 27.0 - 31.0 pg    Mean Corpuscular Hemoglobin Conc 32.2 32.0 - 36.0 g/dL    RDW 14.7 (H) 11.5 - 14.5 %    Platelets 201 150 - 350 K/uL    MPV 10.0 9.2 - 12.9 fL    Immature Granulocytes 0.7 (H) 0.0 - 0.5 %    Gran # (ANC) 9.5 (H) 1.8 - 7.7 K/uL    Immature Grans (Abs) 0.08 (H) 0.00 - 0.04 K/uL    Lymph # 0.7 (L) 1.0 - 4.8 K/uL    Mono # 0.9 0.3 - 1.0 K/uL    Eos # 0.1 0.0 - 0.5 K/uL    Baso # 0.04 0.00 - 0.20 K/uL    nRBC 0 0 /100 WBC    Gran% 83.7 (H) 38.0 - 73.0 %    Lymph% 6.4 (L) 18.0 - 48.0 %    Mono% 8.2 4.0 - 15.0 %    Eosinophil% 0.6 0.0 - 8.0 %    Basophil% 0.4 0.0 - 1.9 %    Differential Method Automated    Comprehensive metabolic panel    Collection Time: 10/09/20  4:17 PM   Result Value Ref Range    Sodium 132 (L) 136 - 145 mmol/L    Potassium 4.5 3.5 - 5.1 mmol/L    Chloride 96 95 - 110 mmol/L    CO2 24 23 - 29 mmol/L    Glucose 177 (H) 70 - 110 mg/dL    BUN, Bld 17 8 - 23 mg/dL    Creatinine 1.0 0.5 - 1.4 mg/dL    Calcium 9.6 8.7 - 10.5 mg/dL    Total Protein 7.8 6.0 - 8.4 g/dL    Albumin 3.4 (L) 3.5 - 5.2 g/dL    Total Bilirubin 0.9 0.1 - 1.0 mg/dL    Alkaline Phosphatase 103 55 - 135 U/L    AST 20 10 - 40 U/L    ALT 17 10 - 44 U/L    Anion Gap 12 8 - 16 mmol/L    eGFR if African American >60 >60 mL/min/1.73 m^2    eGFR if non African American >60 >60 mL/min/1.73 m^2   Lactic acid, plasma #1    Collection Time: 10/09/20  4:17 PM   Result Value Ref Range    Lactate (Lactic Acid) 2.6 (H) 0.5 - 2.2 mmol/L   Troponin I    Collection Time: 10/09/20  4:17 PM   Result Value Ref Range    Troponin I 0.018 0.000 - 0.026 ng/mL   Procalcitonin    Collection Time: 10/09/20  4:17 PM   Result Value Ref Range    Procalcitonin 0.10 <0.25 ng/mL   C-Reactive Protein    Collection Time: 10/09/20  4:17 PM   Result Value Ref Range    .1 (H) 0.0 -  8.2 mg/L   Ferritin    Collection Time: 10/09/20  4:17 PM   Result Value Ref Range    Ferritin 201 20.0 - 300.0 ng/mL   Lactate Dehydrogenase    Collection Time: 10/09/20  4:17 PM   Result Value Ref Range     110 - 260 U/L   CK    Collection Time: 10/09/20  4:17 PM   Result Value Ref Range     (H) 20 - 200 U/L   APTT    Collection Time: 10/09/20  4:17 PM   Result Value Ref Range    aPTT 33.1 (H) 21.0 - 32.0 sec   Protime-INR    Collection Time: 10/09/20  4:17 PM   Result Value Ref Range    Prothrombin Time 12.0 9.0 - 12.5 sec    INR 1.1 0.8 - 1.2   D-Dimer, Quantitative    Collection Time: 10/09/20  4:17 PM   Result Value Ref Range    D-Dimer 2.21 (H) <0.50 mg/L FEU   Blood culture x two cultures. Draw prior to antibiotics.    Collection Time: 10/09/20  4:26 PM    Specimen: Peripheral, Forearm, Left; Blood   Result Value Ref Range    Blood Culture, Routine No Growth to date    Group A Strep, Molecular    Collection Time: 10/09/20  5:20 PM    Specimen: Throat   Result Value Ref Range    Group A Strep, Molecular Negative Negative   POCT COVID-19 Rapid Screening    Collection Time: 10/09/20  5:58 PM   Result Value Ref Range    POC Rapid COVID Negative Negative     Acceptable Yes    POCT Influenza A/B Molecular    Collection Time: 10/09/20  5:58 PM   Result Value Ref Range    POC Molecular Influenza A Ag Negative Negative, Not Reported    POC Molecular Influenza B Ag Negative Negative, Not Reported     Acceptable Yes    Urinalysis, Reflex to Urine Culture Urine, Clean Catch    Collection Time: 10/09/20  6:25 PM    Specimen: Urine   Result Value Ref Range    Specimen UA Urine, Catheterized     Color, UA Yellow Yellow, Straw, Jillian    Appearance, UA Hazy (A) Clear    pH, UA 5.0 5.0 - 8.0    Specific Gravity, UA 1.020 1.005 - 1.030    Protein, UA 2+ (A) Negative    Glucose, UA 3+ (A) Negative    Ketones, UA Negative Negative    Bilirubin (UA) Negative Negative    Occult Blood UA  2+ (A) Negative    Nitrite, UA Negative Negative    Urobilinogen, UA 2.0-3.0 (A) <2.0 EU/dL    Leukocytes, UA Negative Negative   Urinalysis Microscopic    Collection Time: 10/09/20  6:25 PM   Result Value Ref Range    RBC, UA 5 (H) 0 - 4 /hpf    WBC, UA 2 0 - 5 /hpf    Bacteria Many (A) None-Occ /hpf    Yeast, UA Occasional (A) None    Squam Epithel, UA 3 /hpf    Hyaline Casts, UA 0 0-1/lpf /lpf    Microscopic Comment SEE COMMENT    Lactic acid, plasma #2    Collection Time: 10/09/20  8:22 PM   Result Value Ref Range    Lactate (Lactic Acid) 2.1 0.5 - 2.2 mmol/L   Brain natriuretic peptide    Collection Time: 10/09/20  8:32 PM   Result Value Ref Range    BNP 69 0 - 99 pg/mL   TSH    Collection Time: 10/09/20  8:32 PM   Result Value Ref Range    TSH 1.395 0.400 - 4.000 uIU/mL   Digoxin level    Collection Time: 10/09/20  8:32 PM   Result Value Ref Range    Digoxin Lvl 0.5 (L) 0.8 - 2.0 ng/mL       Lab Results   Component Value Date    INR 1.1 10/09/2020    INR 1.1 08/09/2019    INR 1.1 07/18/2019     Lab Results   Component Value Date    HGBA1C 5.7 (H) 07/19/2019    HGBA1C 5.7 (H) 07/19/2019     No results for input(s): POCTGLUCOSE in the last 72 hours.        IMAGING:     Imaging Results          CTA Chest Non-Coronary (PE Study) (Final result)  Result time 10/09/20 21:04:45    Final result by Rosalio Zazueta MD (10/09/20 21:04:45)                 Impression:      1. No evidence of pulmonary embolism.  2. Mild cardiomegaly, coronary atherosclerosis and moderate pericardial effusion.  Cardiology follow-up recommended.  3. Small bibasilar pleural effusions with mild associated atelectasis, right greater than left.      Electronically signed by: Rosalio Zazueta  Date:    10/09/2020  Time:    21:04             Narrative:    EXAMINATION:  CTA CHEST NON CORONARY    CLINICAL HISTORY:  Chest pain, acute, nonspecific;    TECHNIQUE:  Low dose axial images, sagittal and coronal reformations were obtained from the thoracic  inlet to the lung bases following the IV administration of 80 mL of Omnipaque 350.  Contrast timing was optimized to evaluate the pulmonary arteries.  MIP images were performed.    COMPARISON:  None    FINDINGS:  Pulmonary arteries:Pulmonary arteries are adequately enhanced.No filling defects to indicate pulmonary embolism.    Thoracic soft tissues: Unremarkable.    Aorta: Left-sided aortic arch.  No aneurysm or dissection.    Heart: The heart is mildly enlarged there is a moderate diffuse pericardial effusion present.  Cardiology follow-up recommended.    Coronary atherosclerosis.    Patti/Mediastinum: No pathologic zak enlargement.    Airways: Patent.    Lungs/Pleura: Small dependent bibasilar pleural effusions with mild associated atelectasis, right greater than left.    Esophagus: Unremarkable.    Upper Abdomen: No abnormality of the partially imaged upper abdomen.    Bones: No acute fracture. No suspicious lytic or sclerotic lesions.  Severe disc space narrowing in the lower thoracic spine.  Prominent anterior osteophytic spurring also.                               X-Ray Chest AP Portable (Final result)  Result time 10/09/20 16:59:37    Final result by Stanislav Gary MD (10/09/20 16:59:37)                 Impression:      Resolution of perihilar pulmonary opacities with no convincing evidence of acute pulmonic process radiographically.      Electronically signed by: Stanislav Gary  Date:    10/09/2020  Time:    16:59             Narrative:    EXAMINATION:  XR CHEST AP PORTABLE    CLINICAL HISTORY:  Sepsis;    TECHNIQUE:  Single frontal view of the chest was performed.    COMPARISON:  08/09/2019    FINDINGS:  Previous perihilar opacities in the lung parenchyma have cleared.  There is no new ground-glass or consolidation opacification.  The cardiac silhouette remains stable with mild by ventricular configuration.  Single right ventricular pacemaker lead is stable.    The bony structures remain intact with  high-riding humeral heads suggesting bilateral rotator cuff disease.                                  CONSULTS:     PHARMACY TO DOSE VANCOMYCIN CONSULT  IP CONSULT TO CARDIOLOGY       ASSESSMENT & PLAN:     Primary Diagnosis:  Sepsis    Active Hospital Problems    Diagnosis  POA    *Sepsis [A41.9]  Yes     Priority: 1 - High    Persistent atrial fibrillation [I48.19]  Yes     Priority: 2      Chronic    BPH (benign prostatic hyperplasia) [N40.0]  Yes     Chronic    Hypothyroidism [E03.9]  Yes     Chronic    FEMI on CPAP [G47.33, Z99.89]  Not Applicable     Chronic    Chronic anticoagulation [Z79.01]  Not Applicable     Chronic     Plavix (on hold for angiogram)      Essential hypertension [I10]  Yes     Chronic    Type 2 diabetes mellitus, controlled [E11.9]  Yes     Chronic    Anemia of chronic disease [D63.8]  Yes     Chronic    History of CVA (cerebrovascular accident) [Z86.73]  Not Applicable     Chronic    Presence of permanent cardiac pacemaker [Z95.0]  Yes     Chronic    Coronary artery disease involving native coronary artery of native heart without angina pectoris [I25.10]  Yes     Chronic      Resolved Hospital Problems   No resolved problems to display.     Sepsis; unclear etiology  · Febrile, tachycardic, and mildly tachypneic upon initial presentation; recent hospitalization at [facility] reportedly for atrial fibrillation  · CT angiogram of the chest performed without obvious evidence of infectious/pneumonia.  Although though he does have some atelectasis and bilateral pleural effusions.  Urinalysis shows many bacteria, but also squamous cells and only 2 wbc's.  Doubt UTI.  Blood and urine cultures were obtained.  Empiric antibiotics initiated with levofloxacin in the emergency department, however will continue with ceftriaxone given history of arrhythmia.  Low threshold to broaden antibiotic coverage should he remain febrile.  Trend cultures.  Patient had pleuritic chest  pain.    Atrial fibrillation with rapid ventricular response  · Currently rate controlled after being given IV fluids and single dose of metoprolol IV in the emergency department  · Maintain with oral amiodarone and restart beta-blocker when clinically appropriate and sepsis has resolved.  Patient was previously on digoxin which was discontinued by home health.  · Monitor on telemetry  · Maintain magnesium around 2.0  · Maintain potassium around 4.0  · Cardiology consult  · Continue chronic anticoagulation with Xarelto and Plavix    Chronic anticoagulation  · For treatment of atrial fibrillation and cerebrovascular disease  · No evidence of acute blood loss   · Continue Xarelto and Plavix.    Diabetes Mellitus Type 2  · Blood glucose is  in acceptable range at this time  · Maintain w/ subcutaneous insulin management order set  · Hold oral diabetic meds  · ADA 1800 kcal diet  · BG goal while in patient is <180mg/dL  · HgA1c = Pending    Essential Hypertension  · Goal while inpatient is a systolic blood pressure less than 160mmHg  · BP in acceptable range at this time  · Continue current home regimen with hold parameters  · PRN antihypertensives available    Cerebral vascular disease  · No evidence of acute stroke at this time  · Maintain adequate blood pressure control  · Heart healthy diet  · Aspirin /  plavix  · Statin    Coronary artery disease  · No evidence of acute coronary syndrome at this time  · Maintain adequate blood pressure control  · Heart healthy diet  · Aspirin / Plavix  · Statin    Thyroid dysfunction   · Clinically, patient is euthyroid   · Chemically, undetermined  · Obtain TSH, free T3, and free T4  · Continue current regimen    VTE Risk Mitigation (From admission, onward)         Ordered     rivaroxaban tablet 20 mg  with dinner      10/10/20 0019     IP VTE HIGH RISK PATIENT  Once      10/10/20 0019     Place sequential compression device  Until discontinued      10/10/20 0019     Place KATIE hose   Until discontinued      10/10/20 0019     Reason for No Pharmacological VTE Prophylaxis  Once     Question:  Reasons:  Answer:  Already adequately anticoagulated on oral Anticoagulants    10/10/20 0019                  Adult PRN medications available   DVT prophylaxis given       DISPOSITION:     Will admit to the Hospital Medicine service for further evaluation and treatment.    Chart reviewed and updated where applicable.    High Risk Conditions:  Patient has a condition that poses threat to life and bodily function:  Atrial fibrillation rapid ventricular response.  Sepsis.      ===============================================================    Nic Cadet MD, MPH  Department of Hospital Medicine   [facility]  [phone] pg  (7pm - 6am)          This note is dictated using [employer] voice recognition software.  There are word recognition mistakes that are occasionally missed on review.

## 2020-10-10 NOTE — ASSESSMENT & PLAN NOTE
-Noted recent [facility] admit 9/21 and discharge 9/25.  Was admitted after fall getting up from bed feeling dizzy/lightheaded.  -Now notes dizziness x 1 week  -Patient thinks may be secondary to recent med changes after discharge from [facility]  -Could be secondary to poor rate control.  -Await cardiology input.  -Consult PT/OT.

## 2020-10-10 NOTE — HPI
80 y.o. male that (in part)  has a past medical history of A-fib, Anticoagulant long-term use, Arthritis, Coronary artery disease, Diabetes mellitus, Hypertension, Pacemaker, Sleep apnea, Stroke, and Thyroid disease.  has a past surgical history that includes Coronary angioplasty with stent (6/06); Cholecystectomy; Appendectomy; Hernia repair; Eye surgery; Cardiac pacemaker placement; sciatica; Ankle surgery; and Treatment of cardiac arrhythmia (7/19/2019). Presents to [facility] Emergency Department complaining of left-sided chest pain.  Worsened with deep inspiration.  Recently evaluated for atrial fibrillation at [facility]..  Associated symptoms include Mild dyspnea with exertion.  Denies syncope, edema, cyanosis fever, chills, or cough, however, he was febrile to 100.6° F in the emergency department.  No joint swelling or unilateral weakness.  No slurred speech, paresthesias, ataxia, vertigo, or tremors.  Acute onset today.  Frequency of multiple episodes of pain.  Reports compliance with his home medication regimen although some of his medications including digoxin were discontinued by home health.  History of persistent atrial fibrillation.  On chronic anticoagulation.  Was previously taking amiodarone, digoxin, and beta-blocker.  In the emergency department his heart rate was found to be febrile with a heart rate into the 120s and mild tachypnea.  EKG revealed atrial fibrillation with rapid ventricular response.  He was given a dose of metoprolol IV.  He was rate controlled with this.  Routine laboratory studies, urinalysis, chest x-ray, and CT angiogram of the chest was performed.  No evidence of pneumonia by bilateral pleural effusions and some atelectasis were noted.  CRP markedly elevated.  Procalcitonin normal.  Lactic acid 2.1.  Digoxin level of 0.5.  Given recent hospitalization, levofloxacin was given due to the possibility of hospital community-acquired  pneumonia.  Cultures were obtained prior to this.  Cardiology was consulted.    Pt follows with Dr. Guo at [facility], last seen 7/2020, has hx of Persistent AF on amio/Xarelto and has PPM.  Also with CAD s/p PCI.    Cardiology is consulted for assistant with atrial fibrillation/rapid ventricular response.  The patient has a previous history of VERITO guided cardioversion back in August 2019, however week after that he was readmitted with recurrent atrial fibrillation, it appears that a strategy of rate control and anticoagulation has been undertaken.  He has currently been admitted to the intensive care unit for SIRS without a source of infection.  COVID-19 rule out is pending.  Face-to-face examination and interview will be deferred pending COVID-19 rule out.  Nevertheless, the patient's hemodynamic status is much improved on low-dose metoprolol b.i.d..  His medication list includes amiodarone, digoxin, aspirin, and Plavix.  The patient's CAD appears to be stable in his troponin is negative and his EKG is unchanged.  Per Dr. Guo's last note, the patient should be on Plavix and Xarelto, and his aspirin has been discontinued.  The patient also has a Medtronic pacemaker.  The RVR is likely being driven by the patient's underlying medical issues.  I do not plan repeat VERITO guided cardioversion.

## 2020-10-10 NOTE — PLAN OF CARE
Problem: Physical Therapy Goal  Goal: Physical Therapy Goal  Description: Goals to be met by: 10/24/20     Patient will increase functional independence with mobility by performin. Supine to sit with Supervision  2. Rolling to Left and Right with Supervision  3. Sit to stand transfer with Supervision   4. Bed to chair transfer with Supervision   5. Gait >50 feet with Supervision using Rolling Walker  6. Lower extremity exercise program 3 sets x10 reps per handout, with supervision    Outcome: Ongoing, Progressing    Pt ambulated ~40 ft within room with A.

## 2020-10-10 NOTE — ASSESSMENT & PLAN NOTE
-Mild anemia Hb near baseline  -Hb has dropped since admit likely secondary to IV fluids and phlebotomy  -No evidence of bleeding  -Ferritin normal.  Check Iron, b12 and folate  -Repeat cbc in AM.

## 2020-10-10 NOTE — CONSULTS
[facility]  Cardiology  Consult Note    Patient Name: Doni Theodore  MRN: [id number]  Admission Date: 10/9/2020  Hospital Length of Stay: 1 days  Code Status: Full Code   Attending Provider: Gray Eaton MD   Consulting Provider: Nic Miguel MD  Primary Care Physician: Nic Mauro MD  Principal Problem:SIRS (systemic inflammatory response syndrome)    Patient information was obtained from patient and ER records.     Inpatient consult to Cardiology  Consult performed by: Nic Miguel MD  Consult ordered by: Nic Cadet MD  Reason for consult: AF/RVR        Subjective:     Chief Complaint:  CP     HPI:   80 y.o. male that (in part)  has a past medical history of A-fib, Anticoagulant long-term use, Arthritis, Coronary artery disease, Diabetes mellitus, Hypertension, Pacemaker, Sleep apnea, Stroke, and Thyroid disease.  has a past surgical history that includes Coronary angioplasty with stent (6/06); Cholecystectomy; Appendectomy; Hernia repair; Eye surgery; Cardiac pacemaker placement; sciatica; Ankle surgery; and Treatment of cardiac arrhythmia (7/19/2019). Presents to [facility] Emergency Department complaining of left-sided chest pain.  Worsened with deep inspiration.  Recently evaluated for atrial fibrillation at [facility]..  Associated symptoms include Mild dyspnea with exertion.  Denies syncope, edema, cyanosis fever, chills, or cough, however, he was febrile to 100.6° F in the emergency department.  No joint swelling or unilateral weakness.  No slurred speech, paresthesias, ataxia, vertigo, or tremors.  Acute onset today.  Frequency of multiple episodes of pain.  Reports compliance with his home medication regimen although some of his medications including digoxin were discontinued by home health.  History of persistent atrial fibrillation.  On chronic anticoagulation.  Was previously taking amiodarone, digoxin, and  beta-blocker.  In the emergency department his heart rate was found to be febrile with a heart rate into the 120s and mild tachypnea.  EKG revealed atrial fibrillation with rapid ventricular response.  He was given a dose of metoprolol IV.  He was rate controlled with this.  Routine laboratory studies, urinalysis, chest x-ray, and CT angiogram of the chest was performed.  No evidence of pneumonia by bilateral pleural effusions and some atelectasis were noted.  CRP markedly elevated.  Procalcitonin normal.  Lactic acid 2.1.  Digoxin level of 0.5.  Given recent hospitalization, levofloxacin was given due to the possibility of hospital community-acquired pneumonia.  Cultures were obtained prior to this.  Cardiology was consulted.    Pt follows with Dr. Guo at [facility], last seen 7/2020, has hx of Persistent AF on amio/Xarelto and has PPM.  Also with CAD s/p PCI.    Cardiology is consulted for assistant with atrial fibrillation/rapid ventricular response.  The patient has a previous history of VERITO guided cardioversion back in August 2019, however week after that he was readmitted with recurrent atrial fibrillation, it appears that a strategy of rate control and anticoagulation has been undertaken.  He has currently been admitted to the intensive care unit for SIRS without a source of infection.  COVID-19 rule out is pending.  Face-to-face examination and interview will be deferred pending COVID-19 rule out.  Nevertheless, the patient's hemodynamic status is much improved on low-dose metoprolol b.i.d..  His medication list includes amiodarone, digoxin, aspirin, and Plavix.  The patient's CAD appears to be stable in his troponin is negative and his EKG is unchanged.  Per Dr. Guo's last note, the patient should be on Plavix and Xarelto, and his aspirin has been discontinued.  The patient also has a Medtronic pacemaker.  The RVR is likely being driven by the patient's underlying medical issues.  I do not plan repeat  VERITO guided cardioversion.        Past Medical History:   Diagnosis Date    A-fib 7/18/2019    Anticoagulant long-term use     Plavix (on hold for angiogram)    Arthritis     Coronary artery disease     pacemaker, stentsx2    Diabetes mellitus     Hypertension     Pacemaker     Sleep apnea     Stroke     Rt side affected / mild    Thyroid disease     hypothyroidism       Past Surgical History:   Procedure Laterality Date    ANKLE SURGERY      APPENDECTOMY      CARDIAC PACEMAKER PLACEMENT      CHOLECYSTECTOMY      CORONARY ANGIOPLASTY WITH STENT PLACEMENT  6/06    stents x 2 placed     EYE SURGERY      HERNIA REPAIR      ventral hernia repair    sciatica      sciatica procedure    TREATMENT OF CARDIAC ARRHYTHMIA  7/19/2019    Procedure: Cardioversion or Defibrillation;  Surgeon: Nic Miguel MD;  Location: [facility] CATH LAB;  Service: Cardiology;;       Review of patient's allergies indicates:   Allergen Reactions    Penicillins Swelling       No current facility-administered medications on file prior to encounter.      Current Outpatient Medications on File Prior to Encounter   Medication Sig    aspirin (ECOTRIN) 81 MG EC tablet Take 81 mg by mouth once daily.    fentaNYL (DURAGESIC) 25 mcg/hr Place 1 patch onto the skin every 72 hours.    fluticasone (FLONASE) 50 mcg/actuation nasal spray 1 spray by Each Nare route nightly as needed.     gabapentin (NEURONTIN) 300 MG capsule Take 300 mg by mouth 3 (three) times daily.    glimepiride (AMARYL) 4 MG tablet Take 8 mg by mouth before breakfast.     levocetirizine (XYZAL) 5 MG tablet Take 5 mg by mouth every evening.    metFORMIN (GLUCOPHAGE) 1000 MG tablet Take 1,000 mg by mouth 2 (two) times daily with meals.    ranitidine (ZANTAC) 300 MG tablet Take 300 mg by mouth 2 (two) times daily.     sertraline (ZOLOFT) 100 MG tablet Take 100 mg by mouth 2 (two) times daily.    SYNTHROID 175 mcg tablet 175 mcg once daily.     tamsulosin (FLOMAX)  0.4 mg Cp24 TAKE 1 CAPSULE AT BEDTIME.    vitamin D 185 MG Tab Take 2,000 Units by mouth once daily.     XARELTO 20 mg Tab TAKE 1 TABLET BY MOUTH ONCE A DAY WITH EVENING MEAL    cyanocobalamin 2000 MCG tablet Take 2,500 mcg by mouth once daily.    finasteride (PROSCAR) 5 mg tablet Take 1 tablet (5 mg total) by mouth once daily.    midodrine (PROAMATINE) 5 MG Tab     nitroGLYCERIN (NITROSTAT) 0.4 MG SL tablet Place 0.4 mg under the tongue every 5 (five) minutes as needed for Chest pain.    traMADol (ULTRAM) 50 mg tablet Take 0.5 tablets (25 mg total) by mouth every 8 (eight) hours as needed (Sever pain not controlled by tylenol or tizanidine).     Family History     Problem Relation (Age of Onset)    Cancer Mother, Sister    Heart disease Father        Tobacco Use    Smoking status: Heavy Tobacco Smoker     Packs/day: 0.50     Last attempt to quit: 1998     Years since quittin.3    Smokeless tobacco: Never Used   Substance and Sexual Activity    Alcohol use: No     Alcohol/week: 0.0 standard drinks    Drug use: No    Sexual activity: Never     Review of Systems   Unable to perform ROS: other (pending COVID 19 r/o)     Objective:     Vital Signs (Most Recent):  Temp: 98.4 °F (36.9 °C) (10/10/20 0800)  Pulse: (!) 126 (10/10/20 1103)  Resp: (!) 22 (10/10/20 1030)  BP: (!) 142/75 (10/10/20 1103)  SpO2: (!) 92 % (10/10/20 1030) Vital Signs (24h Range):  Temp:  [98.2 °F (36.8 °C)-100.6 °F (38.1 °C)] 98.4 °F (36.9 °C)  Pulse:  [] 126  Resp:  [16-25] 22  SpO2:  [87 %-97 %] 92 %  BP: ()/(52-93) 142/75     Weight: 76.4 kg (168 lb 6.9 oz)  Body mass index is 26.38 kg/m².    SpO2: (!) 92 %  O2 Device (Oxygen Therapy): nasal cannula      Intake/Output Summary (Last 24 hours) at 10/10/2020 1151  Last data filed at 10/10/2020 0800  Gross per 24 hour   Intake 2200 ml   Output 900 ml   Net 1300 ml       Lines/Drains/Airways     Peripheral Intravenous Line                 Peripheral IV - Single Lumen  10/09/20 2219 20 G Right Antecubital less than 1 day         Peripheral IV - Single Lumen 10/10/20 22 G Left Antecubital less than 1 day                Physical Exam Exam deferred today pending COVID19 r/o, pls see Dr. Eaton's exam for details    Current Medications:   amiodarone  200 mg Oral Daily    aspirin  81 mg Oral Daily    cefTRIAXone (ROCEPHIN) IVPB  1 g Intravenous Q12H    famotidine  20 mg Oral BID    finasteride  5 mg Oral Daily    gabapentin  300 mg Oral TID    levothyroxine  175 mcg Oral Before breakfast    metoprolol tartrate  25 mg Oral BID    polyethylene glycol  17 g Oral Daily    rivaroxaban  20 mg Oral with dinner    sertraline  100 mg Oral BID    tamsulosin  1 capsule Oral Nightly    vitamin D  2,000 Units Oral Daily       acetaminophen, acetaminophen, dextrose 50%, dextrose 50%, glucagon (human recombinant), glucose, glucose, insulin aspart U-100, ketorolac, nitroGLYCERIN, ondansetron, promethazine (PHENERGAN) IVPB, sodium chloride 0.9%    Laboratory (all labs reviewed):  CBC:  Recent Labs   Lab 08/09/19  1655 08/10/19  0408 08/11/19  0912 10/09/20  1617 10/10/20  0713   WBC 7.91 7.10 7.90 11.33 9.50   Hemoglobin 11.5 L 10.7 L 11.1 L 13.8 L 11.7 L   Hematocrit 37.1 L 34.3 L 35.7 L 42.8 36.6 L   Platelets 255 228 230 201 132 L       CHEMISTRIES:  Recent Labs   Lab 04/22/19  2206  07/20/19  0350 08/01/19  1717 08/09/19  1655 08/10/19  0408 08/11/19  0912 10/09/20  1617 10/10/20  0348   Glucose 193 H   < > 101 57 L 154 H 117 H  113 H 162 H 177 H 195 H   Sodium 139   < > 141 140 139 138  139 136 132 L 132 L   Potassium 4.5   < > 3.3 L 3.2 L 4.6 4.1  4.1 3.8 4.5 4.1   BUN, Bld 22   < > 12 14 24 H 20  20 10 17 17   Creatinine 1.0   < > 0.9 0.8 1.3 1.0  0.9 0.8 1.0 0.9   eGFR if  >60   < > >60 >60 >60 >60  >60 >60 >60 >60   eGFR if non African American >60   < > >60 >60 52 A >60  >60 >60 >60 >60   Calcium 9.2   < > 9.3 8.0 L 9.1 8.7  8.5 L 8.9 9.6 8.4 L   Magnesium  1.3 L  --  1.5 L 1.3 L 1.4 L 1.6  --   --   --     < > = values in this interval not displayed.       CARDIAC BIOMARKERS:  Recent Labs   Lab 08/09/19 1655 08/09/19  2252 08/10/19  0408 10/09/20  1617 10/10/20  0348   CPK  --   --   --  276 H  --    Troponin I 0.033 H 0.032 H 0.026 0.018 0.014       COAGS:  Recent Labs   Lab 10/23/17  0705 10/24/17  0853 07/18/19 2047 08/09/19  1654 10/09/20  1617   INR 1.2 1.2 1.1 1.1 1.1       LIPIDS/LFTS:  Recent Labs   Lab 07/19/19 0758 07/20/19  0350 08/01/19  1717 08/09/19  1655 08/10/19  0408 10/09/20  1617   Cholesterol 85 L  --   --   --   --   --    Triglycerides 61  --   --   --   --   --    HDL 46  --   --   --   --   --    LDL Cholesterol 26.8 L  --   --   --   --   --    Non-HDL Cholesterol 39  --   --   --   --   --    AST  --  15 19 34 29 20   ALT  --  11 13 22 20 17       BNP:  Recent Labs   Lab 04/22/19 2206 07/18/19 2047 08/01/19 1717 08/09/19  1654 10/09/20  2032    H 366 H 240 H 183 H 69       TSH:  Recent Labs   Lab 07/18/19  2047 08/01/19  1717 10/09/20  2032   TSH 13.841 H 12.396 H 1.395       Free T4:  Recent Labs   Lab 07/18/19  2047 08/01/19  1717 10/10/20  0348   Free T4 1.05 0.88 1.18       Diagnostic Results:  ECG (personally reviewed and interpreted tracing(s)):  10/9/20 1544 , RBBB/LAD, similar to 8/9/19    Chest X-Ray (personally reviewed and interpreted image(s)): 10/9/20 NAD, mild CMeg, L PPM 1 lead    CTA chest 10/9/20  1. No evidence of pulmonary embolism.  2. Mild cardiomegaly, coronary atherosclerosis and moderate pericardial effusion.  Cardiology follow-up recommended.  3. Small bibasilar pleural effusions with mild associated atelectasis, right greater than left.    Echo: 9/24/20 ([facility]/care everywhere)    1. Mildly decreased left ventricular systolic function. Left ventricular ejection fraction is estimated at 40-45%.      2. Rhythm precludes evaluation of diastolic function.     3. Normal right ventricular size. Normal  right ventricular systolic function. Pacemaker wire visualized in the right ventricle.      4. Mildly increased left atrial size.     5. Mild mitral valve regurgitation.     6. Trivial pericardial effusion.     7. Dilated  ascending aorta measuring 4.2cm.     Carotid US 3/2020 (Care everywhere, per [facility] note 7/30/20)  CAROTIDS: Right Carotid - Doppler peak velocities and ratio are consistent with a right internal carotid stenosis of 16-39% Left Carotid - Doppler peak velocities and ratio are consistent with a left internal carotid stenosis of 16-39%     VERITO/DCCV 7/19/19  LVEF 40-45%, mild global HK  Normal valves  Mild-mod MR  Mild TR  Mild AI  No LA/MAURICIO thrombus  Successful DCCV AF->NSR 360J x1  Pt tolerated procedure well without complications  Plan:  Observe in ICU  Cont Xarelto 20mg qd  Start amio load  Dispo planning appropriate  Pt to follow up with me in 1-2 weeks.     Cath: 10/23/17   B. Summary/Post-Operative Diagnosis    [id number].    70% mid LAD ISR; FFR=0.79.     of D1.    Successful PCI of mid LAD with 4X15 MARY.    Successful PCI of D1 with 2.25X30 and 2.5X18 MARY.    IVUS utilized for stent sizing.  D. Hemodynamic Results  LVEDP (Pre): 22 mmHg  E. Angiographic Results       Patient has a right dominant coronary artery.      - Left Main Coronary Artery:             The LM has luminal irregularities. There is DONNA 3 flow.     - Left Anterior Descending Artery:             The proximal LAD has a 10% stenosis. There is DONNA 3 flow. The remaining portion of the vessel has luminal irregularities (10).             The mid LAD has a 70% stenosis. There is DONNA 3 flow. FFR was 0.79. The remaining portion of the vessel has luminal irregularities (10).                     Lesion Details:   The length is 10-20 mm, eccentric shape, moderate proximal tortuosity, segment angulation of <45 degrees, irregular contour, mild to moderate calcification. No bifurcation is present. The minimum luminal diameter is    0.5 millimeters.             The distal LAD has a 50% stenosis. There is DONNA 3 flow. The remaining portion of the vessel is of small caliber.     - D1:             The D1 has chronic total occlusion. There is DONNA 0 flow. There is collateral flow from the PLB (faint). The remaining portion of the vessel has luminal irregularities (10).                     Lesion Details:   This is a Type C lesion.  The length is 40mm, eccentric shape, moderate proximal tortuosity, segment angulation of 45-90 degrees, irregular contour, mild to moderate calcification. Bifurcation is present. The minimum   luminal diameter is 0 millimeters.     - Left Circumflex Artery:             The proximal LCX has a 20% stenosis. There is DONNA 3 flow. The remaining portion of the vessel has luminal irregularities (10).             The distal LCX has luminal irregularities. There is DONNA 3 flow.     - OM1:             The OM1 has luminal irregularities has a 30% stenosis. There is DONNA 3 flow. The remaining portion of the vessel has luminal irregularities (10).     - Right Coronary Artery:             The proximal RCA has luminal irregularities. There is DONNA 3 flow.             The mid RCA has a 20% stenosis. There is DONNA 3 flow. The remaining portion of the vessel has luminal irregularities (10).             The distal RCA has a 50% stenosis. There is DONNA 3 flow. The remaining portion of the vessel has luminal irregularities (10).     - Posterior Lateral Branch:             The PLB has a 40% stenosis. There is DONNA 3 flow. The remaining portion of the vessel has luminal irregularities (10).  Intervention       Mid LAD:              The lesion was successfully intervened. Post-stenosis of 0%, post-DONNA 3 flow and TMP grade 3. The vessel was accessed natively.  The following items were used: Blln Trek Rx 3.0 X 12, Stent Resolute Rx 4.00x15 (MARY) and Cath [facility]   [place].       D1:              The lesion was successfully intervened.  Post-stenosis of 0%, post-DONNA 3 flow and TMP grade 3. The vessel was accessed natively.  The following items were used: Cath Mini Trek 1.5 X 20 Rx, Blln Sc Euphora 2.0 X 10, Stent Resolute Rx 2.25x30   (MARY), Stent Resolute Rx 2.50x18 (MARY) and Cath Iv[facility] [place].      Assessment and Plan:     * SIRS (systemic inflammatory response syndrome)  Eval/mgmt per IM  Repeat COVID 19 pending  Likely driving AF/RVR    Atrial fibrillation with RVR  Pt has persistent/chronic AF with episodic inc VR (per Dr. Guo's last note 7/30/20) noted on his PPM interrogations.  RVR noted on arrival, now better controlled.  He is maintained on amio/Xarelto  Agree with low dose BID metoprolol, if BP drops, can consider using digoxin/amio instead, and I will stop amio for now (pt alos had prior issue with amio intol, ?elev LFTs in the past).  No plan for repeat VERITO/DCCV.    Coronary artery disease involving native coronary artery of native heart without angina pectoris  S/p PCI, last in 2017  Trop/EKG neg  Cont Plavix, stop ASA with concurrent Xarelto  Cont metoprolol  Pt on praluent as outpatient    Type 2 diabetes mellitus, controlled  Per IM    Presence of permanent cardiac pacemaker  Medtronic, followed by Dr. Guo    Essential hypertension  Cont med rx    Chronic anticoagulation  Cont Xarelto    AAA (abdominal aortic aneurysm)  Follows with Dr. Guo        VTE Risk Mitigation (From admission, onward)         Ordered     rivaroxaban tablet 20 mg  with dinner      10/10/20 0019     IP VTE HIGH RISK PATIENT  Once      10/10/20 0019     Place sequential compression device  Until discontinued      10/10/20 0019     Place KATIE hose  Until discontinued      10/10/20 0019     Reason for No Pharmacological VTE Prophylaxis  Once     Question:  Reasons:  Answer:  Already adequately anticoagulated on oral Anticoagulants    10/10/20 0019              Pt seen in ICU, case d/w Dr. Eaton and RN  Critical care time  35min    Thank you for your consult. I will follow-up with patient. Please contact us if you have any additional questions.    Nic Miguel MD  Cardiology   [facility]

## 2020-10-10 NOTE — PLAN OF CARE
Problem: Occupational Therapy Goal  Goal: Occupational Therapy Goal  Description: Goals to be met by: 10/24/20     Patient will increase functional independence with ADLs by performing:    LE Dressing with Supervision.  Grooming while standing at sink with Supervision.  Toileting from toilet with Supervision for hygiene and clothing management.   Supine to sit with Supervision.  Step transfer with Supervision  Toilet transfer to toilet with Supervision.  Upper extremity exercise program x15 reps per handout, with independence.    Outcome: Ongoing, Progressing     CGA supine>sit; minimal in-room functional mobility with B/L hand-held assist/CGA. OT rec HHOT with family assist/sup.

## 2020-10-10 NOTE — ASSESSMENT & PLAN NOTE
Pt has persistent/chronic AF with episodic inc VR (per Dr. Guo's last note 7/30/20) noted on his PPM interrogations.  RVR noted on arrival, now better controlled.  He is maintained on amio/Xarelto  Agree with low dose BID metoprolol, if BP drops, can consider using digoxin/amio instead, and I will stop amio for now (pt alos had prior issue with amio intol, ?elev LFTs in the past).  No plan for repeat VERITO/DCCV.

## 2020-10-10 NOTE — ASSESSMENT & PLAN NOTE
-History noted  -No chest pain.  Troponins negative  -Discharged med rec from [facility] 9/25 shows Praluent 75mg im q2wk, amiodarone  200qd, asa 81qd, plavix 75qd, digoxin 125qd, imdur 60qd, toprol 25qd, midodrine 5mg tid.  Unclear if patient has been taking these.  -Continue aspirin and plavix.  -Starting metoprolol for better rate control  -Will discuss praluent and imdur with cardiology.  -Check lipid panel in am as not on statin at home.

## 2020-10-11 PROBLEM — R65.10 SIRS (SYSTEMIC INFLAMMATORY RESPONSE SYNDROME): Status: RESOLVED | Noted: 2020-10-09 | Resolved: 2020-10-11

## 2020-10-11 PROBLEM — R78.81 BLOOD BACTERIAL CULTURE POSITIVE: Status: ACTIVE | Noted: 2020-10-11

## 2020-10-11 PROBLEM — R82.71 BACTERIURIA: Status: ACTIVE | Noted: 2020-10-11

## 2020-10-11 LAB
ANION GAP SERPL CALC-SCNC: 10 MMOL/L (ref 8–16)
BASOPHILS # BLD AUTO: 0.03 K/UL (ref 0–0.2)
BASOPHILS NFR BLD: 0.3 % (ref 0–1.9)
BUN SERPL-MCNC: 14 MG/DL (ref 8–23)
CALCIUM SERPL-MCNC: 9 MG/DL (ref 8.7–10.5)
CHLORIDE SERPL-SCNC: 99 MMOL/L (ref 95–110)
CHOLEST SERPL-MCNC: 126 MG/DL (ref 120–199)
CHOLEST/HDLC SERPL: 2.6 {RATIO} (ref 2–5)
CO2 SERPL-SCNC: 22 MMOL/L (ref 23–29)
CREAT SERPL-MCNC: 0.8 MG/DL (ref 0.5–1.4)
CRP SERPL-MCNC: 182.23 MG/L (ref 0–3.19)
DIFFERENTIAL METHOD: ABNORMAL
EOSINOPHIL # BLD AUTO: 0.1 K/UL (ref 0–0.5)
EOSINOPHIL NFR BLD: 0.7 % (ref 0–8)
ERYTHROCYTE [DISTWIDTH] IN BLOOD BY AUTOMATED COUNT: 15.2 % (ref 11.5–14.5)
EST. GFR  (AFRICAN AMERICAN): >60 ML/MIN/1.73 M^2
EST. GFR  (NON AFRICAN AMERICAN): >60 ML/MIN/1.73 M^2
GLUCOSE SERPL-MCNC: 165 MG/DL (ref 70–110)
HCT VFR BLD AUTO: 35.2 % (ref 40–54)
HDLC SERPL-MCNC: 48 MG/DL (ref 40–75)
HDLC SERPL: 38.1 % (ref 20–50)
HGB BLD-MCNC: 11.5 G/DL (ref 14–18)
IMM GRANULOCYTES # BLD AUTO: 0.05 K/UL (ref 0–0.04)
IMM GRANULOCYTES NFR BLD AUTO: 0.6 % (ref 0–0.5)
LDLC SERPL CALC-MCNC: 60.4 MG/DL (ref 63–159)
LYMPHOCYTES # BLD AUTO: 0.8 K/UL (ref 1–4.8)
LYMPHOCYTES NFR BLD: 9 % (ref 18–48)
MAGNESIUM SERPL-MCNC: 1.6 MG/DL (ref 1.6–2.6)
MCH RBC QN AUTO: 31.3 PG (ref 27–31)
MCHC RBC AUTO-ENTMCNC: 32.7 G/DL (ref 32–36)
MCV RBC AUTO: 96 FL (ref 82–98)
MONOCYTES # BLD AUTO: 0.7 K/UL (ref 0.3–1)
MONOCYTES NFR BLD: 7.6 % (ref 4–15)
NEUTROPHILS # BLD AUTO: 7.1 K/UL (ref 1.8–7.7)
NEUTROPHILS NFR BLD: 81.8 % (ref 38–73)
NONHDLC SERPL-MCNC: 78 MG/DL
NRBC BLD-RTO: 0 /100 WBC
PLATELET # BLD AUTO: 161 K/UL (ref 150–350)
PMV BLD AUTO: 10.7 FL (ref 9.2–12.9)
POCT GLUCOSE: 129 MG/DL (ref 70–110)
POCT GLUCOSE: 183 MG/DL (ref 70–110)
POCT GLUCOSE: 188 MG/DL (ref 70–110)
POCT GLUCOSE: 198 MG/DL (ref 70–110)
POCT GLUCOSE: 231 MG/DL (ref 70–110)
POTASSIUM SERPL-SCNC: 4.1 MMOL/L (ref 3.5–5.1)
RBC # BLD AUTO: 3.68 M/UL (ref 4.6–6.2)
SARS-COV-2 RNA RESP QL NAA+PROBE: NOT DETECTED
SODIUM SERPL-SCNC: 131 MMOL/L (ref 136–145)
TRIGL SERPL-MCNC: 88 MG/DL (ref 30–150)
VANCOMYCIN TROUGH SERPL-MCNC: 9.2 UG/ML (ref 10–22)
WBC # BLD AUTO: 8.64 K/UL (ref 3.9–12.7)

## 2020-10-11 PROCEDURE — 25000003 PHARM REV CODE 250: Performed by: HOSPITALIST

## 2020-10-11 PROCEDURE — 85025 COMPLETE CBC W/AUTO DIFF WBC: CPT

## 2020-10-11 PROCEDURE — 99233 PR SUBSEQUENT HOSPITAL CARE,LEVL III: ICD-10-PCS | Mod: ,,, | Performed by: INTERNAL MEDICINE

## 2020-10-11 PROCEDURE — 63600175 PHARM REV CODE 636 W HCPCS: Performed by: HOSPITALIST

## 2020-10-11 PROCEDURE — 36415 COLL VENOUS BLD VENIPUNCTURE: CPT

## 2020-10-11 PROCEDURE — 80202 ASSAY OF VANCOMYCIN: CPT

## 2020-10-11 PROCEDURE — 86141 C-REACTIVE PROTEIN HS: CPT

## 2020-10-11 PROCEDURE — 83735 ASSAY OF MAGNESIUM: CPT

## 2020-10-11 PROCEDURE — 80048 BASIC METABOLIC PNL TOTAL CA: CPT

## 2020-10-11 PROCEDURE — 99233 SBSQ HOSP IP/OBS HIGH 50: CPT | Mod: ,,, | Performed by: INTERNAL MEDICINE

## 2020-10-11 PROCEDURE — 11000001 HC ACUTE MED/SURG PRIVATE ROOM

## 2020-10-11 PROCEDURE — 94799 UNLISTED PULMONARY SVC/PX: CPT

## 2020-10-11 PROCEDURE — 94761 N-INVAS EAR/PLS OXIMETRY MLT: CPT

## 2020-10-11 PROCEDURE — 25000003 PHARM REV CODE 250: Performed by: INTERNAL MEDICINE

## 2020-10-11 PROCEDURE — 80061 LIPID PANEL: CPT

## 2020-10-11 RX ADMIN — TAMSULOSIN HYDROCHLORIDE 0.4 MG: 0.4 CAPSULE ORAL at 08:10

## 2020-10-11 RX ADMIN — GABAPENTIN 300 MG: 300 CAPSULE ORAL at 04:10

## 2020-10-11 RX ADMIN — CLOPIDOGREL 75 MG: 75 TABLET, FILM COATED ORAL at 09:10

## 2020-10-11 RX ADMIN — LEVOTHYROXINE SODIUM 175 MCG: 150 TABLET ORAL at 06:10

## 2020-10-11 RX ADMIN — CHOLECALCIFEROL (VITAMIN D3) 25 MCG (1,000 UNIT) TABLET 2000 UNITS: TABLET at 09:10

## 2020-10-11 RX ADMIN — SERTRALINE HYDROCHLORIDE 100 MG: 50 TABLET ORAL at 09:10

## 2020-10-11 RX ADMIN — CEFTRIAXONE 1 G: 1 INJECTION, SOLUTION INTRAVENOUS at 12:10

## 2020-10-11 RX ADMIN — RIVAROXABAN 20 MG: 20 TABLET, FILM COATED ORAL at 05:10

## 2020-10-11 RX ADMIN — GABAPENTIN 300 MG: 300 CAPSULE ORAL at 08:10

## 2020-10-11 RX ADMIN — METOPROLOL TARTRATE 25 MG: 25 TABLET, FILM COATED ORAL at 09:10

## 2020-10-11 RX ADMIN — GABAPENTIN 300 MG: 300 CAPSULE ORAL at 09:10

## 2020-10-11 RX ADMIN — FAMOTIDINE 20 MG: 20 TABLET ORAL at 09:10

## 2020-10-11 RX ADMIN — METOPROLOL TARTRATE 25 MG: 25 TABLET, FILM COATED ORAL at 08:10

## 2020-10-11 RX ADMIN — VANCOMYCIN HYDROCHLORIDE 1500 MG: 1.5 INJECTION, POWDER, LYOPHILIZED, FOR SOLUTION INTRAVENOUS at 06:10

## 2020-10-11 RX ADMIN — SERTRALINE HYDROCHLORIDE 100 MG: 50 TABLET ORAL at 08:10

## 2020-10-11 RX ADMIN — FINASTERIDE 5 MG: 5 TABLET, FILM COATED ORAL at 09:10

## 2020-10-11 RX ADMIN — FAMOTIDINE 20 MG: 20 TABLET ORAL at 08:10

## 2020-10-11 RX ADMIN — INSULIN ASPART 1 UNITS: 100 INJECTION, SOLUTION INTRAVENOUS; SUBCUTANEOUS at 08:10

## 2020-10-11 NOTE — ASSESSMENT & PLAN NOTE
Unclear etiology, duration approximately 1 week, corresponding with medication changes at prior hospitalization.  - Afib management as above  - Infection management as above

## 2020-10-11 NOTE — ASSESSMENT & PLAN NOTE
S/p PCI, last in 2017  Trop/EKG neg  Cont Plavix/Xarelto (ASA stopped)  Cont metoprolol  Pt on praluent as outpatient

## 2020-10-11 NOTE — PROGRESS NOTES
[facility] Hospital Medicine  Progress Note    Patient Name: Doni Theodore  MRN: [id number]  Patient Class: IP- Inpatient   Admission Date: 10/9/2020  Length of Stay: 2 days  Attending Physician: Wicho Meyers MD  Primary Care Provider: Nic Mauro MD        Subjective:     Principal Problem:SIRS (systemic inflammatory response syndrome)        HPI:  Doni Theodore is a 80 y.o. male that (in part)  has a past medical history of A-fib, Anticoagulant long-term use, Arthritis, Coronary artery disease, Diabetes mellitus, Hypertension, Pacemaker, Sleep apnea, Stroke, and Thyroid disease.  has a past surgical history that includes Coronary angioplasty with stent (6/06); Cholecystectomy; Appendectomy; Hernia repair; Eye surgery; Cardiac pacemaker placement; sciatica; Ankle surgery; and Treatment of cardiac arrhythmia (7/19/2019). Presents to [facility] Emergency Department complaining of left-sided chest pain.  Worsened with deep inspiration.  Recently evaluated for atrial fibrillation at [facility]..  Associated symptoms include Mild dyspnea with exertion.  Denies syncope, edema, cyanosis fever, chills, or cough, however, he was febrile to 100.6° F in the emergency department.  No joint swelling or unilateral weakness.  No slurred speech, paresthesias, ataxia, vertigo, or tremors.  Acute onset today.  Frequency of multiple episodes of pain.  Reports compliance with his home medication regimen although some of his medications including digoxin were discontinued by home health.  History of persistent atrial fibrillation.  On chronic anticoagulation.  Was previously taking amiodarone, digoxin, and beta-blocker.    In the emergency department his heart rate was found to be febrile with a heart rate into the 120s and mild tachypnea.  EKG revealed atrial fibrillation with rapid ventricular response.  He was given a dose of metoprolol IV.  He was rate controlled  with this.  Routine laboratory studies, urinalysis, chest x-ray, and CT angiogram of the chest was performed.  No evidence of pneumonia by bilateral pleural effusions and some atelectasis were noted.  CRP markedly elevated.  Procalcitonin normal.  Lactic acid 2.1.  Digoxin level of 0.5.  Given recent hospitalization, levofloxacin was given due to the possibility of hospital community-acquired pneumonia.  Cultures were obtained prior to this.  Cardiology was consulted.    Hospital medicine has been asked to admit for further evaluation and treatment.     Overview/Hospital Course:  No notes on file    Interval History:     No events overnight. This AM Mr. Theodore denies fevers/chills, denies dysuria, denies cough. Notes that he still has mild dizziness/unsteadiness when standing.    Review of Systems   Constitutional: Negative for chills, fatigue and fever.   HENT: Negative for ear pain and sinus pain.    Eyes: Negative for photophobia and visual disturbance.   Respiratory: Negative for cough and shortness of breath.    Cardiovascular: Negative for chest pain, palpitations and leg swelling.   Gastrointestinal: Negative for abdominal distention and abdominal pain.   Musculoskeletal: Negative for arthralgias and back pain.   Neurological: Positive for dizziness and light-headedness.   Psychiatric/Behavioral: Negative for agitation. The patient is not nervous/anxious.      Objective:     Vital Signs (Most Recent):  Temp: 100.1 °F (37.8 °C) (10/11/20 0801)  Pulse: 93 (10/11/20 0801)  Resp: 17 (10/11/20 0801)  BP: 130/72 (10/11/20 0801)  SpO2: (!) 94 % (10/11/20 0801) Vital Signs (24h Range):  Temp:  [98 °F (36.7 °C)-100.1 °F (37.8 °C)] 100.1 °F (37.8 °C)  Pulse:  [] 93  Resp:  [16-24] 17  SpO2:  [90 %-98 %] 94 %  BP: (103-130)/(62-72) 130/72     Weight: 76.4 kg (168 lb 6.9 oz)  Body mass index is 26.38 kg/m².    Intake/Output Summary (Last 24 hours) at 10/11/2020 1124  Last data filed at 10/11/2020 0824  Gross per  24 hour   Intake 830 ml   Output 1350 ml   Net -520 ml      Physical Exam    Significant Labs:   Blood Culture:   Recent Labs   Lab 10/09/20  1617 10/09/20  1626 10/10/20  2244   LABBLOO Gram stain aer bottle: Gram positive cocci in clusters resembling Staph   10/10/2020  17:36    Results called to and read back by: ASHLEY CHANG/[id number]  COAGULASE-NEGATIVE STAPHYLOCOCCUS SPECIES  Organism is a probable contaminant  * No Growth to date  No Growth to date No Growth to date  No Growth to date     Urine Culture:   Recent Labs   Lab 10/09/20  1825   LABURIN ENTEROCOCCUS SPECIES  >100,000 cfu/ml  Identification and susceptibility pending  *       Significant Imaging: I have reviewed all pertinent imaging results/findings within the past 24 hours.      Assessment/Plan:      Atrial fibrillation with RVR  Pt initially admitted to the ICU in afib w/ RVR. Overnight vitals shows periodic episodes of HR >110. Off home amio, home digoxin. Thought to be driven by infection, however no clear source (covid neg x 2, BCx w/ contaminant CoNS, UCx growing enterococcus but asymptomatic and UA w/ no WBCs/LE)  - cardiology following  - continue metoprolol         Bacteriuria secondary to enterococcus  Pt w/ >100k CFU of enterococcus on UCx (catheterized sample). UA w/ bacteria but negative LE, few WBCs on microscopy. Pt asymptomatic. Treatment favored given low grade fevers, relative leukocytosis, unclear trigger for current situation. Pt already started on vancomycin in ICU, will adjust pending speciation. Has ?hx of penicillin allergy, tolerated CTX without issue.  - cont vancomycin pending sensitivities  - CTX discontinued  - discuss penicillin allergy w/ patient, family      CoNS+ blood culture  Blood culture with coag-negative staph. Repeat negative. No clear risk factor for CoNS infection.  - no treatment necessary      Dizziness  Unclear etiology, duration approximately 1 week, corresponding with medication changes at prior  hospitalization.  - Afib management as above  - Infection management as above      BPH (benign prostatic hyperplasia)  -Continue finasteride and tamsulosin      FEMI on CPAP  -History noted.      Hypothyroidism  -TSH normal  -Continue home synthroid.      Chronic anticoagulation  -Treatment as above.      Essential hypertension  -BP normal to mildly elevated  -Start metoprolol  -Monitor closely.      Type 2 diabetes mellitus, controlled  NIDDM  -Hold home glimepiride and metformin  -Treat with insulin sliding scale ac/hs for now  -Hypoglycemia protocol ordered      History of CVA (cerebrovascular accident)  -Continue plavix and statin        Presence of permanent cardiac pacemaker        Anemia of chronic disease  Mild anemia, stable  - continue to monitor      Coronary artery disease involving native coronary artery of native heart without angina pectoris  No evidence of ACS  - continue plavix, metoprolol      AAA (abdominal aortic aneurysm)  BP well controlled on metoprolol        VTE Risk Mitigation (From admission, onward)         Ordered     rivaroxaban tablet 20 mg  with dinner      10/10/20 0019     IP VTE HIGH RISK PATIENT  Once      10/10/20 0019     Place sequential compression device  Until discontinued      10/10/20 0019     Place KATIE hose  Until discontinued      10/10/20 0019     Reason for No Pharmacological VTE Prophylaxis  Once     Question:  Reasons:  Answer:  Already adequately anticoagulated on oral Anticoagulants    10/10/20 0019                Discharge Planning   ERICK:      Code Status: Full Code   Is the patient medically ready for discharge?:     Reason for patient still in hospital (select all that apply): Patient trending condition, Laboratory test, Treatment, Consult recommendations and Pending disposition  Discharge Plan A: Home with family                  Wicho Meyers MD  Department of Hospital Medicine   [facility]

## 2020-10-11 NOTE — SUBJECTIVE & OBJECTIVE
Interval History:     No events overnight. This AM Mr. Theodore denies fevers/chills, denies dysuria, denies cough. Notes that he still has mild dizziness/unsteadiness when standing.    Review of Systems   Constitutional: Negative for chills, fatigue and fever.   HENT: Negative for ear pain and sinus pain.    Eyes: Negative for photophobia and visual disturbance.   Respiratory: Negative for cough and shortness of breath.    Cardiovascular: Negative for chest pain, palpitations and leg swelling.   Gastrointestinal: Negative for abdominal distention and abdominal pain.   Musculoskeletal: Negative for arthralgias and back pain.   Neurological: Positive for dizziness and light-headedness.   Psychiatric/Behavioral: Negative for agitation. The patient is not nervous/anxious.      Objective:     Vital Signs (Most Recent):  Temp: 100.1 °F (37.8 °C) (10/11/20 0801)  Pulse: 93 (10/11/20 0801)  Resp: 17 (10/11/20 0801)  BP: 130/72 (10/11/20 0801)  SpO2: (!) 94 % (10/11/20 0801) Vital Signs (24h Range):  Temp:  [98 °F (36.7 °C)-100.1 °F (37.8 °C)] 100.1 °F (37.8 °C)  Pulse:  [] 93  Resp:  [16-24] 17  SpO2:  [90 %-98 %] 94 %  BP: (103-130)/(62-72) 130/72     Weight: 76.4 kg (168 lb 6.9 oz)  Body mass index is 26.38 kg/m².    Intake/Output Summary (Last 24 hours) at 10/11/2020 1124  Last data filed at 10/11/2020 0824  Gross per 24 hour   Intake 830 ml   Output 1350 ml   Net -520 ml      Physical Exam    Significant Labs:   Blood Culture:   Recent Labs   Lab 10/09/20  1617 10/09/20  1626 10/10/20  2244   LABBLOO Gram stain aer bottle: Gram positive cocci in clusters resembling Staph   10/10/2020  17:36    Results called to and read back by: ASHLEY CHANG/JOHN  COAGULASE-NEGATIVE STAPHYLOCOCCUS SPECIES  Organism is a probable contaminant  * No Growth to date  No Growth to date No Growth to date  No Growth to date     Urine Culture:   Recent Labs   Lab 10/09/20  0176   LABURIN ENTEROCOCCUS SPECIES  >100,000  cfu/ml  Identification and susceptibility pending  *       Significant Imaging: I have reviewed all pertinent imaging results/findings within the past 24 hours.

## 2020-10-11 NOTE — ASSESSMENT & PLAN NOTE
Pt has persistent/chronic AF with episodic inc VR (per Dr. Guo's last note 7/30/20) noted on his PPM interrogations.  RVR noted on arrival, now better controlled.  Cont Xarelto  Cont BID metoprolol, if BP drops, can consider using digoxin/amio instead (amio stopped yesterday (pt also had prior issue with amio intol, ?elev LFTs in the past).  No plan for repeat VERITO/DCCV.

## 2020-10-11 NOTE — PLAN OF CARE
Problem: Adult Inpatient Plan of Care  Goal: Plan of Care Review  Outcome: Ongoing, Progressing     Patient oriented x 4 and ambulatory w/ standby assist.  Tolerating diet.  Voiding w/o difficulty. CBG monitored. VSS. No c/o SOB/dyspnea.  Bed alarm set. Call light within reach. Will continue to monitor.

## 2020-10-11 NOTE — PROGRESS NOTES
[facility]  Cardiology  Progress Note    Patient Name: Doni Theodore  MRN: [id number]  Admission Date: 10/9/2020  Hospital Length of Stay: 2 days  Code Status: Full Code   Attending Physician: Wicho Meyers MD   Primary Care Physician: Nic Mauro MD  Expected Discharge Date:   Principal Problem:SIRS (systemic inflammatory response syndrome)    Subjective:     Hospital Course:   10/9/20: adm with SIRS    Interval Hx: transferred out of ICU, case d/w RN.  No cp/sob/palps.  Per Dr. Eaton's note, GPC noted in Blood Cx, ?contaminant.    Tele: AF 70s, occ 120s (personally reviewed and interpreted)      Past Medical History:   Diagnosis Date    A-fib 7/18/2019    Anticoagulant long-term use     Plavix (on hold for angiogram)    Arthritis     Coronary artery disease     pacemaker, stentsx2    Diabetes mellitus     Hypertension     Pacemaker     Sleep apnea     Stroke     Rt side affected / mild    Thyroid disease     hypothyroidism       Past Surgical History:   Procedure Laterality Date    ANKLE SURGERY      APPENDECTOMY      CARDIAC PACEMAKER PLACEMENT      CHOLECYSTECTOMY      CORONARY ANGIOPLASTY WITH STENT PLACEMENT  6/06    stents x 2 placed     EYE SURGERY      HERNIA REPAIR      ventral hernia repair    sciatica      sciatica procedure    TREATMENT OF CARDIAC ARRHYTHMIA  7/19/2019    Procedure: Cardioversion or Defibrillation;  Surgeon: Nic Miguel MD;  Location: [facility] CATH LAB;  Service: Cardiology;;       Review of patient's allergies indicates:   Allergen Reactions    Penicillins Swelling       No current facility-administered medications on file prior to encounter.      Current Outpatient Medications on File Prior to Encounter   Medication Sig    aspirin (ECOTRIN) 81 MG EC tablet Take 81 mg by mouth once daily.    fentaNYL (DURAGESIC) 25 mcg/hr Place 1 patch onto the skin every 72 hours.    fluticasone (FLONASE) 50 mcg/actuation nasal spray 1 spray by  Each Nare route nightly as needed.     gabapentin (NEURONTIN) 300 MG capsule Take 300 mg by mouth 3 (three) times daily.    glimepiride (AMARYL) 4 MG tablet Take 8 mg by mouth before breakfast.     levocetirizine (XYZAL) 5 MG tablet Take 5 mg by mouth every evening.    metFORMIN (GLUCOPHAGE) 1000 MG tablet Take 1,000 mg by mouth 2 (two) times daily with meals.    ranitidine (ZANTAC) 300 MG tablet Take 300 mg by mouth 2 (two) times daily.     sertraline (ZOLOFT) 100 MG tablet Take 100 mg by mouth 2 (two) times daily.    SYNTHROID 175 mcg tablet 175 mcg once daily.     tamsulosin (FLOMAX) 0.4 mg Cp24 TAKE 1 CAPSULE AT BEDTIME.    vitamin D 185 MG Tab Take 2,000 Units by mouth once daily.     XARELTO 20 mg Tab TAKE 1 TABLET BY MOUTH ONCE A DAY WITH EVENING MEAL    cyanocobalamin 2000 MCG tablet Take 2,500 mcg by mouth once daily.    finasteride (PROSCAR) 5 mg tablet Take 1 tablet (5 mg total) by mouth once daily.    midodrine (PROAMATINE) 5 MG Tab     nitroGLYCERIN (NITROSTAT) 0.4 MG SL tablet Place 0.4 mg under the tongue every 5 (five) minutes as needed for Chest pain.    traMADol (ULTRAM) 50 mg tablet Take 0.5 tablets (25 mg total) by mouth every 8 (eight) hours as needed (Sever pain not controlled by tylenol or tizanidine).     Family History     Problem Relation (Age of Onset)    Cancer Mother, Sister    Heart disease Father        Tobacco Use    Smoking status: Heavy Tobacco Smoker     Packs/day: 0.50     Last attempt to quit: 1998     Years since quittin.3    Smokeless tobacco: Never Used   Substance and Sexual Activity    Alcohol use: No     Alcohol/week: 0.0 standard drinks    Drug use: No    Sexual activity: Never     Review of Systems   Gastrointestinal: Negative for melena.   Genitourinary: Negative for hematuria.     Objective:     Vital Signs (Most Recent):  Temp: 100.1 °F (37.8 °C) (10/11/20 0801)  Pulse: 93 (10/11/20 0801)  Resp: 17 (10/11/20 0801)  BP: 130/72 (10/11/20  0801)  SpO2: (!) 94 % (10/11/20 0801) Vital Signs (24h Range):  Temp:  [98 °F (36.7 °C)-100.1 °F (37.8 °C)] 100.1 °F (37.8 °C)  Pulse:  [] 93  Resp:  [16-24] 17  SpO2:  [90 %-98 %] 94 %  BP: (103-130)/(62-72) 130/72     Weight: 76.4 kg (168 lb 6.9 oz)  Body mass index is 26.38 kg/m².    SpO2: (!) 94 %  O2 Device (Oxygen Therapy): room air      Intake/Output Summary (Last 24 hours) at 10/11/2020 1113  Last data filed at 10/11/2020 0824  Gross per 24 hour   Intake 830 ml   Output 1350 ml   Net -520 ml       Lines/Drains/Airways     Peripheral Intravenous Line                 Peripheral IV - Single Lumen 10/09/20 2219 20 G Right Antecubital 1 day         Peripheral IV - Single Lumen 10/10/20 22 G Left Antecubital 1 day                Physical Exam   Constitutional: He appears well-developed and well-nourished.   HENT:   Head: Normocephalic and atraumatic.   Eyes: Pupils are equal, round, and reactive to light. Conjunctivae and EOM are normal. No scleral icterus.   Neck: Normal range of motion. Neck supple. No JVD present. No tracheal deviation present. No thyromegaly present.   Cardiovascular: Normal rate, S1 normal and S2 normal. An irregularly irregular rhythm present. Exam reveals distant heart sounds. Exam reveals no gallop and no friction rub.   No murmur heard.  Pulmonary/Chest: Effort normal and breath sounds normal. No respiratory distress. He has no wheezes. He has no rales. He exhibits no tenderness.   Abdominal: Soft. He exhibits no distension.   Musculoskeletal: Normal range of motion.         General: No edema.   Neurological: He is alert. He has normal strength. No cranial nerve deficit.   Skin: Skin is warm and dry. No rash noted.   Psychiatric: He has a normal mood and affect. His behavior is normal.       Current Medications:   clopidogreL  75 mg Oral Daily    famotidine  20 mg Oral BID    finasteride  5 mg Oral Daily    gabapentin  300 mg Oral TID    levothyroxine  175 mcg Oral Before  breakfast    metoprolol tartrate  25 mg Oral BID    polyethylene glycol  17 g Oral Daily    rivaroxaban  20 mg Oral with dinner    sertraline  100 mg Oral BID    tamsulosin  1 capsule Oral Nightly    vancomycin (VANCOCIN) IVPB  1,500 mg Intravenous Q24H    vitamin D  2,000 Units Oral Daily       acetaminophen, acetaminophen, dextrose 50%, dextrose 50%, glucagon (human recombinant), glucose, glucose, insulin aspart U-100, ketorolac, nitroGLYCERIN, ondansetron, promethazine (PHENERGAN) IVPB, sodium chloride 0.9%, Pharmacy to dose Vancomycin consult **AND** vancomycin - pharmacy to dose    Laboratory (all labs reviewed):  CBC:  Recent Labs   Lab 08/10/19  0408 08/11/19  0912 10/09/20  1617 10/10/20  0713 10/11/20  0533   WBC 7.10 7.90 11.33 9.50 8.64   Hemoglobin 10.7 L 11.1 L 13.8 L 11.7 L 11.5 L   Hematocrit 34.3 L 35.7 L 42.8 36.6 L 35.2 L   Platelets 228 230 201 132 L 161       CHEMISTRIES:  Recent Labs   Lab 07/20/19  0350 08/01/19  1717 08/09/19  1655 08/10/19  0408 08/11/19  0912 10/09/20  1617 10/10/20  0348 10/11/20  0533   Glucose 101 57 L 154 H 117 H  113 H 162 H 177 H 195 H 165 H   Sodium 141 140 139 138  139 136 132 L 132 L 131 L   Potassium 3.3 L 3.2 L 4.6 4.1  4.1 3.8 4.5 4.1 4.1   BUN, Bld 12 14 24 H 20  20 10 17 17 14   Creatinine 0.9 0.8 1.3 1.0  0.9 0.8 1.0 0.9 0.8   eGFR if African American >60 >60 >60 >60  >60 >60 >60 >60 >60   eGFR if non  >60 >60 52 A >60  >60 >60 >60 >60 >60   Calcium 9.3 8.0 L 9.1 8.7  8.5 L 8.9 9.6 8.4 L 9.0   Magnesium 1.5 L 1.3 L 1.4 L 1.6  --   --   --  1.6       CARDIAC BIOMARKERS:  Recent Labs   Lab 08/09/19  1655 08/09/19  2252 08/10/19  0408 10/09/20  1617 10/10/20  0348   CPK  --   --   --  276 H  --    Troponin I 0.033 H 0.032 H 0.026 0.018 0.014       COAGS:  Recent Labs   Lab 10/23/17  0705 10/24/17  0853 07/18/19  2047 08/09/19  1654 10/09/20  1617   INR 1.2 1.2 1.1 1.1 1.1       LIPIDS/LFTS:  Recent Labs   Lab 07/19/19  0753  07/20/19  0350 08/01/19 1717 08/09/19  1655 08/10/19  0408 10/09/20  1617 10/11/20  0533   Cholesterol 85 L  --   --   --   --   --  126   Triglycerides 61  --   --   --   --   --  88   HDL 46  --   --   --   --   --  48   LDL Cholesterol 26.8 L  --   --   --   --   --  60.4 L   Non-HDL Cholesterol 39  --   --   --   --   --  78   AST  --  15 19 34 29 20  --    ALT  --  11 13 22 20 17  --        BNP:  Recent Labs   Lab 04/22/19 2206 07/18/19 2047 08/01/19 1717 08/09/19  1654 10/09/20  2032    H 366 H 240 H 183 H 69       TSH:  Recent Labs   Lab 07/18/19  2047 08/01/19  1717 10/09/20  2032   TSH 13.841 H 12.396 H 1.395       Free T4:  Recent Labs   Lab 07/18/19  2047 08/01/19  1717 10/10/20  0348   Free T4 1.05 0.88 1.18       Diagnostic Results:  ECG (personally reviewed and interpreted tracing(s)):  10/9/20 1544 , RBBB/LAD, similar to 8/9/19    Chest X-Ray (personally reviewed and interpreted image(s)): 10/9/20 NAD, mild CMeg, L PPM 1 lead    CTA chest 10/9/20  1. No evidence of pulmonary embolism.  2. Mild cardiomegaly, coronary atherosclerosis and moderate pericardial effusion.  Cardiology follow-up recommended.  3. Small bibasilar pleural effusions with mild associated atelectasis, right greater than left.    Echo: 10/10/20 (EF unchanged vs report 9/24/20, pericardial effusion prev mentioned, ?larger on current exam)  · The left ventricle is normal in size with mildly decreased systolic function. The estimated ejection fraction is 40%.  · There is mild left ventricular global hypokinesis.  · There is moderate left ventricular concentric hypertrophy.  · The sinuses of Valsalva is mildly dilated, 3.7cm.  · Normal right ventricular systolic function.  · Small circumferential pericardial effusion. No evidence of tamponade.  · Elevated central venous pressure (15 mmHg).  · The estimated PA systolic pressure is 45 mmHg.  · There is pulmonary hypertension.    Carotid US 3/2020 (Care everywhere, per  Brant note 7/30/20)  CAROTIDS: Right Carotid - Doppler peak velocities and ratio are consistent with a right internal carotid stenosis of 16-39% Left Carotid - Doppler peak velocities and ratio are consistent with a left internal carotid stenosis of 16-39%     VERITO/DCCV 7/19/19  LVEF 40-45%, mild global HK  Normal valves  Mild-mod MR  Mild TR  Mild AI  No LA/MAURICIO thrombus  Successful DCCV AF->NSR 360J x1  Pt tolerated procedure well without complications  Plan:  Observe in ICU  Cont Xarelto 20mg qd  Start amio load  Dispo planning appropriate  Pt to follow up with me in 1-2 weeks.     Cath: 10/23/17   B. Summary/Post-Operative Diagnosis    [id number].    70% mid LAD ISR; FFR=0.79.     of D1.    Successful PCI of mid LAD with 4X15 MARY.    Successful PCI of D1 with 2.25X30 and 2.5X18 MRAY.    IVUS utilized for stent sizing.  D. Hemodynamic Results  LVEDP (Pre): 22 mmHg  E. Angiographic Results       Patient has a right dominant coronary artery.      - Left Main Coronary Artery:             The LM has luminal irregularities. There is DONNA 3 flow.     - Left Anterior Descending Artery:             The proximal LAD has a 10% stenosis. There is DONNA 3 flow. The remaining portion of the vessel has luminal irregularities (10).             The mid LAD has a 70% stenosis. There is DONNA 3 flow. FFR was 0.79. The remaining portion of the vessel has luminal irregularities (10).                     Lesion Details:   The length is 10-20 mm, eccentric shape, moderate proximal tortuosity, segment angulation of <45 degrees, irregular contour, mild to moderate calcification. No bifurcation is present. The minimum luminal diameter is   0.5 millimeters.             The distal LAD has a 50% stenosis. There is DONNA 3 flow. The remaining portion of the vessel is of small caliber.     - D1:             The D1 has chronic total occlusion. There is DONNA 0 flow. There is collateral flow from the PLB (faint). The remaining portion of the  vessel has luminal irregularities (10).                     Lesion Details:   This is a Type C lesion.  The length is 40mm, eccentric shape, moderate proximal tortuosity, segment angulation of 45-90 degrees, irregular contour, mild to moderate calcification. Bifurcation is present. The minimum   luminal diameter is 0 millimeters.     - Left Circumflex Artery:             The proximal LCX has a 20% stenosis. There is DONNA 3 flow. The remaining portion of the vessel has luminal irregularities (10).             The distal LCX has luminal irregularities. There is DONNA 3 flow.     - OM1:             The OM1 has luminal irregularities has a 30% stenosis. There is DONNA 3 flow. The remaining portion of the vessel has luminal irregularities (10).     - Right Coronary Artery:             The proximal RCA has luminal irregularities. There is DONNA 3 flow.             The mid RCA has a 20% stenosis. There is DONNA 3 flow. The remaining portion of the vessel has luminal irregularities (10).             The distal RCA has a 50% stenosis. There is DONNA 3 flow. The remaining portion of the vessel has luminal irregularities (10).     - Posterior Lateral Branch:             The PLB has a 40% stenosis. There is DONNA 3 flow. The remaining portion of the vessel has luminal irregularities (10).  Intervention       Mid LAD:              The lesion was successfully intervened. Post-stenosis of 0%, post-DONNA 3 flow and TMP grade 3. The vessel was accessed natively.  The following items were used: Blln Trek Rx 3.0 X 12, Stent Resolute Rx 4.00x15 (MARY) and Cath [facility]   [place].       D1:              The lesion was successfully intervened. Post-stenosis of 0%, post-DONNA 3 flow and TMP grade 3. The vessel was accessed natively.  The following items were used: Cath Mini Trek 1.5 X 20 Rx, Blln Sc Euphora 2.0 X 10, Stent Resolute Rx 2.25x30   (MARY), Stent Resolute Rx 2.50x18 (MARY) and Cath [facility] [place].      Assessment and  Plan:     Atrial fibrillation with RVR  Pt has persistent/chronic AF with episodic inc VR (per Dr. Guo's last note 7/30/20) noted on his PPM interrogations.  RVR noted on arrival, now better controlled.  Cont Xarelto  Cont BID metoprolol, if BP drops, can consider using digoxin/amio instead (amio stopped yesterday (pt also had prior issue with amio intol, ?elev LFTs in the past)).  No plan for repeat VERITO/DCCV.    Coronary artery disease involving native coronary artery of native heart without angina pectoris  S/p PCI, last in 2017  Trop/EKG neg  Cont Plavix/Xarelto (ASA stopped)  Cont metoprolol  Pt on praluent as outpatient    Type 2 diabetes mellitus, controlled  Per IM    Presence of permanent cardiac pacemaker  [employer], followed by Dr. Guo    Essential hypertension  Cont med rx    Chronic anticoagulation  Cont Xarelto    AAA (abdominal aortic aneurysm)  Follows with Dr. Guo        VTE Risk Mitigation (From admission, onward)         Ordered     rivaroxaban tablet 20 mg  with dinner      10/10/20 0019     IP VTE HIGH RISK PATIENT  Once      10/10/20 0019     Place sequential compression device  Until discontinued      10/10/20 0019     Place KATIE hose  Until discontinued      10/10/20 0019     Reason for No Pharmacological VTE Prophylaxis  Once     Question:  Reasons:  Answer:  Already adequately anticoagulated on oral Anticoagulants    10/10/20 0019              Cardiology will sign off, pls call with questions.  Pt to follow up with Dr. Guo after discharge.    Nic Miguel MD  Cardiology  [facility]

## 2020-10-11 NOTE — PROGRESS NOTES
Pharmacokinetic Assessment Follow Up: IV Vancomycin    Vancomycin serum concentration assessment(s):    The trough level was drawn correctly and can be used to guide therapy at this time. The measurement is below the desired definitive target range of 10 to 20 mcg/mL.    Vancomycin Regimen Plan:    Continue regimen to Vancomycin 1500 mg IV every 24 hours with next serum trough concentration measured at 17:00 prior to 3rd dose on 10/13/20    Drug levels (last 3 results):  Recent Labs   Lab Result Units 10/11/20  1707   Vancomycin-Trough ug/mL 9.2*       Pharmacy will continue to follow and monitor vancomycin.    Please contact pharmacy at extension [phone] for questions regarding this assessment.    Thank you for the consult,   Rossi Szymanski       Patient brief summary:  Doni Theodore is a 80 y.o. male initiated on antimicrobial therapy with IV Vancomycin for treatment of bacteremia      Drug Allergies:   Review of patient's allergies indicates:   Allergen Reactions    Penicillins Swelling       Actual Body Weight:   76.4 kg    Renal Function:   Estimated Creatinine Clearance: 68.9 mL/min (based on SCr of 0.8 mg/dL).,     Dialysis Method (if applicable):  N/A    CBC (last 72 hours):  Recent Labs   Lab Result Units 10/09/20  1617 10/10/20  0348 10/10/20  0713 10/11/20  0533   WBC K/uL 11.33  --  9.50 8.64   Hemoglobin g/dL 13.8*  --  11.7* 11.5*   Hemoglobin A1C %  --  6.8*  --   --    Hematocrit % 42.8  --  36.6* 35.2*   Platelets K/uL 201  --  132* 161   Gran% % 83.7*  --   --  81.8*   Lymph% % 6.4*  --   --  9.0*   Mono% % 8.2  --   --  7.6   Eosinophil% % 0.6  --   --  0.7   Basophil% % 0.4  --   --  0.3   Differential Method  Automated  --   --  Automated       Metabolic Panel (last 72 hours):  Recent Labs   Lab Result Units 10/09/20  1617 10/09/20  1825 10/10/20  0348 10/11/20  0533   Sodium mmol/L 132*  --  132* 131*   Potassium mmol/L 4.5  --  4.1 4.1   Chloride mmol/L 96  --  97 99   CO2 mmol/L 24  --  23  22*   Glucose mg/dL 177*  --  195* 165*   Glucose, UA   --  3+*  --   --    BUN, Bld mg/dL 17  --  17 14   Creatinine mg/dL 1.0  --  0.9 0.8   Albumin g/dL 3.4*  --   --   --    Total Bilirubin mg/dL 0.9  --   --   --    Alkaline Phosphatase U/L 103  --   --   --    AST U/L 20  --   --   --    ALT U/L 17  --   --   --    Magnesium mg/dL  --   --   --  1.6       Vancomycin Administrations:  vancomycin given in the last 96 hours                     vancomycin 1.5 g in dextrose 5 % 250 mL IVPB (ready to mix) (mg) 1,500 mg New Bag 10/10/20 1800    vancomycin (VANCOCIN) 1,750 mg in dextrose 5 % 500 mL IVPB (mg) 1,750 mg New Bag 10/09/20 1757                    Microbiologic Results:  Microbiology Results (last 7 days)       Procedure Component Value Units Date/Time    Blood culture x two cultures. Draw prior to antibiotics. [[id number]]  (Abnormal) Collected: 10/09/20 1617    Order Status: Completed Specimen: Blood from Peripheral, Antecubital, Right Updated: 10/11/20 1031     Blood Culture, Routine Gram stain aer bottle: Gram positive cocci in clusters resembling Staph       10/10/2020  17:36        Results called to and read back by: ASHLEY CHANG/[id number]      COAGULASE-NEGATIVE STAPHYLOCOCCUS SPECIES  Organism is a probable contaminant      Narrative:      Aerobic and anaerobic    Urine Culture - High Risk **CANNOT BE ORDERED STAT** [[id number]]  (Abnormal) Collected: 10/09/20 1825    Order Status: Completed Specimen: Urine Updated: 10/11/20 0943     Urine Culture, Routine ENTEROCOCCUS SPECIES  >100,000 cfu/ml  Identification and susceptibility pending      Narrative:      Indicated criteria for high risk culture:->Other  Other (specify):->sepsis, bacteriuria on cath ua    Blood culture [[id number]] Collected: 10/10/20 2244    Order Status: Completed Specimen: Blood Updated: 10/11/20 0512     Blood Culture, Routine No Growth to date    Blood culture [[id number]] Collected: 10/10/20 2244    Order Status: Completed Specimen:  Blood from Antecubital, Right Updated: 10/11/20 0512     Blood Culture, Routine No Growth to date    Blood culture x two cultures. Draw prior to antibiotics. [[id number]] Collected: 10/09/20 1626    Order Status: Completed Specimen: Blood from Peripheral, Forearm, Left Updated: 10/10/20 1903     Blood Culture, Routine No Growth to date      No Growth to date    Narrative:      Aerobic and anaerobic    Group A Strep, Molecular [[id number]] Collected: 10/09/20 1720    Order Status: Completed Specimen: Throat Updated: 10/09/20 1747     Group A Strep, Molecular Negative

## 2020-10-11 NOTE — HOSPITAL COURSE
10/9/20: adm with SIRS    Interval Hx: transferred out of ICU, case d/w RN.  No cp/sob/palps.  Per Dr. Eaton's note, GPC noted in Blood Cx, ?contaminant.    Tele: AF 70s, occ 120s (personally reviewed and interpreted)

## 2020-10-11 NOTE — ASSESSMENT & PLAN NOTE
Blood culture with coag-negative staph. Repeat negative. No clear risk factor for CoNS infection.  - no treatment necessary

## 2020-10-11 NOTE — ASSESSMENT & PLAN NOTE
Pt initially admitted to the ICU in afib w/ RVR. Overnight vitals shows periodic episodes of HR >110. Off home amio, home digoxin. Thought to be driven by infection, however no clear source (covid neg x 2, BCx w/ contaminant CoNS, UCx growing enterococcus but asymptomatic and UA w/ no WBCs/LE)  - cardiology following  - continue metoprolol

## 2020-10-11 NOTE — PLAN OF CARE
Pt AAOx3, able to make needs known. Telemetry box 5300 monitored, Pt in Afib.  Diet tolerated well, BG WNL.  Pt is able to ambulate to the  bathroom and void spontaneously.  Medications and antibiotics infusing as ordered.  COVID test processing.  Bed in low position, wheels locked, call light in reach for assistance.  Will continue to monitor.      Problem: Fall Injury Risk  Goal: Absence of Fall and Fall-Related Injury  Outcome: Ongoing, Progressing     Problem: Adult Inpatient Plan of Care  Goal: Plan of Care Review  Outcome: Ongoing, Progressing     Problem: Adult Inpatient Plan of Care  Goal: Readiness for Transition of Care  Outcome: Ongoing, Progressing     Problem: Adult Inpatient Plan of Care  Goal: Rounds/Family Conference  Outcome: Ongoing, Progressing     Problem: Adjustment to Illness (Sepsis/Septic Shock)  Goal: Optimal Coping  Outcome: Ongoing, Progressing

## 2020-10-11 NOTE — PLAN OF CARE
10/11/20 1403   Discharge Assessment   Assessment Type Discharge Planning Assessment   Confirmed/corrected address and phone number on facesheet? Yes   Assessment information obtained from? Patient   Expected Length of Stay (days) 2   Communicated expected length of stay with patient/caregiver yes   Prior to hospitilization cognitive status: Alert/Oriented   Prior to hospitalization functional status: Independent   Current cognitive status: Alert/Oriented   Current Functional Status: Independent   Lives With grandchild(ariella)   Able to Return to Prior Arrangements yes   Is patient able to care for self after discharge? Unable to determine at this time (comments)   Who are your caregiver(s) and their phone number(s)? jennifer Schreiber, [phone]   Patient's perception of discharge disposition home or selfcare   Readmission Within the Last 30 Days no previous admission in last 30 days   Patient currently being followed by outpatient case management? No   Patient currently receives any other outside agency services? No   Equipment Currently Used at Home none   Do you have any problems affording any of your prescribed medications? No   Is the patient taking medications as prescribed? yes   Does the patient have transportation home? Yes   Transportation Anticipated family or friend will provide   Does the patient receive services at the Coumadin Clinic? No   Discharge Plan A Home with family   DME Needed Upon Discharge  none   Patient/Family in Agreement with Plan yes     SW met with pt and pt's family at bedside. SW explained her role in Care Management. Pt voiced understanding. SW inquired about HELP AT HOME. Pt stated that he will have his grandson at home to help for support. SW voiced understanding. SW inquired about responsibilities when it comes to  MANAGING HIS HEALTH at home and what it entails. Pt inquired about details. SW informed pt of RESPONSIBILITIES of:    1. Follow up appointments  2. Getting  Prescriptions filled  3. Taking medications as prescribed.     Pt voiced understanding.       Pt's pharmacy:   [id number] - [place] - [address] AT [facility] [address]  [address] [place] [address]  Phone: [phone] Fax: [phone]    [id number] - [place] - [address]  [address] [place] [address]  Phone: [phone] Fax: [phone]

## 2020-10-11 NOTE — ASSESSMENT & PLAN NOTE
Pt w/ >100k CFU of enterococcus on UCx (catheterized sample). UA w/ bacteria but negative LE, few WBCs on microscopy. Pt asymptomatic. Treatment favored given low grade fevers, relative leukocytosis, unclear trigger for current situation. Pt already started on vancomycin in ICU, will adjust pending speciation. Has ?hx of penicillin allergy, tolerated CTX without issue.  - cont vancomycin pending sensitivities  - CTX discontinued  - discuss penicillin allergy w/ patient, family

## 2020-10-11 NOTE — ASSESSMENT & PLAN NOTE
NIDDM  -Hold home glimepiride and metformin  -Treat with insulin sliding scale ac/hs for now  -Hypoglycemia protocol ordered

## 2020-10-11 NOTE — SUBJECTIVE & OBJECTIVE
Past Medical History:   Diagnosis Date    A-fib 7/18/2019    Anticoagulant long-term use     Plavix (on hold for angiogram)    Arthritis     Coronary artery disease     pacemaker, stentsx2    Diabetes mellitus     Hypertension     Pacemaker     Sleep apnea     Stroke     Rt side affected / mild    Thyroid disease     hypothyroidism       Past Surgical History:   Procedure Laterality Date    ANKLE SURGERY      APPENDECTOMY      CARDIAC PACEMAKER PLACEMENT      CHOLECYSTECTOMY      CORONARY ANGIOPLASTY WITH STENT PLACEMENT  6/06    stents x 2 placed     EYE SURGERY      HERNIA REPAIR      ventral hernia repair    sciatica      sciatica procedure    TREATMENT OF CARDIAC ARRHYTHMIA  7/19/2019    Procedure: Cardioversion or Defibrillation;  Surgeon: Nic Miguel MD;  Location: [facility] CATH LAB;  Service: Cardiology;;       Review of patient's allergies indicates:   Allergen Reactions    Penicillins Swelling       No current facility-administered medications on file prior to encounter.      Current Outpatient Medications on File Prior to Encounter   Medication Sig    aspirin (ECOTRIN) 81 MG EC tablet Take 81 mg by mouth once daily.    fentaNYL (DURAGESIC) 25 mcg/hr Place 1 patch onto the skin every 72 hours.    fluticasone (FLONASE) 50 mcg/actuation nasal spray 1 spray by Each Nare route nightly as needed.     gabapentin (NEURONTIN) 300 MG capsule Take 300 mg by mouth 3 (three) times daily.    glimepiride (AMARYL) 4 MG tablet Take 8 mg by mouth before breakfast.     levocetirizine (XYZAL) 5 MG tablet Take 5 mg by mouth every evening.    metFORMIN (GLUCOPHAGE) 1000 MG tablet Take 1,000 mg by mouth 2 (two) times daily with meals.    ranitidine (ZANTAC) 300 MG tablet Take 300 mg by mouth 2 (two) times daily.     sertraline (ZOLOFT) 100 MG tablet Take 100 mg by mouth 2 (two) times daily.    SYNTHROID 175 mcg tablet 175 mcg once daily.     tamsulosin (FLOMAX) 0.4 mg Cp24 TAKE 1 CAPSULE AT  BEDTIME.    vitamin D 185 MG Tab Take 2,000 Units by mouth once daily.     XARELTO 20 mg Tab TAKE 1 TABLET BY MOUTH ONCE A DAY WITH EVENING MEAL    cyanocobalamin 2000 MCG tablet Take 2,500 mcg by mouth once daily.    finasteride (PROSCAR) 5 mg tablet Take 1 tablet (5 mg total) by mouth once daily.    midodrine (PROAMATINE) 5 MG Tab     nitroGLYCERIN (NITROSTAT) 0.4 MG SL tablet Place 0.4 mg under the tongue every 5 (five) minutes as needed for Chest pain.    traMADol (ULTRAM) 50 mg tablet Take 0.5 tablets (25 mg total) by mouth every 8 (eight) hours as needed (Sever pain not controlled by tylenol or tizanidine).     Family History     Problem Relation (Age of Onset)    Cancer Mother, Sister    Heart disease Father        Tobacco Use    Smoking status: Heavy Tobacco Smoker     Packs/day: 0.50     Last attempt to quit: 1998     Years since quittin.3    Smokeless tobacco: Never Used   Substance and Sexual Activity    Alcohol use: No     Alcohol/week: 0.0 standard drinks    Drug use: No    Sexual activity: Never     Review of Systems   Gastrointestinal: Negative for melena.   Genitourinary: Negative for hematuria.     Objective:     Vital Signs (Most Recent):  Temp: 100.1 °F (37.8 °C) (10/11/20 0801)  Pulse: 93 (10/11/20 0801)  Resp: 17 (10/11/20 0801)  BP: 130/72 (10/11/20 0801)  SpO2: (!) 94 % (10/11/20 0801) Vital Signs (24h Range):  Temp:  [98 °F (36.7 °C)-100.1 °F (37.8 °C)] 100.1 °F (37.8 °C)  Pulse:  [] 93  Resp:  [16-24] 17  SpO2:  [90 %-98 %] 94 %  BP: (103-130)/(62-72) 130/72     Weight: 76.4 kg (168 lb 6.9 oz)  Body mass index is 26.38 kg/m².    SpO2: (!) 94 %  O2 Device (Oxygen Therapy): room air      Intake/Output Summary (Last 24 hours) at 10/11/2020 1113  Last data filed at 10/11/2020 0824  Gross per 24 hour   Intake 830 ml   Output 1350 ml   Net -520 ml       Lines/Drains/Airways     Peripheral Intravenous Line                 Peripheral IV - Single Lumen 10/09/20 2219 20 G  Right Antecubital 1 day         Peripheral IV - Single Lumen 10/10/20 22 G Left Antecubital 1 day                Physical Exam   Constitutional: He appears well-developed and well-nourished.   HENT:   Head: Normocephalic and atraumatic.   Eyes: Pupils are equal, round, and reactive to light. Conjunctivae and EOM are normal. No scleral icterus.   Neck: Normal range of motion. Neck supple. No JVD present. No tracheal deviation present. No thyromegaly present.   Cardiovascular: Normal rate, S1 normal and S2 normal. An irregularly irregular rhythm present. Exam reveals distant heart sounds. Exam reveals no gallop and no friction rub.   No murmur heard.  Pulmonary/Chest: Effort normal and breath sounds normal. No respiratory distress. He has no wheezes. He has no rales. He exhibits no tenderness.   Abdominal: Soft. He exhibits no distension.   Musculoskeletal: Normal range of motion.         General: No edema.   Neurological: He is alert. He has normal strength. No cranial nerve deficit.   Skin: Skin is warm and dry. No rash noted.   Psychiatric: He has a normal mood and affect. His behavior is normal.       Current Medications:   clopidogreL  75 mg Oral Daily    famotidine  20 mg Oral BID    finasteride  5 mg Oral Daily    gabapentin  300 mg Oral TID    levothyroxine  175 mcg Oral Before breakfast    metoprolol tartrate  25 mg Oral BID    polyethylene glycol  17 g Oral Daily    rivaroxaban  20 mg Oral with dinner    sertraline  100 mg Oral BID    tamsulosin  1 capsule Oral Nightly    vancomycin (VANCOCIN) IVPB  1,500 mg Intravenous Q24H    vitamin D  2,000 Units Oral Daily       acetaminophen, acetaminophen, dextrose 50%, dextrose 50%, glucagon (human recombinant), glucose, glucose, insulin aspart U-100, ketorolac, nitroGLYCERIN, ondansetron, promethazine (PHENERGAN) IVPB, sodium chloride 0.9%, Pharmacy to dose Vancomycin consult **AND** vancomycin - pharmacy to dose    Laboratory (all labs  reviewed):  CBC:  Recent Labs   Lab 08/10/19  0408 08/11/19  0912 10/09/20  1617 10/10/20  0713 10/11/20  0533   WBC 7.10 7.90 11.33 9.50 8.64   Hemoglobin 10.7 L 11.1 L 13.8 L 11.7 L 11.5 L   Hematocrit 34.3 L 35.7 L 42.8 36.6 L 35.2 L   Platelets 228 230 201 132 L 161       CHEMISTRIES:  Recent Labs   Lab 07/20/19  0350 08/01/19  1717 08/09/19  1655 08/10/19  0408 08/11/19  0912 10/09/20  1617 10/10/20  0348 10/11/20  0533   Glucose 101 57 L 154 H 117 H  113 H 162 H 177 H 195 H 165 H   Sodium 141 140 139 138  139 136 132 L 132 L 131 L   Potassium 3.3 L 3.2 L 4.6 4.1  4.1 3.8 4.5 4.1 4.1   BUN, Bld 12 14 24 H 20  20 10 17 17 14   Creatinine 0.9 0.8 1.3 1.0  0.9 0.8 1.0 0.9 0.8   eGFR if African American >60 >60 >60 >60  >60 >60 >60 >60 >60   eGFR if non  >60 >60 52 A >60  >60 >60 >60 >60 >60   Calcium 9.3 8.0 L 9.1 8.7  8.5 L 8.9 9.6 8.4 L 9.0   Magnesium 1.5 L 1.3 L 1.4 L 1.6  --   --   --  1.6       CARDIAC BIOMARKERS:  Recent Labs   Lab 08/09/19  1655 08/09/19  2252 08/10/19  0408 10/09/20  1617 10/10/20  0348   CPK  --   --   --  276 H  --    Troponin I 0.033 H 0.032 H 0.026 0.018 0.014       COAGS:  Recent Labs   Lab 10/23/17  0705 10/24/17  0853 07/18/19  2047 08/09/19  1654 10/09/20  1617   INR 1.2 1.2 1.1 1.1 1.1       LIPIDS/LFTS:  Recent Labs   Lab 07/19/19  0758 07/20/19  0350 08/01/19 1717 08/09/19  1655 08/10/19  0408 10/09/20  1617 10/11/20  0533   Cholesterol 85 L  --   --   --   --   --  126   Triglycerides 61  --   --   --   --   --  88   HDL 46  --   --   --   --   --  48   LDL Cholesterol 26.8 L  --   --   --   --   --  60.4 L   Non-HDL Cholesterol 39  --   --   --   --   --  78   AST  --  15 19 34 29 20  --    ALT  --  11 13 22 20 17  --        BNP:  Recent Labs   Lab 04/22/19 2206 07/18/19 2047 08/01/19 1717 08/09/19  1654 10/09/20  2032    H 366 H 240 H 183 H 69       TSH:  Recent Labs   Lab 07/18/19  2047 08/01/19  1717 10/09/20  2032   TSH 13.841 H 12.396 H  1.395       Free T4:  Recent Labs   Lab 07/18/19  2047 08/01/19  1717 10/10/20  0348   Free T4 1.05 0.88 1.18       Diagnostic Results:  ECG (personally reviewed and interpreted tracing(s)):  10/9/20 1544 , RBBB/LAD, similar to 8/9/19    Chest X-Ray (personally reviewed and interpreted image(s)): 10/9/20 NAD, mild CMeg, L PPM 1 lead    CTA chest 10/9/20  1. No evidence of pulmonary embolism.  2. Mild cardiomegaly, coronary atherosclerosis and moderate pericardial effusion.  Cardiology follow-up recommended.  3. Small bibasilar pleural effusions with mild associated atelectasis, right greater than left.    Echo: 10/10/20 (EF unchanged vs report 9/24/20, pericardial effusion prev mentioned, ?larger on current exam)  · The left ventricle is normal in size with mildly decreased systolic function. The estimated ejection fraction is 40%.  · There is mild left ventricular global hypokinesis.  · There is moderate left ventricular concentric hypertrophy.  · The sinuses of Valsalva is mildly dilated, 3.7cm.  · Normal right ventricular systolic function.  · Small circumferential pericardial effusion. No evidence of tamponade.  · Elevated central venous pressure (15 mmHg).  · The estimated PA systolic pressure is 45 mmHg.  · There is pulmonary hypertension.    Carotid US 3/2020 (Care everywhere, per [facility] note 7/30/20)  CAROTIDS: Right Carotid - Doppler peak velocities and ratio are consistent with a right internal carotid stenosis of 16-39% Left Carotid - Doppler peak velocities and ratio are consistent with a left internal carotid stenosis of 16-39%     VERITO/DCCV 7/19/19  LVEF 40-45%, mild global HK  Normal valves  Mild-mod MR  Mild TR  Mild AI  No LA/MAURICIO thrombus  Successful DCCV AF->NSR 360J x1  Pt tolerated procedure well without complications  Plan:  Observe in ICU  Cont Xarelto 20mg qd  Start amio load  Dispo planning appropriate  Pt to follow up with me in 1-2 weeks.     Cath: 10/23/17   B.  Summary/Post-Operative Diagnosis    [id number].    70% mid LAD ISR; FFR=0.79.     of D1.    Successful PCI of mid LAD with 4X15 MARY.    Successful PCI of D1 with 2.25X30 and 2.5X18 MARY.    IVUS utilized for stent sizing.  D. Hemodynamic Results  LVEDP (Pre): 22 mmHg  E. Angiographic Results       Patient has a right dominant coronary artery.      - Left Main Coronary Artery:             The LM has luminal irregularities. There is DONNA 3 flow.     - Left Anterior Descending Artery:             The proximal LAD has a 10% stenosis. There is DONNA 3 flow. The remaining portion of the vessel has luminal irregularities (10).             The mid LAD has a 70% stenosis. There is DONNA 3 flow. FFR was 0.79. The remaining portion of the vessel has luminal irregularities (10).                     Lesion Details:   The length is 10-20 mm, eccentric shape, moderate proximal tortuosity, segment angulation of <45 degrees, irregular contour, mild to moderate calcification. No bifurcation is present. The minimum luminal diameter is   0.5 millimeters.             The distal LAD has a 50% stenosis. There is DONNA 3 flow. The remaining portion of the vessel is of small caliber.     - D1:             The D1 has chronic total occlusion. There is DONNA 0 flow. There is collateral flow from the PLB (faint). The remaining portion of the vessel has luminal irregularities (10).                     Lesion Details:   This is a Type C lesion.  The length is 40mm, eccentric shape, moderate proximal tortuosity, segment angulation of 45-90 degrees, irregular contour, mild to moderate calcification. Bifurcation is present. The minimum   luminal diameter is 0 millimeters.     - Left Circumflex Artery:             The proximal LCX has a 20% stenosis. There is DONNA 3 flow. The remaining portion of the vessel has luminal irregularities (10).             The distal LCX has luminal irregularities. There is DONNA 3 flow.     - OM1:             The OM1 has  luminal irregularities has a 30% stenosis. There is DONNA 3 flow. The remaining portion of the vessel has luminal irregularities (10).     - Right Coronary Artery:             The proximal RCA has luminal irregularities. There is DONNA 3 flow.             The mid RCA has a 20% stenosis. There is DONNA 3 flow. The remaining portion of the vessel has luminal irregularities (10).             The distal RCA has a 50% stenosis. There is DONNA 3 flow. The remaining portion of the vessel has luminal irregularities (10).     - Posterior Lateral Branch:             The PLB has a 40% stenosis. There is DONNA 3 flow. The remaining portion of the vessel has luminal irregularities (10).  Intervention       Mid LAD:              The lesion was successfully intervened. Post-stenosis of 0%, post-DONNA 3 flow and TMP grade 3. The vessel was accessed natively.  The following items were used: Blln Trek Rx 3.0 X 12, Stent Resolute Rx 4.00x15 (MARY) and Cath [facility]   [place].       D1:              The lesion was successfully intervened. Post-stenosis of 0%, post-DONNA 3 flow and TMP grade 3. The vessel was accessed natively.  The following items were used: Cath Mini Trek 1.5 X 20 Rx, Blln Sc [place]hora 2.0 X 10, Stent Resolute Rx 2.25x30   (MARY), Stent Resolute Rx 2.50x18 (MARY) and Cath [facility] [facility] [place].

## 2020-10-12 VITALS
BODY MASS INDEX: 26.44 KG/M2 | RESPIRATION RATE: 19 BRPM | OXYGEN SATURATION: 98 % | HEART RATE: 70 BPM | HEIGHT: 67 IN | SYSTOLIC BLOOD PRESSURE: 109 MMHG | DIASTOLIC BLOOD PRESSURE: 65 MMHG | TEMPERATURE: 97 F | WEIGHT: 168.44 LBS

## 2020-10-12 PROBLEM — R78.81 BLOOD BACTERIAL CULTURE POSITIVE: Status: RESOLVED | Noted: 2020-10-11 | Resolved: 2020-10-12

## 2020-10-12 PROBLEM — I48.91 ATRIAL FIBRILLATION WITH RVR: Status: RESOLVED | Noted: 2017-06-04 | Resolved: 2020-10-12

## 2020-10-12 PROBLEM — R82.71 BACTERIURIA: Status: RESOLVED | Noted: 2020-10-11 | Resolved: 2020-10-12

## 2020-10-12 LAB
ANION GAP SERPL CALC-SCNC: 12 MMOL/L (ref 8–16)
BACTERIA UR CULT: ABNORMAL
BASOPHILS # BLD AUTO: 0.03 K/UL (ref 0–0.2)
BASOPHILS NFR BLD: 0.4 % (ref 0–1.9)
BUN SERPL-MCNC: 14 MG/DL (ref 8–23)
CALCIUM SERPL-MCNC: 9.2 MG/DL (ref 8.7–10.5)
CHLORIDE SERPL-SCNC: 100 MMOL/L (ref 95–110)
CO2 SERPL-SCNC: 22 MMOL/L (ref 23–29)
CREAT SERPL-MCNC: 0.8 MG/DL (ref 0.5–1.4)
DIFFERENTIAL METHOD: ABNORMAL
EOSINOPHIL # BLD AUTO: 0.1 K/UL (ref 0–0.5)
EOSINOPHIL NFR BLD: 1.3 % (ref 0–8)
ERYTHROCYTE [DISTWIDTH] IN BLOOD BY AUTOMATED COUNT: 14.6 % (ref 11.5–14.5)
EST. GFR  (AFRICAN AMERICAN): >60 ML/MIN/1.73 M^2
EST. GFR  (NON AFRICAN AMERICAN): >60 ML/MIN/1.73 M^2
GLUCOSE SERPL-MCNC: 164 MG/DL (ref 70–110)
HCT VFR BLD AUTO: 35.5 % (ref 40–54)
HGB BLD-MCNC: 11.6 G/DL (ref 14–18)
IMM GRANULOCYTES # BLD AUTO: 0.03 K/UL (ref 0–0.04)
IMM GRANULOCYTES NFR BLD AUTO: 0.4 % (ref 0–0.5)
LYMPHOCYTES # BLD AUTO: 0.8 K/UL (ref 1–4.8)
LYMPHOCYTES NFR BLD: 11.7 % (ref 18–48)
MAGNESIUM SERPL-MCNC: 1.7 MG/DL (ref 1.6–2.6)
MCH RBC QN AUTO: 30.2 PG (ref 27–31)
MCHC RBC AUTO-ENTMCNC: 32.7 G/DL (ref 32–36)
MCV RBC AUTO: 92 FL (ref 82–98)
MONOCYTES # BLD AUTO: 0.6 K/UL (ref 0.3–1)
MONOCYTES NFR BLD: 8.3 % (ref 4–15)
NEUTROPHILS # BLD AUTO: 5.3 K/UL (ref 1.8–7.7)
NEUTROPHILS NFR BLD: 77.9 % (ref 38–73)
NRBC BLD-RTO: 0 /100 WBC
PLATELET # BLD AUTO: 169 K/UL (ref 150–350)
PMV BLD AUTO: 10.7 FL (ref 9.2–12.9)
POCT GLUCOSE: 168 MG/DL (ref 70–110)
POCT GLUCOSE: 192 MG/DL (ref 70–110)
POTASSIUM SERPL-SCNC: 4 MMOL/L (ref 3.5–5.1)
RBC # BLD AUTO: 3.84 M/UL (ref 4.6–6.2)
SODIUM SERPL-SCNC: 134 MMOL/L (ref 136–145)
WBC # BLD AUTO: 6.77 K/UL (ref 3.9–12.7)

## 2020-10-12 PROCEDURE — 97535 SELF CARE MNGMENT TRAINING: CPT

## 2020-10-12 PROCEDURE — 25000003 PHARM REV CODE 250: Performed by: HOSPITALIST

## 2020-10-12 PROCEDURE — 97530 THERAPEUTIC ACTIVITIES: CPT

## 2020-10-12 PROCEDURE — 85025 COMPLETE CBC W/AUTO DIFF WBC: CPT

## 2020-10-12 PROCEDURE — 80048 BASIC METABOLIC PNL TOTAL CA: CPT

## 2020-10-12 PROCEDURE — 36415 COLL VENOUS BLD VENIPUNCTURE: CPT

## 2020-10-12 PROCEDURE — 97110 THERAPEUTIC EXERCISES: CPT

## 2020-10-12 PROCEDURE — 25000003 PHARM REV CODE 250: Performed by: INTERNAL MEDICINE

## 2020-10-12 PROCEDURE — 83735 ASSAY OF MAGNESIUM: CPT

## 2020-10-12 PROCEDURE — 97116 GAIT TRAINING THERAPY: CPT

## 2020-10-12 RX ORDER — NITROFURANTOIN 25; 75 MG/1; MG/1
100 CAPSULE ORAL 2 TIMES DAILY
Qty: 8 CAPSULE | Refills: 0 | Status: SHIPPED | OUTPATIENT
Start: 2020-10-12

## 2020-10-12 RX ORDER — METOPROLOL TARTRATE 25 MG/1
25 TABLET, FILM COATED ORAL 2 TIMES DAILY
Qty: 60 TABLET | Refills: 11 | Status: SHIPPED | OUTPATIENT
Start: 2020-10-12 | End: 2021-10-12

## 2020-10-12 RX ADMIN — FAMOTIDINE 20 MG: 20 TABLET ORAL at 08:10

## 2020-10-12 RX ADMIN — POLYETHYLENE GLYCOL 3350 17 G: 17 POWDER, FOR SOLUTION ORAL at 08:10

## 2020-10-12 RX ADMIN — FINASTERIDE 5 MG: 5 TABLET, FILM COATED ORAL at 08:10

## 2020-10-12 RX ADMIN — CLOPIDOGREL 75 MG: 75 TABLET, FILM COATED ORAL at 08:10

## 2020-10-12 RX ADMIN — LEVOTHYROXINE SODIUM 175 MCG: 150 TABLET ORAL at 06:10

## 2020-10-12 RX ADMIN — METOPROLOL TARTRATE 25 MG: 25 TABLET, FILM COATED ORAL at 08:10

## 2020-10-12 RX ADMIN — GABAPENTIN 300 MG: 300 CAPSULE ORAL at 08:10

## 2020-10-12 RX ADMIN — SERTRALINE HYDROCHLORIDE 100 MG: 50 TABLET ORAL at 08:10

## 2020-10-12 RX ADMIN — CHOLECALCIFEROL (VITAMIN D3) 25 MCG (1,000 UNIT) TABLET 2000 UNITS: TABLET at 08:10

## 2020-10-12 NOTE — HOSPITAL COURSE
Mr Theodore is a 80y male with history of Afib, CAD s/p PCI, presented with chest pain, found to be in afib w/ RVR. He was taken to the ICU and started on metoprolol, with a subsequent normalization of ventricular rate. He was kept on metoprolol tartrate, and amiodarone and digoxin were held. His heart rate remained stable on the floor (with occasional jumps during activity),    At admission he had a low grade fever (100.6), and a high-normal white count. Workup was negative (negative blood cultures, negative procalcitonin, no pneumonia, no PE) with the exception of E faecalis bactiuria, without pyuria on dipstick or microscopy. On further discussion with Mr. Theodore he denied dysuria and urgency. He did note his history of BPH, and noted that he was unable to void for >12 hours prior to admission, that potentially could have been a trigger for the Afib w/ RVR that was his presenting symptom. He was able to void spontaneously while inpatient on his home BPH regimen. He was started on an assortment of antibiotics including vancomycin while in the hospital, and was sent home on macrobid given that his bactiuria may have been a trigger for retention. Beers Criteria for geriatric prescribing are noted, however the patient has normal renal function with GFR > 60.    The patient also complained of acute on chronic dizziness and weakness. The etiology was unclear at discharge, however there seemed to be an orthostatic component (without hypovolemia) as well as a generalized weakness component. He was seen by PT/OT who recommended home health. He was sent home with home PT/OT for continued care.    The case was discussed with the patient and his son, all questions were answered. The patient asked to be discharged, and was medically stable at the time of discharge. He is to follow up with his PCP and outpatient cardiologist for further management, with home health following.

## 2020-10-12 NOTE — DISCHARGE SUMMARY
[facility] Hospital Medicine  Discharge Summary      Patient Name: Doni Theodore  MRN: [id number]  Admission Date: 10/9/2020  Hospital Length of Stay: 3 days  Discharge Date and Time: 10/12/2020  2:52 PM  Attending Physician: No att. providers found   Discharging Provider: Wicho Meyers MD  Primary Care Provider: Nic Mauro MD      HPI:   Doni Theodore is a 80 y.o. male that (in part)  has a past medical history of A-fib, Anticoagulant long-term use, Arthritis, Coronary artery disease, Diabetes mellitus, Hypertension, Pacemaker, Sleep apnea, Stroke, and Thyroid disease.  has a past surgical history that includes Coronary angioplasty with stent (6/06); Cholecystectomy; Appendectomy; Hernia repair; Eye surgery; Cardiac pacemaker placement; sciatica; Ankle surgery; and Treatment of cardiac arrhythmia (7/19/2019). Presents to [facility] Emergency Department complaining of left-sided chest pain.  Worsened with deep inspiration.  Recently evaluated for atrial fibrillation at [facility]  Associated symptoms include Mild dyspnea with exertion.  Denies syncope, edema, cyanosis fever, chills, or cough, however, he was febrile to 100.6° F in the emergency department.  No joint swelling or unilateral weakness.  No slurred speech, paresthesias, ataxia, vertigo, or tremors.  Acute onset today.  Frequency of multiple episodes of pain.  Reports compliance with his home medication regimen although some of his medications including digoxin were discontinued by home health.  History of persistent atrial fibrillation.  On chronic anticoagulation.  Was previously taking amiodarone, digoxin, and beta-blocker.    In the emergency department his heart rate was found to be febrile with a heart rate into the 120s and mild tachypnea.  EKG revealed atrial fibrillation with rapid ventricular response.  He was given a dose of metoprolol IV.  He was rate controlled with this.   Routine laboratory studies, urinalysis, chest x-ray, and CT angiogram of the chest was performed.  No evidence of pneumonia by bilateral pleural effusions and some atelectasis were noted.  CRP markedly elevated.  Procalcitonin normal.  Lactic acid 2.1.  Digoxin level of 0.5.  Given recent hospitalization, levofloxacin was given due to the possibility of hospital community-acquired pneumonia.  Cultures were obtained prior to this.  Cardiology was consulted.    Hospital medicine has been asked to admit for further evaluation and treatment.     * No surgery found *      Hospital Course:   Mr Theodore is a 80y male with history of Afib, CAD s/p PCI, presented with chest pain, found to be in afib w/ RVR. He was taken to the ICU and started on metoprolol, with a subsequent normalization of ventricular rate. He was kept on metoprolol tartrate, and amiodarone and digoxin were held. His heart rate remained stable on the floor (with occasional jumps during activity),    At admission he had a low grade fever (100.6), and a high-normal white count. Workup was negative (negative blood cultures, negative procalcitonin, no pneumonia, no PE) with the exception of E faecalis bactiuria, without pyuria on dipstick or microscopy. On further discussion with Mr. Theodore he denied dysuria and urgency. He did note his history of BPH, and noted that he was unable to void for >12 hours prior to admission, that potentially could have been a trigger for the Afib w/ RVR that was his presenting symptom. He was able to void spontaneously while inpatient on his home BPH regimen. He was started on an assortment of antibiotics including vancomycin while in the hospital, and was sent home on macrobid given that his bactiuria may have been a trigger for retention. Beers Criteria for geriatric prescribing are noted, however the patient has normal renal function with GFR > 60.    The patient also complained of acute on chronic dizziness and weakness.  The etiology was unclear at discharge, however there seemed to be an orthostatic component (without hypovolemia) as well as a generalized weakness component. He was seen by PT/OT who recommended home health. He was sent home with home PT/OT for continued care.    The case was discussed with the patient and his son, all questions were answered. The patient asked to be discharged, and was medically stable at the time of discharge. He is to follow up with his PCP and outpatient cardiologist for further management, with home health following.     Consults:   Consults (From admission, onward)        Status Ordering Provider     Inpatient consult to Cardiology  Once     Provider:  (Not yet assigned)    Completed KIANNA STOKES          No new Assessment & Plan notes have been filed under this hospital service since the last note was generated.  Service: Hospital Medicine    Final Active Diagnoses:    Diagnosis Date Noted POA    Dizziness [R42] 10/10/2020 Yes    BPH (benign prostatic hyperplasia) [N40.0] 08/10/2019 Yes     Chronic    Hypothyroidism [E03.9] 08/10/2019 Yes     Chronic    FEMI on CPAP [G47.33, Z99.89] 08/10/2019 Not Applicable     Chronic    Chronic anticoagulation [Z79.01] 07/19/2019 Not Applicable     Chronic    Essential hypertension [I10] 07/19/2019 Yes     Chronic    Type 2 diabetes mellitus, controlled [E11.9] 07/19/2019 Yes     Chronic    Anemia of chronic disease [D63.8] 09/21/2017 Yes     Chronic    History of CVA (cerebrovascular accident) [Z86.73] 09/21/2017 Not Applicable     Chronic    Presence of permanent cardiac pacemaker [Z95.0] 09/21/2017 Yes     Chronic    Coronary artery disease involving native coronary artery of native heart without angina pectoris [I25.10] 06/04/2017 Yes     Chronic    AAA (abdominal aortic aneurysm) [I71.4] 06/18/2013 Yes      Problems Resolved During this Admission:    Diagnosis Date Noted Date Resolved POA    PRINCIPAL PROBLEM:  SIRS (systemic  inflammatory response syndrome) [R65.10] 10/09/2020 10/11/2020 Yes    Atrial fibrillation with RVR [I48.91] 06/04/2017 10/12/2020 Yes    Bacteriuria secondary to enterococcus [R82.71] 10/11/2020 10/12/2020 Yes    CoNS+ blood culture [R78.81] 10/11/2020 10/12/2020 No       Discharged Condition: fair    Disposition: Home or Self Care    Follow Up:  Follow-up Information     Nic Mauro MD. Schedule an appointment as soon as possible for a visit in 1 week.    Specialty: Family Medicine  Why: Primary Care Follow-Up   Contact information:  [address] [facility] [address]  [phone]                 Patient Instructions:      Diet Cardiac     Notify your health care provider if you experience any of the following:  temperature >100.4     Notify your health care provider if you experience any of the following:  persistent dizziness, light-headedness, or visual disturbances     Notify your health care provider if you experience any of the following:  increased confusion or weakness     Activity as tolerated       Significant Diagnostic Studies: As above    Pending Diagnostic Studies:     None         Medications:  Reconciled Home Medications:      Medication List      START taking these medications    metoprolol tartrate 25 MG tablet  Commonly known as: LOPRESSOR  Take 1 tablet (25 mg total) by mouth 2 (two) times daily.     nitrofurantoin (macrocrystal-monohydrate) 100 MG capsule  Commonly known as: MACROBID  Take 1 capsule (100 mg total) by mouth 2 (two) times daily.        CONTINUE taking these medications    aspirin 81 MG EC tablet  Commonly known as: ECOTRIN  Take 81 mg by mouth once daily.     cyanocobalamin 2000 MCG tablet  Take 2,500 mcg by mouth once daily.     fentaNYL 25 mcg/hr  Commonly known as: DURAGESIC  Place 1 patch onto the skin every 72 hours.     finasteride 5 mg tablet  Commonly known as: PROSCAR  Take 1 tablet (5 mg total) by mouth once daily.     fluticasone  propionate 50 mcg/actuation nasal spray  Commonly known as: FLONASE  1 spray by Each Nare route nightly as needed.     gabapentin 300 MG capsule  Commonly known as: NEURONTIN  Take 300 mg by mouth 3 (three) times daily.     glimepiride 4 MG tablet  Commonly known as: AMARYL  Take 8 mg by mouth before breakfast.     levocetirizine 5 MG tablet  Commonly known as: XYZAL  Take 5 mg by mouth every evening.     metFORMIN 1000 MG tablet  Commonly known as: GLUCOPHAGE  Take 1,000 mg by mouth 2 (two) times daily with meals.     nitroGLYCERIN 0.4 MG SL tablet  Commonly known as: NITROSTAT  Place 0.4 mg under the tongue every 5 (five) minutes as needed for Chest pain.     ranitidine 300 MG tablet  Commonly known as: ZANTAC  Take 300 mg by mouth 2 (two) times daily.     sertraline 100 MG tablet  Commonly known as: ZOLOFT  Take 100 mg by mouth 2 (two) times daily.     SYNTHROID 175 MCG tablet  Generic drug: levothyroxine  175 mcg once daily.     tamsulosin 0.4 mg Cap  Commonly known as: FLOMAX  TAKE 1 CAPSULE AT BEDTIME.     traMADoL 50 mg tablet  Commonly known as: ULTRAM  Take 0.5 tablets (25 mg total) by mouth every 8 (eight) hours as needed (Sever pain not controlled by tylenol or tizanidine).     vitamin D 1000 units Tab  Commonly known as: VITAMIN D3  Take 2,000 Units by mouth once daily.     XARELTO 20 mg Tab  Generic drug: rivaroxaban  TAKE 1 TABLET BY MOUTH ONCE A DAY WITH EVENING MEAL        STOP taking these medications    midodrine 5 MG Tab  Commonly known as: PROAMATINE            Indwelling Lines/Drains at time of discharge:   Lines/Drains/Airways     None                 Time spent on the discharge of patient: 45 minutes  Patient was seen and examined on the date of discharge and determined to be suitable for discharge.         Wicho Meyers MD  Department of Hospital Medicine  [facility]

## 2020-10-12 NOTE — PT/OT/SLP DISCHARGE
Physical Therapy Discharge Summary    Name: Doni Theodore  MRN: [id number]   Principal Problem: SIRS (systemic inflammatory response syndrome)     Patient Discharged from acute Physical Therapy on 10/12/20.  Please refer to prior PT notes for functional status.     Assessment:     Patient appropriate for care in another setting.    Objective:     GOALS:   Multidisciplinary Problems     Physical Therapy Goals        Problem: Physical Therapy Goal    Goal Priority Disciplines Outcome Goal Variances Interventions   Physical Therapy Goal     PT, PT/OT Ongoing, Progressing     Description: Goals to be met by: 10/24/20     Patient will increase functional independence with mobility by performin. Supine to sit with Supervision  2. Rolling to Left and Right with Supervision  3. Sit to stand transfer with Supervision   4. Bed to chair transfer with Supervision   5. Gait >50 feet with Supervision using Rolling Walker  6. Lower extremity exercise program 3 sets x10 reps per handout, with supervision                     Reasons for Discontinuation of Therapy Services  Transfer to alternate level of care.      Plan:     Patient Discharged to: Home with Home Health Service.    Daniela Taylor, PT  10/12/2020

## 2020-10-12 NOTE — PLAN OF CARE
EDUCATION:  Patient After Visit Summary (AVS) updated to include educational information on Atrial Fibrillation that will be printed on patient's AVS to review upon discharge; Information reviewed with patient that include: signs and symptoms to look for and call the doctor if experiencing, and symptoms that may indicate a medical emergency: CALL 911.            SW taught patient about things he is responsible for when discharged to help with his recovery:  Particularly on how to Manage his care at home:  1. Getting his prescriptions filled.  2. Taking his medications as directed. DO NOT MISS ANY DOSES!  3. Going to his follow-up doctor appointments.       Help at home will be from pt's daughter, assisting in pt's recovery.     Follow-up appointments to be made with PCP; patient verbalized understanding     Nurse, Isabel, notified that all CM needs have been met.         10/12/20 1346   Final Note   Assessment Type Final Discharge Note   Anticipated Discharge Disposition Home-Health   What phone number can be called within the next 1-3 days to see how you are doing after discharge? [phone]   Hospital Follow Up  Appt(s) scheduled? Yes   Discharge plans and expectations educations in teach back method with documentation complete? Yes   Right Care Referral Info   Post Acute Recommendation Home-care   Post-Acute Status   Post-Acute Authorization Home Health   Home Health Status Pending Payor Medical Review   Discharge Delays None known at this time

## 2020-10-12 NOTE — PLAN OF CARE
Problem: Occupational Therapy Goal  Goal: Occupational Therapy Goal  Description: Goals to be met by: 10/24/20     Patient will increase functional independence with ADLs by performing:    LE Dressing with Supervision.  Grooming while standing at sink with Supervision.  Toileting from toilet with Supervision for hygiene and clothing management.   Supine to sit with Supervision.  Step transfer with Supervision  Toilet transfer to toilet with Supervision.  Upper extremity exercise program x15 reps per handout, with independence.    Outcome: Ongoing, Progressing   Pleasantly motivated throughout session. CGA/SBA for in-room functional mobility and standing ADLs at the sink. OT rec HHOT with family assist/sup at discharge to address home safety.

## 2020-10-12 NOTE — PLAN OF CARE
Pt awake and alert, some bouts of confusion. Pt able to ambulate to the bathroom and void independently and without difficulty.  VSS, no distress noted.  IV infusing antibiotic as ordered.  Telemetry box monitored, a-fibrillation noted on tele strip.  Pt moved to room 418 without incident after COVID negative results.  Bed in low position, wheels locked, call light in reach for assistance.   Will continue to monitor.      Problem: Fall Injury Risk  Goal: Absence of Fall and Fall-Related Injury  10/12/2020 0228 by Janet German RN  Outcome: Ongoing, Progressing    Problem: Adult Inpatient Plan of Care  Goal: Plan of Care Review  10/12/2020 0228 by Janet German RN  Outcome: Ongoing, Progressing     Problem: Adult Inpatient Plan of Care  Goal: Optimal Comfort and Wellbeing  10/12/2020 0228 by Janet German RN  Outcome: Ongoing, Progressing     Problem: Adult Inpatient Plan of Care  Goal: Readiness for Transition of Care  10/12/2020 0228 by Janet German RN  Outcome: Ongoing, Progressing    Problem: Glycemic Control Impaired (Sepsis/Septic Shock)  Goal: Blood Glucose Level Within Desired Range

## 2020-10-12 NOTE — NURSING
In preparation for discharge, removal of patient's saline lock and heart monitor per policy. Discharge instructions giving to patient. Patient verbalized understanding. Patient is waiting for his son to take him home. No distress noted.

## 2020-10-12 NOTE — PROGRESS NOTES
Vancomycin consult follow-up:    Patient reviewed, renal function stable, no new levels, continue current therapy; Next levels due: trough due 10/13/2020 at 1700

## 2020-10-12 NOTE — PLAN OF CARE
[facility] HOME HEALTH ORDERS  FACE TO FACE ENCOUNTER    Patient Name: Doni Theodore  YOB: 1940    PCP: Nic Mauro MD   PCP Address: [address] [facility] [facility] / [place]  PCP Phone Number: [phone]  PCP Fax: [phone]    Encounter Date: 10/12/2020    Admit to Home Health    Diagnoses:  Active Hospital Problems    Diagnosis  POA    Atrial fibrillation with RVR [I48.91]  Yes     Priority: 1 - High    Bacteriuria secondary to enterococcus [R82.71]  Yes     Priority: 2     CoNS+ blood culture [R78.81]  No    Dizziness [R42]  Yes    BPH (benign prostatic hyperplasia) [N40.0]  Yes     Chronic    Hypothyroidism [E03.9]  Yes     Chronic    FEMI on CPAP [G47.33, Z99.89]  Not Applicable     Chronic    Chronic anticoagulation [Z79.01]  Not Applicable     Chronic     Plavix (on hold for angiogram)      Essential hypertension [I10]  Yes     Chronic    Type 2 diabetes mellitus, controlled [E11.9]  Yes     Chronic    Anemia of chronic disease [D63.8]  Yes     Chronic    History of CVA (cerebrovascular accident) [Z86.73]  Not Applicable     Chronic    Presence of permanent cardiac pacemaker [Z95.0]  Yes     Chronic    Coronary artery disease involving native coronary artery of native heart without angina pectoris [I25.10]  Yes     Chronic    AAA (abdominal aortic aneurysm) [I71.4]  Yes      Resolved Hospital Problems    Diagnosis Date Resolved POA    *SIRS (systemic inflammatory response syndrome) [R65.10] 10/11/2020 Yes       No future appointments.        I have seen and examined this patient face to face today. My clinical findings that support the need for the home health skilled services and home bound status are the following:  Weakness/numbness causing balance and gait disturbance due to Weakness/Debility making it taxing to leave home.  Requiring assistive device to leave home due to unsteady gait caused by   Weakness/Debility.    Allergies:  Review of patient's allergies indicates:   Allergen Reactions    Penicillins Swelling       Diet: cardiac diet    Activities: activity as tolerated    Nursing:   SN to complete comprehensive assessment including routine vital signs. Instruct on disease process and s/s of complications to report to MD. Review/verify medication list sent home with the patient at time of discharge  and instruct patient/caregiver as needed. Frequency may be adjusted depending on start of care date.    Notify MD if SBP > 160 or < 90; DBP > 90 or < 50; HR > 120 or < 50; Temp > 101;       CONSULTS:    Physical Therapy to evaluate and treat. Evaluate for home safety and equipment needs; Establish/upgrade home exercise program. Perform / instruct on therapeutic exercises, gait training, transfer training, and Range of Motion.  Occupational Therapy to evaluate and treat. Evaluate home environment for safety and equipment needs. Perform/Instruct on transfers, ADL training, ROM, and therapeutic exercises.    MISCELLANEOUS CARE:  N/A    WOUND CARE ORDERS  n/a      Medications: Review discharge medications with patient and family and provide education.      Current Discharge Medication List      CONTINUE these medications which have NOT CHANGED    Details   aspirin (ECOTRIN) 81 MG EC tablet Take 81 mg by mouth once daily.      fentaNYL (DURAGESIC) 25 mcg/hr Place 1 patch onto the skin every 72 hours.    Comments: Quantity prescribed more than 7 day supply? Press F2 and select one:[id number]        fluticasone (FLONASE) 50 mcg/actuation nasal spray 1 spray by Each Nare route nightly as needed.       gabapentin (NEURONTIN) 300 MG capsule Take 300 mg by mouth 3 (three) times daily.  Refills: 3      glimepiride (AMARYL) 4 MG tablet Take 8 mg by mouth before breakfast.       levocetirizine (XYZAL) 5 MG tablet Take 5 mg by mouth every evening.      metFORMIN (GLUCOPHAGE) 1000 MG tablet Take 1,000 mg by mouth 2 (two) times  daily with meals.      ranitidine (ZANTAC) 300 MG tablet Take 300 mg by mouth 2 (two) times daily.       sertraline (ZOLOFT) 100 MG tablet Take 100 mg by mouth 2 (two) times daily.  Refills: 2      SYNTHROID 175 mcg tablet 175 mcg once daily.       tamsulosin (FLOMAX) 0.4 mg Cp24 TAKE 1 CAPSULE AT BEDTIME.  Qty: 90 capsule, Refills: 3    Associated Diagnoses: BPH without urinary obstruction      vitamin D 185 MG Tab Take 2,000 Units by mouth once daily.       XARELTO 20 mg Tab TAKE 1 TABLET BY MOUTH ONCE A DAY WITH EVENING MEAL  Refills: 6      cyanocobalamin 2000 MCG tablet Take 2,500 mcg by mouth once daily.      finasteride (PROSCAR) 5 mg tablet Take 1 tablet (5 mg total) by mouth once daily.  Qty: 30 tablet, Refills: 11      midodrine (PROAMATINE) 5 MG Tab       nitroGLYCERIN (NITROSTAT) 0.4 MG SL tablet Place 0.4 mg under the tongue every 5 (five) minutes as needed for Chest pain.      traMADol (ULTRAM) 50 mg tablet Take 0.5 tablets (25 mg total) by mouth every 8 (eight) hours as needed (Sever pain not controlled by tylenol or tizanidine).  Qty: 10 tablet, Refills: 0             I certify that this patient is confined to his home and needs physical therapy and occupational therapy.

## 2020-10-12 NOTE — PT/OT/SLP PROGRESS
"Physical Therapy Treatment    Patient Name:  Doni Theodore   MRN:  [id number]    Recommendations:     Discharge Recommendations:  home health PT with family supervision  Discharge Equipment Recommendations: none   Barriers to discharge home: Mr. Theodore currently has no barriers to be discharged home and will benefit from Home Health PT. It should be noted that he still experiences bouts of mild dizziness when standing and experiences decreased endurance when ambulating therefore family supervision is recommended when D/C'ed home.    Assessment:     Doni Theodore is a 80 y.o. male admitted with a medical diagnosis of SIRS (systemic inflammatory response syndrome).  He presents with the following impairments/functional limitations:  weakness, impaired endurance, impaired functional mobilty, gait instability, impaired balance, decreased safety awareness.    Rehab Prognosis: Good; patient would benefit from acute skilled PT services to address these deficits and reach maximum level of function.    Recent Surgery: * No surgery found *      Plan:     During this hospitalization, patient to be seen 3 x/week to address the identified rehab impairments via gait training, therapeutic activities, therapeutic exercises and progress toward the following goals:    · Plan of Care Expires:  10/24/20    Subjective     Chief Complaint: Pt complains of mild dizziness when standing and "gets tired" when ambulating.   Patient/Family Comments/goals: Mr. Theodore is compliant and agreeable with PT and all interventions/activities. His goal is to independently function at home.  Pain/Comfort:  · Pain Rating 1: 0/10      Objective:     Patient found alone in restroom with door closed with pulse ox (continuous) and telemetry upon PT entry to room. PT announced themself to pt and asked the pt to let PT know if he needed any assistance. Upon completion in restroom, pt pulled call light to indicate he was finished in restroom (he states he " "was told do so). PT then entered and found telemetry monitor had detatched from gown and PT reattached.     General Precautions: Standard, fall, diabetic, (- COVID)   Orthopedic Precautions:N/A      Mr. Theodore was in restroom with door closed upon PT entry. Pt was alert, oriented, motivated, and cooperative with PT throughout tx session with the exception of a mild bout of confusion of what day it was (he thought it was Sunday when it was Monday). He was able to ambulate and complete therex with minimal verbal and tactile cues throughout. He expressed verbal understanding and demonstrated exercises prescribed in handout. Before leaving room, pt was seated in hospital chair with chair alarm activated and requested a spoon to eat his pudding. He was able to open pudding independently without assistance.    Functional Mobility:  · Transfers:     · Sit to Stand:  contact guard assistance with no AD and gait belt applied. Pt used arm of hospital chair to stand upright.  · Stand to Sit: stand by assistance with no AD and gait belt applied. Pt used arm of hospital chair to slowly lower himself into chair, sitting centered and upright.  · Gait: Pt ambulated in hallway ~100 feet with contact guard assistance using no AD.  During ambulation in the hallway, pt leaned onto the wall stating, "he needed a break." PT instructed pt to take a standing break until he felt ready to return to his hospital room. After ~30 seconds, pt ambulated contact guard assistance back to his hospital bedroom to sit upright in chair.  Gait deficits included mildly flexed trunk, bilateral flexed knees, flat foot contact, and decreased hip extension in all phases. Pt also demonstrates decreased step length and gait speed throughout ambulation.      AM-PAC 6 CLICK MOBILITY  Turning over in bed (including adjusting bedclothes, sheets and blankets)?: 4  Sitting down on and standing up from a chair with arms (e.g., wheelchair, bedside commode, etc.): " 4  Moving from lying on back to sitting on the side of the bed?: 4  Moving to and from a bed to a chair (including a wheelchair)?: 4  Need to walk in hospital room?: 4  Climbing 3-5 steps with a railing?: 3  Basic Mobility Total Score: 23       Therapeutic Activities and Exercises: Reclined in hospital chair 1 set 10 repetitions, bilateral: Ankle PF/DF, hip abduction, quad sets    Pt issued HEP for seated/supine LE therex, encouraged to perform therex daily.  Pt verbalized understanding.      Patient left up in chair with all lines intact, call button in reach, chair alarm on and nurse Isabel notified.  Tray table in front of pt.     GOALS:   Multidisciplinary Problems     Physical Therapy Goals        Problem: Physical Therapy Goal    Goal Priority Disciplines Outcome Goal Variances Interventions   Physical Therapy Goal     PT, PT/OT Ongoing, Progressing     Description: Goals to be met by: 10/24/20     Patient will increase functional independence with mobility by performin. Supine to sit with Supervision  2. Rolling to Left and Right with Supervision  3. Sit to stand transfer with Supervision   4. Bed to chair transfer with Supervision   5. Gait >50 feet with Supervision using Rolling Walker  6. Lower extremity exercise program 3 sets x10 reps per handout, with supervision                     Time Tracking:     PT Received On: 10/12/20  PT Start Time: 953     PT Stop Time: 1022  PT Total Time (min): 29 min     Billable Minutes: Gait Training 15 minutes and Therapeutic Exercise 14 minutes    Treatment Type: Treatment  PT/PTA: PT     PTA Visit Number: 0     Daniela Taylor, PT  10/12/2020

## 2020-10-12 NOTE — PLAN OF CARE
10/12/20 1344   Post-Acute Status   Post-Acute Authorization Home Health   Home Health Status Pending Payor Medical Review   Discharge Delays None known at this time   Discharge Plan   Discharge Plan A Home with family;Home Health     RUDY faxed home health orders to [facility] [phone] requesting home health.

## 2020-10-12 NOTE — PLAN OF CARE
Problem: Physical Therapy Goal  Goal: Physical Therapy Goal  Description: Goals to be met by: 10/24/20     Patient will increase functional independence with mobility by performin. Supine to sit with Supervision  2. Rolling to Left and Right with Supervision  3. Sit to stand transfer with Supervision   4. Bed to chair transfer with Supervision   5. Gait >50 feet with Supervision using Rolling Walker  6. Lower extremity exercise program 3 sets x10 reps per handout, with supervision    Pt was alert, oriented, motivated, and cooperative with PT throughout tx session with the exception of a mild bout of confusion of what day it was (he thought it was  when it was Monday). He was able to ambulate and complete therex with minimal verbal and tactile cues throughout.    Outcome: Ongoing, Progressing

## 2020-10-12 NOTE — PLAN OF CARE
WRITTEN HEALTHCARE DISCHARGE INFORMATION     Things that YOU are RESPONSIBLE for to Manage Your Care At Home:    1. Getting your prescriptions filled.  2. Taking you medications as directed. DO NOT MISS ANY DOSES!  3. Going to your follow-up doctor appointments. This is important because it allows the doctor to monitor your progress and to determine if any changes need to be made to your treatment plan.    If you are unable to make your follow up appointments, please call the number listed and reschedule this appointment.     ____________HELP AT HOME____________________    Experiencing any SIGNS or SYMPTOMS: YOU CAN:     Schedule a same day appopintment with your Primary Care Doctor or  you can call [facility] On Call Nurse Care Line for 24/7 assistance at [phone]     If you are experience any signs or symptoms that have become severe, Call 911 and come to your nearest Emergency Room.     You should receive a call from [facility] Discharge Department within 48-72 hours to help manage your care after discharge. Please try to make sure that you answer your phone for this important phone call.    Thank you for choosing [facility] and allowing us to care for you.   From your care management team: Desiree Millan [facility], [phone]  Follow-up Information     Nic Mauro MD. Schedule an appointment as soon as possible for a visit in 1 week.    Specialty: Family Medicine  Why: Primary Care Follow-Up   Contact information:  [address] [address]  PRIMARY CARE [facility] [id number] [phone]

## 2020-10-12 NOTE — PT/OT/SLP PROGRESS
Occupational Therapy   Treatment    Name: Doni Theodore  MRN: [id number]  Admitting Diagnosis:  SIRS (systemic inflammatory response syndrome)       Recommendations:     Discharge Recommendations: home health OT  Discharge Equipment Recommendations:  none  Barriers to discharge:  None    Assessment:     Doni Theodore is a 80 y.o. male with a medical diagnosis of SIRS (systemic inflammatory response syndrome). Performance deficits affecting function are weakness, impaired endurance, impaired cognition, impaired self care skills, decreased safety awareness, impaired balance, gait instability, decreased upper extremity function, decreased ROM.     Pleasantly motivated throughout session. CGA/SBA for in-room functional mobility and standing ADLs at the sink. OT rec HHOT with family assist/sup at discharge to address home safety    Rehab Prognosis:  Good; patient would benefit from acute skilled OT services to address these deficits and reach maximum level of function.       Plan:     Patient to be seen (3-5x/week) to address the above listed problems via self-care/home management, therapeutic activities, therapeutic exercises  · Plan of Care Expires: 10/24/20  · Plan of Care Reviewed with: patient    Subjective     Chief complaint: ready to go home   Patient's comments/goals: didn't want to stay up in the chair     Pain/Comfort:  · Pain Rating 1: 0/10    Objective:     Patient found HOB elevated with bed alarm, peripheral IV, pulse ox (continuous), telemetry upon OT entry to room.    General Precautions: Standard, fall, hearing impaired, pacemaker, diabetic   Orthopedic Precautions:N/A   Braces: N/A     Occupational Performance:     Bed Mobility:    · Patient completed Scooting/Bridging with supervision  · Patient completed Supine to Sit with supervision  · Patient completed Sit to Supine with supervision     Functional Mobility/Transfers:  · Patient completed Sit <> Stand Transfer with contact guard assistance  with   no assistive device   · Functional Mobility: Pt completed in-room functional mobility with CGA/SBA with no DME.     Activities of Daily Living:  · Grooming: CGA/SBA for standing ADLs at the sink: brushing his hair, washing his face, putting on deodorant, and brushing his teeth        AMPA 6 Click ADL: 22    Treatment & Education:  · Pt re-educated on OT role/POC.   · Importance of OOB activity with staff assistance.  · Encouraged pt to sit up in the chair throughout the day, especially for meals   · Safety during functional t/f and mobility   · White board updated: level 3 progressive mobility: up to the chair/bathroom with staff   · Handouts provided and reviewed on home safety and energy conservation techniques  · Pt completed cognitive exercises standing to address dynamic standing tolerance as well;  · Pt completed 7 rounds of scanning items from L to R finding the error; pt made a mistake 25% of the time; prolonged time required   · Pt completed tracing shape with coordinates to work on visual scanning and attention; pt completed this with 100% accuracy   · OT left other handouts with exercises for pt to continue to practice   · OT attempted BUE HEP with red theraband seated EOB; however, pt had pain with shoulder horizontal abduction and elbow flexion with theraband so activity deferred to AROM seated EOB. Pt completed the following:   · 1x20 elbow flexion/extension AROM  · 1x15 external rotation AROM   · 1x15 shoulder horizontal abduction AROM   · Multiple self-care tasks/functional mobility completed- assistance level noted above   · All questions/concerns answered within OT scope of practice       Patient left HOB elevated with all lines intact, call button in reach, bed alarm on and nurseIsabel, notifiedEducation:  . OT provided paper with pt's home and daughter's cell number per pt requested. OT wrote down directions on how to use the hospital phone and set-up the call for pt. OT told nurse to keep  encouraging pt to sit up in the chair, especially for meals.     GOALS:   Multidisciplinary Problems     Occupational Therapy Goals        Problem: Occupational Therapy Goal    Goal Priority Disciplines Outcome Interventions   Occupational Therapy Goal     OT, PT/OT Ongoing, Progressing    Description: Goals to be met by: 10/24/20     Patient will increase functional independence with ADLs by performing:    LE Dressing with Supervision.  Grooming while standing at sink with Supervision.  Toileting from toilet with Supervision for hygiene and clothing management.   Supine to sit with Supervision.  Step transfer with Supervision  Toilet transfer to toilet with Supervision.  Upper extremity exercise program x15 reps per handout, with independence.                     Time Tracking:     OT Date of Treatment: 10/12/20  OT Start Time: 1058  OT Stop Time: 1137  OT Total Time (min): 39 min    Billable Minutes:Self Care/Home Management 15 min  Therapeutic Activity 15 min  Therapeutic Exercise 9 min  Total Time 39 min    Abril Veras OT  10/12/2020

## 2020-10-12 NOTE — PLAN OF CARE
10/12/20 1345   Medicare Message   Important Message from Medicare regarding Discharge Appeal Rights Given to patient/caregiver;Explained to patient/caregiver   Date IMM was signed 10/12/20   Time IMM was signed 1100     IMM completed with pt's daughter, Candie [phone].

## 2020-10-13 ENCOUNTER — PATIENT OUTREACH (OUTPATIENT)
Dept: ADMINISTRATIVE | Facility: CLINIC | Age: 80
End: 2020-10-13

## 2020-10-13 LAB
BACTERIA BLD CULT: ABNORMAL

## 2020-10-13 NOTE — PT/OT/SLP DISCHARGE
Occupational Therapy Discharge Summary    August Ewelina  MRN: [id number]   Principal Problem: SIRS (systemic inflammatory response syndrome)      Patient Discharged from acute Occupational Therapy on 10/12/20.  Please refer to prior OT notes for functional status.    Assessment:      Patient appropriate for care in another setting.    Objective:     GOALS:   Multidisciplinary Problems     Occupational Therapy Goals        Problem: Occupational Therapy Goal    Goal Priority Disciplines Outcome Interventions   Occupational Therapy Goal     OT, PT/OT Ongoing, Progressing    Description: Goals to be met by: 10/24/20     Patient will increase functional independence with ADLs by performing:    LE Dressing with Supervision.  Grooming while standing at sink with Supervision.  Toileting from toilet with Supervision for hygiene and clothing management.   Supine to sit with Supervision.  Step transfer with Supervision  Toilet transfer to toilet with Supervision.  Upper extremity exercise program x15 reps per handout, with independence.                     Reasons for Discontinuation of Therapy Services  Transfer to alternate level of care.      Plan:     Patient Discharged to: Home with Home Health Service    Abril Veras OT  10/13/2020

## 2020-10-13 NOTE — PROGRESS NOTES
C3 nurse attempted to contact patient. The following occurred:   C3 nurse attempted to contact Doni Theodore for a TCC post hospital discharge follow up call. The patient is unable to conduct the call @ this time. The patient requested a callback.    The patient does not have a scheduled HOSFU appointment within 7 days post hospital discharge date 10/12.

## 2020-10-14 LAB — BACTERIA BLD CULT: NORMAL

## 2020-10-14 NOTE — PHYSICIAN QUERY
PT Name: Doni Theodore  MR #: [id number]     Diagnosis Clarification      CDS/: Pati Manning RN              Contact information: [email or web address]    This form is a permanent document in the medical record.     Query Date: October 14, 2020    Dear Provider,  By submitting this query, we are merely seeking further clarification of documentation.  Please utilize your independent clinical judgment when addressing the question(s) below.     The medical record contains the following:    Supporting Clinical Information Location in Medical Record   Primary Diagnosis: Sepsis    Diagnosis POA    *Sepsis  Yes     Sepsis; unclear etiology  · Febrile, tachycardic, and mildly tachypneic upon initial presentation; recent hospitalization at [facility] reportedly for atrial fibrillation    CT angiogram of the chest performed without obvious evidence of infectious/pneumonia.  Although though he does have some atelectasis and bilateral pleural effusions.  Urinalysis shows many bacteria, but also squamous cells and only 2 wbc's.  Doubt UTI.  Blood and urine cultures were obtained.  Empiric antibiotics initiated with levofloxacin in the emergency department, however will continue with ceftriaxone given history of arrhythmia.  Low threshold to broaden antibiotic coverage should he remain febrile.      Maintain with oral amiodarone and restart beta-blocker when clinically appropriate and sepsis has resolved. Patient was previously on digoxin which was discontinued by home health.    High Risk Conditions:  Patient has a condition that poses threat to life and bodily function: Atrial fibrillation rapid ventricular response. Sepsis.     * SIRS (systemic inflammatory response syndrome)  -Mr. Theodore was admitted to inpatient status  -On admit had fever, tachycardia and tachypnea but no obvious source of infection.  -Lactic acid was elevated but normal now.  -WBC normal  ESR minimally elevated.  D-dimer  moderately elevated  Ferritin normal.  PCT normal.  -Flu negative.  Rapid covid negative.    -Doubt UTI as no symptoms.  UA showed many bacteria but few wbc and squamous cells.  Await culture.  -No cellulitis.  No headache or neck pain/stiffness.  No diarrhea.  -Possible early covid with false negative rapid test.  Continue covid isolation and repeat Covid PCR.  -Will continue rocephin for now pending urine culture.  (Noted PCN allergy but tolerating rocephin without difficulty, rash, redness or swelling).  Will stop vancomycin for now.  -OK to step down to the floor as no ICU needs at this time.    Vital Signs (24h Range):  Temp:  [98.2 °F (36.8 °C)-100.6 °F (38.1 °C)] 98.6 °F (37 °C)  Pulse:  [] 102  Resp:  [16-25] 19  SpO2:  [87 %-97 %] 92 %  BP: ()/(52-93) 143/75    Results for GISAIR, AUGUST (MRN [id number]) as of 10/14/2020 08:03   Ref. Range 10/10/2020 08:10   SARS-CoV2 (COVID-19) Qualitative PCR Latest Ref Range: Not Detected  Not Detected      Bacteriuria secondary to enterococcus              H/P  10/10 (Chichi)                                      Progress Note 10/10 (Marker)                                             Labs 10/10            D/C Summary  10/12 (Luigi)     Please clarify if the ______Sepsis  _____________________ diagnosis has been:    [  ] Ruled In   [  ] Ruled In, Now Resolved   [  ] Ruled Out   [  ] Other/Clarification of findings (please specify)_______________    [ x  ] Clinically undetermined           Please document in your progress notes daily for the duration of treatment, until resolved, and include in your discharge summary.

## 2020-10-15 LAB
BACTERIA BLD CULT: NORMAL
BACTERIA BLD CULT: NORMAL
